# Patient Record
Sex: FEMALE | Race: WHITE | NOT HISPANIC OR LATINO | Employment: FULL TIME | URBAN - METROPOLITAN AREA
[De-identification: names, ages, dates, MRNs, and addresses within clinical notes are randomized per-mention and may not be internally consistent; named-entity substitution may affect disease eponyms.]

---

## 2017-06-22 ENCOUNTER — CONVERSION ENCOUNTER (OUTPATIENT)
Dept: GENERAL RADIOLOGY | Facility: HOSPITAL | Age: 52
End: 2017-06-22

## 2018-03-15 ENCOUNTER — OFFICE VISIT CONVERTED (OUTPATIENT)
Dept: ORTHOPEDIC SURGERY | Facility: CLINIC | Age: 53
End: 2018-03-15
Attending: ORTHOPAEDIC SURGERY

## 2018-03-15 ENCOUNTER — CONVERSION ENCOUNTER (OUTPATIENT)
Dept: ORTHOPEDIC SURGERY | Facility: CLINIC | Age: 53
End: 2018-03-15

## 2018-06-07 ENCOUNTER — OFFICE VISIT CONVERTED (OUTPATIENT)
Dept: GASTROENTEROLOGY | Facility: CLINIC | Age: 53
End: 2018-06-07
Attending: PHYSICIAN ASSISTANT

## 2018-12-06 ENCOUNTER — CONVERSION ENCOUNTER (OUTPATIENT)
Dept: CARDIOLOGY | Facility: CLINIC | Age: 53
End: 2018-12-06

## 2018-12-06 ENCOUNTER — OFFICE VISIT CONVERTED (OUTPATIENT)
Dept: CARDIOLOGY | Facility: CLINIC | Age: 53
End: 2018-12-06
Attending: INTERNAL MEDICINE

## 2018-12-13 ENCOUNTER — OFFICE VISIT CONVERTED (OUTPATIENT)
Dept: GASTROENTEROLOGY | Facility: CLINIC | Age: 53
End: 2018-12-13
Attending: INTERNAL MEDICINE

## 2019-01-02 ENCOUNTER — HOSPITAL ENCOUNTER (OUTPATIENT)
Dept: GENERAL RADIOLOGY | Facility: HOSPITAL | Age: 54
Discharge: HOME | End: 2019-01-02

## 2019-01-27 ENCOUNTER — HOSPITAL ENCOUNTER (OUTPATIENT)
Dept: URGENT CARE | Facility: CLINIC | Age: 54
Discharge: HOME | End: 2019-01-27

## 2019-01-27 LAB — MONONUCLEOSIS TEST, QUAL: NEGATIVE

## 2019-01-29 LAB — BACTERIA SPEC AEROBE CULT: NORMAL

## 2019-03-20 ENCOUNTER — HOSPITAL ENCOUNTER (OUTPATIENT)
Dept: GENERAL RADIOLOGY | Facility: HOSPITAL | Age: 54
Discharge: HOME | End: 2019-03-20
Attending: NURSE PRACTITIONER

## 2019-06-13 ENCOUNTER — HOSPITAL ENCOUNTER (OUTPATIENT)
Dept: GENERAL RADIOLOGY | Facility: HOSPITAL | Age: 54
Discharge: HOME | End: 2019-06-13
Attending: NURSE PRACTITIONER

## 2019-06-20 ENCOUNTER — HOSPITAL ENCOUNTER (OUTPATIENT)
Dept: OTHER | Facility: HOSPITAL | Age: 54
Discharge: HOME | End: 2019-06-20
Attending: NURSE PRACTITIONER

## 2019-06-20 LAB
ALBUMIN SERPL-MCNC: 4.4 G/DL (ref 3.5–5)
ALBUMIN/GLOB SERPL: 1.8 {RATIO} (ref 1.4–2.6)
ALP SERPL-CCNC: 94 U/L (ref 53–141)
ALT SERPL-CCNC: 22 U/L (ref 10–40)
AMYLASE SERPL-CCNC: 79 U/L (ref 30–110)
ANION GAP SERPL CALC-SCNC: 16 MMOL/L (ref 8–19)
AST SERPL-CCNC: 22 U/L (ref 15–50)
BASOPHILS # BLD AUTO: 0.06 10*3/UL (ref 0–0.2)
BASOPHILS NFR BLD AUTO: 0.8 % (ref 0–3)
BILIRUB SERPL-MCNC: 0.35 MG/DL (ref 0.2–1.3)
BUN SERPL-MCNC: 15 MG/DL (ref 5–25)
BUN/CREAT SERPL: 15 {RATIO} (ref 6–20)
CALCIUM SERPL-MCNC: 9.7 MG/DL (ref 8.7–10.4)
CHLORIDE SERPL-SCNC: 101 MMOL/L (ref 99–111)
CONV ABS IMM GRAN: 0.03 10*3/UL (ref 0–0.2)
CONV CO2: 31 MMOL/L (ref 22–32)
CONV IMMATURE GRAN: 0.4 % (ref 0–1.8)
CONV TOTAL PROTEIN: 6.8 G/DL (ref 6.3–8.2)
CREAT UR-MCNC: 0.98 MG/DL (ref 0.5–0.9)
DEPRECATED RDW RBC AUTO: 45.9 FL (ref 36.4–46.3)
EOSINOPHIL # BLD AUTO: 0.14 10*3/UL (ref 0–0.7)
EOSINOPHIL # BLD AUTO: 1.9 % (ref 0–7)
ERYTHROCYTE [DISTWIDTH] IN BLOOD BY AUTOMATED COUNT: 13.2 % (ref 11.7–14.4)
GFR SERPLBLD BASED ON 1.73 SQ M-ARVRAT: >60 ML/MIN/{1.73_M2}
GLOBULIN UR ELPH-MCNC: 2.4 G/DL (ref 2–3.5)
GLUCOSE SERPL-MCNC: 86 MG/DL (ref 65–99)
HBA1C MFR BLD: 14.1 G/DL (ref 12–16)
HCT VFR BLD AUTO: 44.7 % (ref 37–47)
LIPASE SERPL-CCNC: 51 U/L (ref 5–51)
LYMPHOCYTES # BLD AUTO: 1.84 10*3/UL (ref 1–5)
MCH RBC QN AUTO: 30 PG (ref 27–31)
MCHC RBC AUTO-ENTMCNC: 31.5 G/DL (ref 33–37)
MCV RBC AUTO: 95.1 FL (ref 81–99)
MONOCYTES # BLD AUTO: 0.69 10*3/UL (ref 0.2–1.2)
MONOCYTES NFR BLD AUTO: 9.4 % (ref 3–10)
NEUTROPHILS # BLD AUTO: 4.59 10*3/UL (ref 2–8)
NEUTROPHILS NFR BLD AUTO: 62.5 % (ref 30–85)
NRBC CBCN: 0 % (ref 0–0.7)
OSMOLALITY SERPL CALC.SUM OF ELEC: 296 MOSM/KG (ref 273–304)
PLATELET # BLD AUTO: 283 10*3/UL (ref 130–400)
PMV BLD AUTO: 10.4 FL (ref 9.4–12.3)
POTASSIUM SERPL-SCNC: 4.6 MMOL/L (ref 3.5–5.3)
RBC # BLD AUTO: 4.7 10*6/UL (ref 4.2–5.4)
SODIUM SERPL-SCNC: 143 MMOL/L (ref 135–147)
VARIANT LYMPHS NFR BLD MANUAL: 25 % (ref 20–45)
WBC # BLD AUTO: 7.35 10*3/UL (ref 4.8–10.8)

## 2019-06-21 ENCOUNTER — HOSPITAL ENCOUNTER (OUTPATIENT)
Dept: ULTRASOUND IMAGING | Facility: HOSPITAL | Age: 54
Discharge: HOME | End: 2019-06-21
Attending: FAMILY MEDICINE

## 2019-06-22 LAB — BACTERIA UR CULT: NORMAL

## 2019-09-26 ENCOUNTER — HOSPITAL ENCOUNTER (OUTPATIENT)
Dept: OTHER | Facility: HOSPITAL | Age: 54
Discharge: HOME | End: 2019-09-26

## 2019-09-26 LAB
ALBUMIN SERPL-MCNC: 4.5 G/DL (ref 3.5–5)
ALBUMIN/GLOB SERPL: 1.7 {RATIO} (ref 1.4–2.6)
ALP SERPL-CCNC: 101 U/L (ref 53–141)
ALT SERPL-CCNC: 19 U/L (ref 10–40)
ANION GAP SERPL CALC-SCNC: 18 MMOL/L (ref 8–19)
AST SERPL-CCNC: 17 U/L (ref 15–50)
BASOPHILS # BLD AUTO: 0.05 10*3/UL (ref 0–0.2)
BASOPHILS NFR BLD AUTO: 0.9 % (ref 0–3)
BILIRUB SERPL-MCNC: 0.59 MG/DL (ref 0.2–1.3)
BUN SERPL-MCNC: 19 MG/DL (ref 5–25)
BUN/CREAT SERPL: 22 {RATIO} (ref 6–20)
CALCIUM SERPL-MCNC: 9.6 MG/DL (ref 8.7–10.4)
CHLORIDE SERPL-SCNC: 104 MMOL/L (ref 99–111)
CHOLEST SERPL-MCNC: 257 MG/DL (ref 107–200)
CHOLEST/HDLC SERPL: 4.4 {RATIO} (ref 3–6)
CONV ABS IMM GRAN: 0.02 10*3/UL (ref 0–0.2)
CONV CO2: 24 MMOL/L (ref 22–32)
CONV IMMATURE GRAN: 0.4 % (ref 0–1.8)
CONV TOTAL PROTEIN: 7.1 G/DL (ref 6.3–8.2)
CREAT UR-MCNC: 0.86 MG/DL (ref 0.5–0.9)
DEPRECATED RDW RBC AUTO: 40.4 FL (ref 36.4–46.3)
EOSINOPHIL # BLD AUTO: 0.09 10*3/UL (ref 0–0.7)
EOSINOPHIL # BLD AUTO: 1.7 % (ref 0–7)
ERYTHROCYTE [DISTWIDTH] IN BLOOD BY AUTOMATED COUNT: 12.5 % (ref 11.7–14.4)
GFR SERPLBLD BASED ON 1.73 SQ M-ARVRAT: >60 ML/MIN/{1.73_M2}
GLOBULIN UR ELPH-MCNC: 2.6 G/DL (ref 2–3.5)
GLUCOSE SERPL-MCNC: 95 MG/DL (ref 65–99)
HCT VFR BLD AUTO: 40.1 % (ref 37–47)
HDLC SERPL-MCNC: 58 MG/DL (ref 40–60)
HGB BLD-MCNC: 13.4 G/DL (ref 12–16)
LDLC SERPL CALC-MCNC: 181 MG/DL (ref 70–100)
LYMPHOCYTES # BLD AUTO: 1.47 10*3/UL (ref 1–5)
LYMPHOCYTES NFR BLD AUTO: 27.2 % (ref 20–45)
MCH RBC QN AUTO: 29.5 PG (ref 27–31)
MCHC RBC AUTO-ENTMCNC: 33.4 G/DL (ref 33–37)
MCV RBC AUTO: 88.3 FL (ref 81–99)
MONOCYTES # BLD AUTO: 0.49 10*3/UL (ref 0.2–1.2)
MONOCYTES NFR BLD AUTO: 9.1 % (ref 3–10)
NEUTROPHILS # BLD AUTO: 3.28 10*3/UL (ref 2–8)
NEUTROPHILS NFR BLD AUTO: 60.7 % (ref 30–85)
NRBC CBCN: 0 % (ref 0–0.7)
OSMOLALITY SERPL CALC.SUM OF ELEC: 296 MOSM/KG (ref 273–304)
PLATELET # BLD AUTO: 279 10*3/UL (ref 130–400)
PMV BLD AUTO: 10 FL (ref 9.4–12.3)
POTASSIUM SERPL-SCNC: 3.7 MMOL/L (ref 3.5–5.3)
RBC # BLD AUTO: 4.54 10*6/UL (ref 4.2–5.4)
SODIUM SERPL-SCNC: 142 MMOL/L (ref 135–147)
TRIGL SERPL-MCNC: 92 MG/DL (ref 40–150)
TSH SERPL-ACNC: 2.17 M[IU]/L (ref 0.27–4.2)
VLDLC SERPL-MCNC: 18 MG/DL (ref 5–37)
WBC # BLD AUTO: 5.4 10*3/UL (ref 4.8–10.8)

## 2019-12-06 ENCOUNTER — HOSPITAL ENCOUNTER (OUTPATIENT)
Dept: OTHER | Facility: HOSPITAL | Age: 54
Discharge: HOME | End: 2019-12-06
Attending: INTERNAL MEDICINE

## 2019-12-06 LAB
ALBUMIN SERPL-MCNC: 4.2 G/DL (ref 3.5–5)
ALBUMIN/GLOB SERPL: 1.5 {RATIO} (ref 1.4–2.6)
ALP SERPL-CCNC: 96 U/L (ref 53–141)
ALT SERPL-CCNC: 16 U/L (ref 10–40)
ANION GAP SERPL CALC-SCNC: 16 MMOL/L (ref 8–19)
AST SERPL-CCNC: 15 U/L (ref 15–50)
BILIRUB SERPL-MCNC: 0.41 MG/DL (ref 0.2–1.3)
BUN SERPL-MCNC: 15 MG/DL (ref 5–25)
BUN/CREAT SERPL: 20 {RATIO} (ref 6–20)
CALCIUM SERPL-MCNC: 9.3 MG/DL (ref 8.7–10.4)
CHLORIDE SERPL-SCNC: 104 MMOL/L (ref 99–111)
CHOLEST SERPL-MCNC: 227 MG/DL (ref 107–200)
CHOLEST/HDLC SERPL: 4 {RATIO} (ref 3–6)
CONV CO2: 26 MMOL/L (ref 22–32)
CONV TOTAL PROTEIN: 7 G/DL (ref 6.3–8.2)
CREAT UR-MCNC: 0.76 MG/DL (ref 0.5–0.9)
GFR SERPLBLD BASED ON 1.73 SQ M-ARVRAT: >60 ML/MIN/{1.73_M2}
GLOBULIN UR ELPH-MCNC: 2.8 G/DL (ref 2–3.5)
GLUCOSE SERPL-MCNC: 93 MG/DL (ref 65–99)
HDLC SERPL-MCNC: 57 MG/DL (ref 40–60)
LDLC SERPL CALC-MCNC: 146 MG/DL (ref 70–100)
OSMOLALITY SERPL CALC.SUM OF ELEC: 295 MOSM/KG (ref 273–304)
POTASSIUM SERPL-SCNC: 3.8 MMOL/L (ref 3.5–5.3)
SODIUM SERPL-SCNC: 142 MMOL/L (ref 135–147)
TRIGL SERPL-MCNC: 120 MG/DL (ref 40–150)
VLDLC SERPL-MCNC: 24 MG/DL (ref 5–37)

## 2019-12-08 ENCOUNTER — HOSPITAL ENCOUNTER (OUTPATIENT)
Dept: URGENT CARE | Facility: CLINIC | Age: 54
Discharge: HOME | End: 2019-12-08
Attending: EMERGENCY MEDICINE

## 2019-12-09 ENCOUNTER — OFFICE VISIT CONVERTED (OUTPATIENT)
Dept: CARDIOLOGY | Facility: CLINIC | Age: 54
End: 2019-12-09
Attending: INTERNAL MEDICINE

## 2019-12-10 LAB — BACTERIA SPEC AEROBE CULT: NORMAL

## 2019-12-20 ENCOUNTER — HOSPITAL ENCOUNTER (OUTPATIENT)
Dept: OTHER | Facility: HOSPITAL | Age: 54
Discharge: HOME | End: 2019-12-20
Attending: NURSE PRACTITIONER

## 2019-12-20 ENCOUNTER — OFFICE VISIT CONVERTED (OUTPATIENT)
Dept: PULMONOLOGY | Facility: CLINIC | Age: 54
End: 2019-12-20
Attending: NURSE PRACTITIONER

## 2019-12-20 LAB
ALBUMIN SERPL-MCNC: 4 G/DL (ref 3.5–5)
ALBUMIN/GLOB SERPL: 1.6 {RATIO} (ref 1.4–2.6)
ALP SERPL-CCNC: 115 U/L (ref 53–141)
ALT SERPL-CCNC: 20 U/L (ref 10–40)
ANION GAP SERPL CALC-SCNC: 15 MMOL/L (ref 8–19)
AST SERPL-CCNC: 16 U/L (ref 15–50)
BASOPHILS # BLD AUTO: 0.07 10*3/UL (ref 0–0.2)
BASOPHILS NFR BLD AUTO: 0.8 % (ref 0–3)
BILIRUB SERPL-MCNC: 0.26 MG/DL (ref 0.2–1.3)
BUN SERPL-MCNC: 18 MG/DL (ref 5–25)
BUN/CREAT SERPL: 20 {RATIO} (ref 6–20)
CALCIUM SERPL-MCNC: 9.2 MG/DL (ref 8.7–10.4)
CHLORIDE SERPL-SCNC: 101 MMOL/L (ref 99–111)
CONV ABS IMM GRAN: 0.04 10*3/UL (ref 0–0.2)
CONV CO2: 29 MMOL/L (ref 22–32)
CONV IMMATURE GRAN: 0.4 % (ref 0–1.8)
CONV TOTAL PROTEIN: 6.5 G/DL (ref 6.3–8.2)
CREAT UR-MCNC: 0.89 MG/DL (ref 0.5–0.9)
DEPRECATED RDW RBC AUTO: 42.3 FL (ref 36.4–46.3)
EOSINOPHIL # BLD AUTO: 0.09 10*3/UL (ref 0–0.7)
EOSINOPHIL # BLD AUTO: 1 % (ref 0–7)
ERYTHROCYTE [DISTWIDTH] IN BLOOD BY AUTOMATED COUNT: 12.8 % (ref 11.7–14.4)
GFR SERPLBLD BASED ON 1.73 SQ M-ARVRAT: >60 ML/MIN/{1.73_M2}
GLOBULIN UR ELPH-MCNC: 2.5 G/DL (ref 2–3.5)
GLUCOSE SERPL-MCNC: 97 MG/DL (ref 65–99)
HCT VFR BLD AUTO: 41.7 % (ref 37–47)
HGB BLD-MCNC: 13.5 G/DL (ref 12–16)
LYMPHOCYTES # BLD AUTO: 2.95 10*3/UL (ref 1–5)
LYMPHOCYTES NFR BLD AUTO: 31.8 % (ref 20–45)
MCH RBC QN AUTO: 29.1 PG (ref 27–31)
MCHC RBC AUTO-ENTMCNC: 32.4 G/DL (ref 33–37)
MCV RBC AUTO: 89.9 FL (ref 81–99)
MONOCYTES # BLD AUTO: 0.87 10*3/UL (ref 0.2–1.2)
MONOCYTES NFR BLD AUTO: 9.4 % (ref 3–10)
NEUTROPHILS # BLD AUTO: 5.25 10*3/UL (ref 2–8)
NEUTROPHILS NFR BLD AUTO: 56.6 % (ref 30–85)
NRBC CBCN: 0 % (ref 0–0.7)
OSMOLALITY SERPL CALC.SUM OF ELEC: 292 MOSM/KG (ref 273–304)
PLATELET # BLD AUTO: 296 10*3/UL (ref 130–400)
PMV BLD AUTO: 9.6 FL (ref 9.4–12.3)
POTASSIUM SERPL-SCNC: 4.5 MMOL/L (ref 3.5–5.3)
RBC # BLD AUTO: 4.64 10*6/UL (ref 4.2–5.4)
SODIUM SERPL-SCNC: 140 MMOL/L (ref 135–147)
WBC # BLD AUTO: 9.27 10*3/UL (ref 4.8–10.8)

## 2019-12-28 LAB — IGE SERPL-ACNC: 7 K[IU]/ML (ref 0–24)

## 2020-01-03 ENCOUNTER — HOSPITAL ENCOUNTER (OUTPATIENT)
Dept: CARDIOLOGY | Facility: HOSPITAL | Age: 55
Discharge: HOME | End: 2020-01-03
Attending: NURSE PRACTITIONER

## 2020-01-13 ENCOUNTER — OFFICE VISIT CONVERTED (OUTPATIENT)
Dept: PULMONOLOGY | Facility: CLINIC | Age: 55
End: 2020-01-13
Attending: INTERNAL MEDICINE

## 2020-02-26 ENCOUNTER — HOSPITAL ENCOUNTER (OUTPATIENT)
Dept: OTHER | Facility: HOSPITAL | Age: 55
Discharge: HOME | End: 2020-02-26
Attending: NURSE PRACTITIONER

## 2020-02-26 LAB
T4 FREE SERPL-MCNC: 1.3 NG/DL (ref 0.9–1.8)
TSH SERPL-ACNC: 2.76 M[IU]/L (ref 0.27–4.2)

## 2020-03-04 ENCOUNTER — HOSPITAL ENCOUNTER (OUTPATIENT)
Dept: OTHER | Facility: HOSPITAL | Age: 55
Discharge: HOME | End: 2020-03-04
Attending: PODIATRIST

## 2020-03-04 LAB
ALBUMIN SERPL-MCNC: 3.9 G/DL (ref 3.5–5)
ALBUMIN/GLOB SERPL: 1.4 {RATIO} (ref 1.4–2.6)
ALP SERPL-CCNC: 94 U/L (ref 53–141)
ALT SERPL-CCNC: 23 U/L (ref 10–40)
ANION GAP SERPL CALC-SCNC: 15 MMOL/L (ref 8–19)
AST SERPL-CCNC: 16 U/L (ref 15–50)
BILIRUB SERPL-MCNC: 0.83 MG/DL (ref 0.2–1.3)
BILIRUB SERPL-MCNC: 0.86 MG/DL (ref 0.2–1.3)
BUN SERPL-MCNC: 18 MG/DL (ref 5–25)
BUN/CREAT SERPL: 22 {RATIO} (ref 6–20)
CALCIUM SERPL-MCNC: 8.9 MG/DL (ref 8.7–10.4)
CHLORIDE SERPL-SCNC: 101 MMOL/L (ref 99–111)
CHOLEST SERPL-MCNC: 174 MG/DL (ref 107–200)
CHOLEST/HDLC SERPL: 2.4 {RATIO} (ref 3–6)
CONV BILI, CONJUGATED: <0.2 MG/DL (ref 0–0.6)
CONV CO2: 26 MMOL/L (ref 22–32)
CONV TOTAL PROTEIN: 6.7 G/DL (ref 6.3–8.2)
CONV UNCONJUGATED BILIRUBIN: 0.7 MG/DL (ref 0–1.1)
CREAT UR-MCNC: 0.82 MG/DL (ref 0.5–0.9)
GFR SERPLBLD BASED ON 1.73 SQ M-ARVRAT: >60 ML/MIN/{1.73_M2}
GLOBULIN UR ELPH-MCNC: 2.8 G/DL (ref 2–3.5)
GLUCOSE SERPL-MCNC: 97 MG/DL (ref 65–99)
HDLC SERPL-MCNC: 72 MG/DL (ref 40–60)
LDLC SERPL CALC-MCNC: 90 MG/DL (ref 70–100)
OSMOLALITY SERPL CALC.SUM OF ELEC: 286 MOSM/KG (ref 273–304)
POTASSIUM SERPL-SCNC: 4.6 MMOL/L (ref 3.5–5.3)
SODIUM SERPL-SCNC: 137 MMOL/L (ref 135–147)
TRIGL SERPL-MCNC: 60 MG/DL (ref 40–150)
VLDLC SERPL-MCNC: 12 MG/DL (ref 5–37)

## 2020-03-05 ENCOUNTER — OFFICE VISIT CONVERTED (OUTPATIENT)
Dept: PULMONOLOGY | Facility: CLINIC | Age: 55
End: 2020-03-05
Attending: INTERNAL MEDICINE

## 2020-08-18 ENCOUNTER — HOSPITAL ENCOUNTER (OUTPATIENT)
Dept: OTHER | Facility: HOSPITAL | Age: 55
Discharge: HOME | End: 2020-08-18
Attending: INTERNAL MEDICINE

## 2020-08-18 LAB
ALBUMIN SERPL-MCNC: 4.3 G/DL (ref 3.5–5)
ALBUMIN/GLOB SERPL: 1.7 {RATIO} (ref 1.4–2.6)
ALP SERPL-CCNC: 100 U/L (ref 53–141)
ALT SERPL-CCNC: 22 U/L (ref 10–40)
ANION GAP SERPL CALC-SCNC: 21 MMOL/L (ref 8–19)
AST SERPL-CCNC: 21 U/L (ref 15–50)
BILIRUB SERPL-MCNC: 0.76 MG/DL (ref 0.2–1.3)
BUN SERPL-MCNC: 16 MG/DL (ref 5–25)
BUN/CREAT SERPL: 18 {RATIO} (ref 6–20)
CALCIUM SERPL-MCNC: 9.8 MG/DL (ref 8.7–10.4)
CHLORIDE SERPL-SCNC: 108 MMOL/L (ref 99–111)
CHOLEST SERPL-MCNC: 167 MG/DL (ref 107–200)
CHOLEST/HDLC SERPL: 2.7 {RATIO} (ref 3–6)
CONV CO2: 22 MMOL/L (ref 22–32)
CONV TOTAL PROTEIN: 6.8 G/DL (ref 6.3–8.2)
CREAT UR-MCNC: 0.89 MG/DL (ref 0.5–0.9)
GFR SERPLBLD BASED ON 1.73 SQ M-ARVRAT: >60 ML/MIN/{1.73_M2}
GLOBULIN UR ELPH-MCNC: 2.5 G/DL (ref 2–3.5)
GLUCOSE SERPL-MCNC: 91 MG/DL (ref 65–99)
HDLC SERPL-MCNC: 61 MG/DL (ref 40–60)
LDLC SERPL CALC-MCNC: 88 MG/DL (ref 70–100)
OSMOLALITY SERPL CALC.SUM OF ELEC: 305 MOSM/KG (ref 273–304)
POTASSIUM SERPL-SCNC: 4.1 MMOL/L (ref 3.5–5.3)
SODIUM SERPL-SCNC: 147 MMOL/L (ref 135–147)
TRIGL SERPL-MCNC: 89 MG/DL (ref 40–150)
VLDLC SERPL-MCNC: 18 MG/DL (ref 5–37)

## 2020-08-19 ENCOUNTER — OFFICE VISIT CONVERTED (OUTPATIENT)
Dept: CARDIOLOGY | Facility: CLINIC | Age: 55
End: 2020-08-19
Attending: INTERNAL MEDICINE

## 2020-09-23 ENCOUNTER — HOSPITAL ENCOUNTER (OUTPATIENT)
Dept: OTHER | Facility: HOSPITAL | Age: 55
Discharge: HOME | End: 2020-09-23

## 2020-09-23 LAB
ALBUMIN SERPL-MCNC: 4.5 G/DL (ref 3.5–5)
ALBUMIN/GLOB SERPL: 1.7 {RATIO} (ref 1.4–2.6)
ALP SERPL-CCNC: 95 U/L (ref 53–141)
ALT SERPL-CCNC: 20 U/L (ref 10–40)
ANION GAP SERPL CALC-SCNC: 14 MMOL/L (ref 8–19)
AST SERPL-CCNC: 21 U/L (ref 15–50)
BASOPHILS # BLD AUTO: 0.05 10*3/UL (ref 0–0.2)
BASOPHILS NFR BLD AUTO: 0.7 % (ref 0–3)
BILIRUB SERPL-MCNC: 0.6 MG/DL (ref 0.2–1.3)
BUN SERPL-MCNC: 15 MG/DL (ref 5–25)
BUN/CREAT SERPL: 17 {RATIO} (ref 6–20)
CALCIUM SERPL-MCNC: 9.1 MG/DL (ref 8.7–10.4)
CHLORIDE SERPL-SCNC: 102 MMOL/L (ref 99–111)
CHOLEST SERPL-MCNC: 226 MG/DL (ref 107–200)
CHOLEST/HDLC SERPL: 3.8 {RATIO} (ref 3–6)
CONV ABS IMM GRAN: 0.02 10*3/UL (ref 0–0.2)
CONV CO2: 28 MMOL/L (ref 22–32)
CONV IMMATURE GRAN: 0.3 % (ref 0–1.8)
CONV TOTAL PROTEIN: 7.1 G/DL (ref 6.3–8.2)
CREAT UR-MCNC: 0.9 MG/DL (ref 0.5–0.9)
DEPRECATED RDW RBC AUTO: 42 FL (ref 36.4–46.3)
EOSINOPHIL # BLD AUTO: 0.11 10*3/UL (ref 0–0.7)
EOSINOPHIL # BLD AUTO: 1.4 % (ref 0–7)
ERYTHROCYTE [DISTWIDTH] IN BLOOD BY AUTOMATED COUNT: 12.8 % (ref 11.7–14.4)
GFR SERPLBLD BASED ON 1.73 SQ M-ARVRAT: >60 ML/MIN/{1.73_M2}
GLOBULIN UR ELPH-MCNC: 2.6 G/DL (ref 2–3.5)
GLUCOSE SERPL-MCNC: 95 MG/DL (ref 65–99)
HCT VFR BLD AUTO: 42.6 % (ref 37–47)
HDLC SERPL-MCNC: 60 MG/DL (ref 40–60)
HGB BLD-MCNC: 14 G/DL (ref 12–16)
LDLC SERPL CALC-MCNC: 139 MG/DL (ref 70–100)
LYMPHOCYTES # BLD AUTO: 2.36 10*3/UL (ref 1–5)
LYMPHOCYTES NFR BLD AUTO: 31 % (ref 20–45)
MCH RBC QN AUTO: 29.2 PG (ref 27–31)
MCHC RBC AUTO-ENTMCNC: 32.9 G/DL (ref 33–37)
MCV RBC AUTO: 88.8 FL (ref 81–99)
MONOCYTES # BLD AUTO: 0.7 10*3/UL (ref 0.2–1.2)
MONOCYTES NFR BLD AUTO: 9.2 % (ref 3–10)
NEUTROPHILS # BLD AUTO: 4.37 10*3/UL (ref 2–8)
NEUTROPHILS NFR BLD AUTO: 57.4 % (ref 30–85)
NRBC CBCN: 0 % (ref 0–0.7)
OSMOLALITY SERPL CALC.SUM OF ELEC: 291 MOSM/KG (ref 273–304)
PLATELET # BLD AUTO: 266 10*3/UL (ref 130–400)
PMV BLD AUTO: 9.6 FL (ref 9.4–12.3)
POTASSIUM SERPL-SCNC: 4.2 MMOL/L (ref 3.5–5.3)
RBC # BLD AUTO: 4.8 10*6/UL (ref 4.2–5.4)
SODIUM SERPL-SCNC: 140 MMOL/L (ref 135–147)
TRIGL SERPL-MCNC: 137 MG/DL (ref 40–150)
TSH SERPL-ACNC: 2.68 M[IU]/L (ref 0.27–4.2)
VLDLC SERPL-MCNC: 27 MG/DL (ref 5–37)
WBC # BLD AUTO: 7.61 10*3/UL (ref 4.8–10.8)

## 2021-04-16 ENCOUNTER — HOSPITAL ENCOUNTER (OUTPATIENT)
Dept: INTERNAL MEDICINE | Facility: CLINIC | Age: 56
Discharge: HOME | End: 2021-04-16
Attending: INTERNAL MEDICINE

## 2021-04-16 ENCOUNTER — OFFICE VISIT CONVERTED (OUTPATIENT)
Dept: INTERNAL MEDICINE | Facility: CLINIC | Age: 56
End: 2021-04-16
Attending: INTERNAL MEDICINE

## 2021-04-16 LAB
25(OH)D3 SERPL-MCNC: 16.3 NG/ML (ref 30–100)
ALBUMIN SERPL-MCNC: 4.2 G/DL (ref 3.5–5)
ALBUMIN/GLOB SERPL: 1.5 {RATIO} (ref 1.4–2.6)
ALP SERPL-CCNC: 83 U/L (ref 53–141)
ALT SERPL-CCNC: 18 U/L (ref 10–40)
ANION GAP SERPL CALC-SCNC: 14 MMOL/L (ref 8–19)
AST SERPL-CCNC: 21 U/L (ref 15–50)
BASOPHILS # BLD AUTO: 0.06 10*3/UL (ref 0–0.2)
BASOPHILS NFR BLD AUTO: 1.1 % (ref 0–3)
BILIRUB SERPL-MCNC: 0.67 MG/DL (ref 0.2–1.3)
BUN SERPL-MCNC: 14 MG/DL (ref 5–25)
BUN/CREAT SERPL: 16 {RATIO} (ref 6–20)
CALCIUM SERPL-MCNC: 9.4 MG/DL (ref 8.7–10.4)
CHLORIDE SERPL-SCNC: 104 MMOL/L (ref 99–111)
CHOLEST SERPL-MCNC: 239 MG/DL (ref 107–200)
CHOLEST/HDLC SERPL: 4.2 {RATIO} (ref 3–6)
CONV ABS IMM GRAN: 0.01 10*3/UL (ref 0–0.2)
CONV CO2: 26 MMOL/L (ref 22–32)
CONV IMMATURE GRAN: 0.2 % (ref 0–1.8)
CONV TOTAL PROTEIN: 7 G/DL (ref 6.3–8.2)
CREAT UR-MCNC: 0.88 MG/DL (ref 0.5–0.9)
DEPRECATED RDW RBC AUTO: 42.3 FL (ref 36.4–46.3)
EOSINOPHIL # BLD AUTO: 0.15 10*3/UL (ref 0–0.7)
EOSINOPHIL # BLD AUTO: 2.8 % (ref 0–7)
ERYTHROCYTE [DISTWIDTH] IN BLOOD BY AUTOMATED COUNT: 13.1 % (ref 11.7–14.4)
GFR SERPLBLD BASED ON 1.73 SQ M-ARVRAT: >60 ML/MIN/{1.73_M2}
GLOBULIN UR ELPH-MCNC: 2.8 G/DL (ref 2–3.5)
GLUCOSE SERPL-MCNC: 93 MG/DL (ref 65–99)
HCT VFR BLD AUTO: 43.1 % (ref 37–47)
HDLC SERPL-MCNC: 57 MG/DL (ref 40–60)
HGB BLD-MCNC: 14 G/DL (ref 12–16)
LDLC SERPL CALC-MCNC: 156 MG/DL (ref 70–100)
LYMPHOCYTES # BLD AUTO: 1.11 10*3/UL (ref 1–5)
LYMPHOCYTES NFR BLD AUTO: 20.9 % (ref 20–45)
MCH RBC QN AUTO: 28.6 PG (ref 27–31)
MCHC RBC AUTO-ENTMCNC: 32.5 G/DL (ref 33–37)
MCV RBC AUTO: 88.1 FL (ref 81–99)
MONOCYTES # BLD AUTO: 0.56 10*3/UL (ref 0.2–1.2)
MONOCYTES NFR BLD AUTO: 10.5 % (ref 3–10)
NEUTROPHILS # BLD AUTO: 3.43 10*3/UL (ref 2–8)
NEUTROPHILS NFR BLD AUTO: 64.5 % (ref 30–85)
NRBC CBCN: 0 % (ref 0–0.7)
OSMOLALITY SERPL CALC.SUM OF ELEC: 288 MOSM/KG (ref 273–304)
PLATELET # BLD AUTO: 275 10*3/UL (ref 130–400)
PMV BLD AUTO: 10.6 FL (ref 9.4–12.3)
POTASSIUM SERPL-SCNC: 4.6 MMOL/L (ref 3.5–5.3)
RBC # BLD AUTO: 4.89 10*6/UL (ref 4.2–5.4)
SODIUM SERPL-SCNC: 139 MMOL/L (ref 135–147)
TRIGL SERPL-MCNC: 129 MG/DL (ref 40–150)
TSH SERPL-ACNC: 0.7 M[IU]/L (ref 0.27–4.2)
VLDLC SERPL-MCNC: 26 MG/DL (ref 5–37)
WBC # BLD AUTO: 5.32 10*3/UL (ref 4.8–10.8)

## 2021-05-11 NOTE — H&P
History and Physical      Patient Name: Mariama Turner   Patient ID: [id number]   Sex: Female   YOB: 1965    Primary Care Provider: James Araiza MD    Visit Date: April 16, 2021    Provider: Usman Castillo MD   Location: [facility]   Location Address: [address] [place] [address]; [address]  [place], [place]  [address]   Location Phone: [phone]          Chief Complaint  · Est Care      History Of Present Illness  Mariama Turner is a 55 year old /White female who presents for evaluation and treatment of:      Pt states she has concerns in regards to her thyroid. pt also with Night sweats, weight gain, right flank pain. pt reports right flank pain seems different than renal stones in the past. pt reports night sweats for last 1-2 months where she has to change clothes at night.  pt has been on estrogen patch since 2011. pt has tried taken a break from medication, but get hot flashes. pt has not seen OB in several years.    aortic stenosis- pt is being monitored by cardiology.   palpitations- pt is on atenolol to help control.   hypothyroid- due for recheck. pt has been taking 25mcg for about 4 years.   anxiety- pt report taking it most days, but does forget it intermittently. pt denies HI and SI.   HLD- pt reports having bad pains in her feet that she thinks may be related to statin. pt has previously tried pravastatin without much help. doing well on zetia.   pt will take ultram intermittently for hand pain and twitching. pt does not recall when she took it last  ADD- pt would take Adderall once daily when she goes to work. pt notices that she does focus better with Adderall. pt reports having some side effects with Adderall.   vit D def due for recheck  mammo- due  pap- 1.5 years ago, h/o cervical adenocarcinoma s/p loop in 1993. pt reports neg paps since then  colon Ca- last done 3 years ago and due now.   +FamHx of CAD         Allergy  "List    Allergies Reconciled  Vitals  Date Time BP Position Site L\R Cuff Size HR RR TEMP (F) WT  HT  BMI kg/m2 BSA m2 O2 Sat FR L/min FiO2 HC       08/19/2020 03:17 /80 Sitting    86 - R   174lbs 0oz 5'  4\" 29.87 1.89       04/16/2021 01:26 /79 Sitting    76 - R  97.9 179lbs 0oz 5'  4\" 30.72 1.91 99 %  21%          Physical Examination  · Constitutional  o Appearance  o : no acute distress, well-nourished  · Head and Face  o Head  o :   § Inspection  § : atraumatic, normocephalic  · Eyes  o Eyes  o : extraocular movements intact, no scleral icterus, no conjunctival injection  · Ears, Nose, Mouth and Throat  o Ears  o :   § External Ears  § : normal  o Nose  o :   § Intranasal Exam  § : nares patent  o Oral Cavity  o :   § Oral Mucosa  § : moist mucous membranes  · Neck  o Thyroid  o : gland size normal, nontender, no nodules or masses present on palpation, symmetric  · Respiratory  o Respiratory Effort  o : breathing comfortably, symmetric chest rise  o Auscultation of Lungs  o : clear to asculatation bilaterally, no wheezes, rales, or rhonchii  · Cardiovascular  o Heart  o :   § Auscultation of Heart  § : regular rate and rhythm, no murmurs, rubs, or gallops  o Peripheral Vascular System  o :   § Extremities  § : no edema  · Lymphatic  o Neck  o : no lymphadenopathy present  o Supraclavicular Nodes  o : no supraclavicular nodes  · Neurologic  o Mental Status Examination  o :   § Orientation  § : grossly oriented to person, place and time  o Gait and Station  o :   § Gait Screening  § : normal gait  · Psychiatric  o General  o : normal mood and affect              Assessment  · Screening for colon cancer     V76.51/Z12.11  · Visit for screening mammogram     V76.12/Z12.31  · Hyperlipidemia     272.4/E78.5  cont livalo and zetia. check lipids. f/u with cards  · Hypothyroidism     244.9/E03.9  check TSH. may DC Synthroid if normal given small dose.  · Vitamin D deficiency     268.9/E55.9  check " level  · Right flank pain     789.09/R10.9  check urine dip to further eval.   · Night sweats     780.8/R61  unknown etiology. may be from hormone use? will check labs.   · Aortic stenosis     424.1/I35.0  cont f/u with cardiology. reportedly reassuring echo on last eval.   · ADD (attention deficit disorder)     314.00/F98.8  will switch to Strattera. off Adderall. f/u in 1 month for repeat eval.   · Anxiety     300.00/F41.9  off Lexapro with concerns for side effects. start Strattera.   · Palpitations     785.1/R00.2  cont BB per cards. doing well.     Problems Reconciled  Plan  · Orders  o COLONOSCOPY REFERRAL (COLON) - V76.51/Z12.11 - 04/16/2021  o Screening Mammography; Bilateral 2D (, 90535) - V76.12/Z12.31 - 04/16/2021  o Physical, Primary Care Panel (CBC, CMP, Lipid, TSH) [facility] (60999, 01244, 51101, 37810) - 244.9/E03.9, 272.4/E78.5 - 04/16/2021  o Vitamin D (25-Hydroxy) Level (99021) - 268.9/E55.9 - 04/16/2021  o ACO-39: Current medications updated and reviewed (, 1159F) - - 04/16/2021  · Medications  o Strattera 60 mg oral capsule   SIG: take 1 capsule (60 mg) by oral route once daily in the morning for 30 days   DISP: (30) Capsule with 1 refills  Prescribed on 04/16/2021     o Adderall 20 mg oral tablet   SIG: take 1 tablet (20 mg) by oral route 2 times per day before breakfast and at noon   DISP: (0) tablet with 0 refills  Discontinued on 04/16/2021     o Medications have been Reconciled  o Transition of Care or Provider Policy  · Instructions  o Patient was educated/instructed on their diagnosis, treatment and medications prior to discharge from the clinic today.  o 40 Minutes spent with patient including chart review and Education/Counseling/Care Coordination.  · Disposition  o f/u in 1 month  o labs done in clinic            Electronically Signed by: Usman Castillo MD -Author on April 19, 2021 07:28:59 AM

## 2021-05-13 NOTE — PROGRESS NOTES
Progress Note      Patient Name: Mariama Turner   Patient ID: [id number]   Sex: Female   YOB: 1965    Primary Care Provider: James Araiza MD   Referring Provider: Elizabeth Mishra MD    Visit Date: August 19, 2020    Provider: Elizabeth Mishra MD   Location: [facility]   Location Address: [address] [place] [address] A   LETTY Carvajal  [id number]   Location Phone: [phone]          Chief Complaint  · Palpitations   · Myalgias       History Of Present Illness  REFERRING PROVIDER: James Araiza MD   Mariama Turner is a 54-year-old female with palpitations and hyperlipidemia, who is doing well from a cardiac standpoint. Her episodes of palpitations have become quite infrequent and occur once or twice a month, and they are self-limiting. She still has quite a bit of myalgias every night. She thinks they are related to the Crestor. She denies dizziness or syncope.   PAST MEDICAL HISTORY: Gestational diabetes (resolved); Hypothyroidism; Hyperlipidemia; Hypertension; Mitral valve regurgitation; Symptomatic palpitations.   FAMILY HISTORY: Positive for diabetes, hypertension and heart disease.   PSYCHOSOCIAL HISTORY: She rarely drinks alcohol and never used tobacco.   CURRENT MEDICATIONS: include Atenolol 25 mg at bedtime; Crestor 5 mg at bedtime; Zetia 10 mg at bedtime; Potassium Chloride 10 mEq daily; Lasix 40 mg daily; Lexapro 20 mg daily; Levothyroxine 50 mcg daily; Albuterol inhaler p.r.n. The dosage and frequency of the medications were reviewed with the patient.       Review of Systems  · Cardiovascular  o Admits  o : palpitations (fast, fluttering, or skipping beats), swelling (feet, ankles, hands), chest pain or angina pectoris   o Denies  o : shortness of breath while walking or lying flat  · Respiratory  o Admits  o : asthma or wheezing  o Denies  o : chronic or frequent cough      Vitals  Date Time BP Position Site L\R Cuff Size HR RR TEMP (F) WT  HT  BMI kg/m2 BSA m2  "O2 Sat        08/19/2020 03:17 /80 Sitting    86 - R   174lbs 0oz 5'  4\" 29.87 1.89     08/19/2020 03:17 /78 Sitting    88 - R                 Physical Examination  · Constitutional  o Appearance  o : Awake, alert, in no acute distress.  · Respiratory  o Respiratory  o : Good respiratory effort. Clear to percussion and auscultation.  · Cardiovascular  o Heart  o : PMI not well felt. S1, S2 regular. No S3. No S4. 1-2/6 systolic murmur at the apex. Negative diastolic murmur.   o Peripheral Vascular System  o :   § Extremities  § : Trace pedal edema, left more than right. Good femoral and pedal pulses.   · Gastrointestinal  o Abdominal Examination  o : Soft. No tenderness or masses felt. No hepatosplenomegaly. Abdominal aorta is not palpable.     Her home blood pressure readings are much better.    Chemistry panel is normal.  HDL 61, LDL 88, TRG 89.               Assessment     1.  Palpitations, stable, improved.  2.  Hypertension controlled based on home blood pressure readings.  3.  Hyperlipidemia at goal, but probably intolerant to Crestor.         Plan  · Medications  o Medications have been Reconciled  o Transition of Care or Provider Policy     I will have her discontinue the Crestor, and if after 2 weeks her symptoms are resolved, then we will consider it to be a Crestor myalgia and will consider either trying Livalo or Nexletol.    Elizabeth Mishra M.D., [facility]  pmm/dmd           This note was transcribed by Pastora Logan.  dmd/pmm  The above service was transcribed by Pastora Logan, and I attest to the accuracy of the note.  PMM                 Electronically Signed by: Pastora Logan-, -Author on August 21, 2020 09:23:36 AM  Electronically Co-signed by: Elizabeth Mishra MD -Reviewer on August 22, 2020 03:53:28 PM  "

## 2021-05-14 VITALS
SYSTOLIC BLOOD PRESSURE: 132 MMHG | HEART RATE: 76 BPM | HEIGHT: 64 IN | OXYGEN SATURATION: 99 % | DIASTOLIC BLOOD PRESSURE: 79 MMHG | TEMPERATURE: 97.9 F | BODY MASS INDEX: 30.56 KG/M2 | WEIGHT: 179 LBS

## 2021-05-15 VITALS
HEART RATE: 86 BPM | SYSTOLIC BLOOD PRESSURE: 150 MMHG | DIASTOLIC BLOOD PRESSURE: 80 MMHG | BODY MASS INDEX: 29.71 KG/M2 | HEIGHT: 64 IN | WEIGHT: 174 LBS

## 2021-05-15 VITALS
DIASTOLIC BLOOD PRESSURE: 70 MMHG | WEIGHT: 172 LBS | SYSTOLIC BLOOD PRESSURE: 154 MMHG | BODY MASS INDEX: 29.37 KG/M2 | HEIGHT: 64 IN | HEART RATE: 78 BPM

## 2021-05-16 VITALS
RESPIRATION RATE: 12 BRPM | HEART RATE: 95 BPM | WEIGHT: 167.5 LBS | HEIGHT: 63 IN | DIASTOLIC BLOOD PRESSURE: 75 MMHG | SYSTOLIC BLOOD PRESSURE: 139 MMHG | BODY MASS INDEX: 29.68 KG/M2 | OXYGEN SATURATION: 97 %

## 2021-05-16 VITALS — HEIGHT: 63 IN | WEIGHT: 164 LBS | HEART RATE: 73 BPM | OXYGEN SATURATION: 98 % | BODY MASS INDEX: 29.06 KG/M2

## 2021-05-16 VITALS
WEIGHT: 171 LBS | HEIGHT: 64 IN | HEART RATE: 66 BPM | SYSTOLIC BLOOD PRESSURE: 136 MMHG | DIASTOLIC BLOOD PRESSURE: 82 MMHG | BODY MASS INDEX: 29.19 KG/M2

## 2021-05-16 VITALS
HEART RATE: 74 BPM | OXYGEN SATURATION: 97 % | DIASTOLIC BLOOD PRESSURE: 78 MMHG | SYSTOLIC BLOOD PRESSURE: 126 MMHG | WEIGHT: 173 LBS

## 2021-05-27 ENCOUNTER — TRANSCRIBE ORDERS (OUTPATIENT)
Dept: INTERNAL MEDICINE | Facility: CLINIC | Age: 56
End: 2021-05-27

## 2021-05-27 DIAGNOSIS — Z12.31 ENCOUNTER FOR SCREENING MAMMOGRAM FOR BREAST CANCER: Primary | ICD-10-CM

## 2021-05-28 VITALS
HEART RATE: 84 BPM | WEIGHT: 170 LBS | DIASTOLIC BLOOD PRESSURE: 85 MMHG | HEIGHT: 63 IN | RESPIRATION RATE: 14 BRPM | HEART RATE: 100 BPM | BODY MASS INDEX: 30.12 KG/M2 | SYSTOLIC BLOOD PRESSURE: 147 MMHG | TEMPERATURE: 98.1 F | RESPIRATION RATE: 14 BRPM | TEMPERATURE: 98 F | HEIGHT: 63 IN | OXYGEN SATURATION: 96 % | BODY MASS INDEX: 30.65 KG/M2 | DIASTOLIC BLOOD PRESSURE: 60 MMHG | WEIGHT: 173 LBS | OXYGEN SATURATION: 97 % | SYSTOLIC BLOOD PRESSURE: 137 MMHG

## 2021-05-28 VITALS
HEART RATE: 51 BPM | BODY MASS INDEX: 30.39 KG/M2 | TEMPERATURE: 98.1 F | HEIGHT: 63 IN | WEIGHT: 171.5 LBS | DIASTOLIC BLOOD PRESSURE: 68 MMHG | RESPIRATION RATE: 16 BRPM | OXYGEN SATURATION: 98 % | SYSTOLIC BLOOD PRESSURE: 129 MMHG

## 2021-05-28 NOTE — PROGRESS NOTES
Patient: HCRISTIAN STONE     Acct: [id number]     Report: #[id number]  UNIT #: [id number]     : 1965    Encounter Date:2020  PRIMARY CARE: BRAXTON FOSTER  ***Signed***  --------------------------------------------------------------------------------------------------------------------  Chief Complaint      Encounter Date      Mar 5, 2020            Primary Care Provider      BRAXTON FOSTER            Referring Provider      SOBEIDA PANG            Patient Complaint      Patient is complaining of      Patient here today for f/u, Asthma            VITALS      Height 63 in / 160.02 cm      Weight 173 lbs  / 78.274924 kg      BSA 1.82 m2      BMI 30.6 kg/m2      Temperature 98 F / 36.67 C - Oral      Pulse 100      Respirations 14      Blood Pressure 147/85 Sitting, Left Arm      Pulse Oximetry 97%, room air      Initial Exhaled Nitrous Oxide      Date:  Dec 20, 2019      Exhaled Nitrous Oxide Results:  8            HPI      The patient is a 54 year old female here for follow up today.             The patient is doing well, her cough is completely resolved. She reports     compliance with the use of her bronchodilator therapies and she thinks these     medicines are helping. She feel overall feels much better than her last office     visit.            ROS      Constitutional:  Denies: Fatigue, Fever, Weight gain, Weight loss, Chills,     Insomnia, Other      Respiratory/Breathing:  Complains of: Shortness of air; Denies: Wheezing, Cough,    Hemoptysis, Pleuritic pain, Other      Endocrine:  Denies: Polydipsia, Polyuria, Heat/cold intolerance, Abnorml     menstrual pattern, Diabetes, Other      Eyes:  Denies: Blurred vision, Vision Changes, Other      Ears, nose, mouth, throat:  Denies: Mouth lesions, Thrush, Throat pain,     Hoarseness, Allergies/Hay Fever, Post Nasal Drip, Headaches, Recent Head Injury,    Nose Bleeding, Neck Stiffness, Thyroid Mass, Hearing Loss, Ear Fullness, Dry     Mouth,  Nasal or Sinus Pain, Dry Lips, Nasal discharge, Nasal congestion, Other      Cardiovascular:  Denies: Palpitations, Syncope, Claudication, Chest Pain, Wake     up Gasping for air, Leg Swelling, Irregular Heart Rate, Cyanosis, Dyspnea on     Exertion, Other      Gastrointestinal:  Denies: Nausea, Constipation, Diarrhea, Abdominal pain,     Vomiting, Difficulty Swallowing, Reflux/Heartburn, Dysphagia, Jaundice,     Bloating, Melena, Bloody stools, Other      Genitourinary:  Denies: Urinary frequency, Incontinence, Hematuria, Urgency,     Nocturia, Dysuria, Testicular problems, Other      Musculoskeletal:  Denies: Joint Pain, Joint Stiffness, Joint Swelling, Myalgias,    Other      Hematologic/lymphatic:  DENIES: Lymphadenopathy, Bruising, Bleeding tendencies,     Other      Neurological:  Denies: Headache, Numbness, Weakness, Seizures, Other      Psychiatric:  Denies: Anxiety, Appropriate Effect, Depression, Other      Sleep:  No: Excessive daytime sleep, Morning Headache?, Snoring, Insomnia?, Stop    breathing at sleep?, Other      Integumentary:  Denies: Rash, Dry skin, Skin Warm to Touch, Other      Immunologic/Allergic:  Denies: Latex allergy, Seasonal allergies, Asthma,     Urticaria, Eczema, Other      Immunization status:  No: Up to date            FAMILY/SOCIAL/MEDICAL HX      Surgical History:  Yes: Bowel Surgery (COLONOSCOPY 1989, 2000,2017), Cholecyste    ctomy (LAP CHOL 2005), Head Surgery (RHINOPLASTY 1979), Nose Surgery, Oral     Surgery (TONSILLECTOMY 1997,EGD)      Stroke - Family Hx:  Father, Uncle      Heart - Family Hx:  Father, Sister, Grandparent, Uncle      Diabetes - Family Hx:  Father      Cancer/Type - Family Hx:  Mother, Aunt      Is Father Still Living?:  No      Is Mother Still Living?:  No       Family History:  Yes      Smoking status:  Never smoker      Anticoagulation Therapy:  No      Antibiotic Prophylaxis:  Yes      Medical History:  Yes: Arthritis, Asthma (INHALER PRN), Blood  Disease (HX     ANEMIA), Chemotherapy/Cancer (CERVICAL CA IN 1995, REMOVED IN 1995),     Hemorrhoids/Rectal Prob (REFLUX, HX ULCERS, HIATAL HERNIA, IBS), High Blood     Pressure (GESTATIONAL INDUCED.), Shortness Of Breath (ASTHMA, HX BRONCHITIS,HX     PNEUMONIA IN 1993), Miscellaneous Medical/oth (FIBROMYALGIA); No: Congestive     Heart Failu, Deafness or Ringing Ears (BILATERAL DECREASED HEARING), Diabetes     (GESTATIONAL DIABETES 1997, HX HYPOGLYCEMIA), Seizures, Heart Attack, Sinus     Trouble      Psychiatric History      none            PREVENTION      Hx Influenza Vaccination:  Yes      Date Influenza Vaccine Given:  Oct 1, 2019      Influenza Vaccine Declined:  No      2 or More Falls Past Year?:  No      Fall Past Year with Injury?:  No      Hx Pneumococcal Vaccination:  No      Encouraged to follow-up with:  PCP regarding preventative exams.      Chart initiated by      Tona Valadez CMA            ALLERGIES/MEDICATIONS      Allergies:        Coded Allergies:             CEFTRIAXONE (Verified  Allergy, Severe, ITCHING, SWELLING, RED RASH,     3/5/20)           IODINE (Verified  Allergy, Severe, ANAPHYLACTIC REACTION, 3/5/20)      Medications    Last Reconciled on 3/5/20 13:09 by CHETNA LÓPEZ MD      Tiotropium Bromide (Spiriva Respimat 1.25 mcg/puff) 4 Gm Mist.inhal               Prov: Chetna López         1/13/20       Esomeprazole Mag (nexIUM) 40 Mg Capsule      40 MG PO QDAY@07, #30 CAP 11 Refills         Prov: Chetna López         1/13/20       Montelukast Sodium (Singulair*) 10 Mg Tab      10 MG PO HS, #30 TAB 11 Refills         Prov: Chetna López         1/13/20       Tiotropium Bromide (Spiriva Respimat 1.25 mcg/puff) 4 Gm Mist.inhal      2 PUFFS INH QDAY, #1 INH 6 Refills         Prov: Chetna López         1/13/20       Ezetimide (Zetia) 10 Mg Tablet      10 MG PO HS, #30 TAB 0 Refills         Reported         1/13/20       Budesonide/Formoterol Fumarate (Symbicort 160/4.5 Mcg) 10.2  Gm Inh      2 PUFF INH RTBID, #1 INH 5 Refills         Prov: LESTER DAVILA [facility]         12/20/19       NEB-Albuterol Sulf (Albuterol) 2.5 Mg/3 Ml Vial.neb      2.5 MG INH Q4H PRN for SHORTNESS OF BREATH, #120 NEB 0 Refills         Reported         12/20/19       Escitalopram Oxalate (Escitalopram Oxalate*) 5 Mg Tablet      10 MG PO QDAY, TAB         Reported         7/20/18       Potassium Chloride (K-Dur*) 10 Meq Tab.er.prt      10 MEQ PO TID, #90 TAB 0 Refills         Reported         7/20/18       Rosuvastatin Calcium (Crestor*) 5 Mg Tab      5 MG PO HS, #30 TAB 0 Refills         Reported         4/29/18       Levothyroxine (Synthroid) 50 Mcg Tablet      0.05 MG PO QDAY@07, #30 TAB 0 Refills         Reported         3/6/17       Furosemide (Furosemide) 40 Mg Tablet      40 MG PO QDAY, #30 TAB 0 Refills         Reported         7/15/16       Estradiol (Vivelle-Dot 0.075 MG/24 HR) 0.075 Mg Patch.tdbw      1 PATCH TRANSDERM ONCE WEEKLY, PATCH.BWK 0 Refills         Reported         7/15/16       traMADol (Ultram) 50 Mg Tablet      50 MG PO BID PRN for JOINT PAIN, TAB 0 Refills         Reported         6/2/09       Albuterol (*Albuterol Inhaler) 17 Gm Inh      2 PUFF IH Q4HR PRN for SHORTNESS OF BREATH, #1 INH 0 Refills         Reported         6/2/09       Atenolol (ATENOLOL) 25 Mg Tablet      25 MG PO HS, TAB 0 Refills         Reported         6/2/09      Current Medications      Current Medications Reviewed 3/5/20            EXAM      Vital Signs Reviewed      Gen: WDWN, Alert, NAD.        HEENT:  PERRL, EOMI.  OP, nares clear, no sinus tenderness.      Neck:  Supple, no JVD, no thyromegaly.      Lymph: No axillary, cervical, supraclavicular lymphadenopathy noted bilaterally.      Chest:  Good aeration, clear to auscultation bilaterally, tympanic to percussion    bilaterally, no work of breathing noted.      CV:  RRR, no MGR, pulses 2+, equal.      Abd:  Soft, NT, ND, + BS, no HSM.      EXT:  No clubbing, no  cyanosis, no edema, no joint tenderness.       Neuro:  A  Skin: No rashes or lesions.      Vtials      Vitals:             Height 63 in / 160.02 cm           Weight 173 lbs  / 78.060195 kg           BSA 1.82 m2           BMI 30.6 kg/m2           Temperature 98 F / 36.67 C - Oral           Pulse 100           Respirations 14           Blood Pressure 147/85 Sitting, Left Arm           Pulse Oximetry 97%, room air            REVIEW      Results Reviewed      [facility] Results Reviewed?:  Yes Prev Lab Results, Yes Prev Radiology Results, Yes     Previous [facility] Records            Assessment      ASSESSMENT:       1.  Asthma.        2. Cough variant asthma.      3. History of gastroesophageal reflux disease.      4. Allergic rhinitis.      5. History of esophageal stricture, status post dilatation.            PLAN:      1. Continue Symbicort 160.      2. Continue Spiriva given persistent symptoms.      3. Continue  Singulair 10 mg po qhs.      4. Continue  patient on Protonix.      5.I have personally reviewed laboratory data, imaging as well as previous     medical records.            Patient Education      Education resources provided:  Yes      Patient Education Provided:  Acute Asthma            Electronically signed by Felipe López  03/24/2020 16:24       Disclaimer: Converted document may not contain table formatting or lab diagrams. Please see [facility] for the authenticated document.

## 2021-05-28 NOTE — PROGRESS NOTES
Patient: CHRISTIAN STONE     Acct: [id number]     Report: #[id number]  UNIT #: [id number]     : 1965    Encounter Date:2019  PRIMARY CARE: BRAXTON FOSTER  ***Signed***  --------------------------------------------------------------------------------------------------------------------  Chief Complaint      Encounter Date      Dec 20, 2019            Primary Care Provider      BRAXTON FOSTER            Referring Provider      SOBEIDA PANG            Patient Complaint      Patient is complaining of      NEW PT  HERE FOR ASTHMA AND COPD            VITALS      Height 5 ft 3 in / 160.02 cm      Weight 171 lbs 8 oz / 77.036750 kg      BSA 1.81 m2      BMI 30.4 kg/m2      Temperature 98.1 F / 36.72 C - Oral      Pulse 51      Respirations 16      Blood Pressure 129/68 Sitting, Left Arm      Pulse Oximetry 98%, room air      Initial Exhaled Nitrous Oxide      Date:  Dec 20, 2019      Exhaled Nitrous Oxide Results:  8            HPI      The patient is a 54 year old  female who presents today with increasing    shortness of air, coughing, wheezing, nasal congestion, and post nasal drip. The    patient states that symptoms started over one year ago and her PCP, Dr. Albert    had given her an albuterol inhaler to use as needed and albuterol nebs.  The     patient states that her cough is productive and is dark brown to yellow and     white at times.  The patient states that she was seen at the [facility] over one     week ago and was prescribed Augmentin, however it was not working, so they     switched her to Levaquin and gave her a Medrol dose pack.  The patient states     she is feeling somewhat better, however she is still short of air and is only     able to walk about 500-1000 feet before having to stop and rest.  The patient     states she used to be able to climb up five flights of stairs and now is only     able to climb up two flights of stairs before needing to stop and rest.   The      patient states that she does have diffuse joint pain at times.The patient states    that she was tested for Rheumatoid Arthritis and lLupus, however the test came     back negative and she was told that she had Fibromyalgia.  The patient states     that she did have a pet bird back in 1993, but the only animal pet that she has     now is a dog.  The patient states the dog does not sleep with her.  The patient     states that she also used to get allergy shots twice a week, however she is no     longer doing that.  The patient states she has never smoked cigarettes, however     she was exposed to secondhand smoke exposure up until the age of 37.  The     patient states that she used to see an  allergist and had allergy testing and     did have some allergies to mold. The patient states that she may have possible     mold exposure, however she denies any chemical exposure.  The patient states she    did have a maternal uncle that had histoplasmosis and may have had another uncle    that had silicosis.  The patient states that symptoms are worse with exertion,     moderate in severity and improved with rest.  The patient states since starting     her albuterol nebulizers her symptoms have improved.  The patient denies fevers,    chills, night sweats, hemoptysis, chest pain, chest tightness, reflux, swollen     glands in the head and neck, malignancies, nausea, vomiting, diarrhea, chest     pain, chest tightness. The patient states she was diagnosed with asthma at the     age of 38.  The patient states she has a history of aortic stenosis, is under     the care of Dr. Mishra, seen by him two weeks ago and had an EKG.  Patient states    she is scheduled to have a stress test completed.  Patient denies any history of    blood clots, bleeding disorders. No recent travel. Patient states that she does     use an Estrogen patch.            I have personally reviewed the review of systems, past family, social, surgical      and medical histories and I agree with those as entered in the chart.      Copies To:   Felipe López      Constitutional:  Complains of: Fatigue; Denies: Fever, Weight gain, Weight loss,    Chills, Insomnia, Other      Respiratory/Breathing:  Complains of: Shortness of air, Wheezing, Cough; Denies:    Hemoptysis, Pleuritic pain, Other      Endocrine:  Denies: Polydipsia, Polyuria, Heat/cold intolerance, Abnorml     menstrual pattern, Diabetes, Other      Eyes:  Denies: Blurred vision, Vision Changes, Other      Ears, nose, mouth, throat:  Complains of: Nasal discharge; Denies: Mouth     lesions, Thrush, Throat pain, Hoarseness, Allergies/Hay Fever, Post Nasal Drip,     Headaches, Recent Head Injury, Nose Bleeding, Neck Stiffness, Thyroid Mass,     Hearing Loss, Ear Fullness, Dry Mouth, Nasal or Sinus Pain, Dry Lips, Nasal     congestion, Other      Cardiovascular:  Denies: Palpitations, Syncope, Claudication, Chest Pain, Wake     up Gasping for air, Leg Swelling, Irregular Heart Rate, Cyanosis, Dyspnea on     Exertion, Other      Gastrointestinal:  Denies: Nausea, Constipation, Diarrhea, Abdominal pain,     Vomiting, Difficulty Swallowing, Reflux/Heartburn, Dysphagia, Jaundice,     Bloating, Melena, Bloody stools, Other      Genitourinary:  Denies: Urinary frequency, Incontinence, Hematuria, Urgency,     Nocturia, Dysuria, Testicular problems, Other      Musculoskeletal:  Denies: Joint Pain, Joint Stiffness, Joint Swelling, Myalgias,    Other      Hematologic/lymphatic:  DENIES: Lymphadenopathy, Bruising, Bleeding tendencies,     Other      Neurological:  Complains of: Headache; Denies: Numbness, Weakness, Seizures,     Other      Psychiatric:  Denies: Anxiety, Appropriate Effect, Depression, Other      Sleep:  No: Excessive daytime sleep, Morning Headache?, Snoring, Insomnia?, Stop    breathing at sleep?, Other      Integumentary:  Denies: Rash, Dry skin, Skin Warm to Touch, Other       Immunologic/Allergic:  Denies: Latex allergy, Seasonal allergies, Asthma,     Urticaria, Eczema, Other      Immunization status:  No: Up to date            FAMILY/SOCIAL/MEDICAL HX      Surgical History:  Yes: Bowel Surgery (COLONOSCOPY 1989, 2000,2017),     Cholecystectomy (LAP CHOL 2005), Head Surgery (RHINOPLASTY 1979), Nose Surgery,     Oral Surgery (TONSILLECTOMY 1997,EGD)      Stroke - Family Hx:  Father, Uncle      Heart - Family Hx:  Father, Sister, Grandparent, Uncle      Diabetes - Family Hx:  Father      Cancer/Type - Family Hx:  Mother, Aunt      Is Father Still Living?:  No      Is Mother Still Living?:  No       Family History:  Yes      Smoking status:  Never smoker      Anticoagulation Therapy:  No      Antibiotic Prophylaxis:  Yes      Medical History:  Yes: Arthritis, Asthma (INHALER PRN), Blood Disease (HX     ANEMIA), Chemotherapy/Cancer (CERVICAL CA IN 1995, REMOVED IN 1995),     Hemorrhoids/Rectal Prob (REFLUX, HX ULCERS, HIATAL HERNIA, IBS), High Blood     Pressure (GESTATIONAL INDUCED.), Shortness Of Breath (ASTHMA, HX BRONCHITIS,HX     PNEUMONIA IN 1993), Miscellaneous Medical/oth (FIBROMYALGIA); No: Congestive     Heart Failu, Deafness or Ringing Ears (BILATERAL DECREASED HEARING), Diabetes     (GESTATIONAL DIABETES 1997, HX HYPOGLYCEMIA), Seizures, Heart Attack      Psychiatric History      NONE            PREVENTION      Hx Influenza Vaccination:  Yes      Date Influenza Vaccine Given:  Oct 1, 2019      Influenza Vaccine Declined:  No      2 or More Falls Past Year?:  No      Fall Past Year with Injury?:  No      Hx Pneumococcal Vaccination:  No      Encouraged to follow-up with:  PCP regarding preventative exams.      Chart initiated by      ABBEY INMAN MA            ALLERGIES/MEDICATIONS      Allergies:        Coded Allergies:             CEFTRIAXONE (Verified  Allergy, Severe, ITCHING, SWELLING, RED RASH,     12/20/19)           IODINE (Verified  Allergy, Severe, ANAPHYLACTIC  REACTION, 12/20/19)      Medications    Last Reconciled on 12/20/19 09:43 by LESTER DAVILA,       Budesonide/Formoterol Fumarate (Symbicort 160/4.5 Mcg) 10.2 Gm Inh      2 PUFF INH RTBID, #1 INH 5 Refills         Prov: LESTER DAVILA [facility]         12/20/19       NEB-Albuterol Sulf (Albuterol) 2.5 Mg/3 Ml Vial.neb      2.5 MG INH Q4H PRN for SHORTNESS OF BREATH, #120 NEB 0 Refills         Reported         12/20/19       Escitalopram Oxalate (Escitalopram Oxalate*) 5 Mg Tablet      10 MG PO QDAY, TAB         Reported         7/20/18       Potassium Chloride (K-Dur*) 10 Meq Tab.er.prt      10 MEQ PO TID, #90 TAB 0 Refills         Reported         7/20/18       Rosuvastatin Calcium (Crestor*) 5 Mg Tab      5 MG PO HS, #30 TAB 0 Refills         Reported         4/29/18       Levothyroxine (Synthroid) 50 Mcg Tablet      0.05 MG PO QDAY@07, #30 TAB 0 Refills         Reported         3/6/17       Furosemide (Furosemide) 40 Mg Tablet      40 MG PO QDAY, #30 TAB 0 Refills         Reported         7/15/16       Estradiol (Vivelle-Dot 0.075 MG/24 HR) 0.075 Mg Patch.tdbw      1 PATCH TRANSDERM ONCE WEEKLY, PATCH.BWK 0 Refills         Reported         7/15/16       traMADol (Ultram) 50 Mg Tablet      50 MG PO BID PRN for JOINT PAIN, TAB 0 Refills         Reported         6/2/09       Albuterol (*Albuterol Inhaler) 17 Gm Inh      2 PUFF IH Q4HR PRN for SHORTNESS OF BREATH, #1 INH 0 Refills         Reported         6/2/09       Atenolol (ATENOLOL) 25 Mg Tablet      25 MG PO HS, TAB 0 Refills         Reported         6/2/09      Current Medications      Current Medications Reviewed 12/20/19            EXAM      GEN-well built, well nourished, in no acute distress.        Eyes-PERRL,  conjunctiva are normal in appearance extraocular muscles are     intact, no scleral icterus      Nasal-both nares are patent turbinates appear normal no polyps seen no nasal     discharge or ulcerations      Ears-tympanic membranes are normal no  erythema no bulging, normal to inspection      Lymphatic-no swollen or enlarged cervical nodes, or axillary node, or femoral     nodes, or supraclavicular nodes      Mouth normal dentition, no erythema no ulcerations oropharynx appears normal no     exudate no evidence of postnasal drip      Neck-there are no palpable supraclavicular or cervical adenopathy, thyroid is     normal in appearance no apparent nodules, there is no inspiratory or expiratory     stridor      Respiratory-chest is normal in appearance.  Lungs are clear to auscultation     bilaterally.  No wheezes, rales or rhonchi appreciated, normal work of breathing    noted.  The patient is able to speak full sentences without difficulty.        Cardiovascular-the heart rate is normal and regular S1 and S2 present with no     murmur or extra heart sounds, there is no JVD or pedal edema present      GI-the abdomen is normal in appearance, bowel sounds present and normal in all     quadrants no hepatosplenomegaly or masses felt      Extremities-no clubbing is present, pulses present in all extremities, capillary    refill time is normal      Musculoskeletal-Normal strength in upper and lower extremities, inspection shows    no evidence of muscle atrophy      Skin-skin is normal in appearance it is warm and dry, no rashes present, no     evidence of cyanosis, palpation reveals no masses      Neurological-the patient is alert and oriented to time place and person, moves     all 4 extremities, normal gait, normal affect and mood, CN2-12 intact      Psych-normal judgment and insight is good, normal mood and affect, alert and     oriented to person, place, time and date      Vtials      Vitals:             Height 5 ft 3 in / 160.02 cm           Weight 171 lbs 8 oz / 77.261670 kg           BSA 1.81 m2           BMI 30.4 kg/m2           Temperature 98.1 F / 36.72 C - Oral           Pulse 51           Respirations 16           Blood Pressure 129/68 Sitting, Left Arm            Pulse Oximetry 98%, room air            REVIEW      Results Reviewed      PCCS Results Reviewed?:  Yes Prev Lab Results, Yes Prev Radiology Results, Yes     Previous [facility] Records            Assessment      Dyspnea - R06.00            Productive cough - R05            Notes      New Medications      * NEB-Albuterol Sulf (Albuterol) 2.5 MG/3 ML VIAL.NEB: 2.5 MG INH Q4H PRN SHORTN      ESS OF BREATH #120         Instructions: DIAGNOSIS CODE REQUIRED PRIOR TO PRESCRIBING.      * Budesonide/Formoterol Fumarate (Symbicort 160/4.5 Mcg) 10.2 GM INH: 2 PUFF INH      RTBID #1         Dx: Dyspnea - R06.00      * Budesonide/Formoterol Fumarate (Symbicort 160/4.5 Mcg) 10.2 GM INH          Sample - Qty 2      Discontinued Medications      * Amoxicillin 875 MG TABLET: 875 MG PO BID #20      * BENZOCAINE/MENTH/CETYLPYRD CL (CEPACOL SORE THROAT LOZENGE) 1 HERRERA LOZENGE: 1       HERRERA PO Q4H PRN SORE THROAT #1         Instructions: 1 HERRERA      * ACETAMINOPHEN ES (Tylenol Extra Strength) 500 MG TABLET: 500 MG PO Q6H PRN       PAIN OR FEVER #30      * Benzonatate (Tessalon Perles) 100 MG CAP: 100 MG PO TID #30      * methylPREDNISolone Dosepak (Medrol Dosepak) 4 MG TAB.DS.PK: 4 MG PO ASDIR #1         Instructions: Take dosepack as directed. Take all of first day's tablets the        first day.      New Diagnostics      * PFT Comp PrePost DLCO BodBx sx, Week         Dx: Dyspnea - R06.00      * 6 Min Walk w O2 Titration Test, Routine         Dx: Dyspnea - R06.00      * Chest W/O Cont CT, SCHEDULED PROCEDURE         Dx: Dyspnea - R06.00      * Immunoglobulin  E (I, Week         Dx: Dyspnea - R06.00      * Sputum Culture W/Gram Stain, Routine         Dx: Dyspnea - R06.00      * Comp Metabolic Panel, 1 DAY         Dx: Dyspnea - R06.00      * CBC, Routine         Dx: Dyspnea - R06.00      ASSESSMENT:       1.  Dyspnea.,      2. Cough.      3. Wheezing.      4.  History of asthma.              PLAN:        1. FENO was performed in the  office today with a level of 8 signifying no     eosinophilic airway inflammation.      2.  The patient with cough and dyspnea for the past year.  We will order a CBC,     IgE level, chest CT without contrast, pulmonary function test, six minute walk     test.      3. We will start patient on Symbicort two puffs twice a day.  The patient is     advised to rinse mouth out after each use. Inhaler demonstration performed in     the office today. Samples of Symbicort given to the patient in the office today.     Patient to continue Ventolin inhaler and albuterol nebulizers as needed.        4.  We will request records from patient's PCP, Dr. Albert.  Unfortunately, no    records were sent over to the office and were not available with the patient in     the office today.       5. The patient states she has already received the flu shot. We will hold off on    pneumonia vaccines for now and discuss with the patient  at next office visit.        6.  The patient is to follow up with Dr. López in one month, sooner if needed.            Patient Education      Patient Education Provided:  Acute Bronchitis      Time Spent:  > 50% /Coord Care            Electronically signed by LESTER DAVILA [facility]  12/23/2019 14:19       Disclaimer: Converted document may not contain table formatting or lab diagrams. Please see [facility] for the authenticated document.

## 2021-05-28 NOTE — PROGRESS NOTES
Patient: CHRISTIAN STONE     Acct: [id number]     Report: #[id number]  UNIT #: [id number]     : 1965    Encounter Date:2020  PRIMARY CARE: BRAXTON FOSTER  ***Signed***  --------------------------------------------------------------------------------------------------------------------  Chief Complaint      Encounter Date      2020            Primary Care Provider      BRAXTON FOSETR            Referring Provider      SOBEIDA PANG            Patient Complaint      Patient is complaining of      Pt here for f/u, Dyspnea            VITALS      Height 5 ft 3 in / 160.02 cm      Weight 170 lbs 0 oz / 77.847999 kg      BSA 1.80 m2      BMI 30.1 kg/m2      Temperature 98.1 F / 36.72 C - Oral      Pulse 84      Respirations 14      Blood Pressure 137/60 Sitting, Left Arm      Pulse Oximetry 96%, Room air      Initial Exhaled Nitrous Oxide      Date:  Dec 20, 2019      Exhaled Nitrous Oxide Results:  8            HPI      The patient is a 54 year old female with a history of asthma here for follow up.     Patient had pulmonary function studies since last office visit and CT scan of     the chest was unremarkable.  She feels that her breathing has improved and her     cough is much less since starting Symbicort.  She still has dyspnea, worse with     exertion, better with rest.  She does have some chest tightness with no overt     chest pain and lower extremity edema. She is still with cough, nasal congestion     and drainage.  She still has acid reflux, but not currently taking any     medications. She describes her symptoms as mild in severity now, there were     moderate to severe in nature prior.            ROS      Constitutional:  Denies: Fatigue, Fever, Weight gain, Weight loss, Chills,     Insomnia, Other      Respiratory/Breathing:  Complains of: Shortness of air, Cough; Denies: Wheezing,    Hemoptysis, Pleuritic pain, Other      Endocrine:  Denies: Polydipsia, Polyuria, Heat/cold  intolerance, Abnorml me    nstrual pattern, Diabetes, Other      Eyes:  Denies: Blurred vision, Vision Changes, Other      Ears, nose, mouth, throat:  Denies: Mouth lesions, Thrush, Throat pain,     Hoarseness, Allergies/Hay Fever, Post Nasal Drip, Headaches, Recent Head Injury,    Nose Bleeding, Neck Stiffness, Thyroid Mass, Hearing Loss, Ear Fullness, Dry     Mouth, Nasal or Sinus Pain, Dry Lips, Nasal discharge, Nasal congestion, Other      Cardiovascular:  Denies: Palpitations, Syncope, Claudication, Chest Pain, Wake     up Gasping for air, Leg Swelling, Irregular Heart Rate, Cyanosis, Dyspnea on     Exertion, Other      Gastrointestinal:  Denies: Nausea, Constipation, Diarrhea, Abdominal pain,     Vomiting, Difficulty Swallowing, Reflux/Heartburn, Dysphagia, Jaundice,     Bloating, Melena, Bloody stools, Other      Genitourinary:  Denies: Urinary frequency, Incontinence, Hematuria, Urgency,     Nocturia, Dysuria, Testicular problems, Other      Musculoskeletal:  Denies: Joint Pain, Joint Stiffness, Joint Swelling, Myalgias,    Other      Hematologic/lymphatic:  DENIES: Lymphadenopathy, Bruising, Bleeding tendencies,     Other      Neurological:  Denies: Headache, Numbness, Weakness, Seizures, Other      Psychiatric:  Denies: Anxiety, Appropriate Effect, Depression, Other      Sleep:  No: Excessive daytime sleep, Morning Headache?, Snoring, Insomnia?, Stop    breathing at sleep?, Other      Integumentary:  Denies: Rash, Dry skin, Skin Warm to Touch, Other      Immunologic/Allergic:  Denies: Latex allergy, Seasonal allergies, Asthma,     Urticaria, Eczema, Other      Immunization status:  No: Up to date            FAMILY/SOCIAL/MEDICAL HX      Surgical History:  Yes: Bowel Surgery (COLONOSCOPY 1989, 2000,2017),     Cholecystectomy (LAP CHOL 2005), Head Surgery (RHINOPLASTY 1979), Nose Surgery,     Oral Surgery (TONSILLECTOMY 1997,EGD)      Stroke - Family Hx:  Father, Uncle      Heart - Family Hx:  Father,  Sister, Grandparent, Uncle      Diabetes - Family Hx:  Father      Cancer/Type - Family Hx:  Mother, Aunt      Is Father Still Living?:  No      Is Mother Still Living?:  No       Family History:  Yes      Smoking status:  Never smoker      Anticoagulation Therapy:  No      Antibiotic Prophylaxis:  Yes      Medical History:  Yes: Arthritis, Asthma (INHALER PRN), Blood Disease (HX     ANEMIA), Chemotherapy/Cancer (CERVICAL CA IN 1995, REMOVED IN 1995),     Hemorrhoids/Rectal Prob (REFLUX, HX ULCERS, HIATAL HERNIA, IBS), High Blood     Pressure (GESTATIONAL INDUCED.), Shortness Of Breath (ASTHMA, HX BRONCHITIS,HX     PNEUMONIA IN 1993), Miscellaneous Medical/oth (FIBROMYALGIA); No: Congestive     Heart Failu, Deafness or Ringing Ears (BILATERAL DECREASED HEARING), Diabetes     (GESTATIONAL DIABETES 1997, HX HYPOGLYCEMIA), Seizures, Heart Attack, Sinus     Trouble      Psychiatric History      None            PREVENTION      Hx Influenza Vaccination:  Yes      Date Influenza Vaccine Given:  Oct 1, 2019      Influenza Vaccine Declined:  No      2 or More Falls Past Year?:  No      Fall Past Year with Injury?:  No      Hx Pneumococcal Vaccination:  No      Encouraged to follow-up with:  PCP regarding preventative exams.      Chart initiated by      Agnieszka Li MA            ALLERGIES/MEDICATIONS      Allergies:        Coded Allergies:             CEFTRIAXONE (Verified  Allergy, Severe, ITCHING, SWELLING, RED RASH,     1/13/20)           IODINE (Verified  Allergy, Severe, ANAPHYLACTIC REACTION, 1/13/20)      Medications    Last Reconciled on 1/13/20 16:16 by FELIPE LOPEZ MD      Esomeprazole Mag (nexIUM) 40 Mg Capsule      40 MG PO QDAY@07, #30 CAP 11 Refills         Prov: Felipe Lopez         1/13/20       Montelukast Sodium (Singulair*) 10 Mg Tab      10 MG PO HS, #30 TAB 11 Refills         Prov: Felipe Lopez         1/13/20       Tiotropium Bromide (Spiriva Respimat 1.25 mcg/puff) 4 Gm Mist.inhal      2  PUFFS INH QDAY, #1 INH 6 Refills         Prov: Felipe López         1/13/20       Ezetimide (Zetia) 10 Mg Tablet      10 MG PO HS, #30 TAB 0 Refills         Reported         1/13/20       Budesonide/Formoterol Fumarate (Symbicort 160/4.5 Mcg) 10.2 Gm Inh      2 PUFF INH RTBID, #1 INH 5 Refills         [facility]         12/20/19       NEB-Albuterol Sulf (Albuterol) 2.5 Mg/3 Ml Vial.neb      2.5 MG INH Q4H PRN for SHORTNESS OF BREATH, #120 NEB 0 Refills         Reported         12/20/19       Escitalopram Oxalate (Escitalopram Oxalate*) 5 Mg Tablet      10 MG PO QDAY, TAB         Reported         7/20/18       Potassium Chloride (K-Dur*) 10 Meq Tab.er.prt      10 MEQ PO TID, #90 TAB 0 Refills         Reported         7/20/18       Rosuvastatin Calcium (Crestor*) 5 Mg Tab      5 MG PO HS, #30 TAB 0 Refills         Reported         4/29/18       Levothyroxine (Synthroid) 50 Mcg Tablet      0.05 MG PO QDAY@07, #30 TAB 0 Refills         Reported         3/6/17       Furosemide (Furosemide) 40 Mg Tablet      40 MG PO QDAY, #30 TAB 0 Refills         Reported         7/15/16       Estradiol (Vivelle-Dot 0.075 MG/24 HR) 0.075 Mg Patch.tdbw      1 PATCH TRANSDERM ONCE WEEKLY, PATCH.BWK 0 Refills         Reported         7/15/16       traMADol (Ultram) 50 Mg Tablet      50 MG PO BID PRN for JOINT PAIN, TAB 0 Refills         Reported         6/2/09       Albuterol (*Albuterol Inhaler) 17 Gm Inh      2 PUFF IH Q4HR PRN for SHORTNESS OF BREATH, #1 INH 0 Refills         Reported         6/2/09       Atenolol (ATENOLOL) 25 Mg Tablet      25 MG PO HS, TAB 0 Refills         Reported         6/2/09      Current Medications      Current Medications Reviewed 1/13/20            EXAM      Vital Signs Reviewed.      General:  WDWN, Alert, NAD.      HEENT: PERRL, EOMI.  OP, nares clear, no sinus tenderness.      Neck: Supple, no JVD, no thyromegaly.      Lymph: No axillary, cervical, supraclavicular lymphadenopathy noted  bilaterally.      Chest: Good aeration, clear to auscultation bilaterally, tympanic to percussion     bilaterally, no work of breathing noted.      CV: RRR, no MGR, pulses 2+, equal.        Abd: Soft, NT, ND, +BS, no HSM.      EXT: No clubbing, no cyanosis, no edema, no joint tenderness.        Neuro:  A  Skin: No rashes or lesions.      Vtials      Vitals:             Height 5 ft 3 in / 160.02 cm           Weight 170 lbs 0 oz / 77.865079 kg           BSA 1.80 m2           BMI 30.1 kg/m2           Temperature 98.1 F / 36.72 C - Oral           Pulse 84           Respirations 14           Blood Pressure 137/60 Sitting, Left Arm           Pulse Oximetry 96%, Room air            REVIEW      Results Reviewed      PCCS Results Reviewed?:  Yes Prev Lab Results, Yes Prev Radiology Results, Yes     Previous [facility] Records            Assessment      Notes      New Medications      * Ezetimide (Zetia) 10 MG TABLET: 10 MG PO HS #30      * TIOTROPIUM BROMIDE (Spiriva Respimat 1.25 mcg/puff) 4 GM MIST.INHAL: 2 PUFFS       INH QDAY #1      * Montelukast Sodium (Singulair*) 10 MG TAB: 10 MG PO HS #30      * Esomeprazole Mag (nexIUM) 40 MG CAPSULE: 40 MG PO QDAY@07 #30         Instructions: Take on an empty stomach, one hour before or two hours after        meal.      * TIOTROPIUM BROMIDE (Spiriva Respimat 1.25 mcg/puff) 4 GM MIST.INHAL          Sample - Qty 2         Instructions: 2 puffs QD         Dx: Dyspnea, unspecified - R06.00      Discontinued Medications      * Budesonide/Formoterol Fumarate (Symbicort 160/4.5 Mcg) 10.2 GM INH          Sample - Qty 2      ASSESSMENT:       1.  Asthma.        2. Cough variant asthma.      3. History of gastroesophageal reflux disease.      4. Allergic rhinitis.      5. History of esophageal stricture, status post dilatation.            PLAN:      1. Continue Symbicort 160.      2. Add Spiriva given persistent symptoms.      3. Add Singulair 10 mg po qhs.      4. Start patient on Protonix.       5. We will see patient back and assess response to treatment.      6. I have personally reviewed laboratory data, imaging as well as previous     medical records.            Patient Education      Education resources provided:  Yes      Patient Education Provided:  Acute Asthma            Electronically signed by Felipe López  01/23/2020 11:56       Disclaimer: Converted document may not contain table formatting or lab diagrams. Please see [employer] for the authenticated document.

## 2021-06-15 ENCOUNTER — APPOINTMENT (OUTPATIENT)
Dept: MAMMOGRAPHY | Facility: HOSPITAL | Age: 56
End: 2021-06-15

## 2021-06-25 ENCOUNTER — HOSPITAL ENCOUNTER (OUTPATIENT)
Dept: MAMMOGRAPHY | Facility: HOSPITAL | Age: 56
Discharge: HOME | End: 2021-06-25
Admitting: INTERNAL MEDICINE

## 2021-06-25 DIAGNOSIS — Z12.31 ENCOUNTER FOR SCREENING MAMMOGRAM FOR BREAST CANCER: ICD-10-CM

## 2021-06-25 PROCEDURE — 77063 BREAST TOMOSYNTHESIS BI: CPT

## 2021-06-25 PROCEDURE — 77067 SCR MAMMO BI INCL CAD: CPT

## 2021-06-28 ENCOUNTER — OFFICE VISIT (OUTPATIENT)
Dept: INTERNAL MEDICINE | Facility: CLINIC | Age: 56
End: 2021-06-28

## 2021-06-28 VITALS
WEIGHT: 169.4 LBS | OXYGEN SATURATION: 97 % | TEMPERATURE: 97.7 F | BODY MASS INDEX: 30.02 KG/M2 | HEIGHT: 63 IN | HEART RATE: 79 BPM | SYSTOLIC BLOOD PRESSURE: 131 MMHG | DIASTOLIC BLOOD PRESSURE: 84 MMHG

## 2021-06-28 DIAGNOSIS — I10 ESSENTIAL HYPERTENSION: Primary | ICD-10-CM

## 2021-06-28 DIAGNOSIS — M25.562 LEFT KNEE PAIN, UNSPECIFIED CHRONICITY: ICD-10-CM

## 2021-06-28 DIAGNOSIS — E78.49 OTHER HYPERLIPIDEMIA: ICD-10-CM

## 2021-06-28 DIAGNOSIS — F41.9 ANXIETY: ICD-10-CM

## 2021-06-28 DIAGNOSIS — B02.9 HERPES ZOSTER WITHOUT COMPLICATION: ICD-10-CM

## 2021-06-28 PROBLEM — Z85.41 HX OF CERVICAL CANCER: Status: ACTIVE | Noted: 2021-06-28

## 2021-06-28 PROBLEM — E11.9 TYPE 2 DIABETES MELLITUS, WITHOUT LONG-TERM CURRENT USE OF INSULIN (HCC): Status: ACTIVE | Noted: 2021-06-28

## 2021-06-28 PROBLEM — E11.9 TYPE 2 DIABETES MELLITUS, WITHOUT LONG-TERM CURRENT USE OF INSULIN: Status: RESOLVED | Noted: 2021-06-28 | Resolved: 2021-06-28

## 2021-06-28 PROBLEM — N20.0 KIDNEY STONES: Status: ACTIVE | Noted: 2021-06-28

## 2021-06-28 PROBLEM — J45.909 ASTHMA: Status: ACTIVE | Noted: 2021-06-28

## 2021-06-28 PROBLEM — M19.90 ARTHRITIS: Status: ACTIVE | Noted: 2021-06-28

## 2021-06-28 PROBLEM — J30.2 SEASONAL ALLERGIC RHINITIS: Status: ACTIVE | Noted: 2021-06-28

## 2021-06-28 PROBLEM — K25.9 GASTRIC ULCER: Status: ACTIVE | Noted: 2021-06-28

## 2021-06-28 PROBLEM — D64.9 ANEMIA: Status: ACTIVE | Noted: 2021-06-28

## 2021-06-28 PROCEDURE — 99214 OFFICE O/P EST MOD 30 MIN: CPT | Performed by: INTERNAL MEDICINE

## 2021-06-28 RX ORDER — VALACYCLOVIR HYDROCHLORIDE 1 G/1
500 TABLET, FILM COATED ORAL 2 TIMES DAILY
COMMUNITY
End: 2021-06-28 | Stop reason: SDUPTHER

## 2021-06-28 RX ORDER — ATOMOXETINE 60 MG/1
60 CAPSULE ORAL DAILY
Qty: 90 CAPSULE | Refills: 3 | Status: SHIPPED | OUTPATIENT
Start: 2021-06-28 | End: 2022-04-19 | Stop reason: ALTCHOICE

## 2021-06-28 RX ORDER — ALBUTEROL SULFATE 90 UG/1
2 AEROSOL, METERED RESPIRATORY (INHALATION) EVERY 4 HOURS PRN
COMMUNITY
End: 2021-11-22 | Stop reason: SDUPTHER

## 2021-06-28 RX ORDER — ATOMOXETINE 60 MG/1
CAPSULE ORAL
COMMUNITY
Start: 2021-04-22 | End: 2021-06-28 | Stop reason: SDUPTHER

## 2021-06-28 RX ORDER — SPIRONOLACTONE 25 MG/1
25 TABLET ORAL DAILY
Qty: 90 TABLET | Refills: 3 | Status: SHIPPED | OUTPATIENT
Start: 2021-06-28 | End: 2022-05-06

## 2021-06-28 RX ORDER — SPIRONOLACTONE 25 MG/1
25 TABLET ORAL DAILY
COMMUNITY
End: 2021-06-28 | Stop reason: SDUPTHER

## 2021-06-28 RX ORDER — VALACYCLOVIR HYDROCHLORIDE 1 G/1
500 TABLET, FILM COATED ORAL 2 TIMES DAILY PRN
Qty: 30 TABLET | Refills: 0 | Status: SHIPPED | OUTPATIENT
Start: 2021-06-28 | End: 2021-07-29

## 2021-06-28 NOTE — PROGRESS NOTES
"Chief Complaint  Follow-up (medication change ) and Knee Pain (left )    Subjective          Mariama Turner presents to [facility]  History of Present Illness  ADD/anxiety - pt on strattera and overall doing well. 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   HLD- on statin and zetia and doing well.   Aortic stenosis- pt is on lasix and aldactone. Cont to f/u with cards without issue.   H/o cold sores - pt gets help with valtrex.   Palpitations- doing well on atenolol.   Pt reports left knee pain that she thought occurred during her sleep. Pt reports frequent popping of her left knee. Pt also feels her knee give out intermittently. Pt does report having intermittent sharp pain. Pt reports improved today. Pt has not tried anything for pain control.     Objective   Vital Signs:   /84 (BP Location: Right arm, Patient Position: Sitting, Cuff Size: Adult)   Pulse 79   Temp 97.7 °F (36.5 °C) (Temporal)   Ht 160 cm (63\")   Wt 76.8 kg (169 lb 6.4 oz)   SpO2 97%   BMI 30.01 kg/m²     Physical Exam   Appearance: No acute distress, well-nourished  Head: normocephalic, atraumatic  Eyes: extraocular movements intact, no scleral icterus, no conjunctival injection  Ears, Nose, and Throat: external ears normal, nares patent, moist mucous membranes  Cardiovascular: regular rate and rhythm. no murmurs, rales, or rhonchi. no edema  Respiratory: breathing comfortably, symmetric chest rise, clear to auscultation bilaterally. No wheezes, rales, or rhonchi.  Neuro: alert and oriented to time, place, and person. Normal gait  Psych: normal mood and affect     Result Review :   The following data was reviewed by: Usman Castillo Jr, MD on 06/28/2021:  Common labs    Common Labsle 8/18/20 8/18/20 9/23/20 4/16/21    1420 1420     Glucose 91  95 93   BUN 16  15 14   Creatinine 0.89  0.90 0.88   Sodium 147  140 139   Potassium 4.1  4.2 4.6 "   Chloride 108  102 104   Calcium 9.8  9.1 9.4   Albumin 4.3  4.5 4.2   Total Bilirubin 0.76  0.60 0.67   Alkaline Phosphatase 100  95 83   AST (SGOT) 21  21 21   ALT (SGPT) 22  20 18   WBC   7.61 5.32   Hemoglobin   14.0 14.0   Hematocrit   42.6 43.1   Platelets   266 275   Total Cholesterol  167 226 (A) 239 (A)   Triglycerides  89 137 129   HDL Cholesterol  61 (A) 60 57   LDL Cholesterol   88 139 (A) 156 (A)   (A) Abnormal value       Comments are available for some flowsheets but are not being displayed.           Assessment and Plan    Diagnoses and all orders for this visit:    1. Essential hypertension (Primary)  Comments:  well controlled on regimen.   Orders:  -     spironolactone (ALDACTONE) 25 MG tablet; Take 1 tablet by mouth Daily.  Dispense: 90 tablet; Refill: 3    2. Other hyperlipidemia  Comments:  check labs in 3 months    3. Left knee pain, unspecified chronicity  -     Diclofenac Sodium (Voltaren) 1 % gel gel; Apply 4 g topically to the appropriate area as directed 4 (Four) Times a Day As Needed (pain).  Dispense: 350 g; Refill: 1    4. Herpes zoster without complication  -     valACYclovir (VALTREX) 1000 MG tablet; Take 0.5 tablets by mouth 2 (Two) Times a Day As Needed (cold sores) for up to 1 day.  Dispense: 30 tablet; Refill: 0    5. Anxiety  -     atomoxetine (Strattera) 60 MG capsule; Take 1 capsule by mouth Daily.  Dispense: 90 capsule; Refill: 3        Follow Up   Return in about 3 months (around 9/28/2021).  Patient was given instructions and counseling regarding her condition or for health maintenance advice. Please see specific information pulled into the AVS if appropriate.

## 2021-06-29 ENCOUNTER — TELEPHONE (OUTPATIENT)
Dept: INTERNAL MEDICINE | Facility: CLINIC | Age: 56
End: 2021-06-29

## 2021-08-23 ENCOUNTER — OFFICE VISIT (OUTPATIENT)
Dept: CARDIOLOGY | Facility: CLINIC | Age: 56
End: 2021-08-23

## 2021-08-23 VITALS
HEIGHT: 63 IN | WEIGHT: 172 LBS | BODY MASS INDEX: 30.48 KG/M2 | DIASTOLIC BLOOD PRESSURE: 77 MMHG | HEART RATE: 67 BPM | SYSTOLIC BLOOD PRESSURE: 120 MMHG

## 2021-08-23 DIAGNOSIS — E78.49 OTHER HYPERLIPIDEMIA: ICD-10-CM

## 2021-08-23 DIAGNOSIS — R00.2 PALPITATIONS: Primary | ICD-10-CM

## 2021-08-23 DIAGNOSIS — I10 ESSENTIAL HYPERTENSION: ICD-10-CM

## 2021-08-23 PROCEDURE — 99214 OFFICE O/P EST MOD 30 MIN: CPT | Performed by: NURSE PRACTITIONER

## 2021-08-23 RX ORDER — ESTRADIOL 0.04 MG/D
FILM, EXTENDED RELEASE TRANSDERMAL
COMMUNITY
End: 2021-10-22 | Stop reason: SDUPTHER

## 2021-08-23 NOTE — PROGRESS NOTES
Chief Complaint  Follow-up, Hypertension, and Hyperlipidemia    Subjective            Mariama Turner presents to [facility]  She is a 55-year-old white female comes in complains of palpitation scribes her heart pounding.  It is different than the palpitations in the past where she felt skipped and irregular beats.  She is now on a different med for ADHD.  Is also off the thyroid medications.  Has occasional dizzy spells which is long-term and normal for her and unchanged.  Swelling is mild. Denies chest pain, shortness of breath, syncope, PND, and orthopnea.  Has not had a Covid vaccine.              Past History:    Past Medical History:   Diagnosis Date   • Anemia    • Arthritis    • Asthma    • Bowel disease    • Cervical cancer (CMS/HCC)    • Diabetes (CMS/HCC)    • Gastric ulcer    • Heart disease    • Hyperlipemia    • Hypertension    • Kidney stones    • Limb swelling    • Osteoarthritis 2018    Of left thumb CMC joint   • Reflux esophagitis    • Seasonal allergies    • Thyroid disorder         Family History: family history includes Cancer in her mother; Diabetes in her father; Heart disease in her mother; Stroke in her father.     Social History: reports that she has never smoked. She has never used smokeless tobacco. She reports current alcohol use. She reports that she does not use drugs.    Allergies: Ceftriaxone, Contrast dye, and Iodine      Past Surgical History:   Procedure Laterality Date   • ABDOMINAL HYSTERECTOMY     •  SECTION     • CHOLECYSTECTOMY     • COLONOSCOPY  ,,,   • ENDOSCOPY  ,,   • KIDNEY STONE SURGERY  2014    Right Ureteroscopy, Laser litho, stent insertion   • RHINOPLASTY     • TONSILLECTOMY          Prior to Admission medications    Medication Sig Start Date End Date Taking? Authorizing Provider   albuterol sulfate  (90 Base) MCG/ACT inhaler Inhale 2 puffs Every 4 (Four) Hours As Needed for  "Wheezing.   Yes Provider, MD Ugo   atenolol (TENORMIN) 25 MG tablet Take 1 tablet by mouth every night at bedtime. 8/19/20  Yes    atomoxetine (Strattera) 60 MG capsule Take 1 capsule by mouth Daily. 6/28/21  Yes Usman Castillo Jr., MD   Diclofenac Sodium (Voltaren) 1 % gel gel Apply 4 g topically to the appropriate area as directed 4 (Four) Times a Day As Needed (pain). 6/28/21  Yes Usman Castillo Jr., MD   ezetimibe (ZETIA) 10 MG tablet Take 1 tablet by mouth every night at bedtime. 6/14/21  Yes    furosemide (LASIX) 40 MG tablet Take 1 tablet by mouth Daily. 6/14/21  Yes    pitavastatin calcium (LIVALO) 1 MG tablet tablet Take 1 tablet by mouth Daily. 6/14/21  Yes Malou Fitzgerald June, APRN   potassium chloride (K-DUR,KLOR-CON) 10 MEQ CR tablet Take 1 tablet by mouth Daily. 6/14/21  Yes    spironolactone (ALDACTONE) 25 MG tablet Take 1 tablet by mouth Daily. 6/28/21  Yes Usman Castillo Jr., MD   estradiol (VIVELLE-DOT) 0.0375 MG/24HR patch Vivelle-Dot 0.0375 mg/24 hr transdermal patch semiweekly apply 1 patch by transdermal route twice weekly   Active    Provider, MD Ugo        Review of Systems   Constitutional: Positive for fatigue.   Cardiovascular: Positive for palpitations (\"pounding\") and leg swelling (Mostly L).   Neurological: Positive for light-headedness.   All other systems reviewed and are negative.       Objective     Physical Exam  Constitutional:       Appearance: Normal appearance. She is obese.   Eyes:      Pupils: Pupils are equal, round, and reactive to light.   Neck:      Vascular: No carotid bruit.   Cardiovascular:      Rate and Rhythm: Normal rate and regular rhythm.      Chest Wall: PMI is displaced.      Pulses: Normal pulses.      Heart sounds: Murmur (At the apex) heard.   Systolic murmur is present with a grade of 1/6.   No S3 or S4 sounds.    Musculoskeletal:      Right lower leg: No edema.      Left lower leg: No edema.   Neurological:      " "Mental Status: She is alert.       /77   Pulse 67   Ht 160 cm (63\")   Wt 78 kg (172 lb)   BMI 30.47 kg/m²       Vitals:    08/23/21 1319   BP: 120/77   Pulse: 67       Result Review :         The following data was reviewed by: MIGNON Najera on 08/23/2021:      No results found for: PROBNP, BNP  CMP    CMP 9/23/20 4/16/21   Glucose 95 93   BUN 15 14   Creatinine 0.90 0.88   Sodium 140 139   Potassium 4.2 4.6   Chloride 102 104   Calcium 9.1 9.4   Albumin 4.5 4.2   Total Bilirubin 0.60 0.67   Alkaline Phosphatase 95 83   AST (SGOT) 21 21   ALT (SGPT) 20 18           CBC w/diff    CBC w/Diff 9/23/20 4/16/21   WBC 7.61 5.32   RBC 4.80 4.89   Hemoglobin 14.0 14.0   Hematocrit 42.6 43.1   MCV 88.8 88.1   MCH 29.2 28.6   MCHC 32.9 (A) 32.5 (A)   RDW 12.8 13.1   Platelets 266 275   Neutrophil Rel % 57.4 64.5   Lymphocyte Rel % 31.0 20.9   Monocyte Rel % 9.2 10.5 (A)   Eosinophil Rel % 1.4 2.8   Basophil Rel % 0.7 1.1   (A) Abnormal value             Lipid Panel    Lipid Panel 9/23/20 4/16/21   Total Cholesterol 226 (A) 239 (A)   Triglycerides 137 129   HDL Cholesterol 60 57   VLDL Cholesterol 27 26   LDL Cholesterol  139 (A) 156 (A)   (A) Abnormal value       Comments are available for some flowsheets but are not being displayed.            Lab Results   Component Value Date    TSH 0.701 04/16/2021    TSH 2.680 09/23/2020    TSH 2.760 02/26/2020      Lab Results   Component Value Date    FREET4 1.3 02/26/2020      No results found for: DDIMERQUANT  No results found for: MG   No results found for: DIGOXIN                          Assessment and Plan        Diagnoses and all orders for this visit:    1. Palpitations (Primary)  Assessment & Plan:  Symptomatic palpitations.  Most likely aggravated by her Strattera.  Discussed possibly increasing the atenolol dose but she would like to stay on the current medications at this time.      2. Essential hypertension  Assessment & Plan:  Controlled.  Continue " spironolactone and furosemide and atenolol.      3. Other hyperlipidemia  Assessment & Plan:  Worse in April.  She admits she was probably not taking her Livalo at that time.  She did have repeat labs next month with her birthday labs.  Will suggest Nexletol and Zetia combination with Livalo if it does not improve.    4.  Discussed the risk versus the benefits of the Covid vaccine.  Strongly encouraged her to the Covid vaccineand she agrees to consider it.          Follow Up     Return in about 1 year (around 8/23/2022) for with Dr. Mishra, EKG on next visit.    Patient was given instructions and counseling regarding her condition or for health maintenance advice. Please see specific information pulled into the AVS if appropriate.       MIGNON Guzman  08/23/21 13:21 EDT

## 2021-08-23 NOTE — ASSESSMENT & PLAN NOTE
Worse in April.  She admits she was probably not taking her Livalo at that time.  She did have repeat labs next month with her birthday labs.  Will suggest Nexletol and Zetia combination with Livalo if it does not improve.

## 2021-08-23 NOTE — ASSESSMENT & PLAN NOTE
Symptomatic palpitations.  Most likely aggravated by her Strattera.  Discussed possibly increasing the atenolol dose but she would like to stay on the current medications at this time.

## 2021-08-31 ENCOUNTER — OFFICE VISIT (OUTPATIENT)
Dept: INTERNAL MEDICINE | Facility: CLINIC | Age: 56
End: 2021-08-31

## 2021-08-31 VITALS
BODY MASS INDEX: 30.58 KG/M2 | HEIGHT: 63 IN | HEART RATE: 71 BPM | WEIGHT: 172.6 LBS | OXYGEN SATURATION: 98 % | TEMPERATURE: 97.2 F | SYSTOLIC BLOOD PRESSURE: 126 MMHG | DIASTOLIC BLOOD PRESSURE: 77 MMHG

## 2021-08-31 DIAGNOSIS — M25.551 RIGHT HIP PAIN: Primary | ICD-10-CM

## 2021-08-31 PROCEDURE — 99213 OFFICE O/P EST LOW 20 MIN: CPT | Performed by: INTERNAL MEDICINE

## 2021-09-01 PROCEDURE — 20610 DRAIN/INJ JOINT/BURSA W/O US: CPT | Performed by: INTERNAL MEDICINE

## 2021-09-01 RX ADMIN — TRIAMCINOLONE ACETONIDE 80 MG: 40 INJECTION, SUSPENSION INTRA-ARTICULAR; INTRAMUSCULAR at 14:00

## 2021-09-02 RX ORDER — TRIAMCINOLONE ACETONIDE 40 MG/ML
80 INJECTION, SUSPENSION INTRA-ARTICULAR; INTRAMUSCULAR
Status: COMPLETED | OUTPATIENT
Start: 2021-09-01 | End: 2021-09-01

## 2021-09-03 ENCOUNTER — TELEPHONE (OUTPATIENT)
Dept: INTERNAL MEDICINE | Facility: CLINIC | Age: 56
End: 2021-09-03

## 2021-09-03 DIAGNOSIS — M25.551 HIP PAIN, ACUTE, RIGHT: Primary | ICD-10-CM

## 2021-09-03 NOTE — TELEPHONE ENCOUNTER
Caller: Mariama Turner    Relationship: Self    Best call back number: [phone]  What orders are you requesting (i.e. lab or imaging) MRI ORDER FOR RIGHT HIP     In what timeframe would the patient need to come in: ASAP

## 2021-09-07 ENCOUNTER — PATIENT ROUNDING (BHMG ONLY) (OUTPATIENT)
Dept: INTERNAL MEDICINE | Facility: CLINIC | Age: 56
End: 2021-09-07

## 2021-09-07 NOTE — PROGRESS NOTES
September 2,2021    Hello, may I speak with Mariama Turner?    My name is Yissel     I am  with [facility]  [address]  [place] [address] [phone].    Before we get started may I verify your date of birth? 1965    I am calling to officially welcome you to our practice and ask about your recent visit. Is this a good time to talk? LMOM    Tell me about your visit with us. What things went well?  LMOM       We're always looking for ways to make our patients' experiences even better. Do you have recommendations on ways we may improve?  LMOM      Overall were you satisfied with your first visit to our practice? LMOM     I appreciate you taking the time to speak with me today. Is there anything else I can do for you? no      Thank you, and have a great day.

## 2021-09-08 NOTE — TELEPHONE ENCOUNTER
Will need hip x-ray prior to MRI and will order. May complete at any [facility] imaging center.

## 2021-09-13 ENCOUNTER — TRANSCRIBE ORDERS (OUTPATIENT)
Dept: LAB | Facility: HOSPITAL | Age: 56
End: 2021-09-13

## 2021-09-13 ENCOUNTER — LAB (OUTPATIENT)
Dept: LAB | Facility: HOSPITAL | Age: 56
End: 2021-09-13

## 2021-09-13 DIAGNOSIS — Z00.00 ROUTINE GENERAL MEDICAL EXAMINATION AT A HEALTH CARE FACILITY: ICD-10-CM

## 2021-09-13 DIAGNOSIS — Z00.00 ROUTINE GENERAL MEDICAL EXAMINATION AT A HEALTH CARE FACILITY: Primary | ICD-10-CM

## 2021-09-13 PROCEDURE — C9803 HOPD COVID-19 SPEC COLLECT: HCPCS

## 2021-09-13 PROCEDURE — U0004 COV-19 TEST NON-CDC HGH THRU: HCPCS

## 2021-09-13 RX ORDER — FUROSEMIDE 40 MG/1
40 TABLET ORAL DAILY
Qty: 90 TABLET | Refills: 3 | Status: SHIPPED | OUTPATIENT
Start: 2021-09-13 | End: 2022-05-06

## 2021-09-13 NOTE — TELEPHONE ENCOUNTER
Attempted to contact patient.    GURU OK TO ADVISE:  Will need hip x-ray prior to MRI and will order. May complete at any [facility] imaging center. This is per Dr. Castillo's order.

## 2021-09-14 LAB — SARS-COV-2 RNA NOSE QL NAA+PROBE: NOT DETECTED

## 2021-09-15 RX ORDER — ATENOLOL 25 MG/1
25 TABLET ORAL
Qty: 90 TABLET | Refills: 3 | Status: SHIPPED | OUTPATIENT
Start: 2021-09-15 | End: 2022-09-21 | Stop reason: SDUPTHER

## 2021-09-16 RX ORDER — ATENOLOL 25 MG/1
25 TABLET ORAL
Qty: 30 TABLET | Refills: 3 | OUTPATIENT
Start: 2021-09-16

## 2021-09-21 NOTE — TELEPHONE ENCOUNTER
PT VERIFIED      CONTACTED PT PER PROVIDER'S INSTRUCTIONS     Usman Castillo Jr., MD 13 days ago   DA     Will need hip x-ray prior to MRI and will order. May complete at any [facility] imaging center.         XR Hip With or Without Pelvis 2 - 3 View Right (2021 07:16)    PT CAN GO TO OUTPATIENT LAB TO COMPLETE    [facility] [place]  PHONE [phone]    OPT 2 DIAGNOSTICS/IMAGING/[place] (CT/MRI/XRAY ORDERS)    DIAGNOSTICS/IMAGING/[facility] MENU  [address] [address]    HOURS 730AM - 5PM    OPT 1 APPT SCHEDULING

## 2021-09-22 ENCOUNTER — HOSPITAL ENCOUNTER (OUTPATIENT)
Dept: GENERAL RADIOLOGY | Facility: HOSPITAL | Age: 56
Discharge: HOME | End: 2021-09-22

## 2021-09-22 DIAGNOSIS — M25.551 HIP PAIN, ACUTE, RIGHT: ICD-10-CM

## 2021-09-22 PROCEDURE — 73502 X-RAY EXAM HIP UNI 2-3 VIEWS: CPT

## 2021-09-23 ENCOUNTER — OFFICE VISIT (OUTPATIENT)
Dept: INTERNAL MEDICINE | Facility: CLINIC | Age: 56
End: 2021-09-23

## 2021-09-23 VITALS
WEIGHT: 170.6 LBS | HEART RATE: 87 BPM | HEIGHT: 63 IN | SYSTOLIC BLOOD PRESSURE: 129 MMHG | TEMPERATURE: 97.5 F | DIASTOLIC BLOOD PRESSURE: 85 MMHG | OXYGEN SATURATION: 99 % | BODY MASS INDEX: 30.23 KG/M2

## 2021-09-23 DIAGNOSIS — M25.559 HIP PAIN: ICD-10-CM

## 2021-09-23 DIAGNOSIS — G43.911 INTRACTABLE MIGRAINE WITH STATUS MIGRAINOSUS, UNSPECIFIED MIGRAINE TYPE: Primary | ICD-10-CM

## 2021-09-23 DIAGNOSIS — Z20.822 CLOSE EXPOSURE TO COVID-19 VIRUS: ICD-10-CM

## 2021-09-23 LAB
EXPIRATION DATE: NORMAL
FLUAV AG UPPER RESP QL IA.RAPID: NOT DETECTED
FLUBV AG UPPER RESP QL IA.RAPID: NOT DETECTED
INTERNAL CONTROL: NORMAL
Lab: NORMAL
SARS-COV-2 AG UPPER RESP QL IA.RAPID: NOT DETECTED

## 2021-09-23 PROCEDURE — U0004 COV-19 TEST NON-CDC HGH THRU: HCPCS | Performed by: INTERNAL MEDICINE

## 2021-09-23 PROCEDURE — 87428 SARSCOV & INF VIR A&B AG IA: CPT | Performed by: INTERNAL MEDICINE

## 2021-09-23 PROCEDURE — 96372 THER/PROPH/DIAG INJ SC/IM: CPT | Performed by: INTERNAL MEDICINE

## 2021-09-23 PROCEDURE — 99213 OFFICE O/P EST LOW 20 MIN: CPT | Performed by: INTERNAL MEDICINE

## 2021-09-23 RX ORDER — PROMETHAZINE HYDROCHLORIDE 25 MG/ML
12.5 INJECTION, SOLUTION INTRAMUSCULAR; INTRAVENOUS ONCE
Status: DISCONTINUED | OUTPATIENT
Start: 2021-09-23 | End: 2021-09-23

## 2021-09-23 RX ORDER — KETOROLAC TROMETHAMINE 30 MG/ML
60 INJECTION, SOLUTION INTRAMUSCULAR; INTRAVENOUS ONCE
Status: COMPLETED | OUTPATIENT
Start: 2021-09-23 | End: 2021-09-23

## 2021-09-23 RX ORDER — PROMETHAZINE HYDROCHLORIDE 25 MG/ML
25 INJECTION, SOLUTION INTRAMUSCULAR; INTRAVENOUS ONCE
Status: COMPLETED | OUTPATIENT
Start: 2021-09-23 | End: 2021-09-23

## 2021-09-23 RX ADMIN — KETOROLAC TROMETHAMINE 60 MG: 30 INJECTION, SOLUTION INTRAMUSCULAR; INTRAVENOUS at 17:37

## 2021-09-23 RX ADMIN — PROMETHAZINE HYDROCHLORIDE 25 MG: 25 INJECTION, SOLUTION INTRAMUSCULAR; INTRAVENOUS at 17:39

## 2021-09-23 NOTE — PROGRESS NOTES
"Chief Complaint  Hip Pain (still having hip pain ), Migraine (wont go away- last tuesday night ), and Tinnitus (high pitch noise that doesnt stop)    Subjective          Mariama Turner presents to [facility]  History of Present Illness  HA- pt has been taking ibufroen and decongestant, but without help. Pt cannot identify triggers. Pt reports entire heads hurts. Pt reports getting covid vaccine 2-3 weeks ago.   Pt reports recent covid exposure.  Persistent hip pain. Pt with x-ray yesterday, but read pending. Orthopedic appt in approx 1 week.     Objective   Vital Signs:   /85 (BP Location: Left arm, Patient Position: Sitting, Cuff Size: Adult)   Pulse 87   Temp 97.5 °F (36.4 °C) (Temporal)   Ht 158.8 cm (62.5\")   Wt 77.4 kg (170 lb 9.6 oz)   SpO2 99%   BMI 30.71 kg/m²     Physical Exam   Appearance: No acute distress, well-nourished  Head: normocephalic, atraumatic  Eyes: extraocular movements intact, no scleral icterus, no conjunctival injection  Ears, Nose, and Throat: external ears normal, nares patent, moist mucous membranes  Cardiovascular: regular rate and rhythm. no murmurs, rales, or rhonchi. no edema  Respiratory: breathing comfortably, symmetric chest rise, clear to auscultation bilaterally. No wheezes, rales, or rhonchi.  Neuro: alert and oriented to time, place, and person. Normal gait  Psych: normal mood and affect     Result Review :   The following data was reviewed by: Usman Castillo Jr, MD on 09/23/2021:  Common labs    Common Labsle 4/16/21   Glucose 93   BUN 14   Creatinine 0.88   Sodium 139   Potassium 4.6   Chloride 104   Calcium 9.4   Albumin 4.2   Total Bilirubin 0.67   Alkaline Phosphatase 83   AST (SGOT) 21   ALT (SGPT) 18   WBC 5.32   Hemoglobin 14.0   Hematocrit 43.1   Platelets 275   Total Cholesterol 239 (A)   Triglycerides 129   HDL Cholesterol 57   LDL Cholesterol  156 (A)   (A) Abnormal value       Comments are " available for some flowsheets but are not being displayed.                Assessment and Plan    Diagnoses and all orders for this visit:    1. Intractable migraine with status migrainosus, unspecified migraine type (Primary)  -     promethazine (PHENERGAN) injection 12.5 mg  -     ketorolac (TORADOL) injection 60 mg    2. Close exposure to COVID-19 virus  Comments:  neg rapid  Orders:  -     POCT SARS-CoV-2 Antigen LUBA + Flu  -     COVID-19 PCR, [facility], NP SWAB IN [facility] VIRAL TRANSPORT MEDIA/ORAL SWISH 24-30 HR TAT - Swab, Nasopharynx; Future  -     COVID-19 PCR, [facility], NP SWAB IN [facility] VIRAL TRANSPORT MEDIA/ORAL SWISH 24-30 HR TAT - Swab, Nasopharynx    3. Hip pain  Comments:  x-ray pending. orthopedic appt upcoming.       Follow Up   Return if symptoms worsen or fail to improve.  Patient was given instructions and counseling regarding her condition or for health maintenance advice. Please see specific information pulled into the AVS if appropriate.

## 2021-09-24 LAB — SARS-COV-2 RNA NOSE QL NAA+PROBE: NOT DETECTED

## 2021-09-27 ENCOUNTER — OFFICE VISIT (OUTPATIENT)
Dept: ORTHOPEDIC SURGERY | Facility: CLINIC | Age: 56
End: 2021-09-27

## 2021-09-27 VITALS — BODY MASS INDEX: 30.58 KG/M2 | WEIGHT: 172.6 LBS | HEIGHT: 63 IN | HEART RATE: 74 BPM | OXYGEN SATURATION: 96 %

## 2021-09-27 DIAGNOSIS — M76.891 ADDUCTOR TENDINITIS OF RIGHT HIP: ICD-10-CM

## 2021-09-27 DIAGNOSIS — M76.891 HIP FLEXOR TENDINITIS, RIGHT: Primary | ICD-10-CM

## 2021-09-27 DIAGNOSIS — M70.61 TROCHANTERIC BURSITIS, RIGHT HIP: ICD-10-CM

## 2021-09-27 PROCEDURE — 99203 OFFICE O/P NEW LOW 30 MIN: CPT | Performed by: ORTHOPAEDIC SURGERY

## 2021-09-27 RX ORDER — DICLOFENAC SODIUM 75 MG/1
75 TABLET, DELAYED RELEASE ORAL 2 TIMES DAILY
Qty: 60 TABLET | Refills: 2 | Status: SHIPPED | OUTPATIENT
Start: 2021-09-27 | End: 2022-05-06

## 2021-09-27 NOTE — PROGRESS NOTES
"Chief Complaint  Pain of the Right Hip     Subjective      Mariama Turner presents to [facility] for an evaluation of right hip. A month ago, patient went to put her leg into her pajamas when she felt a sharp, ripping sensation throughout her hip. She states pain was severe and her hip has been sore for several days afterwards. She states pain has improved some. She has pain at night or with twisting and turning. She states pain in in the groin that radiates laterally and posteriorly.     Allergies   Allergen Reactions   • Ceftriaxone Itching   • Contrast Dye Unknown - High Severity   • Iodine Unknown - High Severity        Social History     Socioeconomic History   • Marital status:      Spouse name: Not on file   • Number of children: Not on file   • Years of education: Not on file   • Highest education level: Not on file   Tobacco Use   • Smoking status: Never Smoker   • Smokeless tobacco: Never Used   Vaping Use   • Vaping Use: Never used   Substance and Sexual Activity   • Alcohol use: Yes     Comment: Some day / rarely once a year   • Drug use: Never        Review of Systems     Objective   Vital Signs:   Pulse 74   Ht 160 cm (63\")   Wt 78.3 kg (172 lb 9.6 oz)   SpO2 96%   BMI 30.57 kg/m²       Physical Exam  Constitutional:       Appearance: Normal appearance. Patient is well-developed and normal weight.   HENT:      Head: Normocephalic.      Right Ear: Hearing and external ear normal.      Left Ear: Hearing and external ear normal.      Nose: Nose normal.   Eyes:      Conjunctiva/sclera: Conjunctivae normal.   Cardiovascular:      Rate and Rhythm: Normal rate.   Pulmonary:      Effort: Pulmonary effort is normal.      Breath sounds: No wheezing or rales.   Abdominal:      Palpations: Abdomen is soft.      Tenderness: There is no abdominal tenderness.   Musculoskeletal:      Cervical back: Normal range of motion.   Skin:     Findings: No rash.   Neurological:      " Mental Status: Patient  is alert and oriented to person, place, and time.   Psychiatric:         Mood and Affect: Mood and affect normal.         Judgment: Judgment normal.       Ortho Exam      RIGHT HIP: Tender greater trochanter. Moderate tenderness of the hip and pelvic muscles. Good tone of hip flexors, hip extensors, hip adductor, hip abductors. Full leg raise. Good tone of hip flexors, hip extensors, hip adductor, hip abductors. Good strength to hamstrings, quadriceps, dorsiflexors and plantar flexors. Calf supple, non-tender, no signs of DVT. Dorsal Pedal Pulse 2+, posterior tibialis pulse 2+. Sensation grossly intact. Neurovascular intact.        Procedures        Imaging Results (Most Recent)     None           Result Review :       XR Hip With or Without Pelvis 2 - 3 View Right    Result Date: 9/24/2021  Narrative: PROCEDURE: XR HIP W OR WO PELVIS 2-3 VIEW RIGHT  COMPARISON: None  INDICATIONS: right hip pain  FINDINGS:  Mild to moderate right hip arthrosis.  There is no fracture or dislocation.  No aggressive appearing bone change.  Sacroiliac joints are symmetric.  CONCLUSION: Mild to moderate right hip arthrosis      MONICA MALCOLM MD       Electronically Signed and Approved By: MONICA MALCOLM MD on 9/24/2021 at 9:16                  Assessment and Plan     DX: Right hip flexor tendinitis   Trochanteric bursitis,right   Adductor tendinitis, right     Patients pain is improving at this time. Patient given a prescription for PT if she wishes to attend in the future. She will continue giving her hip time to improve. She was also prescribed an anti-inflammatory in office today.     Call or return if worsening symptoms.    Follow Up     PRN.       Patient was given instructions and counseling regarding her condition or for health maintenance advice. Please see specific information pulled into the AVS if appropriate.     Scribed for Eleazar Matos MD by Nelli Ware.  09/27/21   14:19 EDT    I have personally  performed the services described in this document as scribed by the above individual and it is both accurate and complete. Eleazar Matos MD 09/29/21

## 2021-10-13 ENCOUNTER — OFFICE VISIT (OUTPATIENT)
Dept: INTERNAL MEDICINE | Facility: CLINIC | Age: 56
End: 2021-10-13

## 2021-10-13 ENCOUNTER — TELEPHONE (OUTPATIENT)
Dept: INTERNAL MEDICINE | Facility: CLINIC | Age: 56
End: 2021-10-13

## 2021-10-13 VITALS
DIASTOLIC BLOOD PRESSURE: 81 MMHG | SYSTOLIC BLOOD PRESSURE: 131 MMHG | BODY MASS INDEX: 29.3 KG/M2 | TEMPERATURE: 97.7 F | OXYGEN SATURATION: 95 % | WEIGHT: 165.4 LBS | HEART RATE: 101 BPM | HEIGHT: 63 IN

## 2021-10-13 DIAGNOSIS — Z11.52 ENCOUNTER FOR SCREENING FOR COVID-19: ICD-10-CM

## 2021-10-13 DIAGNOSIS — H66.002 NON-RECURRENT ACUTE SUPPURATIVE OTITIS MEDIA OF LEFT EAR WITHOUT SPONTANEOUS RUPTURE OF TYMPANIC MEMBRANE: ICD-10-CM

## 2021-10-13 DIAGNOSIS — H93.13 TINNITUS OF BOTH EARS: ICD-10-CM

## 2021-10-13 DIAGNOSIS — R51.9 ACUTE NONINTRACTABLE HEADACHE, UNSPECIFIED HEADACHE TYPE: ICD-10-CM

## 2021-10-13 DIAGNOSIS — R11.2 NAUSEA AND VOMITING, INTRACTABILITY OF VOMITING NOT SPECIFIED, UNSPECIFIED VOMITING TYPE: Primary | ICD-10-CM

## 2021-10-13 DIAGNOSIS — B34.9 ACUTE VIRAL SYNDROME: ICD-10-CM

## 2021-10-13 PROCEDURE — U0004 COV-19 TEST NON-CDC HGH THRU: HCPCS | Performed by: NURSE PRACTITIONER

## 2021-10-13 PROCEDURE — 96372 THER/PROPH/DIAG INJ SC/IM: CPT | Performed by: NURSE PRACTITIONER

## 2021-10-13 PROCEDURE — 99213 OFFICE O/P EST LOW 20 MIN: CPT | Performed by: NURSE PRACTITIONER

## 2021-10-13 RX ORDER — AMOXICILLIN 875 MG/1
875 TABLET, COATED ORAL 2 TIMES DAILY
Qty: 20 TABLET | Refills: 0 | Status: SHIPPED | OUTPATIENT
Start: 2021-10-13 | End: 2021-10-22

## 2021-10-13 RX ORDER — KETOROLAC TROMETHAMINE 30 MG/ML
60 INJECTION, SOLUTION INTRAMUSCULAR; INTRAVENOUS ONCE
Status: COMPLETED | OUTPATIENT
Start: 2021-10-13 | End: 2021-10-13

## 2021-10-13 RX ORDER — ONDANSETRON 4 MG/1
4 TABLET, ORALLY DISINTEGRATING ORAL EVERY 8 HOURS PRN
Qty: 15 TABLET | Refills: 0 | Status: SHIPPED | OUTPATIENT
Start: 2021-10-13 | End: 2022-03-25

## 2021-10-13 RX ADMIN — KETOROLAC TROMETHAMINE 60 MG: 30 INJECTION, SOLUTION INTRAMUSCULAR; INTRAVENOUS at 13:54

## 2021-10-13 NOTE — TELEPHONE ENCOUNTER
Hub staff attempted to follow warm transfer process and was unsuccessful     Caller: Mariama Turner    Relationship to patient: Self    Best call back number: [phone]    Patient is needing: PATIENT IS HAVING A SEVERE HEADACHE, NAUSEA, DIARRHEA, POSSIBLE FEVER, VOMITING, EARS RINGING, ACHES. NEEDS A SAME DAY APPOINTMENT. HUB STAFF UNABLE TO FIND AVAILABILITY

## 2021-10-13 NOTE — PROGRESS NOTES
"Chief Complaint  Nausea, Vomiting, Diarrhea, Generalized Body Aches, Chest Pain (feels like there is a band all around my chest and back ), Migraine (had since i got the vaccine ), and Tinnitus (has ear ringing )    Subjective          Mariama Turner presents to [facility]  History of Present Illness     56-year-old female patient presents to the clinic complaining of headache, ringing in the ears, nausea, vomiting, diarrhea, body aches.  Patient states that the initial episode of the symptoms began in September after receiving her first Covid vaccine.  Patient states that they have been resolved.      Patient states that the symptoms returned yesterday and she has had multiple episodes of vomiting.  Patient denies chest pain, shortness of air, lower extremity edema, abdominal pain.  She has not received her second dose of the Covid vaccine.          Objective   Vital Signs:   /81 (BP Location: Left arm, Patient Position: Sitting, Cuff Size: Adult)   Pulse 101   Temp 97.7 °F (36.5 °C) (Temporal)   Ht 160 cm (63\")   Wt 75 kg (165 lb 6.4 oz)   SpO2 95%   BMI 29.30 kg/m²     Physical Exam  Vitals and nursing note reviewed.   Constitutional:       General: She is not in acute distress.     Appearance: Normal appearance. She is not ill-appearing.   HENT:      Head: Normocephalic.      Right Ear: Hearing, tympanic membrane, ear canal and external ear normal.      Left Ear: Hearing, ear canal and external ear normal. Tenderness present. Tympanic membrane is erythematous.      Nose: Nose normal.   Eyes:      Extraocular Movements: Extraocular movements intact.      Conjunctiva/sclera: Conjunctivae normal.   Cardiovascular:      Rate and Rhythm: Normal rate.   Pulmonary:      Effort: Pulmonary effort is normal.      Breath sounds: Normal breath sounds.   Abdominal:      General: Bowel sounds are normal. There is no distension.      Palpations: Abdomen is soft.    "   Tenderness: There is no abdominal tenderness. There is no right CVA tenderness, left CVA tenderness, guarding or rebound.   Skin:     General: Skin is warm and dry.      Capillary Refill: Capillary refill takes less than 2 seconds.   Neurological:      General: No focal deficit present.      Mental Status: She is alert and oriented to person, place, and time. Mental status is at baseline.   Psychiatric:         Mood and Affect: Mood normal.         Behavior: Behavior normal.         Thought Content: Thought content normal.         Judgment: Judgment normal.          Result Review :   The following data was reviewed by: MIGNON Gaitan on 10/13/2021:  Common labs    Common Labsle 4/16/21   Glucose 93   BUN 14   Creatinine 0.88   Sodium 139   Potassium 4.6   Chloride 104   Calcium 9.4   Albumin 4.2   Total Bilirubin 0.67   Alkaline Phosphatase 83   AST (SGOT) 21   ALT (SGPT) 18   WBC 5.32   Hemoglobin 14.0   Hematocrit 43.1   Platelets 275   Total Cholesterol 239 (A)   Triglycerides 129   HDL Cholesterol 57   LDL Cholesterol  156 (A)   (A) Abnormal value       Comments are available for some flowsheets but are not being displayed.             Procedures      Assessment and Plan    Diagnoses and all orders for this visit:    1. Nausea and vomiting, intractability of vomiting not specified, unspecified vomiting type (Primary)  Comments:  Zofran ODT prescribed in the clinic.   Orders:  -     COVID-19 PCR, [facility], NP SWAB IN [facility] VIRAL TRANSPORT MEDIA/ORAL SWISH 24-30 HR TAT - Swab, Nasopharynx    2. Non-recurrent acute suppurative otitis media of left ear without spontaneous rupture of tympanic membrane  Comments:  Amoxicillin given; not likely to be causing all of patient's symptoms.     3. Acute nonintractable headache, unspecified headache type  Comments:  Toradol IM given in the clinic.   Orders:  -     ketorolac (TORADOL) injection 60 mg    4. Tinnitus of both ears  Comments:  intermittent; resolved  at present. No medications on list that would appear to cause this    5. Acute viral syndrome  Comments:  Call or RTC if symptoms persist.     6. Encounter for screening for COVID-19    Other orders  -     amoxicillin (AMOXIL) 875 MG tablet; Take 1 tablet by mouth 2 (Two) Times a Day for 10 days.  Dispense: 20 tablet; Refill: 0  -     ondansetron ODT (ZOFRAN-ODT) 4 MG disintegrating tablet; Place 1 tablet under the tongue Every 8 (Eight) Hours As Needed for Nausea or Vomiting.  Dispense: 15 tablet; Refill: 0        Follow Up   Return if symptoms worsen or fail to improve.  Patient was given instructions and counseling regarding her condition or for health maintenance advice. Please see specific information pulled into the AVS if appropriate.

## 2021-10-13 NOTE — TELEPHONE ENCOUNTER
PT VERIFIED      CONTACTED PT PER PROVIDER'S INSTRUCTIONS   PT TO COME IN TODAY FOR SAME DAY APPT

## 2021-10-14 PROBLEM — H93.13 TINNITUS OF BOTH EARS: Status: ACTIVE | Noted: 2021-10-14

## 2021-10-14 PROBLEM — H66.002 NON-RECURRENT ACUTE SUPPURATIVE OTITIS MEDIA OF LEFT EAR WITHOUT SPONTANEOUS RUPTURE OF TYMPANIC MEMBRANE: Status: ACTIVE | Noted: 2021-10-14

## 2021-10-14 PROBLEM — B34.9 ACUTE VIRAL SYNDROME: Status: ACTIVE | Noted: 2021-10-14

## 2021-10-14 PROBLEM — R51.9 ACUTE NONINTRACTABLE HEADACHE: Status: ACTIVE | Noted: 2021-10-14

## 2021-10-14 PROBLEM — R11.2 NAUSEA AND VOMITING: Status: ACTIVE | Noted: 2021-10-14

## 2021-10-16 LAB — SARS-COV-2 RNA NOSE QL NAA+PROBE: NOT DETECTED

## 2021-10-17 NOTE — PROGRESS NOTES
Rapid COVID negative. NOT VACCINATED:     Self quarantine to protect yourself and others.  Stay home.  Do not go to work, school, or other public places.  Self quarantine for    10 full days if you have no symptoms.  7 days if you have no symptoms and test negative on or after day 5 of quarantine.    Stay away from people you live with, if possible.  Consider wearing a mask at home if you live with persons who are at high risk.    Consider vaccination when able.    If symptoms develop, you need to be retested, and remain in quarantine.     Symptoms include, but are not limited to:     Fever, chills, cough, shortness of breath, fatigue, body aches, headache, new loss of taste or smell, sore throat, congestion, runny nose, nausea, vomiting, diarrhea.     Seek emergency medical care if you experience chest pain, blue or gray lips/fingernails, or difficulty staying awake, or fevers that you cannot control with Tylenol or ibuprofen.    VACCINATED:     Get tested 3 to 5 days after the day you were exposed to COVID-19    Wear a mask in indoor public settings for 14 days or until you receive a negative test result.    If you do not have symptoms of COVID-19, you do not need to quarantine, and less you are living in the same home unable to separate with someone who is COVID-19 positive.  In that case, you will need to quarantine.    Monitor for symptoms for 14 days following exposure.    Consider wearing a mask at home if you live with persons who are high risk.    Isolate yourself if you develop symptoms:    Symptoms include, but are not limited to:     Fever, chills, cough, shortness of breath, fatigue, body aches, headache, new loss of taste or smell, sore throat, congestion, runny nose, nausea, vomiting, diarrhea.     Seek emergency medical care if you experience chest pain, blue or gray lips/fingernails, or difficulty staying awake, or fevers that you cannot control with Tylenol or ibuprofen.

## 2021-10-18 ENCOUNTER — TELEPHONE (OUTPATIENT)
Dept: INTERNAL MEDICINE | Facility: CLINIC | Age: 56
End: 2021-10-18

## 2021-10-18 NOTE — TELEPHONE ENCOUNTER
----- Message from MIGNON Gaitan sent at 10/17/2021  3:07 PM EDT -----  Rapid COVID negative. NOT VACCINATED:     Self quarantine to protect yourself and others.  Stay home.  Do not go to work, school, or other public places.  Self quarantine for    10 full days if you have no symptoms.  7 days if you have no symptoms and test negative on or after day 5 of quarantine.    Stay away from people you live with, if possible.  Consider wearing a mask at home if you live with persons who are at high risk.    Consider vaccination when able.    If symptoms develop, you need to be retested, and remain in quarantine.     Symptoms include, but are not limited to:     Fever, chills, cough, shortness of breath, fatigue, body aches, headache, new loss of taste or smell, sore throat, congestion, runny nose, nausea, vomiting, diarrhea.     Seek emergency medical care if you experience chest pain, blue or gray lips/fingernails, or difficulty staying awake, or fevers that you cannot control with Tylenol or ibuprofen.    VACCINATED:     Get tested 3 to 5 days after the day you were exposed to COVID-19    Wear a mask in indoor public settings for 14 days or until you receive a negative test result.    If you do not have symptoms of COVID-19, you do not need to quarantine, and less you are living in the same home unable to separate with someone who is COVID-19 positive.  In that case, you will need to quarantine.    Monitor for symptoms for 14 days following exposure.    Consider wearing a mask at home if you live with persons who are high risk.    Isolate yourself if you develop symptoms:    Symptoms include, but are not limited to:     Fever, chills, cough, shortness of breath, fatigue, body aches, headache, new loss of taste or smell, sore throat, congestion, runny nose, nausea, vomiting, diarrhea.     Seek emergency medical care if you experience chest pain, blue or gray lips/fingernails, or difficulty staying awake, or  fevers that you cannot control with Tylenol or ibuprofen.

## 2021-10-18 NOTE — TELEPHONE ENCOUNTER
ATTEMPTED TO CONTACT PT PER PROVIDER'S INSTRUCTIONS     NO ANSWER     LEFT VOICEMAIL WITH INSTRUCTIONS TO RETURN CALL TO OFFICE AT [phone]    OK FOR HUB TO ADVISE/READ   Ame Xie APRN P [facility] [place] [facility]  Rapid COVID negative. NOT VACCINATED:     Self quarantine to protect yourself and others.   Stay home.  Do not go to work, school, or other public places.  Self quarantine for     10 full days if you have no symptoms.   7 days if you have no symptoms and test negative on or after day 5 of quarantine.     Stay away from people you live with, if possible.  Consider wearing a mask at home if you live with persons who are at high risk.     Consider vaccination when able.     If symptoms develop, you need to be retested, and remain in quarantine.     Symptoms include, but are not limited to:     Fever, chills, cough, shortness of breath, fatigue, body aches, headache, new loss of taste or smell, sore throat, congestion, runny nose, nausea, vomiting, diarrhea.     Seek emergency medical care if you experience chest pain, blue or gray lips/fingernails, or difficulty staying awake, or fevers that you cannot control with Tylenol or ibuprofen.     VACCINATED:     Get tested 3 to 5 days after the day you were exposed to COVID-19     Wear a mask in indoor public settings for 14 days or until you receive a negative test result.     If you do not have symptoms of COVID-19, you do not need to quarantine, and less you are living in the same home unable to separate with someone who is COVID-19 positive.  In that case, you will need to quarantine.     Monitor for symptoms for 14 days following exposure.     Consider wearing a mask at home if you live with persons who are high risk.     Isolate yourself if you develop symptoms:     Symptoms include, but are not limited to:     Fever, chills, cough, shortness of breath, fatigue, body aches, headache, new loss of taste or smell, sore throat,  congestion, runny nose, nausea, vomiting, diarrhea.     Seek emergency medical care if you experience chest pain, blue or gray lips/fingernails, or difficulty staying awake, or fevers that you cannot control with Tylenol or ibuprofen.

## 2021-10-22 ENCOUNTER — OFFICE VISIT (OUTPATIENT)
Dept: INTERNAL MEDICINE | Facility: CLINIC | Age: 56
End: 2021-10-22

## 2021-10-22 VITALS
WEIGHT: 168.6 LBS | OXYGEN SATURATION: 98 % | DIASTOLIC BLOOD PRESSURE: 90 MMHG | TEMPERATURE: 97.4 F | SYSTOLIC BLOOD PRESSURE: 147 MMHG | BODY MASS INDEX: 29.88 KG/M2 | HEIGHT: 63 IN | HEART RATE: 82 BPM

## 2021-10-22 DIAGNOSIS — E55.9 VITAMIN D DEFICIENCY: ICD-10-CM

## 2021-10-22 DIAGNOSIS — R51.9 NONINTRACTABLE HEADACHE, UNSPECIFIED CHRONICITY PATTERN, UNSPECIFIED HEADACHE TYPE: Primary | ICD-10-CM

## 2021-10-22 LAB
ALBUMIN SERPL-MCNC: 4.5 G/DL (ref 3.5–5.2)
ALBUMIN/GLOB SERPL: 1.5 G/DL
ALP SERPL-CCNC: 90 U/L (ref 39–117)
ALT SERPL W P-5'-P-CCNC: 29 U/L (ref 1–33)
ANION GAP SERPL CALCULATED.3IONS-SCNC: 13.9 MMOL/L (ref 5–15)
AST SERPL-CCNC: 21 U/L (ref 1–32)
BASOPHILS # BLD AUTO: 0.06 10*3/MM3 (ref 0–0.2)
BASOPHILS NFR BLD AUTO: 0.9 % (ref 0–1.5)
BILIRUB SERPL-MCNC: 0.5 MG/DL (ref 0–1.2)
BUN SERPL-MCNC: 13 MG/DL (ref 6–20)
BUN/CREAT SERPL: 17.6 (ref 7–25)
CALCIUM SPEC-SCNC: 9.4 MG/DL (ref 8.6–10.5)
CHLORIDE SERPL-SCNC: 100 MMOL/L (ref 98–107)
CHOLEST SERPL-MCNC: 190 MG/DL (ref 0–200)
CO2 SERPL-SCNC: 25.1 MMOL/L (ref 22–29)
CREAT SERPL-MCNC: 0.74 MG/DL (ref 0.57–1)
DEPRECATED RDW RBC AUTO: 41.4 FL (ref 37–54)
EOSINOPHIL # BLD AUTO: 0.13 10*3/MM3 (ref 0–0.4)
EOSINOPHIL NFR BLD AUTO: 1.9 % (ref 0.3–6.2)
ERYTHROCYTE [DISTWIDTH] IN BLOOD BY AUTOMATED COUNT: 12.9 % (ref 12.3–15.4)
GFR SERPL CREATININE-BSD FRML MDRD: 81 ML/MIN/1.73
GLOBULIN UR ELPH-MCNC: 3 GM/DL
GLUCOSE SERPL-MCNC: 89 MG/DL (ref 65–99)
HCT VFR BLD AUTO: 44.3 % (ref 34–46.6)
HDLC SERPL-MCNC: 54 MG/DL (ref 40–60)
HGB BLD-MCNC: 14.8 G/DL (ref 12–15.9)
IMM GRANULOCYTES # BLD AUTO: 0.1 10*3/MM3 (ref 0–0.05)
IMM GRANULOCYTES NFR BLD AUTO: 1.4 % (ref 0–0.5)
LDLC SERPL CALC-MCNC: 118 MG/DL (ref 0–100)
LDLC/HDLC SERPL: 2.14 {RATIO}
LYMPHOCYTES # BLD AUTO: 1.34 10*3/MM3 (ref 0.7–3.1)
LYMPHOCYTES NFR BLD AUTO: 19.3 % (ref 19.6–45.3)
MCH RBC QN AUTO: 29.2 PG (ref 26.6–33)
MCHC RBC AUTO-ENTMCNC: 33.4 G/DL (ref 31.5–35.7)
MCV RBC AUTO: 87.5 FL (ref 79–97)
MONOCYTES # BLD AUTO: 0.6 10*3/MM3 (ref 0.1–0.9)
MONOCYTES NFR BLD AUTO: 8.6 % (ref 5–12)
NEUTROPHILS NFR BLD AUTO: 4.73 10*3/MM3 (ref 1.7–7)
NEUTROPHILS NFR BLD AUTO: 67.9 % (ref 42.7–76)
NRBC BLD AUTO-RTO: 0 /100 WBC (ref 0–0.2)
PLATELET # BLD AUTO: 338 10*3/MM3 (ref 140–450)
PMV BLD AUTO: 9.7 FL (ref 6–12)
POTASSIUM SERPL-SCNC: 4.2 MMOL/L (ref 3.5–5.2)
PROT SERPL-MCNC: 7.5 G/DL (ref 6–8.5)
RBC # BLD AUTO: 5.06 10*6/MM3 (ref 3.77–5.28)
SODIUM SERPL-SCNC: 139 MMOL/L (ref 136–145)
TRIGL SERPL-MCNC: 102 MG/DL (ref 0–150)
TSH SERPL DL<=0.05 MIU/L-ACNC: 3.03 UIU/ML (ref 0.27–4.2)
VLDLC SERPL-MCNC: 18 MG/DL (ref 5–40)
WBC # BLD AUTO: 6.96 10*3/MM3 (ref 3.4–10.8)

## 2021-10-22 PROCEDURE — 80053 COMPREHEN METABOLIC PANEL: CPT | Performed by: INTERNAL MEDICINE

## 2021-10-22 PROCEDURE — 99213 OFFICE O/P EST LOW 20 MIN: CPT | Performed by: INTERNAL MEDICINE

## 2021-10-22 PROCEDURE — 80061 LIPID PANEL: CPT | Performed by: INTERNAL MEDICINE

## 2021-10-22 PROCEDURE — 85025 COMPLETE CBC W/AUTO DIFF WBC: CPT | Performed by: INTERNAL MEDICINE

## 2021-10-22 PROCEDURE — 84443 ASSAY THYROID STIM HORMONE: CPT | Performed by: INTERNAL MEDICINE

## 2021-10-22 PROCEDURE — 82652 VIT D 1 25-DIHYDROXY: CPT | Performed by: INTERNAL MEDICINE

## 2021-10-22 RX ORDER — RIMEGEPANT SULFATE 75 MG/75MG
75 TABLET, ORALLY DISINTEGRATING ORAL DAILY PRN
Qty: 8 TABLET | Refills: 0 | Status: SHIPPED | OUTPATIENT
Start: 2021-10-22 | End: 2022-03-25

## 2021-10-22 RX ORDER — ESTRADIOL 0.07 MG/D
1 FILM, EXTENDED RELEASE TRANSDERMAL 2 TIMES WEEKLY
Qty: 24 PATCH | Refills: 3 | Status: SHIPPED | OUTPATIENT
Start: 2021-10-25 | End: 2022-12-27 | Stop reason: SDUPTHER

## 2021-10-22 RX ORDER — ESTRADIOL 0.07 MG/D
1 FILM, EXTENDED RELEASE TRANSDERMAL 2 TIMES WEEKLY
COMMUNITY
End: 2021-10-22 | Stop reason: SDUPTHER

## 2021-10-22 NOTE — PROGRESS NOTES
"Chief Complaint  Headache and Dizziness    Subjective          Mariama Turner presents to [facility]  History of Present Illness  pt reports having HAs and tinnitus since having first dose of covid vaccine. Pt has been given ABx. Pt reports HA is different than previous HAs she has had. Pt has tried tramadol and NSAIDs and caffiene without much relief. Pt has not tried imitrex or maxalt previously.     Objective   Vital Signs:   /90 (BP Location: Left arm, Patient Position: Sitting, Cuff Size: Adult)   Pulse 82   Temp 97.4 °F (36.3 °C) (Temporal)   Ht 160 cm (63\")   Wt 76.5 kg (168 lb 9.6 oz)   SpO2 98%   BMI 29.87 kg/m²     BP Readings from Last 3 Encounters:   10/22/21 147/90   10/13/21 131/81   09/23/21 129/85       Physical Exam   Appearance: No acute distress, well-nourished  Head: normocephalic, atraumatic  Eyes: extraocular movements intact, no scleral icterus, no conjunctival injection  Ears, Nose, and Throat: external ears normal, nares patent, moist mucous membranes  Cardiovascular: regular rate and rhythm. no murmurs, rales, or rhonchi. no edema  Respiratory: breathing comfortably, symmetric chest rise, clear to auscultation bilaterally. No wheezes, rales, or rhonchi.  Neuro: alert and oriented to time, place, and person. Normal gait  Psych: normal mood and affect     Result Review :   The following data was reviewed by: Usman Castillo Jr, MD on 10/22/2021:  Common labs    Common Labsle 4/16/21 10/22/21 10/22/21 10/22/21     1638 1638 1638   Glucose    89   Glucose 93      BUN 14   13   Creatinine 0.88   0.74   eGFR Non African Am    81   Sodium 139   139   Potassium 4.6   4.2   Chloride 104   100   Calcium 9.4   9.4   Albumin 4.2   4.50   Total Bilirubin 0.67   0.5   Alkaline Phosphatase 83   90   AST (SGOT) 21   21   ALT (SGPT) 18   29   WBC 5.32 6.96     Hemoglobin 14.0 14.8     Hematocrit 43.1 44.3     Platelets 275 338     Total " Cholesterol   190    Total Cholesterol 239 (A)      Triglycerides 129  102    HDL Cholesterol 57  54    LDL Cholesterol  156 (A)  118 (A)    (A) Abnormal value       Comments are available for some flowsheets but are not being displayed.              Assessment and Plan    Diagnoses and all orders for this visit:    1. Nonintractable headache, unspecified chronicity pattern, unspecified headache type (Primary)  -     MRI Brain With & Without Contrast; Future  -     Lipid Panel  -     Comprehensive Metabolic Panel  -     CBC & Differential  -     TSH  -     Rimegepant Sulfate (Nurtec) 75 MG tablet dispersible tablet; Take 1 tablet by mouth Daily As Needed (migraine).  Dispense: 8 tablet; Refill: 0    2. Vitamin D deficiency  -     Vitamin D 1,25 Dihydroxy    Other orders  -     estradiol (MINIVELLE, VIVELLE-DOT) 0.075 MG/24HR patch; Place 1 patch on the skin as directed by provider 2 (Two) Times a Week.  Dispense: 24 patch; Refill: 3      Follow Up   Return if symptoms worsen or fail to improve.  Patient was given instructions and counseling regarding her condition or for health maintenance advice. Please see specific information pulled into the AVS if appropriate.

## 2021-10-25 LAB — 1,25(OH)2D SERPL-MCNC: 84.6 PG/ML (ref 19.9–79.3)

## 2021-10-26 NOTE — TELEPHONE ENCOUNTER
PT VERIFIED     CONTACTED PT PER PROVIDER'S INSTRUCTIONS    PT INFORMED COVID-19 PCR, [facility], NP SWAB IN [facility] VIRAL TRANSPORT MEDIA/ORAL SWISH 24-30 HR TAT - Swab, Nasopharynx (10/13/2021 13:55)  SARS-CoV-2 JIMI   Not Detected Not Detected

## 2021-11-05 RX ORDER — PITAVASTATIN CALCIUM 1.04 MG/1
1 TABLET, FILM COATED ORAL DAILY
Qty: 30 TABLET | Refills: 3 | Status: CANCELLED | OUTPATIENT
Start: 2021-11-05

## 2021-11-05 RX ORDER — EZETIMIBE 10 MG/1
10 TABLET ORAL
Qty: 30 TABLET | Refills: 3 | OUTPATIENT
Start: 2021-11-05

## 2021-11-05 RX ORDER — POTASSIUM CHLORIDE 750 MG/1
TABLET, EXTENDED RELEASE ORAL
Qty: 30 TABLET | Refills: 3 | OUTPATIENT
Start: 2021-11-05

## 2021-11-08 NOTE — TELEPHONE ENCOUNTER
Rx Refill Note  Requested Prescriptions     Pending Prescriptions Disp Refills   • pitavastatin calcium (Livalo) 1 MG tablet tablet 30 tablet 3     Sig: Take 1 tablet by mouth Daily.      Last office visit with prescribing clinician: 8/23/2021      Next office visit with prescribing clinician: Visit date not found            Yelena Bass RN  11/08/21, 17:48 EST      Matches OV from 8/23/21

## 2021-11-10 ENCOUNTER — HOSPITAL ENCOUNTER (OUTPATIENT)
Dept: MRI IMAGING | Facility: HOSPITAL | Age: 56
Discharge: HOME | End: 2021-11-10
Admitting: INTERNAL MEDICINE

## 2021-11-10 DIAGNOSIS — R51.9 NONINTRACTABLE HEADACHE, UNSPECIFIED CHRONICITY PATTERN, UNSPECIFIED HEADACHE TYPE: ICD-10-CM

## 2021-11-10 PROCEDURE — A9577 INJ MULTIHANCE: HCPCS | Performed by: INTERNAL MEDICINE

## 2021-11-10 PROCEDURE — 70553 MRI BRAIN STEM W/O & W/DYE: CPT

## 2021-11-10 PROCEDURE — 0 GADOBENATE DIMEGLUMINE 529 MG/ML SOLUTION: Performed by: INTERNAL MEDICINE

## 2021-11-10 RX ADMIN — GADOBENATE DIMEGLUMINE 15 ML: 529 INJECTION, SOLUTION INTRAVENOUS at 08:07

## 2021-11-11 ENCOUNTER — PATIENT MESSAGE (OUTPATIENT)
Dept: INTERNAL MEDICINE | Facility: CLINIC | Age: 56
End: 2021-11-11

## 2021-11-11 DIAGNOSIS — R51.9 NONINTRACTABLE HEADACHE, UNSPECIFIED CHRONICITY PATTERN, UNSPECIFIED HEADACHE TYPE: Primary | ICD-10-CM

## 2021-11-11 NOTE — TELEPHONE ENCOUNTER
From: Mariama Turner  To: Usman Castillo Jr, MD  Sent: 11/11/2021 1:48 PM EST  Subject: Provider Referral    Dr. Castillo,     Good afternoon, I hope you are doing well. I wanted to touch base to see if you would give me a neurologist referral. This past weekend I had some issues with walking that I've not experienced before, also I'm still having issues with headaches.    Respectfully,  Mariama Turner

## 2021-11-22 ENCOUNTER — TELEPHONE (OUTPATIENT)
Dept: INTERNAL MEDICINE | Facility: CLINIC | Age: 56
End: 2021-11-22

## 2021-11-22 NOTE — TELEPHONE ENCOUNTER
Caller: Mariama Turner    Relationship: Self    Best call back number: [phone]    Requested Prescriptions:   Requested Prescriptions     Pending Prescriptions Disp Refills   • albuterol sulfate  (90 Base) MCG/ACT inhaler       Sig: Inhale 2 puffs Every 4 (Four) Hours As Needed for Wheezing.        Pharmacy where request should be sent:    [facility] [phone]    Additional details provided by patient: PATIENT IS TOTALLY OUT OF THIS MEDICATION     Does the patient have less than a 3 day supply:  [x] Yes  [] No    Marian VALENTINE Rep   11/22/21 11:13 EST

## 2021-11-23 RX ORDER — ALBUTEROL SULFATE 90 UG/1
2 AEROSOL, METERED RESPIRATORY (INHALATION) EVERY 4 HOURS PRN
Qty: 18 G | Refills: 3 | Status: SHIPPED | OUTPATIENT
Start: 2021-11-23 | End: 2023-03-15 | Stop reason: SDUPTHER

## 2021-12-17 ENCOUNTER — OFFICE VISIT (OUTPATIENT)
Dept: INTERNAL MEDICINE | Facility: CLINIC | Age: 56
End: 2021-12-17

## 2021-12-17 VITALS
TEMPERATURE: 97.6 F | HEART RATE: 83 BPM | WEIGHT: 168.8 LBS | OXYGEN SATURATION: 96 % | HEIGHT: 63 IN | BODY MASS INDEX: 29.91 KG/M2 | DIASTOLIC BLOOD PRESSURE: 82 MMHG | SYSTOLIC BLOOD PRESSURE: 122 MMHG

## 2021-12-17 DIAGNOSIS — J01.10 ACUTE NON-RECURRENT FRONTAL SINUSITIS: Primary | ICD-10-CM

## 2021-12-17 PROCEDURE — 99213 OFFICE O/P EST LOW 20 MIN: CPT | Performed by: INTERNAL MEDICINE

## 2021-12-17 RX ORDER — AMOXICILLIN AND CLAVULANATE POTASSIUM 875; 125 MG/1; MG/1
1 TABLET, FILM COATED ORAL 2 TIMES DAILY
Qty: 20 TABLET | Refills: 0 | Status: SHIPPED | OUTPATIENT
Start: 2021-12-17 | End: 2021-12-27

## 2021-12-17 RX ORDER — BROMPHENIRAMINE MALEATE, PSEUDOEPHEDRINE HYDROCHLORIDE, AND DEXTROMETHORPHAN HYDROBROMIDE 2; 30; 10 MG/5ML; MG/5ML; MG/5ML
5 SYRUP ORAL 4 TIMES DAILY PRN
Qty: 118 ML | Refills: 0 | Status: SHIPPED | OUTPATIENT
Start: 2021-12-17 | End: 2022-03-24

## 2021-12-17 NOTE — PROGRESS NOTES
"Chief Complaint  Nasal Congestion and Earache (left ear, feels really full)    Subjective     {Problem List  Visit Diagnosis   Encounters  Notes  Medications  Labs  Result Review Imaging  Media :23}     Mariama Turner presents to [facility]  History of Present Illness  Patient reports having congestion, sinus pain for several days. Patient has used mucinex to help. Patient denies fevers, shortness of breath, chest pain. sick contacts. Patient reports previously taking allergy shots twice weekly but has delayed injections.     Current Outpatient Medications   Medication Instructions   • albuterol sulfate  (90 Base) MCG/ACT inhaler 2 puffs, Inhalation, Every 4 Hours PRN   • amoxicillin-clavulanate (Augmentin) 875-125 MG per tablet 1 tablet, Oral, 2 Times Daily   • atenolol (TENORMIN) 25 mg, Oral, Every Night at Bedtime   • atomoxetine (STRATTERA) 60 mg, Oral, Daily   • brompheniramine-pseudoephedrine-DM 30-2-10 MG/5ML syrup 5 mL, Oral, 4 Times Daily PRN   • diclofenac (VOLTAREN) 75 mg, Oral, 2 Times Daily   • Diclofenac Sodium (VOLTAREN) 4 g, Topical, 4 Times Daily PRN   • estradiol (MINIVELLE, VIVELLE-DOT) 0.075 MG/24HR patch 1 patch, Transdermal, 2 Times Weekly   • furosemide (LASIX) 40 mg, Oral, Daily   • Livalo 1 mg, Oral, Daily   • Nurtec 75 mg, Oral, Daily PRN   • ondansetron ODT (ZOFRAN-ODT) 4 mg, Sublingual, Every 8 Hours PRN   • spironolactone (ALDACTONE) 25 mg, Oral, Daily       The following portions of the patient's history were reviewed and updated as appropriate: allergies, current medications, past family history, past medical history, past social history, past surgical history, and problem list.    Objective   Vital Signs:   /82 (BP Location: Left arm, Patient Position: Sitting, Cuff Size: Adult)   Pulse 83   Temp 97.6 °F (36.4 °C) (Temporal)   Ht 160 cm (63\")   Wt 76.6 kg (168 lb 12.8 oz)   SpO2 96%   BMI 29.90 kg/m²     Wt " Readings from Last 3 Encounters:   12/17/21 76.6 kg (168 lb 12.8 oz)   10/22/21 76.5 kg (168 lb 9.6 oz)   10/13/21 75 kg (165 lb 6.4 oz)     BP Readings from Last 3 Encounters:   12/17/21 122/82   10/22/21 147/90   10/13/21 131/81     Physical Exam   Appearance: No acute distress, well-nourished  Head: normocephalic, atraumatic  Eyes: extraocular movements intact, no scleral icterus, no conjunctival injection  Ears, Nose, and Throat: external ears normal, nares patent, moist mucous membranes  Cardiovascular: regular rate and rhythm. no murmurs, rales, or rhonchi. no edema  Respiratory: breathing comfortably, symmetric chest rise, clear to auscultation bilaterally. No wheezes, rales, or rhonchi.  Neuro: alert and oriented to time, place, and person. Normal gait  Psych: normal mood and affect     Result Review :{Labs  Result Review  Imaging  Med Tab  Media  Procedures :23}   The following data was reviewed by: Usman Castillo Jr, MD on 12/17/2021:  Common labs    Common Labsle 4/16/21 10/22/21 10/22/21 10/22/21     1638 1638 1638   Glucose 93   89   BUN 14   13   Creatinine 0.88   0.74   eGFR Non African Am    81   Sodium 139   139   Potassium 4.6   4.2   Chloride 104   100   Calcium 9.4   9.4   Albumin 4.2   4.50   Total Bilirubin 0.67   0.5   Alkaline Phosphatase 83   90   AST (SGOT) 21   21   ALT (SGPT) 18   29   WBC 5.32 6.96     Hemoglobin 14.0 14.8     Hematocrit 43.1 44.3     Platelets 275 338     Total Cholesterol   190    Total Cholesterol 239 (A)      Triglycerides 129  102    HDL Cholesterol 57  54    LDL Cholesterol  156 (A)  118 (A)    (A) Abnormal value       Comments are available for some flowsheets but are not being displayed.                  Assessment and Plan    Diagnoses and all orders for this visit:    1. Acute non-recurrent frontal sinusitis (Primary)  -     amoxicillin-clavulanate (Augmentin) 875-125 MG per tablet; Take 1 tablet by mouth 2 (Two) Times a Day for 10 days.   Dispense: 20 tablet; Refill: 0  -     brompheniramine-pseudoephedrine-DM 30-2-10 MG/5ML syrup; Take 5 mL by mouth 4 (Four) Times a Day As Needed for Congestion or Cough.  Dispense: 118 mL; Refill: 0      There are no discontinued medications.     Follow Up   No follow-ups on file.  Patient was given instructions and counseling regarding her condition or for health maintenance advice. Please see specific information pulled into the AVS if appropriate.

## 2021-12-22 DIAGNOSIS — E78.49 OTHER HYPERLIPIDEMIA: Primary | ICD-10-CM

## 2021-12-22 DIAGNOSIS — I10 PRIMARY HYPERTENSION: ICD-10-CM

## 2021-12-22 RX ORDER — POTASSIUM CHLORIDE 750 MG/1
10 TABLET, FILM COATED, EXTENDED RELEASE ORAL DAILY
Qty: 90 TABLET | Refills: 3 | Status: SHIPPED | OUTPATIENT
Start: 2021-12-22 | End: 2022-05-06

## 2021-12-22 RX ORDER — EZETIMIBE 10 MG/1
10 TABLET ORAL DAILY
Qty: 90 TABLET | Refills: 3 | Status: SHIPPED | OUTPATIENT
Start: 2021-12-22 | End: 2022-08-24 | Stop reason: SDUPTHER

## 2022-02-10 ENCOUNTER — TELEPHONE (OUTPATIENT)
Dept: INTERNAL MEDICINE | Facility: CLINIC | Age: 57
End: 2022-02-10

## 2022-02-10 NOTE — TELEPHONE ENCOUNTER
Caller: [employer]    Relationship to patient: Other    Best call back number: [phone]    Patient is needing: [employer] FOR PATIENT IS CALLING TO INFORM DOCTOR ANSWER PATIENT'S    Rimegepant Sulfate (Nurtec) 75 MG tablet dispersible tablet      WILL NEED AUTHORIZATION SENT TO:  [facility]  [phone]

## 2022-02-21 ENCOUNTER — LAB (OUTPATIENT)
Dept: LAB | Facility: HOSPITAL | Age: 57
End: 2022-02-21

## 2022-02-21 ENCOUNTER — OFFICE VISIT (OUTPATIENT)
Dept: NEUROLOGY | Facility: CLINIC | Age: 57
End: 2022-02-21

## 2022-02-21 VITALS
WEIGHT: 170 LBS | DIASTOLIC BLOOD PRESSURE: 88 MMHG | HEIGHT: 63 IN | SYSTOLIC BLOOD PRESSURE: 138 MMHG | OXYGEN SATURATION: 94 % | BODY MASS INDEX: 30.12 KG/M2 | HEART RATE: 71 BPM

## 2022-02-21 DIAGNOSIS — R41.89 COGNITIVE IMPAIRMENT: Primary | ICD-10-CM

## 2022-02-21 DIAGNOSIS — M79.7 FIBROMYALGIA: ICD-10-CM

## 2022-02-21 DIAGNOSIS — H93.13 TINNITUS OF BOTH EARS: ICD-10-CM

## 2022-02-21 DIAGNOSIS — R42 DIZZINESS: ICD-10-CM

## 2022-02-21 DIAGNOSIS — M54.81 BILATERAL OCCIPITAL NEURALGIA: ICD-10-CM

## 2022-02-21 LAB — VIT B12 BLD-MCNC: 420 PG/ML (ref 211–946)

## 2022-02-21 PROCEDURE — 36415 COLL VENOUS BLD VENIPUNCTURE: CPT | Performed by: PSYCHIATRY & NEUROLOGY

## 2022-02-21 PROCEDURE — 82607 VITAMIN B-12: CPT | Performed by: PSYCHIATRY & NEUROLOGY

## 2022-02-21 PROCEDURE — 99204 OFFICE O/P NEW MOD 45 MIN: CPT | Performed by: PSYCHIATRY & NEUROLOGY

## 2022-02-21 NOTE — PROGRESS NOTES
"Chief Complaint   Patient presents with   • Headache   • Gait Problem       Patient ID: Mariama Turner is a 56 y.o. female.    HPI: I have had the pleasure of speaking with your patient today.  As you may know she is a 56-year-old female being seen at the request of and referred to us by Dr. Castillo for a history of headache, cognitive issues as well as fibromyalgia.  Patient says that she was diagnosed with fibromyalgia back about 17 years ago.  She states that around that time she was experiencing shoulder elbow legs and back pain.  She did have a rheumatological work-up that was negative apparently.  She states that she was treated with Mobic and tramadol which did help at the time.  She also has a history of chronic neck and lower back pain.  She says that she has a \"herniated disc\" in the cervical spine as well as lumbosacral spine.  She has not had surgical intervention apparently.  She says that she has had a lot of headaches however.  She started having migraine headaches at age 18.  Patient says that these headaches were in the back of her head primarily radiating to the top and the sides of her head.  She says that previously she would have sensitivity to light as well as sound as well as nausea however no vomiting.  It is usually a throbbing type headache or an achy type sensation.  At some point she was prescribed atenolol but was quite helpful for her.  She also has a lot of issues with focus and concentration.  She says that for the most part she has a lot of difficulty with word finding at times.  She says that she is left the stove on at home.  She is also left her front door open.  She is worried about her memory and is afraid that she may have dementia.  She does mention that her aunt had dementia.  She has been known to ask the same question multiple times based on family account.  She has also describes some dizziness.  She describes it as a movement-like sensation that occurs when she is " moving or changes position.  It does not last very long and is not associated with any focal weakness or numbness of her arms or legs.  Along with those symptoms she has had some ringing in your ears.  Is typically a high-pitched ringing that occurs on a daily basis.  She did recently have an MRI of her brain In November 2021.  I did review that with her today.  It was within normal limits for age.    The following portions of the patient's history were reviewed and updated as appropriate: allergies, current medications, past family history, past medical history, past social history, past surgical history and problem list.    Review of Systems   Constitutional: Positive for fatigue. Negative for activity change and appetite change.   HENT: Positive for hearing loss, tinnitus and voice change.    Eyes: Positive for pain. Negative for photophobia and visual disturbance.   Respiratory: Negative for cough, chest tightness and shortness of breath.    Cardiovascular: Negative for chest pain, palpitations and leg swelling.   Gastrointestinal: Positive for constipation. Negative for abdominal pain and nausea.   Endocrine: Positive for heat intolerance. Negative for cold intolerance and polydipsia.   Musculoskeletal: Positive for gait problem. Negative for neck pain and neck stiffness.   Skin: Negative for color change, pallor and rash.   Allergic/Immunologic: Positive for environmental allergies. Negative for food allergies and immunocompromised state.   Neurological: Positive for dizziness, tremors, speech difficulty, weakness, numbness and headaches. Negative for seizures, syncope, facial asymmetry and light-headedness.   Hematological: Negative for adenopathy. Does not bruise/bleed easily.   Psychiatric/Behavioral: Positive for decreased concentration. Negative for agitation, behavioral problems, confusion, dysphoric mood, hallucinations, self-injury, sleep disturbance and suicidal ideas. The patient is not  nervous/anxious and is not hyperactive.       I have reviewed the review of systems above performed by my medical assistant.      Vitals:    22 1226   BP: 138/88   Pulse: 71   SpO2: 94%       Neurologic Exam     Mental Status   Oriented to person, place, and time.   Registration: recalls 3 of 3 objects. Follows 3 step commands.   Attention: normal. Concentration: normal.   Speech: speech is normal   Level of consciousness: alert  Knowledge: consistent with education (No deficits found.).   Normal comprehension.     Cranial Nerves     CN II   Visual fields full to confrontation.     CN III, IV, VI   Pupils are equal, round, and reactive to light.  Extraocular motions are normal.   CN III: no CN III palsy  CN VI: no CN VI palsy  Nystagmus: none   Diplopia: none    CN V   Facial sensation intact.     CN VII   Facial expression full, symmetric.     CN VIII   CN VIII normal.     CN IX, X   CN IX normal.   CN X normal.     CN XI   CN XI normal.     CN XII   CN XII normal.     Motor Exam   Muscle bulk: normal  Right arm tone: normal  Left arm tone: normal  Right leg tone: normal  Left leg tone: normal    Strength   Right neck flexion: 5/5  Left neck flexion: 5/5  Right neck extension: 5/5  Left neck extension: 5/5  Right deltoid: 5/5  Left deltoid: 5/5  Right biceps: 5/5  Left biceps: 5/5  Right triceps: 5/5  Left triceps: 5/5  Right wrist flexion: 5/5  Left wrist flexion: 5/5  Right wrist extension: 5/5  Left wrist extension: 5/5  Right interossei: 5/5  Left interossei: 5/5  Right abdominals: 5/5  Left abdominals: 5/5  Right iliopsoas: 5/5  Left iliopsoas: 5/5  Right quadriceps: 5/5  Left quadriceps: 5/5  Right hamstrin/5  Left hamstrin/5  Right glutei: 5/5  Left glutei: 5/5  Right anterior tibial: 5/5  Left anterior tibial: 5/5  Right posterior tibial: 5/5  Left posterior tibial: 5/5  Right peroneal: 5/5  Left peroneal: 5/5  Right gastroc: 5/5  Left gastroc: 5/5    Sensory Exam   Light touch normal.    Vibration normal.   Proprioception normal.   Pinprick normal.     Gait, Coordination, and Reflexes     Gait  Gait: normal    Coordination   Romberg: negative    Tremor   Resting tremor: absent  Intention tremor: absent    Reflexes   Right brachioradialis: 2+  Left brachioradialis: 2+  Right biceps: 2+  Left biceps: 2+  Right triceps: 2+  Left triceps: 2+  Right patellar: 2+  Left patellar: 2+  Right achilles: 2+  Left achilles: 2+  Right : 2+  Left : 2+Station is normal.       Physical Exam  Vitals reviewed.   Constitutional:       General: She is not in acute distress.     Appearance: She is well-developed.   HENT:      Head: Normocephalic and atraumatic.   Eyes:      Extraocular Movements: EOM normal.      Pupils: Pupils are equal, round, and reactive to light.   Cardiovascular:      Rate and Rhythm: Normal rate and regular rhythm.      Heart sounds: Normal heart sounds.   Pulmonary:      Effort: Pulmonary effort is normal. No respiratory distress.      Breath sounds: Normal breath sounds.   Abdominal:      General: Bowel sounds are normal. There is no distension.      Palpations: Abdomen is soft.      Tenderness: There is no abdominal tenderness.   Musculoskeletal:         General: No deformity.      Cervical back: Normal range of motion.   Skin:     General: Skin is warm.      Findings: No rash.   Neurological:      Mental Status: She is oriented to person, place, and time.      Coordination: Romberg Test normal.      Gait: Gait is intact.      Deep Tendon Reflexes:      Reflex Scores:       Tricep reflexes are 2+ on the right side and 2+ on the left side.       Bicep reflexes are 2+ on the right side and 2+ on the left side.       Brachioradialis reflexes are 2+ on the right side and 2+ on the left side.       Patellar reflexes are 2+ on the right side and 2+ on the left side.       Achilles reflexes are 2+ on the right side and 2+ on the left side.  Psychiatric:         Speech: Speech normal.          Judgment: Judgment normal.         Procedures    Assessment/Plan: I would like to go ahead and schedule her for neuropsych testing.  We will also refer her to the [facility] for vestibular assessment.  Return to check a vitamin B12 level today.  We'll also schedule her for an occipital nerve block, left.  Return to clinic after testing.       Diagnoses and all orders for this visit:    1. Cognitive impairment (Primary)  -     Vitamin B12  -     Ambulatory Referral to Neuropsychology    2. Dizziness  -     Basic Vestibular Evaluation; Future    3. Bilateral occipital neuralgia    4. Fibromyalgia    5. Tinnitus of both ears  -     Basic Vestibular Evaluation; Future           Jordan Rawls II, MD

## 2022-02-28 ENCOUNTER — PATIENT ROUNDING (BHMG ONLY) (OUTPATIENT)
Dept: NEUROLOGY | Facility: CLINIC | Age: 57
End: 2022-02-28

## 2022-03-03 ENCOUNTER — TELEPHONE (OUTPATIENT)
Dept: CARDIOLOGY | Facility: CLINIC | Age: 57
End: 2022-03-03

## 2022-03-03 NOTE — TELEPHONE ENCOUNTER
"Received VM from patient.    Returned call. Patient stated that she has had a \"twinge under her breast\" that is both exertional and non-exertional. Denies SOB.    Advised patient that this sounds pretty atypical but that I would discuss with Dr. Mishra.     Advised patient that she had a normal stress test 1/2020.  "

## 2022-03-22 ENCOUNTER — PATIENT MESSAGE (OUTPATIENT)
Dept: INTERNAL MEDICINE | Facility: CLINIC | Age: 57
End: 2022-03-22

## 2022-03-23 RX ORDER — VALACYCLOVIR HYDROCHLORIDE 1 G/1
1000 TABLET, FILM COATED ORAL 2 TIMES DAILY
Qty: 20 TABLET | Refills: 0 | Status: SHIPPED | OUTPATIENT
Start: 2022-03-23 | End: 2022-04-03

## 2022-03-23 NOTE — TELEPHONE ENCOUNTER
From: Mariama Turner  To: Usman Castillo Jr, MD  Sent: 3/22/2022 8:52 AM EDT  Subject: Script    Good morning Dr. Castillo, I hope all is going well with you. I am having issues with fever blisters again. Can you please give me a prescription for Valtrex with refills?

## 2022-03-24 ENCOUNTER — TELEPHONE (OUTPATIENT)
Dept: INTERNAL MEDICINE | Facility: CLINIC | Age: 57
End: 2022-03-24

## 2022-03-24 NOTE — TELEPHONE ENCOUNTER
Spoke with patient and her . I put her on for tomorrow at 12:00 with Ame. I explained we are just overbooked today and that is the earliest available. They may go to urgent care or the ER depending on if she can wait.

## 2022-03-24 NOTE — TELEPHONE ENCOUNTER
Patient has a hx of herniated disc back in 1995. Patient states she steam cleaned carpets Sunday. She mopped on Monday and moved a recliner. She states she went to put laundry in the dryer earlier and when bending over she almost went to the floor. Patient states she has been dx with spinal stenosis as well. Patient states it is progressing worse and she is in a lot of pain. Patient states she can't sit, stand, aor lay. She says she thinks she can feel the vertebra slipping a little bit. She gets a sharp pain with the slightest bit of movement.

## 2022-03-24 NOTE — TELEPHONE ENCOUNTER
Caller: JAGDISH STONE    Relationship: Emergency Contact    Best call back number: [phone]    What is the best time to reach you: ANYTIME     Who are you requesting to speak with (clinical staff, provider,  specific staff member): NURSE       What was the call regarding: PATIENT HAS DONE SOMETHING TO HER BACK AND IS UNABLE TO GET COMFORTABLE, SHE IS IN A LOT OF PAIN, NOT SURE IF SHE NEEDS TO COME IN OR IF SHE COULD GET SOMETHING CALLED IN FOR IT.  PLEASE ADVISE PATIENT.     Do you require a callback: YES

## 2022-03-25 ENCOUNTER — OFFICE VISIT (OUTPATIENT)
Dept: INTERNAL MEDICINE | Facility: CLINIC | Age: 57
End: 2022-03-25

## 2022-03-25 VITALS
HEIGHT: 63 IN | BODY MASS INDEX: 30.12 KG/M2 | TEMPERATURE: 98.6 F | WEIGHT: 170 LBS | OXYGEN SATURATION: 95 % | DIASTOLIC BLOOD PRESSURE: 76 MMHG | HEART RATE: 63 BPM | SYSTOLIC BLOOD PRESSURE: 115 MMHG

## 2022-03-25 DIAGNOSIS — F98.8 ATTENTION DEFICIT DISORDER (ADD) WITHOUT HYPERACTIVITY: Chronic | ICD-10-CM

## 2022-03-25 DIAGNOSIS — S39.012D STRAIN OF LUMBAR REGION, SUBSEQUENT ENCOUNTER: Primary | ICD-10-CM

## 2022-03-25 DIAGNOSIS — F41.9 ANXIETY: Chronic | ICD-10-CM

## 2022-03-25 PROCEDURE — 99214 OFFICE O/P EST MOD 30 MIN: CPT | Performed by: NURSE PRACTITIONER

## 2022-03-25 PROCEDURE — 96372 THER/PROPH/DIAG INJ SC/IM: CPT | Performed by: NURSE PRACTITIONER

## 2022-03-25 RX ORDER — KETOROLAC TROMETHAMINE 30 MG/ML
60 INJECTION, SOLUTION INTRAMUSCULAR; INTRAVENOUS ONCE
Status: COMPLETED | OUTPATIENT
Start: 2022-03-25 | End: 2022-03-25

## 2022-03-25 RX ORDER — METHYLPREDNISOLONE ACETATE 80 MG/ML
80 INJECTION, SUSPENSION INTRA-ARTICULAR; INTRALESIONAL; INTRAMUSCULAR; SOFT TISSUE ONCE
Status: COMPLETED | OUTPATIENT
Start: 2022-03-25 | End: 2022-03-25

## 2022-03-25 RX ADMIN — METHYLPREDNISOLONE ACETATE 80 MG: 80 INJECTION, SUSPENSION INTRA-ARTICULAR; INTRALESIONAL; INTRAMUSCULAR; SOFT TISSUE at 10:17

## 2022-03-25 RX ADMIN — KETOROLAC TROMETHAMINE 60 MG: 30 INJECTION, SOLUTION INTRAMUSCULAR; INTRAVENOUS at 10:17

## 2022-03-25 NOTE — PROGRESS NOTES
Chief Complaint  Back Pain (Seen at Urgent Care yesterday)    Subjective          Mariama Turner presents to [facility]  Back Pain  This is a new problem. The current episode started yesterday. The problem occurs constantly. The pain is present in the lumbar spine. The pain does not radiate. The symptoms are aggravated by bending, sitting, standing, stress, twisting and position. Pertinent negatives include no abdominal pain, bladder incontinence, bowel incontinence, chest pain, dysuria, fever, headaches, leg pain, numbness, paresis, paresthesias, pelvic pain, perianal numbness, tingling, weakness or weight loss. Treatments tried: seen at  - [facility] xray - taking oxycodone and naproxen with moderate relief.   Had done some cleaning ealrier in the week woke up yesterday and had more severe pain bent over to put something in the dryer and had sharp pain in lower back     Patient also states that she was seeing Dr. Albert and was on Adderrrall and lexapro. Pt states that she was having dry mouth and started seeing Dr. Castillo and they took her off of Lexapro and switched to Straterra. Patient states that she does not feel like the inattention is helped with the Straterra.         Current Outpatient Medications   Medication Instructions   • albuterol sulfate  (90 Base) MCG/ACT inhaler 2 puffs, Inhalation, Every 4 Hours PRN   • atenolol (TENORMIN) 25 mg, Oral, Every Night at Bedtime   • atomoxetine (STRATTERA) 60 mg, Oral, Daily   • diclofenac (VOLTAREN) 75 mg, Oral, 2 Times Daily   • Diclofenac Sodium (VOLTAREN) 4 g, Topical, 4 Times Daily PRN   • estradiol (MINIVELLE, VIVELLE-DOT) 0.075 MG/24HR patch 1 patch, Transdermal, 2 Times Weekly   • ezetimibe (ZETIA) 10 mg, Oral, Daily   • furosemide (LASIX) 40 mg, Oral, Daily   • HYDROcodone-acetaminophen (NORCO) 5-325 MG per tablet 1 tablet 4 times daily as needed for pain.   • Livalo 1 mg, Oral, Daily   • potassium  "chloride 10 MEQ CR tablet 10 mEq, Oral, Daily   • spironolactone (ALDACTONE) 25 mg, Oral, Daily   • valACYclovir (VALTREX) 1,000 mg, Oral, 2 Times Daily       The following portions of the patient's history were reviewed and updated as appropriate: allergies, current medications, past family history, past medical history, past social history, past surgical history, and problem list.    Objective   Vital Signs:   /76 (BP Location: Left arm, Patient Position: Sitting, Cuff Size: Adult)   Pulse 63   Temp 98.6 °F (37 °C) (Temporal)   Ht 160 cm (63\")   Wt 77.1 kg (170 lb)   SpO2 95%   BMI 30.11 kg/m²     Wt Readings from Last 3 Encounters:   03/25/22 77.1 kg (170 lb)   03/24/22 72.6 kg (160 lb)   02/21/22 77.1 kg (170 lb)     BP Readings from Last 3 Encounters:   03/25/22 115/76   03/24/22 106/74   02/21/22 138/88     Physical Exam  Vitals and nursing note reviewed.   Constitutional:       General: She is not in acute distress.     Appearance: Normal appearance. She is not ill-appearing.   Eyes:      Extraocular Movements: Extraocular movements intact.      Conjunctiva/sclera: Conjunctivae normal.   Cardiovascular:      Rate and Rhythm: Normal rate.   Pulmonary:      Effort: Pulmonary effort is normal.      Breath sounds: Normal breath sounds.   Musculoskeletal:         General: Tenderness (lumbar ) present.      Lumbar back: Tenderness present. No bony tenderness. Decreased range of motion (related to pain). Positive right straight leg raise test and positive left straight leg raise test.   Skin:     General: Skin is warm and dry.      Capillary Refill: Capillary refill takes less than 2 seconds.   Neurological:      General: No focal deficit present.      Mental Status: She is alert and oriented to person, place, and time. Mental status is at baseline.   Psychiatric:         Mood and Affect: Mood normal.         Behavior: Behavior normal.         Thought Content: Thought content normal.         Judgment: " Judgment normal.              Result Review :   The following data was reviewed by: MIGNON Gaitan on 03/25/2022:  Common labs    Common Labsle 4/16/21 10/22/21 10/22/21 10/22/21     1638 1638 1638   Glucose 93   89   BUN 14   13   Creatinine 0.88   0.74   eGFR Non African Am    81   Sodium 139   139   Potassium 4.6   4.2   Chloride 104   100   Calcium 9.4   9.4   Albumin 4.2   4.50   Total Bilirubin 0.67   0.5   Alkaline Phosphatase 83   90   AST (SGOT) 21   21   ALT (SGPT) 18   29   WBC 5.32 6.96     Hemoglobin 14.0 14.8     Hematocrit 43.1 44.3     Platelets 275 338     Total Cholesterol   190    Total Cholesterol 239 (A)      Triglycerides 129  102    HDL Cholesterol 57  54    LDL Cholesterol  156 (A)  118 (A)    (A) Abnormal value       Comments are available for some flowsheets but are not being displayed.                  Lab Results   Component Value Date    SARSANTIGEN Not Detected 09/23/2021    COVID19 Not Detected 10/13/2021    FLUAAG Not Detected 09/23/2021    FLUBAG Not Detected 09/23/2021       Procedures        Assessment and Plan    Diagnoses and all orders for this visit:    1. Strain of lumbar region, subsequent encounter (Primary)  -     Ambulatory Referral to Physical Therapy  -     ketorolac (TORADOL) injection 60 mg  -     methylPREDNISolone acetate (DEPO-medrol) injection 80 mg    2. Anxiety  -     [facility] - Swab,    3. Attention deficit disorder (ADD) without hyperactivity    - discussed genesight before switching medications.     Medications Discontinued During This Encounter   Medication Reason   • ondansetron ODT (ZOFRAN-ODT) 4 MG disintegrating tablet    • Rimegepant Sulfate (Nurtec) 75 MG tablet dispersible tablet    • naproxen sodium (ANAPROX) 550 MG tablet Historical Med - Therapy completed          Follow Up   Return if symptoms worsen or fail to improve.  Patient was given instructions and counseling regarding her condition or for health maintenance advice. Please see  specific information pulled into the AVS if appropriate.       Ame Xie, MIGNON  03/25/22  11:33 EDT

## 2022-03-29 ENCOUNTER — TELEPHONE (OUTPATIENT)
Dept: INTERNAL MEDICINE | Facility: CLINIC | Age: 57
End: 2022-03-29

## 2022-03-29 ENCOUNTER — OFFICE VISIT (OUTPATIENT)
Dept: INTERNAL MEDICINE | Facility: CLINIC | Age: 57
End: 2022-03-29

## 2022-03-29 VITALS
OXYGEN SATURATION: 95 % | BODY MASS INDEX: 29.02 KG/M2 | HEART RATE: 63 BPM | DIASTOLIC BLOOD PRESSURE: 83 MMHG | HEIGHT: 64 IN | TEMPERATURE: 97.5 F | WEIGHT: 170 LBS | SYSTOLIC BLOOD PRESSURE: 117 MMHG

## 2022-03-29 DIAGNOSIS — J02.9 PHARYNGITIS, UNSPECIFIED ETIOLOGY: ICD-10-CM

## 2022-03-29 DIAGNOSIS — J06.9 VIRAL URI WITH COUGH: Primary | ICD-10-CM

## 2022-03-29 LAB
EXPIRATION DATE: NORMAL
FLUAV AG NPH QL: NEGATIVE
FLUBV AG NPH QL: NEGATIVE
INTERNAL CONTROL: NORMAL
Lab: NORMAL
S PYO AG THROAT QL: NEGATIVE
SARS-COV-2 AG UPPER RESP QL IA.RAPID: NOT DETECTED
SARS-COV-2 RNA PNL SPEC NAA+PROBE: NOT DETECTED

## 2022-03-29 PROCEDURE — 87081 CULTURE SCREEN ONLY: CPT | Performed by: NURSE PRACTITIONER

## 2022-03-29 PROCEDURE — 99214 OFFICE O/P EST MOD 30 MIN: CPT | Performed by: NURSE PRACTITIONER

## 2022-03-29 PROCEDURE — 87880 STREP A ASSAY W/OPTIC: CPT | Performed by: NURSE PRACTITIONER

## 2022-03-29 PROCEDURE — 87426 SARSCOV CORONAVIRUS AG IA: CPT | Performed by: NURSE PRACTITIONER

## 2022-03-29 PROCEDURE — 87804 INFLUENZA ASSAY W/OPTIC: CPT | Performed by: NURSE PRACTITIONER

## 2022-03-29 PROCEDURE — U0004 COV-19 TEST NON-CDC HGH THRU: HCPCS | Performed by: NURSE PRACTITIONER

## 2022-03-29 RX ORDER — FLUTICASONE PROPIONATE 50 MCG
2 SPRAY, SUSPENSION (ML) NASAL DAILY
Qty: 16 G | Refills: 0 | Status: SHIPPED | OUTPATIENT
Start: 2022-03-29 | End: 2022-03-29

## 2022-03-29 RX ORDER — BROMPHENIRAMINE MALEATE, PSEUDOEPHEDRINE HYDROCHLORIDE, AND DEXTROMETHORPHAN HYDROBROMIDE 2; 30; 10 MG/5ML; MG/5ML; MG/5ML
10 SYRUP ORAL 4 TIMES DAILY PRN
Qty: 400 ML | Refills: 0 | Status: SHIPPED | OUTPATIENT
Start: 2022-03-29 | End: 2022-03-29

## 2022-03-29 RX ORDER — METHYLPREDNISOLONE 4 MG/1
TABLET ORAL
Qty: 21 EACH | Refills: 0 | Status: SHIPPED | OUTPATIENT
Start: 2022-03-29 | End: 2022-03-29

## 2022-03-29 RX ORDER — FLUTICASONE PROPIONATE 50 MCG
2 SPRAY, SUSPENSION (ML) NASAL DAILY
Qty: 16 G | Refills: 0 | Status: SHIPPED | OUTPATIENT
Start: 2022-03-29 | End: 2023-03-15 | Stop reason: SDUPTHER

## 2022-03-29 RX ORDER — BROMPHENIRAMINE MALEATE, PSEUDOEPHEDRINE HYDROCHLORIDE, AND DEXTROMETHORPHAN HYDROBROMIDE 2; 30; 10 MG/5ML; MG/5ML; MG/5ML
10 SYRUP ORAL 4 TIMES DAILY PRN
Qty: 400 ML | Refills: 0 | Status: SHIPPED | OUTPATIENT
Start: 2022-03-29 | End: 2022-04-08

## 2022-03-29 RX ORDER — METHYLPREDNISOLONE 4 MG/1
TABLET ORAL
Qty: 21 EACH | Refills: 0 | Status: SHIPPED | OUTPATIENT
Start: 2022-03-29 | End: 2022-05-06

## 2022-03-29 NOTE — TELEPHONE ENCOUNTER
----- Message from MIGNON Gaitan sent at 3/29/2022 12:01 PM EDT -----  Flu, strep, covid negative.

## 2022-03-29 NOTE — PROGRESS NOTES
Chief Complaint  Cough, Nasal Congestion, and Sore Throat    Subjective          Mariama Turner presents to [facility]  URI   This is a new problem. Episode onset: 3 days  The problem has been unchanged. There has been no fever. Associated symptoms include coughing, ear pain, headaches, a plugged ear sensation, rhinorrhea and a sore throat. Pertinent negatives include no abdominal pain, chest pain, congestion, diarrhea, dysuria, joint pain, joint swelling, nausea, neck pain, rash, sinus pain, sneezing, swollen glands, vomiting or wheezing.       Current Outpatient Medications   Medication Instructions   • albuterol sulfate  (90 Base) MCG/ACT inhaler 2 puffs, Inhalation, Every 4 Hours PRN   • atenolol (TENORMIN) 25 mg, Oral, Every Night at Bedtime   • atomoxetine (STRATTERA) 60 mg, Oral, Daily   • brompheniramine-pseudoephedrine-DM (Bromfed DM) 30-2-10 MG/5ML syrup 10 mL, Oral, 4 Times Daily PRN   • diclofenac (VOLTAREN) 75 mg, Oral, 2 Times Daily   • Diclofenac Sodium (VOLTAREN) 4 g, Topical, 4 Times Daily PRN   • estradiol (MINIVELLE, VIVELLE-DOT) 0.075 MG/24HR patch 1 patch, Transdermal, 2 Times Weekly   • ezetimibe (ZETIA) 10 mg, Oral, Daily   • fluticasone (Flonase) 50 MCG/ACT nasal spray 2 sprays, Nasal, Daily   • furosemide (LASIX) 40 mg, Oral, Daily   • HYDROcodone-acetaminophen (NORCO) 5-325 MG per tablet 1 tablet 4 times daily as needed for pain.   • Livalo 1 mg, Oral, Daily   • methylPREDNISolone (Medrol) 4 MG dose pack Take as directed on package instructions.   • potassium chloride 10 MEQ CR tablet 10 mEq, Oral, Daily   • spironolactone (ALDACTONE) 25 mg, Oral, Daily   • valACYclovir (VALTREX) 1,000 mg, Oral, 2 Times Daily       The following portions of the patient's history were reviewed and updated as appropriate: allergies, current medications, past family history, past medical history, past social history, past surgical history, and problem  "list.    Objective   Vital Signs:   /83   Pulse 63   Temp 97.5 °F (36.4 °C) (Temporal)   Ht 161.3 cm (63.5\")   Wt 77.1 kg (170 lb)   SpO2 95%   BMI 29.64 kg/m²     Wt Readings from Last 3 Encounters:   03/29/22 77.1 kg (170 lb)   03/25/22 77.1 kg (170 lb)   03/24/22 72.6 kg (160 lb)     BP Readings from Last 3 Encounters:   03/29/22 117/83   03/25/22 115/76   03/24/22 106/74     Physical Exam  Vitals and nursing note reviewed.   Constitutional:       General: She is not in acute distress.     Appearance: Normal appearance. She is not ill-appearing.   HENT:      Right Ear: Ear canal and external ear normal.      Left Ear: Ear canal and external ear normal.      Ears:      Comments: Fluid noted to bilateral TM     Nose: Congestion and rhinorrhea present.      Mouth/Throat:      Mouth: Mucous membranes are moist.      Pharynx: Posterior oropharyngeal erythema present.   Eyes:      Extraocular Movements: Extraocular movements intact.      Conjunctiva/sclera: Conjunctivae normal.   Cardiovascular:      Rate and Rhythm: Normal rate.   Pulmonary:      Effort: Pulmonary effort is normal.      Breath sounds: Normal breath sounds.   Skin:     General: Skin is warm and dry.      Capillary Refill: Capillary refill takes less than 2 seconds.   Neurological:      General: No focal deficit present.      Mental Status: She is alert and oriented to person, place, and time. Mental status is at baseline.   Psychiatric:         Mood and Affect: Mood normal.         Behavior: Behavior normal.         Thought Content: Thought content normal.         Judgment: Judgment normal.            Result Review :   The following data was reviewed by: MIGNON Gaitan on 03/29/2022:  Common labs    Common Labsle 4/16/21 10/22/21 10/22/21 10/22/21     1638 1638 1638   Glucose 93   89   BUN 14   13   Creatinine 0.88   0.74   eGFR Non African Am    81   Sodium 139   139   Potassium 4.6   4.2   Chloride 104   100   Calcium 9.4   9.4 "   Albumin 4.2   4.50   Total Bilirubin 0.67   0.5   Alkaline Phosphatase 83   90   AST (SGOT) 21   21   ALT (SGPT) 18   29   WBC 5.32 6.96     Hemoglobin 14.0 14.8     Hematocrit 43.1 44.3     Platelets 275 338     Total Cholesterol   190    Total Cholesterol 239 (A)      Triglycerides 129  102    HDL Cholesterol 57  54    LDL Cholesterol  156 (A)  118 (A)    (A) Abnormal value       Comments are available for some flowsheets but are not being displayed.                  Lab Results   Component Value Date    SARSANTIGEN Not Detected 03/29/2022    COVID19 Not Detected 10/13/2021    RAPFLUA Negative 03/29/2022    RAPFLUB Negative 03/29/2022    FLUAAG Not Detected 09/23/2021    FLUBAG Not Detected 09/23/2021    RAPSCRN Negative 03/29/2022       Procedures        Assessment and Plan    Diagnoses and all orders for this visit:    1. Viral URI with cough (Primary)  -     POCT SARS-CoV-2 Antigen LUBA  -     POCT Influenza A/B  -     COVID-19,APTIMA PANTHER(JIMI),[facility]/ LESLIE, NP/OP SWAB IN UTM/VTM/SALINE TRANSPORT MEDIA,24 HR TAT - Swab, Nasal Cavity  -     Discontinue: brompheniramine-pseudoephedrine-DM (Bromfed DM) 30-2-10 MG/5ML syrup; Take 10 mL by mouth 4 (Four) Times a Day As Needed for Congestion, Cough or Allergies for up to 10 days.  Dispense: 400 mL; Refill: 0  -     Discontinue: fluticasone (Flonase) 50 MCG/ACT nasal spray; 2 sprays into the nostril(s) as directed by provider Daily.  Dispense: 16 g; Refill: 0  -     Discontinue: methylPREDNISolone (Medrol) 4 MG dose pack; Take as directed on package instructions.  Dispense: 21 each; Refill: 0  -     methylPREDNISolone (Medrol) 4 MG dose pack; Take as directed on package instructions.  Dispense: 21 each; Refill: 0  -     fluticasone (Flonase) 50 MCG/ACT nasal spray; 2 sprays into the nostril(s) as directed by provider Daily.  Dispense: 16 g; Refill: 0  -     brompheniramine-pseudoephedrine-DM (Bromfed DM) 30-2-10 MG/5ML syrup; Take 10 mL by mouth 4 (Four) Times a  Day As Needed for Congestion, Cough or Allergies for up to 10 days.  Dispense: 400 mL; Refill: 0    2. Pharyngitis, unspecified etiology  -     POCT rapid strep A  -     COVID-19,APTIMA PANTHER(JIMI),[facility]/ LESLIE, NP/OP SWAB IN UTM/VTM/SALINE TRANSPORT MEDIA,24 HR TAT - Swab, Nasal Cavity  -     Beta Strep Culture, Throat - , Throat; Future          Medications Discontinued During This Encounter   Medication Reason   • brompheniramine-pseudoephedrine-DM (Bromfed DM) 30-2-10 MG/5ML syrup    • fluticasone (Flonase) 50 MCG/ACT nasal spray    • methylPREDNISolone (Medrol) 4 MG dose pack           Follow Up   Return if symptoms worsen or fail to improve.  Patient was given instructions and counseling regarding her condition or for health maintenance advice. Please see specific information pulled into the AVS if appropriate.       Ame Xie, MIGNON  03/29/22  12:37 EDT

## 2022-03-30 ENCOUNTER — TELEPHONE (OUTPATIENT)
Dept: INTERNAL MEDICINE | Facility: CLINIC | Age: 57
End: 2022-03-30

## 2022-03-31 LAB — BACTERIA SPEC AEROBE CULT: NORMAL

## 2022-04-04 ENCOUNTER — TELEPHONE (OUTPATIENT)
Dept: INTERNAL MEDICINE | Facility: CLINIC | Age: 57
End: 2022-04-04

## 2022-04-04 NOTE — TELEPHONE ENCOUNTER
Called patient in regards to Portfolium swab. When swabbed, patient requested office to hold onto specimen before sending it off until patient was sure insurance would cover it. Swab is only good for a few more days in packaging before needing to be sent.     Hub: please ask patient if she is wanting to continue with this.

## 2022-04-07 DIAGNOSIS — B37.31 VAGINAL YEAST INFECTION: Primary | ICD-10-CM

## 2022-04-07 RX ORDER — FLUCONAZOLE 150 MG/1
150 TABLET ORAL ONCE
Qty: 2 TABLET | Refills: 0 | Status: SHIPPED | OUTPATIENT
Start: 2022-04-07 | End: 2022-04-10

## 2022-04-20 ENCOUNTER — TREATMENT (OUTPATIENT)
Dept: PHYSICAL THERAPY | Facility: CLINIC | Age: 57
End: 2022-04-20

## 2022-04-20 DIAGNOSIS — R29.898 WEAKNESS OF BOTH HIPS: ICD-10-CM

## 2022-04-20 DIAGNOSIS — S39.012D STRAIN OF LUMBAR REGION, SUBSEQUENT ENCOUNTER: ICD-10-CM

## 2022-04-20 DIAGNOSIS — M54.50 MIDLINE LOW BACK PAIN, UNSPECIFIED CHRONICITY, UNSPECIFIED WHETHER SCIATICA PRESENT: Primary | ICD-10-CM

## 2022-04-20 PROCEDURE — 97161 PT EVAL LOW COMPLEX 20 MIN: CPT | Performed by: PHYSICAL THERAPIST

## 2022-04-20 PROCEDURE — 97110 THERAPEUTIC EXERCISES: CPT | Performed by: PHYSICAL THERAPIST

## 2022-04-20 NOTE — PROGRESS NOTES
"Physical Therapy Initial Evaluation and Plan of Care        Patient: Mariama Turner   : 1965  Diagnosis/ICD-10 Code:  Midline low back pain, unspecified chronicity, unspecified whether sciatica present [M54.50]  Referring practitioner: SCARLET Gaitan*  Date of Initial Visit: 2022  Today's Date: 2022  Patient seen for 1 sessions           Subjective Questionnaire: Oswestry:       Subjective Evaluation    History of Present Illness  Mechanism of injury: Pt reports that she has been having lower back pain intermittently for several years after she herniated a lumbar disc while she was trying to lift something heavy.  Pt reports that she vaccuumed and mopped the same day about 4 weeks ago and \"flared the back pain up again\".  Pt reports she feels instability and soreness in her lower back.  Pt reports that she sleeps with a pillow between her legs which decreases some pressure while sleeping. Pt reports that prolonged bending and twisting as well as prolonged sitting increases her pain.  Pt reports that she sits for long periods for her job.  Pt reports that during bad flare ups that she has intermittent numbness to her bilateral knees and experiences bilateral LE weakness.  Pt reports that normally pain is mainly localized in her lower back.  Pt reports that she is not taking anything for pain currently.           Pain  Current pain ratin  At best pain ratin  At worst pain rating: 10  Quality: dull ache, discomfort and sharp  Aggravating factors: repetitive movement and prolonged positioning       Lumbar x-ray results:    FINDINGS:          There is no acute fracture or dislocation.  There is disc space narrowing at L5-S1 indicating   degenerative disc disease.  There is multilevel endplate sclerosis and endplate spurring consistent   with spondylosis.  There is levoscoliotic curvature of the lumbar spine.  There are facet arthritic   changes at L5-S1.  The soft tissues are " unremarkable.     IMPRESSION:               No acute fracture or dislocation.      Objective          Static Posture     Head  Forward.    Shoulders  Rounded.    Scapulae  Left protracted and right protracted.    Thoracic Spine  Hyperkyphosis.    Lumbar Spine   Decreased lordosis.     Palpation   Left   No palpable tenderness to the erector spinae and lumbar paraspinals.     Right   No palpable tenderness to the erector spinae and lumbar paraspinals.     Active Range of Motion     Additional Active Range of Motion Details  Lumbar AROM:  Flexion: 25% limited  Extension: 50% limited (pain)  Lateral flexion: 50% limited bilaterally  Rotation: 50% limited bialterally    Strength/Myotome Testing     Left Hip   Planes of Motion   Flexion: 4-  Extension: 4-  Abduction: 4-    Right Hip   Planes of Motion   Flexion: 4-  Extension: 4-  Abduction: 4-    Left Knee   Flexion: 4+  Extension: 4+    Right Knee   Flexion: 4+  Extension: 4+    Tests       Thoracic   Negative slump.     Lumbar     Left   Positive quadrant.   Negative passive SLR.     Right   Positive quadrant.   Negative passive SLR.     Left Hip   Negative FEROZ and piriformis.   90/90 SLR: Negative.   SLR: Negative.     Right Hip   Negative FEROZ and piriformis.   90/90 SLR: Negative.  SLR: Negative.     Lumbar Flexibility Comments:   Tightness noted in bilateral piriformis    Ambulation     Observational Gait     Additional Observational Gait Details  Pt demonstrates decreased hip extension in stance phase bilaterally with slight forward flexed posture during gait.           Assessment & Plan     Assessment  Impairments: abnormal gait, abnormal muscle firing, abnormal or restricted ROM, activity intolerance, impaired balance, impaired physical strength, lacks appropriate home exercise program, pain with function, safety issue and weight-bearing intolerance  Functional Limitations: lifting, sleeping, walking, pulling, pushing, uncomfortable because of pain, moving  in bed, sitting, standing, stooping and unable to perform repetitive tasks  Assessment details: Patient presents with signs/symptoms consistent with lower back pain with intermittent bilateral LE radiculopathy including decreased lumbar ROM, bilateral hip and core weakness and reports of pain limiting function during ADLs as shown on the NELLY. Patient would benefit from skilled PT services in order to address remaining deficits limiting function at this time.       Prognosis: good    Goals  Plan Goals: 1. The patient complains of low back pain.  LTG 1: 12 weeks:  The patient will report a pain rating of 2/10 or better in order to improve  tolerance to activities of daily living and improve sleep quality.  STATUS:  New  STG 1a: 6 weeks:  The patient will report a pain rating of 3/10 or better.  STATUS:  New  TREATMENT:  Therapeutic exercises, manual therapy, aquatic therapy, home exercise   instruction, and modalities as needed for pain to include:  electrical stimulation, moist heat, ice,   ultrasound, and diathermy.      2. The patient demonstrates weakness of the bilateral hips.  LTG 2: 12 weeks:  The patient will demonstrate 4+ /5 strength for bilateral hip flexion, abduction,  and extension in order to improve hip stability.  STATUS:  New  STG 2a: 6 weeks:  The patient will demonstrate 4 /5 strength for bilateral hip flexion, abduction,  and extension.  STATUS:  New  TREATMENT: Therapeutic exercises, manual therapy, aquatic therapy, home exercise instruction,  and modalities as needed for pain to include:  electrical stimulation, moist heat, ice, ultrasound, and   diathermy.      3. The patient has gait dysfunction.  LTG 3: 12 weeks:  The patient will ambulate without assistive device, independently, for   community distances with minimal limp in order to improve mobility and allow   patient to perform activities such as grocery shopping with greater ease.  STATUS:  New  TREATMENT: Gait training, aquatic therapy,  therapeutic exercise, and home exercise instruction.    4. Mobility: Walking/Moving Around Functional Limitation    LTG 1: 12 weeks:  The patient will demonstrate 1-19 % limitation by achieving a score of 8/45 on the NELLY.  STATUS:  New  TREATMENT:  Manual therapy, therapeutic exercise, home exercise instruction, and modalities as needed to include: moist heat, electrical stimulation, and ultrasound.      Plan  Therapy options: will be seen for skilled therapy services  Planned modality interventions: cryotherapy, electrical stimulation/Russian stimulation and TENS  Planned therapy interventions: balance/weight-bearing training, ADL retraining, soft tissue mobilization, strengthening, stretching, therapeutic activities, joint mobilization, home exercise program, gait training, functional ROM exercises, flexibility, body mechanics training, postural training, neuromuscular re-education, manual therapy, spinal/joint mobilization and abdominal trunk stabilization  Frequency: 2x week  Duration in weeks: 12  Treatment plan discussed with: patient        Timed:         Manual Therapy:    0     mins  71767;     Therapeutic Exercise:    8     mins  07861;     Neuromuscular Srinivas:    0    mins  21212;    Therapeutic Activity:     0     mins  51103;     Gait Trainin     mins  69089;     Ultrasound:     0     mins  11224;    Ionto                               0    mins   93180  Self-care  __0__ mins 17287    Un-Timed:  Electrical Stimulation:    0     mins  23078 ( );  Traction     0     mins 61076  Low Eval     30     Mins  03306  Mod Eval     0     Mins  37586  High Eval                       0     Mins  38320  Hot pack     0     Mins    Cold pack                       0     Mins      Timed Treatment:   8   mins   Total Treatment:     38   mins    PT SIGNATURE: Jaky Pritchett PT      Electronically signed 2022  [place] License: [id number]    Initial Certification    Certification Period: 2022 thru  7/18/2022  NPI: [id number]  I certify that the therapy services are furnished while this patient is under my care.  The services outlined above are required by this patient, and will be reviewed every 90 days.     PHYSICIAN: Ame Xie APRN      DATE:     Please sign and return via fax to [phone].. Thank you, [facility].

## 2022-04-25 ENCOUNTER — TREATMENT (OUTPATIENT)
Dept: PHYSICAL THERAPY | Facility: CLINIC | Age: 57
End: 2022-04-25

## 2022-04-25 DIAGNOSIS — R29.898 WEAKNESS OF BOTH HIPS: ICD-10-CM

## 2022-04-25 DIAGNOSIS — S39.012D STRAIN OF LUMBAR REGION, SUBSEQUENT ENCOUNTER: ICD-10-CM

## 2022-04-25 DIAGNOSIS — M54.50 MIDLINE LOW BACK PAIN, UNSPECIFIED CHRONICITY, UNSPECIFIED WHETHER SCIATICA PRESENT: Primary | ICD-10-CM

## 2022-04-25 PROCEDURE — 97112 NEUROMUSCULAR REEDUCATION: CPT | Performed by: PHYSICAL THERAPIST

## 2022-04-25 PROCEDURE — 97110 THERAPEUTIC EXERCISES: CPT | Performed by: PHYSICAL THERAPIST

## 2022-04-25 NOTE — PROGRESS NOTES
"Physical Therapy Daily Progress Note    VISIT#: 2    Subjective   Mariama Turner reports 0/10 pain. Pt reports she has been compliant with HEP and has been walking more since initial evaluation.      Objective     See Exercise, Manual, and Modality Logs for complete treatment.     Assessment/Plan     Progressed patient's strengthening exercises and patient tolerated well. Pt required tactile cues with performing pelvic tilt in standing but able to achieve after cues. Pt did report slight \"twinging\" sensation with standing marching exercise but no increase in pain. Will continue to progress patient as able.        Progress per Plan of Care and Progress strengthening /stabilization /functional activity            Timed:                 Manual Therapy:    0     mins  05936;     Therapeutic Exercise:    8     mins  37345;     Neuromuscular Srinivas:    23    mins  03462;    Therapeutic Activity:     0     mins  09339;     Gait Trainin     mins  17096;     Ultrasound:     0     mins  03076;    Ionto                               0    mins   53017  Self pay                         0     mins [id number]    Un-Timed:  Electrical Stimulation:    0     mins  06143 (MC )  Canalith Repos    0     mins 88010  Dry Needling     0     mins self-pay  Traction     0     mins 21579    Timed Treatment:   31   mins   Total Treatment:     31   mins    Lilliana Hicks PT  Physical Therapist    PT SIGNATURE: Electronically signed by Lilliana Hicks PT  [place] LICENSE: [id number]  " Hospital Medicine Progress Note, Adult, Complex               Author: Lalo Vernon Date & Time created: 1/25/2017  4:20 PM     Interval History:  Admitted for Quadriparesis.    Quadriparesis - minimal improvement  Pain - not controlled  HTN - BP controlled    Review of Systems:  Review of Systems   Constitutional: Positive for malaise/fatigue. Negative for fever and chills.   HENT: Negative for sore throat.    Eyes: Negative for blurred vision.   Respiratory: Negative for cough, shortness of breath and wheezing.    Cardiovascular: Negative for chest pain.   Gastrointestinal: Negative for heartburn, nausea, vomiting, abdominal pain and diarrhea.   Musculoskeletal: Positive for joint pain.   Neurological: Positive for sensory change, focal weakness and weakness. Negative for dizziness, speech change, seizures and headaches.       Physical Exam:  Physical Exam   Constitutional: She is oriented to person, place, and time. She appears well-developed.   Morbidly obese   HENT:   Head: Normocephalic and atraumatic.   Eyes: Conjunctivae are normal. Pupils are equal, round, and reactive to light.   Neck: No tracheal deviation present. No thyromegaly present.   Cardiovascular: Normal rate and regular rhythm.    Pulmonary/Chest: Effort normal and breath sounds normal.   Abdominal: Soft. Bowel sounds are normal. She exhibits no distension. There is no tenderness.   Musculoskeletal: She exhibits edema.   Lymphadenopathy:     She has no cervical adenopathy.   Neurological: She is alert and oriented to person, place, and time. No cranial nerve deficit.   MMT RUE 3/5, RLE 0-1/5, LUE 4+/5, LLE 0-1/5   Skin: Skin is warm and dry.   Nursing note and vitals reviewed.      Labs:        Invalid input(s): [id number]      Recent Labs      01/25/17   0814   SODIUM  139   POTASSIUM  4.0   CHLORIDE  105   CO2  26   BUN  24*   CREATININE  0.65   CALCIUM  9.0     Recent Labs      01/25/17   0814   GLUCOSE  137*     No results for  input(s): RBC, HEMOGLOBIN, HEMATOCRIT, PLATELETCT, PROTHROMBTM, APTT, INR, IRON, FERRITIN, TOTIRONBC in the last 72 hours.            Hemodynamics:  Temp (24hrs), Av.4 °C (97.6 °F), Min:36.1 °C (96.9 °F), Max:36.9 °C (98.4 °F)  Temperature: 36.9 °C (98.4 °F)  Pulse  Av.4  Min: 70  Max: 108   Blood Pressure: 135/81 mmHg     Respiratory:    Respiration: 17, Pulse Oximetry: 95 %     Work Of Breathing / Effort: Mild  RUL Breath Sounds: Diminished, RML Breath Sounds: Diminished, RLL Breath Sounds: Diminished, MARY Breath Sounds: Diminished, LLL Breath Sounds: Diminished  Fluids:    Intake/Output Summary (Last 24 hours) at 17 1620  Last data filed at 17 0600   Gross per 24 hour   Intake    100 ml   Output   1200 ml   Net  -1100 ml        GI/Nutrition:  Orders Placed This Encounter   Procedures   • Diet Order     Standing Status: Standing      Number of Occurrences: 1      Standing Expiration Date:      Order Specific Question:  Diet:     Answer:  Low Fiber(GI Soft) [2]     Order Specific Question:  Texture/Fiber modifications:     Answer:  Dysphagia 1(Pureed)specify fluid consistency(question 6) [1]     Medical Decision Making, by Problem:  Active Hospital Problems    Diagnosis   • *Quadriparesis (CMS-[facility]) [G82.50]   IV Solumedrol, Neurology following, arrangements for muscle biopsy by Surgery   • HTN (hypertension) [I10]     No current meds   • Chronic pain syndrome [G89.4]     Fentanyl patch, increased Oxycodone, continue Neurontin   • Dysphagia [R13.10]      Dysphagia 1, needs swallow eval   • Chronic inflammatory arthritis [M19.90]     previously on Plaquenil   • Suprapubic catheter (CMS-[facility]) [Z93.59]          • Schizophrenia (CMS-[facility]) [F20.9]   Geodon, Prozac   • TONYA on CPAP [G47.33]        May use home cpap   • Hypothyroidism [E03.9]       Synthroid       Medications reviewed, Labs reviewed, Radiology images reviewed and EKG reviewed  Andrade catheter: Neurogenic Bladder      DVT Prophylaxis:  Enoxaparin (Lovenox)    Ulcer prophylaxis: Yes    Assessed for rehab: Patient was assess for and/or received rehabilitation services during this hospitalization

## 2022-04-28 ENCOUNTER — HOSPITAL ENCOUNTER (EMERGENCY)
Facility: HOSPITAL | Age: 57
Discharge: HOME | End: 2022-04-28
Attending: EMERGENCY MEDICINE | Admitting: EMERGENCY MEDICINE

## 2022-04-28 ENCOUNTER — APPOINTMENT (OUTPATIENT)
Dept: CT IMAGING | Facility: HOSPITAL | Age: 57
End: 2022-04-28

## 2022-04-28 ENCOUNTER — TELEPHONE (OUTPATIENT)
Dept: INTERNAL MEDICINE | Facility: CLINIC | Age: 57
End: 2022-04-28

## 2022-04-28 ENCOUNTER — APPOINTMENT (OUTPATIENT)
Dept: GENERAL RADIOLOGY | Facility: HOSPITAL | Age: 57
End: 2022-04-28

## 2022-04-28 VITALS
HEART RATE: 59 BPM | RESPIRATION RATE: 18 BRPM | HEIGHT: 62 IN | DIASTOLIC BLOOD PRESSURE: 51 MMHG | TEMPERATURE: 97.9 F | OXYGEN SATURATION: 100 % | WEIGHT: 166.89 LBS | SYSTOLIC BLOOD PRESSURE: 122 MMHG | BODY MASS INDEX: 30.71 KG/M2

## 2022-04-28 DIAGNOSIS — G89.29 CHRONIC NONINTRACTABLE HEADACHE, UNSPECIFIED HEADACHE TYPE: ICD-10-CM

## 2022-04-28 DIAGNOSIS — R11.0 NAUSEA: ICD-10-CM

## 2022-04-28 DIAGNOSIS — R55 VASOVAGAL SYNCOPE: Primary | ICD-10-CM

## 2022-04-28 DIAGNOSIS — R51.9 CHRONIC NONINTRACTABLE HEADACHE, UNSPECIFIED HEADACHE TYPE: ICD-10-CM

## 2022-04-28 LAB
ALBUMIN SERPL-MCNC: 4.6 G/DL (ref 3.5–5.2)
ALBUMIN/GLOB SERPL: 1.8 G/DL
ALP SERPL-CCNC: 89 U/L (ref 39–117)
ALT SERPL W P-5'-P-CCNC: 20 U/L (ref 1–33)
ANION GAP SERPL CALCULATED.3IONS-SCNC: 11.2 MMOL/L (ref 5–15)
AST SERPL-CCNC: 20 U/L (ref 1–32)
BASOPHILS # BLD AUTO: 0.04 10*3/MM3 (ref 0–0.2)
BASOPHILS NFR BLD AUTO: 0.5 % (ref 0–1.5)
BILIRUB SERPL-MCNC: 0.7 MG/DL (ref 0–1.2)
BUN SERPL-MCNC: 16 MG/DL (ref 6–20)
BUN/CREAT SERPL: 18 (ref 7–25)
CALCIUM SPEC-SCNC: 9.5 MG/DL (ref 8.6–10.5)
CHLORIDE SERPL-SCNC: 104 MMOL/L (ref 98–107)
CO2 SERPL-SCNC: 26.8 MMOL/L (ref 22–29)
CREAT SERPL-MCNC: 0.89 MG/DL (ref 0.57–1)
DEPRECATED RDW RBC AUTO: 43 FL (ref 37–54)
EGFRCR SERPLBLD CKD-EPI 2021: 75.3 ML/MIN/1.73
EOSINOPHIL # BLD AUTO: 0.03 10*3/MM3 (ref 0–0.4)
EOSINOPHIL NFR BLD AUTO: 0.4 % (ref 0.3–6.2)
ERYTHROCYTE [DISTWIDTH] IN BLOOD BY AUTOMATED COUNT: 13.2 % (ref 12.3–15.4)
FLUAV AG NPH QL: NEGATIVE
FLUBV AG NPH QL IA: NEGATIVE
GLOBULIN UR ELPH-MCNC: 2.5 GM/DL
GLUCOSE SERPL-MCNC: 115 MG/DL (ref 65–99)
HCT VFR BLD AUTO: 42 % (ref 34–46.6)
HGB BLD-MCNC: 14 G/DL (ref 12–15.9)
HOLD SPECIMEN: NORMAL
HOLD SPECIMEN: NORMAL
IMM GRANULOCYTES # BLD AUTO: 0.02 10*3/MM3 (ref 0–0.05)
IMM GRANULOCYTES NFR BLD AUTO: 0.2 % (ref 0–0.5)
LYMPHOCYTES # BLD AUTO: 0.83 10*3/MM3 (ref 0.7–3.1)
LYMPHOCYTES NFR BLD AUTO: 10 % (ref 19.6–45.3)
MAGNESIUM SERPL-MCNC: 2 MG/DL (ref 1.6–2.6)
MCH RBC QN AUTO: 29.8 PG (ref 26.6–33)
MCHC RBC AUTO-ENTMCNC: 33.3 G/DL (ref 31.5–35.7)
MCV RBC AUTO: 89.4 FL (ref 79–97)
MONOCYTES # BLD AUTO: 0.54 10*3/MM3 (ref 0.1–0.9)
MONOCYTES NFR BLD AUTO: 6.5 % (ref 5–12)
NEUTROPHILS NFR BLD AUTO: 6.82 10*3/MM3 (ref 1.7–7)
NEUTROPHILS NFR BLD AUTO: 82.4 % (ref 42.7–76)
NRBC BLD AUTO-RTO: 0 /100 WBC (ref 0–0.2)
PLATELET # BLD AUTO: 272 10*3/MM3 (ref 140–450)
PMV BLD AUTO: 9.8 FL (ref 6–12)
POTASSIUM SERPL-SCNC: 3.9 MMOL/L (ref 3.5–5.2)
PROT SERPL-MCNC: 7.1 G/DL (ref 6–8.5)
RBC # BLD AUTO: 4.7 10*6/MM3 (ref 3.77–5.28)
SARS-COV-2 RNA PNL SPEC NAA+PROBE: NOT DETECTED
SODIUM SERPL-SCNC: 142 MMOL/L (ref 136–145)
TROPONIN T SERPL-MCNC: <0.01 NG/ML (ref 0–0.03)
TROPONIN T SERPL-MCNC: <0.01 NG/ML (ref 0–0.03)
WBC NRBC COR # BLD: 8.28 10*3/MM3 (ref 3.4–10.8)
WHOLE BLOOD HOLD SPECIMEN: NORMAL
WHOLE BLOOD HOLD SPECIMEN: NORMAL

## 2022-04-28 PROCEDURE — C9803 HOPD COVID-19 SPEC COLLECT: HCPCS | Performed by: EMERGENCY MEDICINE

## 2022-04-28 PROCEDURE — 85025 COMPLETE CBC W/AUTO DIFF WBC: CPT | Performed by: NURSE PRACTITIONER

## 2022-04-28 PROCEDURE — U0004 COV-19 TEST NON-CDC HGH THRU: HCPCS | Performed by: EMERGENCY MEDICINE

## 2022-04-28 PROCEDURE — 36415 COLL VENOUS BLD VENIPUNCTURE: CPT | Performed by: NURSE PRACTITIONER

## 2022-04-28 PROCEDURE — 93010 ELECTROCARDIOGRAM REPORT: CPT | Performed by: INTERNAL MEDICINE

## 2022-04-28 PROCEDURE — 87804 INFLUENZA ASSAY W/OPTIC: CPT | Performed by: EMERGENCY MEDICINE

## 2022-04-28 PROCEDURE — 93005 ELECTROCARDIOGRAM TRACING: CPT | Performed by: NURSE PRACTITIONER

## 2022-04-28 PROCEDURE — 71045 X-RAY EXAM CHEST 1 VIEW: CPT

## 2022-04-28 PROCEDURE — 84484 ASSAY OF TROPONIN QUANT: CPT | Performed by: NURSE PRACTITIONER

## 2022-04-28 PROCEDURE — 93005 ELECTROCARDIOGRAM TRACING: CPT

## 2022-04-28 PROCEDURE — 80053 COMPREHEN METABOLIC PANEL: CPT | Performed by: NURSE PRACTITIONER

## 2022-04-28 PROCEDURE — 93010 ELECTROCARDIOGRAM REPORT: CPT | Performed by: SPECIALIST

## 2022-04-28 PROCEDURE — 70450 CT HEAD/BRAIN W/O DYE: CPT

## 2022-04-28 PROCEDURE — 93005 ELECTROCARDIOGRAM TRACING: CPT | Performed by: EMERGENCY MEDICINE

## 2022-04-28 PROCEDURE — 99284 EMERGENCY DEPT VISIT MOD MDM: CPT

## 2022-04-28 PROCEDURE — 83735 ASSAY OF MAGNESIUM: CPT

## 2022-04-28 PROCEDURE — 72100 X-RAY EXAM L-S SPINE 2/3 VWS: CPT

## 2022-04-28 RX ORDER — SODIUM CHLORIDE 0.9 % (FLUSH) 0.9 %
10 SYRINGE (ML) INJECTION AS NEEDED
Status: DISCONTINUED | OUTPATIENT
Start: 2022-04-28 | End: 2022-04-28 | Stop reason: HOSPADM

## 2022-04-28 RX ADMIN — SODIUM CHLORIDE 500 ML: 9 INJECTION, SOLUTION INTRAVENOUS at 18:35

## 2022-04-28 RX ADMIN — SODIUM CHLORIDE 500 ML: 9 INJECTION, SOLUTION INTRAVENOUS at 19:45

## 2022-04-28 NOTE — TELEPHONE ENCOUNTER
PT(PATIENT) VERIFIED     PT(PATIENT) STATES SHE HAS PASSED OUT TWICE THIS MORNING    PT(PATIENT) STATES SHE HAS HAD A BAD HEADACHE SINCE SHE WOKE UP TODAY    PT(PATIENT) STATES SHE LAID BACK DOWN WITH THE INTENTION TO CALL OUT OFFICE TO MAKE AN APPT    PT(PATIENT) STATES SHE PASSED OUT ON HER WAY TO THE BATHROOM, SHE WOKE UP IN THE HALLWAY ON THE FLOOR    PT(PATIENT) STATES WHEN SHE GOT TO THE BATHROOM, SHE PASSED OUT AGAIN AND WOKE UP ON THE BATHROOM FLOOR WITH A MORE INTENSE HEADACHE    PT(PATIENT) STATES SHE LOST CONTROL OF HER BLADDER WHILE SHE WAS UNCONSCIOUS    PT(PATIENT) STATES SHE HAD BLURRED VISION WHEN SHE PASSED OUT AND WHEN SHE CAME TOO    PT(PATIENT) ADVISED TO CALL 911 OR GO TO THE ER FOR IMMEDIATELY FOR URGENT MEDICAL TREATMENT    PT(PATIENT) STATES SHE DOESN'T WANT TO GO TO THE ER OR CALL 911    PT(PATIENT) AGAIN ADVISED TO GO TO THE ER    PT(PATIENT) STATES SHE DOES NOT WISH TO GO TO THE ER AND WAIT FOR HOURS TO BE SEEN    PT(PATIENT) STATES DR MEJIA IS AWARE THAT SHE HAS BEEN HAVING HEADACHES    PT(PATIENT) REQUESTED FOR DR MEJIA TO BE SENT A MESSAGE AS SHE DOES NOT WANT TO GO TO THE ER    PT(PATIENT) IS REQUESTING A SAME DAY APPT    PROVIDER PLEASE ADVISE

## 2022-04-28 NOTE — TELEPHONE ENCOUNTER
ATTEMPTED TO CONTACT PT PER PROVIDER'S INSTRUCTIONS     NO ANSWER     LEFT VOICEMAIL WITH INSTRUCTIONS TO RETURN CALL TO OFFICE AT [phone]    OK FOR HUB TO ADVISE/READ   Just now (1:00 PM)     Usman Castillo Jr., MD 1 minute ago (1:00 PM)        With these constellation of symptoms, patient should be immediately evaluated by the ER. If she goes by ambulance, they typically see patients immediately.

## 2022-04-28 NOTE — TELEPHONE ENCOUNTER
With these constellation of symptoms, patient should be immediately evaluated by the ER. If she goes by ambulance, they typically see patients immediately.

## 2022-04-28 NOTE — TELEPHONE ENCOUNTER
Spoke with patient. Verified .      Patient is aware that provider advised patient to go to the ER. Patient stated she doesn't know what she is going to do but she is not going by ambulance. States she feels better and is bruised up. I continued to explain that the Provider wants her to be seen in the ER. Patient is of understanding.

## 2022-04-29 LAB — QT INTERVAL: 450 MS

## 2022-05-01 LAB — QT INTERVAL: 430 MS

## 2022-05-06 ENCOUNTER — OFFICE VISIT (OUTPATIENT)
Dept: INTERNAL MEDICINE | Facility: CLINIC | Age: 57
End: 2022-05-06

## 2022-05-06 VITALS
WEIGHT: 169.6 LBS | HEART RATE: 59 BPM | SYSTOLIC BLOOD PRESSURE: 129 MMHG | HEIGHT: 63 IN | BODY MASS INDEX: 30.05 KG/M2 | OXYGEN SATURATION: 95 % | DIASTOLIC BLOOD PRESSURE: 74 MMHG

## 2022-05-06 DIAGNOSIS — S09.90XD TRAUMATIC INJURY OF HEAD, SUBSEQUENT ENCOUNTER: ICD-10-CM

## 2022-05-06 DIAGNOSIS — F41.9 ANXIETY: ICD-10-CM

## 2022-05-06 DIAGNOSIS — F98.8 ATTENTION DEFICIT DISORDER (ADD) WITHOUT HYPERACTIVITY: ICD-10-CM

## 2022-05-06 DIAGNOSIS — R56.9 SEIZURE-LIKE ACTIVITY: ICD-10-CM

## 2022-05-06 DIAGNOSIS — R42 DIZZINESS: ICD-10-CM

## 2022-05-06 DIAGNOSIS — I10 PRIMARY HYPERTENSION: Primary | ICD-10-CM

## 2022-05-06 DIAGNOSIS — R00.2 PALPITATIONS: ICD-10-CM

## 2022-05-06 PROCEDURE — 99214 OFFICE O/P EST MOD 30 MIN: CPT | Performed by: INTERNAL MEDICINE

## 2022-05-06 RX ORDER — MECLIZINE HYDROCHLORIDE 25 MG/1
25 TABLET ORAL 3 TIMES DAILY PRN
Qty: 30 TABLET | Refills: 1 | Status: SHIPPED | OUTPATIENT
Start: 2022-05-06 | End: 2022-08-05

## 2022-05-06 RX ORDER — ESCITALOPRAM OXALATE 5 MG/1
5 TABLET ORAL DAILY
Qty: 90 TABLET | Refills: 1 | Status: SHIPPED | OUTPATIENT
Start: 2022-05-06 | End: 2022-05-19 | Stop reason: ALTCHOICE

## 2022-05-06 NOTE — PROGRESS NOTES
Chief Complaint  Med Refill, ER FOLLOW UP, Headache, Blurred Vision, and Balance Issues    Subjective          Mariama Turner presents to [facility]  History of Present Illness  pt REPORTS HAVING EPISODE OF SYNCOPE PRIOR TO ER VISIT. SHE DOES NOT RECALL PASSING OUT. pt REPORTS HAVING BRIEF POST-ICTAL STATE WHERE SHE NEEDED TO COLLECT HER THOUGHTS. Patient reports having 2nd episode as well. Patient reports having knot/bruise on side of her head from when she fell. Patient thinks she had LOC for approx 20 minutes. Patient continue to be on atenolol as well as lasix/aldactone. Patient reports she had stool incontinence during this episode as well.   Patient would like to switch back to lexapro/adderall from straterra. Patient does not think strattera was helping as much. Patient stopped straterra and started lexapro and concerned some symptoms may be related to restarting lexapro. Patient reports continued headaches and dizziness. This has made it difficult for her to work and to drive.     Current Outpatient Medications   Medication Instructions   • albuterol sulfate  (90 Base) MCG/ACT inhaler 2 puffs, Inhalation, Every 4 Hours PRN   • atenolol (TENORMIN) 25 mg, Oral, Every Night at Bedtime   • escitalopram (LEXAPRO) 5 mg, Oral, Daily   • estradiol (MINIVELLE, VIVELLE-DOT) 0.075 MG/24HR patch 1 patch, Transdermal, 2 Times Weekly   • ezetimibe (ZETIA) 10 mg, Oral, Daily   • fluticasone (Flonase) 50 MCG/ACT nasal spray 2 sprays, Nasal, Daily   • Livalo 1 mg, Oral, Daily   • meclizine (ANTIVERT) 25 mg, Oral, 3 Times Daily PRN       The following portions of the patient's history were reviewed and updated as appropriate: allergies, current medications, past family history, past medical history, past social history, past surgical history, and problem list.    Objective   Vital Signs:   /74 (BP Location: Left arm, Patient Position: Sitting, Cuff Size: Adult)   " Pulse 59   Ht 160 cm (63\")   Wt 76.9 kg (169 lb 9.6 oz)   SpO2 95%   BMI 30.04 kg/m²     Wt Readings from Last 3 Encounters:   05/06/22 76.9 kg (169 lb 9.6 oz)   04/28/22 75.7 kg (166 lb 14.2 oz)   03/29/22 77.1 kg (170 lb)     BP Readings from Last 3 Encounters:   05/06/22 129/74   04/28/22 122/51   03/29/22 117/83     Physical Exam   Appearance: No acute distress, well-nourished  Head: normocephalic, atraumatic  Eyes: extraocular movements intact, no scleral icterus, no conjunctival injection  Ears, Nose, and Throat: external ears normal, nares patent, moist mucous membranes  Cardiovascular: regular rate and rhythm. no murmurs, rubs, or gallops. no edema  Respiratory: breathing comfortably, symmetric chest rise, clear to auscultation bilaterally. No wheezes, rales, or rhonchi.  Neuro: alert and oriented to time, place, and person. Normal gait  Psych: normal mood and affect     Result Review :   The following data was reviewed by: Usman Castillo Jr, MD on 05/06/2022:  Common labs    Common Labsle 10/22/21 10/22/21 10/22/21 4/28/22 4/28/22    1638 1638 1638 1515 1515   Glucose   89  115 (A)   BUN   13  16   Creatinine   0.74  0.89   eGFR Non African Am   81     Sodium   139  142   Potassium   4.2  3.9   Chloride   100  104   Calcium   9.4  9.5   Albumin   4.50  4.60   Total Bilirubin   0.5  0.7   Alkaline Phosphatase   90  89   AST (SGOT)   21  20   ALT (SGPT)   29  20   WBC 6.96   8.28    Hemoglobin 14.8   14.0    Hematocrit 44.3   42.0    Platelets 338   272    Total Cholesterol  190      Triglycerides  102      HDL Cholesterol  54      LDL Cholesterol   118 (A)      (A) Abnormal value              Lab Results   Component Value Date    SARSANTIGEN Not Detected 03/29/2022    COVID19 Not Detected 04/28/2022    RAPFLUA Negative 03/29/2022    RAPFLUB Negative 03/29/2022    FLUAAG Not Detected 09/23/2021    FLU Negative 04/28/2022    FLU Negative 04/28/2022    FLUBAG Not Detected 09/23/2021    BERRY " Negative 03/29/2022           Assessment and Plan    Diagnoses and all orders for this visit:    1. Primary hypertension (Primary)  Comments:  doing well. concern for hypotensive episodes. stop diuretics. monitor closely.     2. Palpitations  Comments:  cont atenolol. monitor BP and HR.     3. Attention deficit disorder (ADD) without hyperactivity  Comments:  discussed possible use of trintellix or adderall in future. will rule out seizure disorder and recover from concussion first.     4. Anxiety  Comments:  DC straterra due to lack of efficacy. consider restart lexapro or if side effect due to lexapro, may consider trintellix for cognitive benefit.   Orders:  -     escitalopram (Lexapro) 5 MG tablet; Take 1 tablet by mouth Daily.  Dispense: 90 tablet; Refill: 1    5. Traumatic injury of head, subsequent encounter  Comments:  copncern for post-concussive syndrome. educated on gradual return to work. pt given note to be excused form work until next appt.   Orders:  -     EEG; Future  -     MRI Brain Without Contrast; Future    6. Dizziness  -     meclizine (ANTIVERT) 25 MG tablet; Take 1 tablet by mouth 3 (Three) Times a Day As Needed for Dizziness or Nausea.  Dispense: 30 tablet; Refill: 1  -     EEG; Future  -     MRI Brain Without Contrast; Future    7. Seizure-like activity (HCC)  Comments:  ER labs and imaging reviewed. obtain EEG and brain MRI to further eval  Orders:  -     EEG; Future  -     MRI Brain Without Contrast; Future          Medications Discontinued During This Encounter   Medication Reason   • diclofenac (VOLTAREN) 75 MG EC tablet *Therapy completed   • Diclofenac Sodium (Voltaren) 1 % gel gel *Therapy completed   • methylPREDNISolone (Medrol) 4 MG dose pack *Therapy completed   • HYDROcodone-acetaminophen (NORCO) 5-325 MG per tablet *Therapy completed   • spironolactone (ALDACTONE) 25 MG tablet *Therapy completed   • potassium chloride 10 MEQ CR tablet *Therapy completed   • furosemide (LASIX) 40  MG tablet *Therapy completed   • escitalopram (Lexapro) 10 MG tablet Reorder        Follow Up {Instructions Charge Capture  Follow-up Communications :23}  Return in about 2 weeks (around 5/20/2022).  Patient was given instructions and counseling regarding her condition or for health maintenance advice. Please see specific information pulled into the AVS if appropriate.       Usman Casitllo Jr, MD  05/06/22  13:56 EDT

## 2022-05-10 ENCOUNTER — HOSPITAL ENCOUNTER (OUTPATIENT)
Dept: MRI IMAGING | Facility: HOSPITAL | Age: 57
Discharge: HOME | End: 2022-05-10
Admitting: INTERNAL MEDICINE

## 2022-05-10 DIAGNOSIS — R42 DIZZINESS: ICD-10-CM

## 2022-05-10 DIAGNOSIS — S09.90XD TRAUMATIC INJURY OF HEAD, SUBSEQUENT ENCOUNTER: ICD-10-CM

## 2022-05-10 DIAGNOSIS — R56.9 SEIZURE-LIKE ACTIVITY: ICD-10-CM

## 2022-05-10 PROCEDURE — 70551 MRI BRAIN STEM W/O DYE: CPT

## 2022-05-17 ENCOUNTER — HOSPITAL ENCOUNTER (OUTPATIENT)
Dept: NEUROLOGY | Facility: HOSPITAL | Age: 57
Discharge: HOME | End: 2022-05-17
Admitting: INTERNAL MEDICINE

## 2022-05-17 DIAGNOSIS — R56.9 SEIZURE-LIKE ACTIVITY: ICD-10-CM

## 2022-05-17 DIAGNOSIS — S09.90XD TRAUMATIC INJURY OF HEAD, SUBSEQUENT ENCOUNTER: ICD-10-CM

## 2022-05-17 DIAGNOSIS — R42 DIZZINESS: ICD-10-CM

## 2022-05-17 PROCEDURE — 95816 EEG AWAKE AND DROWSY: CPT

## 2022-05-18 DIAGNOSIS — R94.01 ABNORMAL EEG: ICD-10-CM

## 2022-05-18 DIAGNOSIS — R56.9 SEIZURE-LIKE ACTIVITY: Primary | ICD-10-CM

## 2022-05-19 ENCOUNTER — OFFICE VISIT (OUTPATIENT)
Dept: INTERNAL MEDICINE | Facility: CLINIC | Age: 57
End: 2022-05-19

## 2022-05-19 VITALS
BODY MASS INDEX: 30.58 KG/M2 | TEMPERATURE: 98.2 F | WEIGHT: 172.6 LBS | OXYGEN SATURATION: 98 % | SYSTOLIC BLOOD PRESSURE: 134 MMHG | HEIGHT: 63 IN | DIASTOLIC BLOOD PRESSURE: 78 MMHG | HEART RATE: 74 BPM

## 2022-05-19 DIAGNOSIS — R94.01 ABNORMAL EEG: ICD-10-CM

## 2022-05-19 DIAGNOSIS — I10 PRIMARY HYPERTENSION: Primary | ICD-10-CM

## 2022-05-19 DIAGNOSIS — R42 DIZZINESS: ICD-10-CM

## 2022-05-19 DIAGNOSIS — F41.9 ANXIETY: ICD-10-CM

## 2022-05-19 PROCEDURE — 99214 OFFICE O/P EST MOD 30 MIN: CPT | Performed by: INTERNAL MEDICINE

## 2022-05-19 RX ORDER — SCOLOPAMINE TRANSDERMAL SYSTEM 1 MG/1
1 PATCH, EXTENDED RELEASE TRANSDERMAL
Qty: 10 PATCH | Refills: 1 | Status: SHIPPED | OUTPATIENT
Start: 2022-05-19 | End: 2023-03-15 | Stop reason: SDUPTHER

## 2022-05-19 RX ORDER — BUSPIRONE HYDROCHLORIDE 5 MG/1
5 TABLET ORAL 3 TIMES DAILY PRN
Qty: 60 TABLET | Refills: 0 | Status: SHIPPED | OUTPATIENT
Start: 2022-05-19 | End: 2022-07-08

## 2022-05-19 NOTE — PROGRESS NOTES
"Chief Complaint  Hypertension (Follow up ) and Fall (3 weeks ago /Hit head )    Subjective          Mariama Turner presents to [facility]  History of Present Illness  hypertension- patient is off diuretic. Patient will utilize intermittently for swelling in her hands.   Traumatic head injury- Patient reports continue feeling off balance. Patient reports continue pain on her left side of her head. Patient does not report help with meclizine.  Patient can walk short distances without problem, but cannot walk longer distances. Patient has been away from computers, TV.   Anxiety- patient reports trying to take lower dose of lexapro. Patient feels hesitant to start these medications again. Patient previously on cymbalta but had swelling.     Current Outpatient Medications   Medication Instructions   • albuterol sulfate  (90 Base) MCG/ACT inhaler 2 puffs, Inhalation, Every 4 Hours PRN   • atenolol (TENORMIN) 25 mg, Oral, Every Night at Bedtime   • busPIRone (BUSPAR) 5 mg, Oral, 3 Times Daily PRN   • estradiol (MINIVELLE, VIVELLE-DOT) 0.075 MG/24HR patch 1 patch, Transdermal, 2 Times Weekly   • ezetimibe (ZETIA) 10 mg, Oral, Daily   • fluticasone (Flonase) 50 MCG/ACT nasal spray 2 sprays, Nasal, Daily   • Livalo 1 mg, Oral, Daily   • meclizine (ANTIVERT) 25 mg, Oral, 3 Times Daily PRN   • Scopolamine (Transderm-Scop, 1.5 MG,) 1 MG/3DAYS patch 1 patch, Transdermal, Every 72 Hours       The following portions of the patient's history were reviewed and updated as appropriate: allergies, current medications, past family history, past medical history, past social history, past surgical history, and problem list.    Objective   Vital Signs:   /78 (BP Location: Left arm, Patient Position: Sitting)   Pulse 74   Temp 98.2 °F (36.8 °C) (Temporal)   Ht 160 cm (63\")   Wt 78.3 kg (172 lb 9.6 oz)   SpO2 98%   BMI 30.57 kg/m²     Wt Readings from Last 3 Encounters: "   05/19/22 78.3 kg (172 lb 9.6 oz)   05/06/22 76.9 kg (169 lb 9.6 oz)   04/28/22 75.7 kg (166 lb 14.2 oz)     BP Readings from Last 3 Encounters:   05/19/22 134/78   05/06/22 129/74   04/28/22 122/51     Physical Exam   Appearance: No acute distress, well-nourished  Head: normocephalic, atraumatic  Eyes: extraocular movements intact, no scleral icterus, no conjunctival injection  Ears, Nose, and Throat: external ears normal, nares patent, moist mucous membranes  Cardiovascular: regular rate. no edema  Respiratory: breathing comfortably, symmetric chest rise  Neuro: alert and oriented to time, place, and person. Normal gait  Psych: normal mood and affect     Result Review :   The following data was reviewed by: Usman Castillo Jr, MD on 05/19/2022:  Common labs    Common Labsle 10/22/21 10/22/21 10/22/21 4/28/22 4/28/22    1638 1638 1638 1515 1515   Glucose   89  115 (A)   BUN   13  16   Creatinine   0.74  0.89   eGFR Non African Am   81     Sodium   139  142   Potassium   4.2  3.9   Chloride   100  104   Calcium   9.4  9.5   Albumin   4.50  4.60   Total Bilirubin   0.5  0.7   Alkaline Phosphatase   90  89   AST (SGOT)   21  20   ALT (SGPT)   29  20   WBC 6.96   8.28    Hemoglobin 14.8   14.0    Hematocrit 44.3   42.0    Platelets 338   272    Total Cholesterol  190      Triglycerides  102      HDL Cholesterol  54      LDL Cholesterol   118 (A)      (A) Abnormal value              Lab Results   Component Value Date    SARSANTIGEN Not Detected 03/29/2022    COVID19 Not Detected 04/28/2022    RAPFLUA Negative 03/29/2022    RAPFLUB Negative 03/29/2022    FLUAAG Not Detected 09/23/2021    FLU Negative 04/28/2022    FLU Negative 04/28/2022    FLUBAG Not Detected 09/23/2021    RAPSCRN Negative 03/29/2022          Assessment and Plan    Diagnoses and all orders for this visit:    1. Primary hypertension (Primary)  Comments:  well controlled off diuretics.     2. Abnormal EEG  Comments:  concern for epilpetogenic  focus. f/u with neuro.     3. Dizziness  Comments:  avoiding diuretics. no help with meclizine. will Rx scopalamine.   Orders:  -     Scopolamine (Transderm-Scop, 1.5 MG,) 1 MG/3DAYS patch; Place 1 patch on the skin as directed by provider Every 72 (Seventy-Two) Hours.  Dispense: 10 patch; Refill: 1    4. Anxiety  Comments:  off lexparto with concerns for side effects. will Rx buspar to use as needed.   Orders:  -     busPIRone (BUSPAR) 5 MG tablet; Take 1 tablet by mouth 3 (Three) Times a Day As Needed (anxiety).  Dispense: 60 tablet; Refill: 0          Medications Discontinued During This Encounter   Medication Reason   • escitalopram (Lexapro) 5 MG tablet Alternate therapy        Follow Up   Return in about 4 weeks (around 6/16/2022) for Recheck.  Patient was given instructions and counseling regarding her condition or for health maintenance advice. Please see specific information pulled into the AVS if appropriate.       Usman Castillo Jr, MD  05/19/22  15:42 EDT

## 2022-05-20 ENCOUNTER — TELEPHONE (OUTPATIENT)
Dept: NEUROLOGY | Facility: CLINIC | Age: 57
End: 2022-05-20

## 2022-05-20 NOTE — TELEPHONE ENCOUNTER
Caller: CHRISTIAN Bailey call back number: [phone]      What was the call regarding: PT WANTED DR SWIFT TO KNOW SHE FELL AND HIT HEAD TWICE  SHE WENT TO E.  04.28.22 .HAD A   C.T. EEG ,  AND MRI   ORDERED BY PCP DR MEJIA  THE  EEG SHOWED ABNORMALITIES AND WOULD LIKE YOU TO GO OVER EVERYTHING AND GET HER IN SOONER TO SEE YOU.      Do you require a callback: YES

## 2022-05-26 ENCOUNTER — TELEPHONE (OUTPATIENT)
Dept: PHYSICAL THERAPY | Facility: CLINIC | Age: 57
End: 2022-05-26

## 2022-05-26 NOTE — TELEPHONE ENCOUNTER
I called and left a message regarding today being the patients third no show for PT. I asked her to please call and let us know if she need to take care of other medical concerns prior to returning to PT. I provided her with our number to call back to cancel if she will be unable to make a future visit.

## 2022-06-01 ENCOUNTER — TELEPHONE (OUTPATIENT)
Dept: INTERNAL MEDICINE | Facility: CLINIC | Age: 57
End: 2022-06-01

## 2022-06-10 ENCOUNTER — OFFICE VISIT (OUTPATIENT)
Dept: INTERNAL MEDICINE | Facility: CLINIC | Age: 57
End: 2022-06-10

## 2022-06-10 VITALS
HEIGHT: 63 IN | BODY MASS INDEX: 30.26 KG/M2 | OXYGEN SATURATION: 96 % | WEIGHT: 170.8 LBS | DIASTOLIC BLOOD PRESSURE: 78 MMHG | TEMPERATURE: 98.3 F | HEART RATE: 69 BPM | SYSTOLIC BLOOD PRESSURE: 122 MMHG

## 2022-06-10 DIAGNOSIS — R56.9 SEIZURE-LIKE ACTIVITY: ICD-10-CM

## 2022-06-10 DIAGNOSIS — S09.90XD TRAUMATIC INJURY OF HEAD, SUBSEQUENT ENCOUNTER: Primary | ICD-10-CM

## 2022-06-10 DIAGNOSIS — G44.321 INTRACTABLE CHRONIC POST-TRAUMATIC HEADACHE: ICD-10-CM

## 2022-06-10 PROCEDURE — 99213 OFFICE O/P EST LOW 20 MIN: CPT | Performed by: INTERNAL MEDICINE

## 2022-06-10 RX ORDER — MAGNESIUM OXIDE 400 MG/1
400 TABLET ORAL DAILY
Qty: 90 TABLET | Refills: 3 | Status: SHIPPED | OUTPATIENT
Start: 2022-06-10 | End: 2022-08-05

## 2022-06-10 NOTE — PROGRESS NOTES
"Chief Complaint  Follow-up and Hypertension    Subjective          Mariama Turner presents to [facility]  History of Present Illness  Patient has follow-up with neurology next week due to abnormal EEG.   Patient also will have 2nd opinion in 7/2022.   Patient reports ongoing Has overlying her temple that occur intermittently. Patient reports Has come one suddenly. Patient thinks may be related to rapid movement. Patient reports improvement for a few days at a time.    Patient reports ongoing left ear pain. Patient does report help with scopalamine patch. Patient reports have difficulty focusing. Patient continue to be off work. Patient reports help with ibuprofen and will lay down.   Anxiety- patient has not taking any buspar. Patient reports anxiety is currently controlled.     Current Outpatient Medications   Medication Instructions   • albuterol sulfate  (90 Base) MCG/ACT inhaler 2 puffs, Inhalation, Every 4 Hours PRN   • atenolol (TENORMIN) 25 mg, Oral, Every Night at Bedtime   • busPIRone (BUSPAR) 5 mg, Oral, 3 Times Daily PRN   • estradiol (MINIVELLE, VIVELLE-DOT) 0.075 MG/24HR patch 1 patch, Transdermal, 2 Times Weekly   • ezetimibe (ZETIA) 10 mg, Oral, Daily   • fluticasone (Flonase) 50 MCG/ACT nasal spray 2 sprays, Nasal, Daily   • Livalo 1 mg, Oral, Daily   • magnesium oxide (MAG-OX) 400 mg, Oral, Daily   • meclizine (ANTIVERT) 25 mg, Oral, 3 Times Daily PRN   • Scopolamine (Transderm-Scop, 1.5 MG,) 1 MG/3DAYS patch 1 patch, Transdermal, Every 72 Hours   • Vitamin B-2 (RIBOFLAVIN) 400 mg, Oral, Daily       The following portions of the patient's history were reviewed and updated as appropriate: allergies, current medications, past family history, past medical history, past social history, past surgical history, and problem list.    Objective   Vital Signs:   /78   Pulse 69   Temp 98.3 °F (36.8 °C) (Temporal)   Ht 160 cm (63\")   Wt 77.5 kg " (170 lb 12.8 oz)   SpO2 96%   BMI 30.26 kg/m²     Wt Readings from Last 3 Encounters:   06/10/22 77.5 kg (170 lb 12.8 oz)   05/19/22 78.3 kg (172 lb 9.6 oz)   05/06/22 76.9 kg (169 lb 9.6 oz)     BP Readings from Last 3 Encounters:   06/10/22 122/78   05/19/22 134/78   05/06/22 129/74     Physical Exam   Appearance: No acute distress, well-nourished  Head: normocephalic, atraumatic  Eyes: extraocular movements intact, no scleral icterus, no conjunctival injection  Ears, Nose, and Throat: external ears normal, nares patent, moist mucous membranes  Cardiovascular: regular rate and rhythm. no murmurs, rubs, or gallops. no edema  Respiratory: breathing comfortably, symmetric chest rise, clear to auscultation bilaterally. No wheezes, rales, or rhonchi.  Neuro: alert and oriented to time, place, and person. Normal gait  Psych: normal mood and affect     Result Review :   The following data was reviewed by: Usman aCstillo Jr, MD on 06/10/2022:  Common labs    Common Labsle 10/22/21 10/22/21 10/22/21 4/28/22 4/28/22    1638 1638 1638 1515 1515   Glucose   89  115 (A)   BUN   13  16   Creatinine   0.74  0.89   eGFR Non African Am   81     Sodium   139  142   Potassium   4.2  3.9   Chloride   100  104   Calcium   9.4  9.5   Albumin   4.50  4.60   Total Bilirubin   0.5  0.7   Alkaline Phosphatase   90  89   AST (SGOT)   21  20   ALT (SGPT)   29  20   WBC 6.96   8.28    Hemoglobin 14.8   14.0    Hematocrit 44.3   42.0    Platelets 338   272    Total Cholesterol  190      Triglycerides  102      HDL Cholesterol  54      LDL Cholesterol   118 (A)      (A) Abnormal value              Lab Results   Component Value Date    SARSANTIGEN Not Detected 03/29/2022    COVID19 Not Detected 04/28/2022    RAPFLUA Negative 03/29/2022    RAPFLUB Negative 03/29/2022    FLUAAG Not Detected 09/23/2021    FLU Negative 04/28/2022    FLU Negative 04/28/2022    FLUBAG Not Detected 09/23/2021    RAPSCRN Negative 03/29/2022         Assessment  and Plan    Diagnoses and all orders for this visit:    1. Traumatic injury of head, subsequent encounter (Primary)  Comments:  head imaging reviewed with radiology without abnormality.     2. Seizure-like activity (HCC)  Comments:  abnormal EEG. pt has f/u with neuro pending.     3. Intractable chronic post-traumatic headache  Comments:  previously nurtec did not help.   Orders:  -     magnesium oxide (MAG-OX) 400 MG tablet; Take 1 tablet by mouth Daily.  Dispense: 90 tablet; Refill: 3  -     Vitamin B-2 (RIBOFLAVIN) 100 MG tablet tablet; Take 4 tablets by mouth Daily.  Dispense: 360 tablet; Refill: 0        There are no discontinued medications.     Follow Up   Return in about 6 weeks (around 7/22/2022).  Patient was given instructions and counseling regarding her condition or for health maintenance advice. Please see specific information pulled into the AVS if appropriate.       Usman Castillo Jr, MD  06/10/22  13:04 EDT

## 2022-06-15 DIAGNOSIS — R42 DIZZINESS: ICD-10-CM

## 2022-06-15 DIAGNOSIS — F07.81 POST CONCUSSIVE SYNDROME: Primary | ICD-10-CM

## 2022-06-24 ENCOUNTER — OFFICE VISIT (OUTPATIENT)
Dept: INTERNAL MEDICINE | Facility: CLINIC | Age: 57
End: 2022-06-24

## 2022-06-24 VITALS
BODY MASS INDEX: 30.3 KG/M2 | SYSTOLIC BLOOD PRESSURE: 131 MMHG | WEIGHT: 171 LBS | HEIGHT: 63 IN | HEART RATE: 58 BPM | TEMPERATURE: 97.9 F | DIASTOLIC BLOOD PRESSURE: 78 MMHG | OXYGEN SATURATION: 95 %

## 2022-06-24 DIAGNOSIS — R51.9 INTRACTABLE EPISODIC HEADACHE, UNSPECIFIED HEADACHE TYPE: ICD-10-CM

## 2022-06-24 DIAGNOSIS — R42 VERTIGO: Primary | ICD-10-CM

## 2022-06-24 DIAGNOSIS — F07.81 POST CONCUSSIVE SYNDROME: ICD-10-CM

## 2022-06-24 PROCEDURE — 99215 OFFICE O/P EST HI 40 MIN: CPT | Performed by: NURSE PRACTITIONER

## 2022-06-27 ENCOUNTER — TELEPHONE (OUTPATIENT)
Dept: INTERNAL MEDICINE | Facility: CLINIC | Age: 57
End: 2022-06-27

## 2022-06-27 DIAGNOSIS — R56.9 SEIZURE-LIKE ACTIVITY: ICD-10-CM

## 2022-06-27 DIAGNOSIS — F07.81 POST CONCUSSIVE SYNDROME: ICD-10-CM

## 2022-06-27 DIAGNOSIS — R42 VERTIGO: Primary | ICD-10-CM

## 2022-06-27 DIAGNOSIS — M25.559 HIP PAIN: ICD-10-CM

## 2022-06-27 DIAGNOSIS — S39.012D STRAIN OF LUMBAR REGION, SUBSEQUENT ENCOUNTER: ICD-10-CM

## 2022-06-27 DIAGNOSIS — M25.551 RIGHT HIP PAIN: ICD-10-CM

## 2022-06-27 DIAGNOSIS — S09.90XD TRAUMATIC INJURY OF HEAD, SUBSEQUENT ENCOUNTER: ICD-10-CM

## 2022-06-27 DIAGNOSIS — M25.562 LEFT KNEE PAIN, UNSPECIFIED CHRONICITY: ICD-10-CM

## 2022-07-08 ENCOUNTER — OFFICE VISIT (OUTPATIENT)
Dept: INTERNAL MEDICINE | Facility: CLINIC | Age: 57
End: 2022-07-08

## 2022-07-08 ENCOUNTER — PATIENT ROUNDING (BHMG ONLY) (OUTPATIENT)
Dept: INTERNAL MEDICINE | Facility: CLINIC | Age: 57
End: 2022-07-08

## 2022-07-08 VITALS
HEIGHT: 63 IN | WEIGHT: 172.8 LBS | HEART RATE: 59 BPM | DIASTOLIC BLOOD PRESSURE: 76 MMHG | OXYGEN SATURATION: 97 % | TEMPERATURE: 97.3 F | BODY MASS INDEX: 30.62 KG/M2 | SYSTOLIC BLOOD PRESSURE: 138 MMHG

## 2022-07-08 DIAGNOSIS — H66.002 NON-RECURRENT ACUTE SUPPURATIVE OTITIS MEDIA OF LEFT EAR WITHOUT SPONTANEOUS RUPTURE OF TYMPANIC MEMBRANE: ICD-10-CM

## 2022-07-08 DIAGNOSIS — G44.309 HEADACHES DUE TO OLD HEAD INJURY: ICD-10-CM

## 2022-07-08 DIAGNOSIS — S09.90XS HEADACHES DUE TO OLD HEAD INJURY: ICD-10-CM

## 2022-07-08 DIAGNOSIS — F07.81 POST CONCUSSIVE SYNDROME: ICD-10-CM

## 2022-07-08 DIAGNOSIS — H83.2X3 VESTIBULAR DISEQUILIBRIUM, BILATERAL: Primary | ICD-10-CM

## 2022-07-08 PROCEDURE — 99215 OFFICE O/P EST HI 40 MIN: CPT | Performed by: NURSE PRACTITIONER

## 2022-07-08 RX ORDER — AMOXICILLIN 875 MG/1
875 TABLET, COATED ORAL 2 TIMES DAILY
Qty: 20 TABLET | Refills: 0 | Status: SHIPPED | OUTPATIENT
Start: 2022-07-08 | End: 2022-07-18

## 2022-07-08 NOTE — PROGRESS NOTES
Chief Complaint  Hypertension (4 week follow-up) and Headache (4 week follow-up)    Subjective          Mariama Turner presents to [facility]  Earache   There is pain in the left ear. This is a new problem. The current episode started 1 to 4 weeks ago. The problem occurs constantly. The problem has been unchanged. There has been no fever. Associated symptoms include headaches. Pertinent negatives include no abdominal pain, coughing, diarrhea, ear discharge, hearing loss, neck pain, rash, rhinorrhea, sore throat or vomiting. She has tried acetaminophen and NSAIDs for the symptoms. The treatment provided mild relief.       Headache  Headache pattern:  Headache sometimes there, sometimes not at all  Initial event:  Head trauma or concussion  Other event details:  Fell   Date of injury (exact):  4/28/2022  Recent changes:  Headaches come more often than they used to  Providers seen:  Neurologist and primary care provider  Previous testing:  CT, MRI and EEG  Time of day symptoms are worse:  No specific time of day  Do headaches wake patient from sleep?: No    Days of the week symptoms are worse:  No specific day of the week  Season symptoms are worse:  No particular season  Headaches last more than three days?: Yes    Changes in thinking and mood:  None  Changes in vision:  Unable to describe  Bilateral symptoms:  None  Unilateral symptoms:  None  Changes in sensation:  Sensitivity to light, lightheadedness and balance problems    Is seeing PT pros for vestibular therapy and this is going well.   Also has optometry appt.   Vestibular ocular reflex   BPPV     Current Outpatient Medications   Medication Instructions   • albuterol sulfate  (90 Base) MCG/ACT inhaler 2 puffs, Inhalation, Every 4 Hours PRN   • atenolol (TENORMIN) 25 mg, Oral, Every Night at Bedtime   • estradiol (MINIVELLE, VIVELLE-DOT) 0.075 MG/24HR patch 1 patch, Transdermal, 2 Times Weekly   •  "ezetimibe (ZETIA) 10 mg, Oral, Daily   • fluticasone (Flonase) 50 MCG/ACT nasal spray 2 sprays, Nasal, Daily   • furosemide (LASIX) 40 mg, Oral, Daily   • Livalo 1 mg, Oral, Daily   • magnesium oxide (MAG-OX) 400 mg, Oral, Daily   • meclizine (ANTIVERT) 25 mg, Oral, 3 Times Daily PRN   • potassium chloride 10 MEQ CR tablet 10 mEq, Oral, 2 Times Daily   • Scopolamine (Transderm-Scop, 1.5 MG,) 1 MG/3DAYS patch 1 patch, Transdermal, Every 72 Hours   • Vitamin B-2 (RIBOFLAVIN) 400 mg, Oral, Daily       The following portions of the patient's history were reviewed and updated as appropriate: allergies, current medications, past family history, past medical history, past social history, past surgical history, and problem list.    Objective   Vital Signs:   /76   Pulse 59   Temp 97.3 °F (36.3 °C)   Ht 160 cm (63\")   Wt 78.4 kg (172 lb 12.8 oz)   SpO2 97%   BMI 30.61 kg/m²     Wt Readings from Last 3 Encounters:   07/25/22 77.1 kg (170 lb)   07/08/22 78.4 kg (172 lb 12.8 oz)   06/24/22 77.6 kg (171 lb)     BP Readings from Last 3 Encounters:   07/25/22 138/84   07/08/22 138/76   06/24/22 131/78     Physical Exam   Appearance: No acute distress, well-nourished  Head: normocephalic, atraumatic  Eyes: extraocular movements intact, no scleral icterus, no conjunctival injection  Ears, Nose, and Throat: external ears normal, nares patent, moist mucous membranes ; erythema noted to left TM  Cardiovascular: regular rate and rhythm. no murmurs, rubs, or gallops. no edema  Respiratory: breathing comfortably, symmetric chest rise, clear to auscultation bilaterally. No wheezes, rales, or rhonchi.  Neuro: alert and oriented to time, place, and person. Normal gait  Psych: normal mood and affect     Result Review :   The following data was reviewed by: MIGNON Gaitan on 07/08/2022:  Common labs    Common Labsle 10/22/21 10/22/21 10/22/21 4/28/22 4/28/22    1638 1638 1638 1515 1515   Glucose   89  115 (A)   BUN   13  " 16   Creatinine   0.74  0.89   eGFR Non African Am   81     Sodium   139  142   Potassium   4.2  3.9   Chloride   100  104   Calcium   9.4  9.5   Albumin   4.50  4.60   Total Bilirubin   0.5  0.7   Alkaline Phosphatase   90  89   AST (SGOT)   21  20   ALT (SGPT)   29  20   WBC 6.96   8.28    Hemoglobin 14.8   14.0    Hematocrit 44.3   42.0    Platelets 338   272    Total Cholesterol  190      Triglycerides  102      HDL Cholesterol  54      LDL Cholesterol   118 (A)      (A) Abnormal value                   Lab Results   Component Value Date    SARSANTIGEN Not Detected 03/29/2022    COVID19 Not Detected 04/28/2022    RAPFLUA Negative 03/29/2022    RAPFLUB Negative 03/29/2022    FLUAAG Not Detected 09/23/2021    FLU Negative 04/28/2022    FLU Negative 04/28/2022    FLUBAG Not Detected 09/23/2021    RAPSCRN Negative 03/29/2022       Procedures        Assessment and Plan    Diagnoses and all orders for this visit:    1. Vestibular disequilibrium, bilateral (Primary)  -     Ambulatory Referral to ENT (Otolaryngology)    2. Non-recurrent acute suppurative otitis media of left ear without spontaneous rupture of tympanic membrane  Overview:  Amoxicillin given; not likely to be causing all of patient's symptoms.     Orders:  -     amoxicillin (AMOXIL) 875 MG tablet; Take 1 tablet by mouth 2 (Two) Times a Day for 10 days.  Dispense: 20 tablet; Refill: 0    3. Post concussive syndrome    4. Headaches due to old head injury    - spoke to patient's optometrist on the phone He is referring her to specialist in [place]   - Pt also needs updated COVID exemption previously filled out by Dr. Castillo.     Medications Discontinued During This Encounter   Medication Reason   • busPIRone (BUSPAR) 5 MG tablet *Therapy completed        I spent 40 minutes caring for Mariama on this date of service. This time includes time spent by me in the following activities:preparing for the visit, reviewing tests, obtaining and/or reviewing a  separately obtained history, performing a medically appropriate examination and/or evaluation , counseling and educating the patient/family/caregiver, ordering medications, tests, or procedures, referring and communicating with other health care professionals , documenting information in the medical record, independently interpreting results and communicating that information with the patient/family/caregiver and care coordination  Follow Up   Return in 4 weeks (on 8/5/2022) for headaches .  Patient was given instructions and counseling regarding her condition or for health maintenance advice. Please see specific information pulled into the AVS if appropriate.       Ame Xie, MIGNON  07/25/22  12:41 EDT

## 2022-07-20 ENCOUNTER — TELEPHONE (OUTPATIENT)
Dept: CARDIOLOGY | Facility: CLINIC | Age: 57
End: 2022-07-20

## 2022-07-20 ENCOUNTER — TELEPHONE (OUTPATIENT)
Dept: INTERNAL MEDICINE | Facility: CLINIC | Age: 57
End: 2022-07-20

## 2022-07-20 RX ORDER — FUROSEMIDE 40 MG/1
40 TABLET ORAL DAILY
Qty: 90 TABLET | Refills: 3 | Status: SHIPPED | OUTPATIENT
Start: 2022-07-20 | End: 2023-03-15 | Stop reason: SDUPTHER

## 2022-07-20 RX ORDER — POTASSIUM CHLORIDE 750 MG/1
10 TABLET, FILM COATED, EXTENDED RELEASE ORAL 2 TIMES DAILY
Qty: 180 TABLET | Refills: 3 | Status: SHIPPED | OUTPATIENT
Start: 2022-07-20 | End: 2023-03-15 | Stop reason: SDUPTHER

## 2022-07-20 NOTE — TELEPHONE ENCOUNTER
Pt called and stated that her Lasix and potassium was took off of her med list.  Pt stated that she needed refills on these.  Is it ok to add these and refill them?  Please advise.

## 2022-07-20 NOTE — TELEPHONE ENCOUNTER
Called patient to notify that COVID Vaccination Exemption form and letter is ready for patient to . Patient states she will be in office tomorrow 7/21/22 to get forms. Placed up front.

## 2022-07-22 ENCOUNTER — TELEPHONE (OUTPATIENT)
Dept: INTERNAL MEDICINE | Facility: CLINIC | Age: 57
End: 2022-07-22

## 2022-07-22 NOTE — TELEPHONE ENCOUNTER
Caller: Mariama Turner    Relationship to patient: Self    Best call back number: [phone]    Patient is needing: PATIENT CALLED STATING SHE IS NEEDING A WORK NOTE TO BE EXCUSED FROM WORK TILL 08/05/2022 AND WOULD LIKE TO COME PICK IT UP IN THE OFFICE TODAY. THE PATIENT WOULD LIKE A CALL BACK TO LET HER KNOW THIS IS READY FOR .

## 2022-07-25 ENCOUNTER — OFFICE VISIT (OUTPATIENT)
Dept: NEUROLOGY | Facility: CLINIC | Age: 57
End: 2022-07-25

## 2022-07-25 VITALS
OXYGEN SATURATION: 99 % | HEART RATE: 69 BPM | BODY MASS INDEX: 30.12 KG/M2 | WEIGHT: 170 LBS | DIASTOLIC BLOOD PRESSURE: 84 MMHG | HEIGHT: 63 IN | SYSTOLIC BLOOD PRESSURE: 138 MMHG

## 2022-07-25 DIAGNOSIS — F07.81 POSTCONCUSSIVE SYNDROME: ICD-10-CM

## 2022-07-25 DIAGNOSIS — R41.89 COGNITIVE IMPAIRMENT: ICD-10-CM

## 2022-07-25 DIAGNOSIS — R42 DIZZINESS: ICD-10-CM

## 2022-07-25 DIAGNOSIS — S09.90XD TRAUMATIC INJURY OF HEAD, SUBSEQUENT ENCOUNTER: Primary | ICD-10-CM

## 2022-07-25 PROCEDURE — 99214 OFFICE O/P EST MOD 30 MIN: CPT | Performed by: PSYCHIATRY & NEUROLOGY

## 2022-07-25 NOTE — PROGRESS NOTES
Chief Complaint   Patient presents with   • cognitive impairment        Patient ID: Mariama Turner is a 56 y.o. female.    HPI: I had the pleasure of seeing your patient today.  As you may know she is a 56-year-old female with a history of dizziness.  The patient apparently fell twice in 1 day in April.  According to the patient she hit her head on both occasions.  With the second head injury she apparently lost consciousness for an unknown amount of time.  She says that since then she has had lots of headaches and vision issues.  She did have an MRI of the brain which did show some chronic microvascular ischemic white matter changes.  No evidence for intracranial hemorrhage.  She does continue to have issues with focus and concentration as well as short-term memory.  She also still has some issues with dizziness.  She denies any focal weakness or numbness of her arms or legs.  She has had some double vision and is currently in vision therapy with an eye specialist.    The following portions of the patient's history were reviewed and updated as appropriate: allergies, current medications, past family history, past medical history, past social history, past surgical history and problem list.    Review of Systems   Musculoskeletal: Positive for gait problem.   Neurological: Positive for dizziness, syncope (hit head April 2022), speech difficulty (forgetting words, but getting better), light-headedness and headaches. Negative for tremors, seizures, weakness and numbness.   Hematological: Bruises/bleeds easily.   Psychiatric/Behavioral: Positive for confusion and decreased concentration. Negative for agitation, behavioral problems, hallucinations, self-injury, sleep disturbance and suicidal ideas. The patient is not nervous/anxious.       I have reviewed the review of systems above performed by my medical assistant.            Vitals:    07/25/22 1013   BP: 138/84   Pulse: 69   SpO2: 99%       Neurologic Exam      Mental Status   Oriented to person, place, and time.   Concentration: normal.   Level of consciousness: alert  Knowledge: consistent with education (No deficits found.).     Cranial Nerves     CN II   Visual fields full to confrontation.     CN III, IV, VI   Pupils are equal, round, and reactive to light.  Extraocular motions are normal.   CN III: no CN III palsy  CN VI: no CN VI palsy    CN V   Facial sensation intact.     CN VII   Facial expression full, symmetric.     CN VIII   CN VIII normal.     CN IX, X   CN IX normal.   CN X normal.     CN XI   CN XI normal.     CN XII   CN XII normal.     Motor Exam     Strength   Right neck flexion: 5/5  Left neck flexion: 5/5  Right neck extension: 5/5  Left neck extension: 5/5  Right deltoid: 5/5  Left deltoid: 5/5  Right biceps: 5/5  Left biceps: 5/5  Right triceps: 5/5  Left triceps: 5/5  Right wrist flexion: 5/5  Left wrist flexion: 5/5  Right wrist extension: 5/5  Left wrist extension: 5/5  Right interossei: 5/5  Left interossei: 5/5  Right abdominals: 5/5  Left abdominals: 5/5  Right iliopsoas: 5/5  Left iliopsoas: 5/5  Right quadriceps: 5/5  Left quadriceps: 5/5  Right hamstrin/5  Left hamstrin/5  Right glutei: 5/5  Left glutei: 5/5  Right anterior tibial: 5/5  Left anterior tibial: 5/5  Right posterior tibial: 5/5  Left posterior tibial: 5/5  Right peroneal: 5/5  Left peroneal: 5/5  Right gastroc: 5/5  Left gastroc: 5/5    Sensory Exam   Light touch normal.   Vibration normal.     Gait, Coordination, and Reflexes     Gait  Gait: normal    Reflexes   Right brachioradialis: 2+  Left brachioradialis: 2+  Right biceps: 2+  Left biceps: 2+  Right triceps: 2+  Left triceps: 2+  Right patellar: 2+  Left patellar: 2+  Right achilles: 2+  Left achilles: 2+  Right : 2+  Left : 2+Station is normal.       Physical Exam  Vitals reviewed.   Constitutional:       Appearance: She is well-developed.   HENT:      Head: Normocephalic and atraumatic.   Eyes:       Extraocular Movements: EOM normal.      Pupils: Pupils are equal, round, and reactive to light.   Cardiovascular:      Rate and Rhythm: Normal rate and regular rhythm.   Pulmonary:      Breath sounds: Normal breath sounds.   Musculoskeletal:         General: Normal range of motion.   Skin:     General: Skin is warm.   Neurological:      Mental Status: She is oriented to person, place, and time.      Gait: Gait is intact.      Deep Tendon Reflexes:      Reflex Scores:       Tricep reflexes are 2+ on the right side and 2+ on the left side.       Bicep reflexes are 2+ on the right side and 2+ on the left side.       Brachioradialis reflexes are 2+ on the right side and 2+ on the left side.       Patellar reflexes are 2+ on the right side and 2+ on the left side.       Achilles reflexes are 2+ on the right side and 2+ on the left side.        Procedures    Assessment/Plan: Her eye movements appear conjugate today however it sounds as though she is experiencing subtle issues with double vision.  This could be 4th nerve palsy versus 6th nerve palsy.  Either way I feel that we should continue with the referral for vestibular assessment and therapy.  We also will continue with the neuropsych testing.  She likely had a grade 2 concussion versus grade 3.  We will see her back after neuropsych testing has been completed.  A total of 40 minutes was spent face-to-face with the patient today.  Of that greater than 50% of this time was spent discussing signs and symptoms of postconcussive syndrome, cognitive impairment, dizziness, patient education, plan of care and prognosis.     Diagnoses and all orders for this visit:    1. Traumatic injury of head, subsequent encounter (Primary)    2. Postconcussive syndrome    3. Dizziness    4. Cognitive impairment           Jordan Rawls II, MD

## 2022-07-26 ENCOUNTER — TELEPHONE (OUTPATIENT)
Dept: INTERNAL MEDICINE | Facility: CLINIC | Age: 57
End: 2022-07-26

## 2022-08-05 ENCOUNTER — OFFICE VISIT (OUTPATIENT)
Dept: INTERNAL MEDICINE | Facility: CLINIC | Age: 57
End: 2022-08-05

## 2022-08-05 VITALS
SYSTOLIC BLOOD PRESSURE: 129 MMHG | WEIGHT: 173.6 LBS | DIASTOLIC BLOOD PRESSURE: 74 MMHG | HEIGHT: 63 IN | HEART RATE: 50 BPM | OXYGEN SATURATION: 96 % | BODY MASS INDEX: 30.76 KG/M2 | TEMPERATURE: 97.4 F

## 2022-08-05 DIAGNOSIS — F07.81 POST CONCUSSIVE SYNDROME: ICD-10-CM

## 2022-08-05 DIAGNOSIS — H83.2X3 VESTIBULAR DISEQUILIBRIUM, BILATERAL: ICD-10-CM

## 2022-08-05 DIAGNOSIS — S09.90XS HEADACHES DUE TO OLD HEAD INJURY: Primary | ICD-10-CM

## 2022-08-05 DIAGNOSIS — G44.309 HEADACHES DUE TO OLD HEAD INJURY: Primary | ICD-10-CM

## 2022-08-05 PROCEDURE — 99215 OFFICE O/P EST HI 40 MIN: CPT | Performed by: NURSE PRACTITIONER

## 2022-08-05 NOTE — PROGRESS NOTES
Chief Complaint  Traumatic injury of head (Follow up )    Subjective          Mariama Turner presents to [facility]  History of Present Illness    56-year-old female presents to the clinic today for follow-up regarding her TBI/headaches.  Patient is currently doing therapy 4 times a week.  2 with an ophthalmologist specializing in her condition and 2 with PT pros in [facility] for vestibular therapy.  The name of the provider in [place] is Dr. Wyatt Sanchez.  Patient provided significant detail of what they are trying to do with her eyes.  Patient states she is doing exercises with lites and tracking.  Patient states that overall when she feels that she is looking straight ahead she is looking down into the right.    Patient does states that she has tried to do some games on the computer but this really bothers her eyes.  To which she cannot work as she is an .  Patient also reports that headaches are improving but she still has them occasionally.  Patient states she does have samples of Nurtec at home that she has not tried.  We will also provide samples of Ubrevly.     Current Outpatient Medications   Medication Instructions   • albuterol sulfate  (90 Base) MCG/ACT inhaler 2 puffs, Inhalation, Every 4 Hours PRN   • atenolol (TENORMIN) 25 mg, Oral, Every Night at Bedtime   • estradiol (MINIVELLE, VIVELLE-DOT) 0.075 MG/24HR patch 1 patch, Transdermal, 2 Times Weekly   • ezetimibe (ZETIA) 10 mg, Oral, Daily   • fluticasone (Flonase) 50 MCG/ACT nasal spray 2 sprays, Nasal, Daily   • furosemide (LASIX) 40 mg, Oral, Daily   • Livalo 1 mg, Oral, Daily   • potassium chloride 10 MEQ CR tablet 10 mEq, Oral, 2 Times Daily   • Scopolamine (Transderm-Scop, 1.5 MG,) 1 MG/3DAYS patch 1 patch, Transdermal, Every 72 Hours       The following portions of the patient's history were reviewed and updated as appropriate: allergies, current medications, past family history,  "past medical history, past social history, past surgical history, and problem list.    Objective   Vital Signs:   /74   Pulse 50   Temp 97.4 °F (36.3 °C) (Temporal)   Ht 160 cm (63\")   Wt 78.7 kg (173 lb 9.6 oz)   SpO2 96%   BMI 30.75 kg/m²     Wt Readings from Last 3 Encounters:   08/05/22 78.7 kg (173 lb 9.6 oz)   07/25/22 77.1 kg (170 lb)   07/08/22 78.4 kg (172 lb 12.8 oz)     BP Readings from Last 3 Encounters:   08/05/22 129/74   07/25/22 138/84   07/08/22 138/76     Physical Exam   Appearance: No acute distress, well-nourished  Head: normocephalic, atraumatic  Eyes: extraocular movements intact, no scleral icterus, no conjunctival injection  Ears, Nose, and Throat: external ears normal, nares patent, moist mucous membranes  Cardiovascular: regular rate and rhythm. no murmurs, rubs, or gallops. no edema  Respiratory: breathing comfortably, symmetric chest rise, clear to auscultation bilaterally. No wheezes, rales, or rhonchi.  Neuro: alert and oriented to time, place, and person. Normal gait  Psych: normal mood and affect     Result Review :   The following data was reviewed by: MIGNON Gaitan on 08/05/2022:  Common labs    Common Labsle 10/22/21 10/22/21 10/22/21 4/28/22 4/28/22    1638 1638 1638 1515 1515   Glucose   89  115 (A)   BUN   13  16   Creatinine   0.74  0.89   eGFR Non African Am   81     Sodium   139  142   Potassium   4.2  3.9   Chloride   100  104   Calcium   9.4  9.5   Albumin   4.50  4.60   Total Bilirubin   0.5  0.7   Alkaline Phosphatase   90  89   AST (SGOT)   21  20   ALT (SGPT)   29  20   WBC 6.96   8.28    Hemoglobin 14.8   14.0    Hematocrit 44.3   42.0    Platelets 338   272    Total Cholesterol  190      Triglycerides  102      HDL Cholesterol  54      LDL Cholesterol   118 (A)      (A) Abnormal value                   Lab Results   Component Value Date    SARSANTIGEN Not Detected 03/29/2022    COVID19 Not Detected 04/28/2022    RAPFLUA Negative 03/29/2022    " RAPFLUB Negative 03/29/2022    FLUAAG Not Detected 09/23/2021    FLU Negative 04/28/2022    FLU Negative 04/28/2022    FLUBAG Not Detected 09/23/2021    RAPSCRN Negative 03/29/2022       Procedures        Assessment and Plan    Diagnoses and all orders for this visit:    1. Headaches due to old head injury (Primary)    2. Vestibular disequilibrium, bilateral    3. Post concussive syndrome      - provided patient with 2 boxes of Ubrevly for headache control.   - extended work note x1 month until pt's long term disability kicks in   - continue therapy sessions     Medications Discontinued During This Encounter   Medication Reason   • meclizine (ANTIVERT) 25 MG tablet Historical Med - Therapy completed   • magnesium oxide (MAG-OX) 400 MG tablet Historical Med - Therapy completed   • Vitamin B-2 (RIBOFLAVIN) 100 MG tablet tablet *Therapy completed        I spent 50 minutes caring for Mariama on this date of service. This time includes time spent by me in the following activities:preparing for the visit, reviewing tests, obtaining and/or reviewing a separately obtained history, performing a medically appropriate examination and/or evaluation , counseling and educating the patient/family/caregiver, ordering medications, tests, or procedures, referring and communicating with other health care professionals , documenting information in the medical record and independently interpreting results and communicating that information with the patient/family/caregiver  Follow Up   Return in about 3 months (around 11/5/2022).  Patient was given instructions and counseling regarding her condition or for health maintenance advice. Please see specific information pulled into the AVS if appropriate.       Ame Xie, IMGNON  08/09/22  07:37 EDT

## 2022-08-09 ENCOUNTER — TELEPHONE (OUTPATIENT)
Dept: INTERNAL MEDICINE | Facility: CLINIC | Age: 57
End: 2022-08-09

## 2022-08-09 NOTE — TELEPHONE ENCOUNTER
Caller: Mariama Turner    Relationship: Self    Best call back number: [phone]    What form or medical record are you requesting: LONG TERM DISABILITY PAPERS    Who is requesting this form or medical record from you: [employer]    Timeframe paperwork needed: AS SOON AS POSSIBLE    Additional notes:   PATIENT IS REQUESTING A CALL TO DISCUSS THE STATUS OF THIS. SHE STATED THAT IT WASN'T FMLA, IT'S LONG TERM DISABILITY.

## 2022-08-09 NOTE — TELEPHONE ENCOUNTER
Sent patient message in regards to paperwork. Patient has information that needs to be filled out on her part so we can complete and send back.

## 2022-08-19 ENCOUNTER — TELEPHONE (OUTPATIENT)
Dept: INTERNAL MEDICINE | Facility: CLINIC | Age: 57
End: 2022-08-19

## 2022-08-24 ENCOUNTER — OFFICE VISIT (OUTPATIENT)
Dept: CARDIOLOGY | Facility: CLINIC | Age: 57
End: 2022-08-24

## 2022-08-24 VITALS
SYSTOLIC BLOOD PRESSURE: 132 MMHG | OXYGEN SATURATION: 97 % | WEIGHT: 176 LBS | HEIGHT: 63 IN | DIASTOLIC BLOOD PRESSURE: 64 MMHG | BODY MASS INDEX: 31.18 KG/M2 | HEART RATE: 53 BPM

## 2022-08-24 DIAGNOSIS — E78.49 OTHER HYPERLIPIDEMIA: ICD-10-CM

## 2022-08-24 DIAGNOSIS — I10 PRIMARY HYPERTENSION: ICD-10-CM

## 2022-08-24 DIAGNOSIS — R00.2 PALPITATIONS: Primary | ICD-10-CM

## 2022-08-24 PROCEDURE — 99214 OFFICE O/P EST MOD 30 MIN: CPT | Performed by: INTERNAL MEDICINE

## 2022-08-24 RX ORDER — EZETIMIBE 10 MG/1
10 TABLET ORAL DAILY
Qty: 90 TABLET | Refills: 3 | Status: SHIPPED | OUTPATIENT
Start: 2022-08-24 | End: 2023-03-15 | Stop reason: SDUPTHER

## 2022-08-24 NOTE — PROGRESS NOTES
Office Visit    Chief Complaint  Hypertension and Hyperlipidemia    Subjective            Mariama Turner presents to [facility]  Mariama is a 56 years old female with prior history of palpitations hypertension pedal edema was doing reasonably well from a cardiac standpoint.  She denies any palpitations lately although she has not been very active and not exercising.  Her pedal edema has resolved.  She accidentally left out her ezetimibe for a few months but is back on it now.      Past Medical History:   Diagnosis Date   • Allergies    • Anemia    • Arthritis    • Asthma    • Bowel disease    • Cervical cancer (HCC)    • Colitis    • Difficulty walking    • Fibromyalgia, primary    • Gait disturbance    • Gallstones    • Gastric ulcer    • Headache    • Heart disease    • Hyperlipemia    • Hypertension    • Kidney stones    • Limb swelling    • Osteoarthritis 2018    Of left thumb CMC joint   • Reflux esophagitis    • Seasonal allergies        Allergies   Allergen Reactions   • Contrast Dye Unknown - High Severity   • Iodine Unknown - High Severity   • Ceftriaxone Itching        Past Surgical History:   Procedure Laterality Date   • ABDOMINAL HYSTERECTOMY     •  SECTION     • CHOLECYSTECTOMY     • COLONOSCOPY  ,,,   • ENDOSCOPY  ,,   • KIDNEY STONE SURGERY  2014    Right Ureteroscopy, Laser litho, stent insertion   • RHINOPLASTY     • TONSILLECTOMY          Social History     Tobacco Use   • Smoking status: Never Smoker   • Smokeless tobacco: Never Used   Vaping Use   • Vaping Use: Never used   Substance Use Topics   • Alcohol use: Yes     Comment: Some day / rarely once a year   • Drug use: Never       Family History   Problem Relation Age of Onset   • Heart disease Mother    • Cancer Mother    • Stroke Father    • Diabetes Father    • Hypertension Father    • Arthritis Maternal Grandmother         Prior to Admission medications   "  Medication Sig Start Date End Date Taking? Authorizing Provider   albuterol sulfate  (90 Base) MCG/ACT inhaler Inhale 2 puffs Every 4 (Four) Hours As Needed for Wheezing or Shortness of Air. 11/23/21  Yes Usman Castillo Jr., MD   atenolol (TENORMIN) 25 MG tablet Take 1 tablet by mouth every night at bedtime. 9/15/21  Yes Malou Fitzgerald APRN   estradiol (MINIVELLE, VIVELLE-DOT) 0.075 MG/24HR patch Place 1 patch on the skin as directed by provider 2 (Two) Times a Week. 10/25/21  Yes Usman Castillo Jr., MD   ezetimibe (ZETIA) 10 MG tablet Take 1 tablet by mouth Daily. 12/22/21  Yes Malou Fitzgerald APRN   fluticasone (Flonase) 50 MCG/ACT nasal spray 2 sprays into the nostril(s) as directed by provider Daily. 3/29/22  Yes Ame Xie APRN   furosemide (LASIX) 40 MG tablet Take 1 tablet by mouth Daily. 7/20/22  Yes Malou Fitzgerald APRN   pitavastatin calcium (Livalo) 1 MG tablet tablet Take 1 tablet by mouth Daily. 11/11/21  Yes Malou Fitzgerald APRN   potassium chloride 10 MEQ CR tablet Take 1 tablet by mouth 2 (Two) Times a Day. 7/20/22  Yes Malou Fitzgerald APRN   Scopolamine (Transderm-Scop, 1.5 MG,) 1 MG/3DAYS patch Place 1 patch on the skin as directed by provider Every 72 (Seventy-Two) Hours. 5/19/22  Yes Usman Castillo Jr., MD        Review of Systems   Constitutional: Negative for fatigue.   Respiratory: Negative for cough and shortness of breath.    Cardiovascular: Positive for palpitations. Negative for chest pain and leg swelling.   Neurological: Positive for dizziness.        Objective     /64   Pulse 53   Ht 160 cm (63\")   Wt 79.8 kg (176 lb)   SpO2 97%   BMI 31.18 kg/m²       Physical Exam  Constitutional:       General: She is awake.      Appearance: Normal appearance.   Neck:      Thyroid: No thyromegaly.      Vascular: No carotid bruit or JVD.   Cardiovascular:      Rate and Rhythm: Normal rate and regular rhythm.      Chest Wall: PMI " is not displaced.      Pulses: Normal pulses.      Heart sounds: S1 normal and S2 normal. Murmur heard.    Systolic murmur is present.    No friction rub. No gallop. No S3 or S4 sounds.   Pulmonary:      Effort: Pulmonary effort is normal.      Breath sounds: Normal breath sounds and air entry. No wheezing, rhonchi or rales.   Abdominal:      General: Bowel sounds are normal.      Palpations: Abdomen is soft. There is no mass.      Tenderness: There is no abdominal tenderness.   Musculoskeletal:      Cervical back: Neck supple.      Right lower leg: No edema.      Left lower leg: No edema.   Neurological:      Mental Status: She is alert and oriented to person, place, and time.   Psychiatric:         Mood and Affect: Mood normal.         Behavior: Behavior is cooperative.           Result Review :                      No results found for: PROBNP, BNP  CMP    CMP 10/22/21 4/28/22   Glucose 89 115 (A)   BUN 13 16   Creatinine 0.74 0.89   eGFR Non African Am 81    Sodium 139 142   Potassium 4.2 3.9   Chloride 100 104   Calcium 9.4 9.5   Albumin 4.50 4.60   Total Bilirubin 0.5 0.7   Alkaline Phosphatase 90 89   AST (SGOT) 21 20   ALT (SGPT) 29 20   (A) Abnormal value            CBC w/diff    CBC w/Diff 10/22/21 4/28/22   WBC 6.96 8.28   RBC 5.06 4.70   Hemoglobin 14.8 14.0   Hematocrit 44.3 42.0   MCV 87.5 89.4   MCH 29.2 29.8   MCHC 33.4 33.3   RDW 12.9 13.2   Platelets 338 272   Neutrophil Rel % 67.9 82.4 (A)   Immature Granulocyte Rel % 1.4 (A) 0.2   Lymphocyte Rel % 19.3 (A) 10.0 (A)   Monocyte Rel % 8.6 6.5   Eosinophil Rel % 1.9 0.4   Basophil Rel % 0.9 0.5   (A) Abnormal value             Lipid Panel    Lipid Panel 10/22/21   Total Cholesterol 190   Triglycerides 102   HDL Cholesterol 54   VLDL Cholesterol 18   LDL Cholesterol  118 (A)   LDL/HDL Ratio 2.14   (A) Abnormal value             Lab Results   Component Value Date    TSH 3.030 10/22/2021    TSH 0.701 04/16/2021    TSH 2.680 09/23/2020      Lab Results    Component Value Date    FREET4 1.3 02/26/2020      No results found for: DDIMERQUANT  Magnesium   Date Value Ref Range Status   04/28/2022 2.0 1.6 - 2.6 mg/dL Final      No results found for: DIGOXIN               Assessment and Plan        Diagnoses and all orders for this visit:    1. Palpitations (Primary)  Assessment & Plan:  Her palpitations are almost completely resolved.  She will continue atenolol 25 mg at bedtime    Orders:  -     Comprehensive Metabolic Panel; Future  -     CBC (No Diff); Future  -     Lipid Panel; Future  -     Magnesium; Future  -     TSH; Future  -     Adult Transthoracic Echo Complete W/ Cont if Necessary Per Protocol; Future    2. Primary hypertension  Assessment & Plan:  Her hypertension is well controlled on atenolol once a day and furosemide 40 mg.    Orders:  -     Comprehensive Metabolic Panel; Future  -     CBC (No Diff); Future  -     Lipid Panel; Future  -     Magnesium; Future  -     TSH; Future  -     Adult Transthoracic Echo Complete W/ Cont if Necessary Per Protocol; Future    3. Other hyperlipidemia  Assessment & Plan:  Her LDL is not quite at goal, however, she has not got any lipids checked since last year.  And in between she had missed out on her ezetimibe for the previous few months.  We will recheck her lipids and then decide if we need to adjust the dose of Livalo    Orders:  -     ezetimibe (ZETIA) 10 MG tablet; Take 1 tablet by mouth Daily.  Dispense: 90 tablet; Refill: 3  -     Comprehensive Metabolic Panel; Future  -     CBC (No Diff); Future  -     Lipid Panel; Future  -     Magnesium; Future  -     TSH; Future  -     Adult Transthoracic Echo Complete W/ Cont if Necessary Per Protocol; Future    Other orders  -     pitavastatin calcium (Livalo) 1 MG tablet tablet; Take 1 tablet by mouth Daily.  Dispense: 90 tablet; Refill: 3          Follow Up     Return in about 9 months (around 5/24/2023) for fu with Valentino.    Patient was given instructions and counseling  regarding her condition or for health maintenance advice. Please see specific information pulled into the AVS if appropriate.     Elizabeth Mishra MD  08/24/22 14:17 EDT

## 2022-08-24 NOTE — ASSESSMENT & PLAN NOTE
Her LDL is not quite at goal, however, she has not got any lipids checked since last year.  And in between she had missed out on her ezetimibe for the previous few months.  We will recheck her lipids and then decide if we need to adjust the dose of Livalo

## 2022-08-26 ENCOUNTER — LAB (OUTPATIENT)
Dept: LAB | Facility: HOSPITAL | Age: 57
End: 2022-08-26

## 2022-08-26 DIAGNOSIS — R00.2 PALPITATIONS: ICD-10-CM

## 2022-08-26 DIAGNOSIS — E78.49 OTHER HYPERLIPIDEMIA: ICD-10-CM

## 2022-08-26 DIAGNOSIS — I10 PRIMARY HYPERTENSION: ICD-10-CM

## 2022-08-26 LAB
ALBUMIN SERPL-MCNC: 4.1 G/DL (ref 3.5–5.2)
ALBUMIN/GLOB SERPL: 2 G/DL
ALP SERPL-CCNC: 78 U/L (ref 39–117)
ALT SERPL W P-5'-P-CCNC: 18 U/L (ref 1–33)
ANION GAP SERPL CALCULATED.3IONS-SCNC: 7 MMOL/L (ref 5–15)
AST SERPL-CCNC: 20 U/L (ref 1–32)
BILIRUB SERPL-MCNC: 0.5 MG/DL (ref 0–1.2)
BUN SERPL-MCNC: 11 MG/DL (ref 6–20)
BUN/CREAT SERPL: 12.9 (ref 7–25)
CALCIUM SPEC-SCNC: 9.2 MG/DL (ref 8.6–10.5)
CHLORIDE SERPL-SCNC: 108 MMOL/L (ref 98–107)
CHOLEST SERPL-MCNC: 164 MG/DL (ref 0–200)
CO2 SERPL-SCNC: 27 MMOL/L (ref 22–29)
CREAT SERPL-MCNC: 0.85 MG/DL (ref 0.57–1)
DEPRECATED RDW RBC AUTO: 41.9 FL (ref 37–54)
EGFRCR SERPLBLD CKD-EPI 2021: 79.5 ML/MIN/1.73
ERYTHROCYTE [DISTWIDTH] IN BLOOD BY AUTOMATED COUNT: 12.9 % (ref 12.3–15.4)
GLOBULIN UR ELPH-MCNC: 2.1 GM/DL
GLUCOSE SERPL-MCNC: 89 MG/DL (ref 65–99)
HCT VFR BLD AUTO: 39.8 % (ref 34–46.6)
HDLC SERPL-MCNC: 51 MG/DL (ref 40–60)
HGB BLD-MCNC: 12.9 G/DL (ref 12–15.9)
LDLC SERPL CALC-MCNC: 87 MG/DL (ref 0–100)
LDLC/HDLC SERPL: 1.64 {RATIO}
MAGNESIUM SERPL-MCNC: 2.2 MG/DL (ref 1.6–2.6)
MCH RBC QN AUTO: 29.3 PG (ref 26.6–33)
MCHC RBC AUTO-ENTMCNC: 32.4 G/DL (ref 31.5–35.7)
MCV RBC AUTO: 90.2 FL (ref 79–97)
PLATELET # BLD AUTO: 229 10*3/MM3 (ref 140–450)
PMV BLD AUTO: 10.6 FL (ref 6–12)
POTASSIUM SERPL-SCNC: 4.2 MMOL/L (ref 3.5–5.2)
PROT SERPL-MCNC: 6.2 G/DL (ref 6–8.5)
RBC # BLD AUTO: 4.41 10*6/MM3 (ref 3.77–5.28)
SODIUM SERPL-SCNC: 142 MMOL/L (ref 136–145)
TRIGL SERPL-MCNC: 148 MG/DL (ref 0–150)
TSH SERPL DL<=0.05 MIU/L-ACNC: 4.63 UIU/ML (ref 0.27–4.2)
VLDLC SERPL-MCNC: 26 MG/DL (ref 5–40)
WBC NRBC COR # BLD: 6.02 10*3/MM3 (ref 3.4–10.8)

## 2022-08-26 PROCEDURE — 80061 LIPID PANEL: CPT

## 2022-08-26 PROCEDURE — 83735 ASSAY OF MAGNESIUM: CPT

## 2022-08-26 PROCEDURE — 80050 GENERAL HEALTH PANEL: CPT

## 2022-08-26 PROCEDURE — 36415 COLL VENOUS BLD VENIPUNCTURE: CPT

## 2022-09-02 ENCOUNTER — TELEPHONE (OUTPATIENT)
Dept: INTERNAL MEDICINE | Facility: CLINIC | Age: 57
End: 2022-09-02

## 2022-09-02 NOTE — TELEPHONE ENCOUNTER
Caller: Mariama Turner    Relationship: Self    Best call back number: [phone]      What is the best time to reach you: ANY     Who are you requesting to speak with CLINICAL     What was the call regarding: PATIENT WOULD LIKE A CALL BACK REGARDING LONG TERM DISABILITY FORM    PLEASE ADVISE       PATIENT IS ALSO REQUESTING AN UPDATED WORK EXCUSE      PATIENT IS REQUESTING ANOTHER MONTH OFF FROM WORK 9-2-22 TO 10-2-22      Do you require a callback: YES

## 2022-09-07 ENCOUNTER — TELEPHONE (OUTPATIENT)
Dept: INTERNAL MEDICINE | Facility: CLINIC | Age: 57
End: 2022-09-07

## 2022-09-07 NOTE — TELEPHONE ENCOUNTER
Pt called in about her long term disability paperwork and I spoke with Kelly who sd that we needed the last 4 months of her records from her eye dr and her physical therapy records. Pt sd that she had an appt on Friday and she will work on gathering these then.

## 2022-09-07 NOTE — TELEPHONE ENCOUNTER
Patient was notified today that we need her opthalmology and P.T notes for the last 4 months to send with paperwork as requested.

## 2022-09-13 ENCOUNTER — TELEPHONE (OUTPATIENT)
Dept: INTERNAL MEDICINE | Facility: CLINIC | Age: 57
End: 2022-09-13

## 2022-09-13 NOTE — TELEPHONE ENCOUNTER
SPOKE WITH PATIENT. VERIFIED .     THERAPY TOLD HER TODAY THAT THEY NEVER RECEIVED ANY REQUEST FOR THE RECORDS.   PATIENT WAS TOLD BY OUR OFFICE THAT WE WERE GOING TO REQUEST THE RECORDS FOR HER.     I EXPLAINED TO HER THAT WE HAD TOLD HER SEVERAL TIMES THAT SHE NEEDED TO GET THE RECORDS AND PROVIDE THEM TO US, PER SHAHEED.     I AGAIN TOLD THE PATIENT SHE NEEDED TO GET THE RECORDS FROM OPTHALMOLOGY AND PHYSICAL THERAPY, PER SHAHEED.     I PROVIDED HER WITH [employer]'S SECURE EMAIL AND OUR OFFICE'S FAX NUMBER TO SEND THE RECORDS TO.     PATIENT WAS TOLD IF SHE WANTS TO FAX THE PAPERWORK HERSELF SHE CAN  OUR PORTION AND FAX IT ALL WHEN SHE GETS THE RECORDS, BUT WE CAN NOT FAX IT UNTIL WE GET ALL THE RECORDS.     PATIENT AND HER  WERE OF UNDERSTANDING THAT THEY NEEDED TO GET THE RECORDS AND THEN GET THEM TO US. SO WE CAN FAX THE PAPERWORK INTO [employer].

## 2022-09-13 NOTE — TELEPHONE ENCOUNTER
PATIENT'S  CALLED  IN, THERE IS NO VERBAL RELEASE ON FILE FOR OUR OFFICE.     HE WAS ASKING ME ABOUT HER DISABILITY PAPERWORK, HOWEVER I CAN NOT LEGALLY RELEASE ANY INFORMATION TO HIM BECAUSE OF THERE BEING NO RELEASE ON FILE FOR OUR OFFICE.     I INFORMED HIM CHRISTIAN HERSELF WOULD NEED TO CALL IN.       HUB OK TO READ/ADVISE IF PATIENT CALLS BACK:     PER JOHN HUMMEL'S MA PATIENT NEEDS TO PROVIDE US WITH 4 MONTHS OF OPTHALMOLOGY RECORDS AND 4 MONTHS OF PHYSICAL THERAPY  RECORDS. WE NEED THIS INFORMATION IN ORDER TO FAX THE PAPERWORK TO [facility]. OUR PORTION OF THE PAPERWORK IS COMPLETED, BUT IN ORDER FOR IT TO BE SENT IN THE OPTHALMOLOGY AND PHYSICAL THERAPY RECORDS ARE REQUIRED.  IF PATIENT WANTS TO FAX AND SEND IN THE PAPERWORK HERSELF THEN SHE IS WELCOME TO  OUR PORTION BUT SHE IS GOING TO STILL HAVE TO HAVE THE OPTHALMOLOGY AND PHYSICAL THERAPY RECORDS IN ORDER FOR IT TO GET APPROVED.

## 2022-09-19 ENCOUNTER — OFFICE VISIT (OUTPATIENT)
Dept: OTOLARYNGOLOGY | Facility: CLINIC | Age: 57
End: 2022-09-19

## 2022-09-19 VITALS — BODY MASS INDEX: 30.48 KG/M2 | HEIGHT: 63 IN | WEIGHT: 172 LBS | TEMPERATURE: 96.8 F

## 2022-09-19 DIAGNOSIS — R26.89 IMBALANCE: Primary | ICD-10-CM

## 2022-09-19 DIAGNOSIS — H92.02 OTALGIA OF LEFT EAR: ICD-10-CM

## 2022-09-19 PROCEDURE — 99203 OFFICE O/P NEW LOW 30 MIN: CPT | Performed by: OTOLARYNGOLOGY

## 2022-09-19 NOTE — PROGRESS NOTES
Patient Name: Mariama Turner   Visit Date: 09/19/2022   Patient ID: [id number]  Provider: Satya Castaneda MD    Sex: female  [place] [facility]   YOB: 1965  Location Address: [address] [place] [address], [address] [place] [address],?[id number]    Primary Care Provider Ame Xie APRN  Location Phone: [phone]    Referring Provider: MUSHTAQ Gaitan        Chief Complaint  Balance Issues    History of Present Illness  Mariama Turner is a 57 y.o. female who presents to [facility] today as a consult from MUSHTAQ Gaitan for evaluation of her ears.  She tells me that her troubles began after falling twice on 4/8/2022.  Both times she woke up on her left side presumably striking her head.  Since that time she has had significant issues with imbalance for which she has undergone vestibular rehabilitation therapy.  This has been helpful but she is still experiencing difficulty with her balance when standing or ambulating.  She does mention occasional left-sided otalgia which only tends to occur at night and tinnitus which began after receiving the COVID-vaccine.  She mentions a history of hereditary hearing loss.  She denies any noise exposure.  She has not had any significant issues with vertigo although she does experience rare episodes lasting less than a minute in duration.  They have not been associated with any triggers.  She experiences occasional diminished sensation in her left foot.  She also mentions intermittent blurry vision for which she has seen an ophthalmologist.  She also sees a neurologist and has been diagnosed with postconcussive syndrome. MRI of her brain without contrast on 5/10/2022 revealed clear middle ears and mastoids.  Stable chronic small vessel ischemic changes were noted.    Past Medical History:   Diagnosis Date   • Allergies    • Anemia    • Arthritis    • Asthma    • Bowel disease    •  Cervical cancer (HCC)    • Colitis    • Difficulty walking    • Fibromyalgia, primary    • Gait disturbance    • Gallstones    • Gastric ulcer    • Headache    • Heart disease    • Hyperlipemia    • Hypertension    • Kidney stones    • Limb swelling    • Osteoarthritis 2018    Of left thumb CMC joint   • Reflux esophagitis    • Seasonal allergies        Past Surgical History:   Procedure Laterality Date   • ABDOMINAL HYSTERECTOMY     •  SECTION     • CHOLECYSTECTOMY     • COLONOSCOPY  ,,,2018   • ENDOSCOPY  ,,   • KIDNEY STONE SURGERY  2014    Right Ureteroscopy, Laser litho, stent insertion   • RHINOPLASTY     • TONSILLECTOMY           Current Outpatient Medications:   •  albuterol sulfate  (90 Base) MCG/ACT inhaler, Inhale 2 puffs Every 4 (Four) Hours As Needed for Wheezing or Shortness of Air., Disp: 18 g, Rfl: 3  •  atenolol (TENORMIN) 25 MG tablet, Take 1 tablet by mouth every night at bedtime., Disp: 90 tablet, Rfl: 3  •  estradiol (MINIVELLE, VIVELLE-DOT) 0.075 MG/24HR patch, Place 1 patch on the skin as directed by provider 2 (Two) Times a Week., Disp: 24 patch, Rfl: 3  •  ezetimibe (ZETIA) 10 MG tablet, Take 1 tablet by mouth Daily., Disp: 90 tablet, Rfl: 3  •  fluticasone (Flonase) 50 MCG/ACT nasal spray, 2 sprays into the nostril(s) as directed by provider Daily., Disp: 16 g, Rfl: 0  •  furosemide (LASIX) 40 MG tablet, Take 1 tablet by mouth Daily., Disp: 90 tablet, Rfl: 3  •  pitavastatin calcium (Livalo) 1 MG tablet tablet, Take 1 tablet by mouth Daily., Disp: 90 tablet, Rfl: 3  •  potassium chloride 10 MEQ CR tablet, Take 1 tablet by mouth 2 (Two) Times a Day., Disp: 180 tablet, Rfl: 3  •  Scopolamine (Transderm-Scop, 1.5 MG,) 1 MG/3DAYS patch, Place 1 patch on the skin as directed by provider Every 72 (Seventy-Two) Hours., Disp: 10 patch, Rfl: 1     Allergies   Allergen Reactions   • Contrast Dye Unknown - High Severity   • Iodine Unknown - High  "Severity   • Ceftriaxone Itching       Social History     Tobacco Use   • Smoking status: Never Smoker   • Smokeless tobacco: Never Used   Vaping Use   • Vaping Use: Never used   Substance Use Topics   • Alcohol use: Yes     Comment: Some day / rarely once a year   • Drug use: Never        Objective     Vital Signs:   Temp 96.8 °F (36 °C) (Temporal)   Ht 160 cm (63\")   Wt 78 kg (172 lb)   BMI 30.47 kg/m²       Physical Exam    General: Well developed, well nourished patient of stated age in no acute distress. Voice is strong and clear.   Head: Normocephalic and atraumatic.  Face: No lesions.  Bilateral parotid and submandibular glands are unremarkable.  Stensen's and Warthin's ducts are productive of clear saliva bilaterally.  House-Brackmann I/VI     bilaterally.   muscles and temporomandibular joint nontender to palpation.  No TMJ crepitus.  Eyes: PERRLA, sclerae anicteric, no conjunctival injection. Extraocular movements are intact and full. No nystagmus.   Ears: Auricles are normal in appearance. Bilateral external auditory canals are unremarkable. Bilateral tympanic membranes are clear and without effusion. Hearing normal to conversational voice.   Nose: External nose is normal in appearance. Bilateral nares are patent with normal appearing mucosa. Septum deviated to the left. Turbinates are unremarkable. No lesions.   Oral Cavity: Lips are normal in appearance. Oral mucosa is unremarkable. Gingiva is unremarkable.  Worn dentition for age. Tongue is unremarkable with good movement. Hard palate is unremarkable.   Oropharynx: Soft palate is unremarkable with full movement. Uvula is unremarkable. Bilateral tonsils are surgically absent. Posterior oropharynx is unremarkable.    Larynx and hypopharynx: Deferred secondary to gag reflex.  Neck: Supple.  No mass.  Nontender to palpation.  Trachea midline. Thyroid normal size and without nodules to palpation.   Lymphatic: No lymphadenopathy upon " palpation.  Respiratory: Clear to auscultation bilaterally, nonlabored respirations    Cardiovascular: RRR, no murmurs, rubs, or gallops,   Psychiatric: Appropriate affect, cooperative   Neurologic: Oriented x 3, strength symmetric in all extremities, Cranial Nerves II-XII are grossly intact to confrontation.  Bilateral finger-to-nose and heel to shin is hesitant but otherwise unremarkable.  [place] Bar-Hallpike testing is negative.  Romberg testing reveals sway which worsens upon eye closure.  Fukuda step testing is negative.   Skin: Warm and dry. No rashes.    Procedures           Result Review :{Labs  Result Review  Imaging  Med Tab  Media  Procedures :23}               Assessment and Plan    Diagnoses and all orders for this visit:    1. Imbalance (Primary)    2. Otalgia of left ear    Impressions and findings were discussed at great length.  We discussed the differential for her symptoms including multifactorial imbalance versus more of a postconcussive syndrome amongst other possibilities.  Options for further evaluation and management were discussed and she feels as though she is improving with time and vestibular rehabilitation would recommend continuing with vestibular rehabilitation therapy.  If she has a point where she plateaus in therapy we may pursue further evaluation with an audiogram, tympanogram, and video nystagmography testing.  She was given ample time to ask questions, all of which were answered to her satisfaction.        Follow Up   No follow-ups on file.  Patient was given instructions and counseling regarding her condition or for health maintenance advice. Please see specific information pulled into the AVS if appropriate.

## 2022-09-21 DIAGNOSIS — E03.9 HYPOTHYROIDISM, UNSPECIFIED TYPE: Primary | ICD-10-CM

## 2022-09-21 RX ORDER — ATENOLOL 25 MG/1
25 TABLET ORAL
Qty: 90 TABLET | Refills: 3 | Status: SHIPPED | OUTPATIENT
Start: 2022-09-21 | End: 2023-03-15 | Stop reason: SDUPTHER

## 2022-09-21 RX ORDER — LEVOTHYROXINE SODIUM 0.05 MG/1
50 TABLET ORAL DAILY
Qty: 90 TABLET | Refills: 1 | Status: SHIPPED | OUTPATIENT
Start: 2022-09-21 | End: 2022-12-16

## 2022-10-05 ENCOUNTER — TELEPHONE (OUTPATIENT)
Dept: CARDIOLOGY | Facility: CLINIC | Age: 57
End: 2022-10-05

## 2022-10-05 NOTE — TELEPHONE ENCOUNTER
----- Message from Tona Mac MA sent at 10/5/2022 12:19 PM EDT -----    ----- Message -----  From: Elizabeth Mishra MD  Sent: 9/30/2022   3:57 PM EDT  To: Ina Jerome-Preston    Call her the results

## 2022-11-01 ENCOUNTER — DOCUMENTATION (OUTPATIENT)
Dept: PHYSICAL THERAPY | Facility: CLINIC | Age: 57
End: 2022-11-01

## 2022-11-02 PROCEDURE — 87086 URINE CULTURE/COLONY COUNT: CPT | Performed by: PHYSICIAN ASSISTANT

## 2022-11-03 DIAGNOSIS — R30.0 DYSURIA: Primary | ICD-10-CM

## 2022-11-03 RX ORDER — FLUCONAZOLE 150 MG/1
150 TABLET ORAL ONCE
Qty: 1 TABLET | Refills: 0 | Status: SHIPPED | OUTPATIENT
Start: 2022-11-03 | End: 2022-11-03

## 2022-11-03 RX ORDER — NITROFURANTOIN 25; 75 MG/1; MG/1
100 CAPSULE ORAL 2 TIMES DAILY
Qty: 14 CAPSULE | Refills: 0 | Status: SHIPPED | OUTPATIENT
Start: 2022-11-03 | End: 2022-11-14

## 2022-11-14 ENCOUNTER — OFFICE VISIT (OUTPATIENT)
Dept: INTERNAL MEDICINE | Facility: CLINIC | Age: 57
End: 2022-11-14

## 2022-11-14 VITALS
TEMPERATURE: 98 F | OXYGEN SATURATION: 95 % | WEIGHT: 172.4 LBS | DIASTOLIC BLOOD PRESSURE: 86 MMHG | HEART RATE: 67 BPM | SYSTOLIC BLOOD PRESSURE: 138 MMHG | BODY MASS INDEX: 30.55 KG/M2 | HEIGHT: 63 IN | RESPIRATION RATE: 14 BRPM

## 2022-11-14 DIAGNOSIS — R51.9 NONINTRACTABLE HEADACHE, UNSPECIFIED CHRONICITY PATTERN, UNSPECIFIED HEADACHE TYPE: ICD-10-CM

## 2022-11-14 DIAGNOSIS — J01.90 ACUTE NON-RECURRENT SINUSITIS, UNSPECIFIED LOCATION: ICD-10-CM

## 2022-11-14 DIAGNOSIS — Z23 ENCOUNTER FOR IMMUNIZATION: ICD-10-CM

## 2022-11-14 DIAGNOSIS — J34.89 SINUS PRESSURE: ICD-10-CM

## 2022-11-14 DIAGNOSIS — R09.81 NASAL CONGESTION: Primary | ICD-10-CM

## 2022-11-14 DIAGNOSIS — J02.9 SORE THROAT: ICD-10-CM

## 2022-11-14 LAB
EXPIRATION DATE: NORMAL
FLUAV AG NPH QL: NEGATIVE
FLUBV AG NPH QL: NEGATIVE
INTERNAL CONTROL: NORMAL
Lab: NORMAL
S PYO AG THROAT QL: NEGATIVE
SARS-COV-2 AG UPPER RESP QL IA.RAPID: NOT DETECTED

## 2022-11-14 PROCEDURE — 99213 OFFICE O/P EST LOW 20 MIN: CPT | Performed by: STUDENT IN AN ORGANIZED HEALTH CARE EDUCATION/TRAINING PROGRAM

## 2022-11-14 PROCEDURE — 87081 CULTURE SCREEN ONLY: CPT | Performed by: STUDENT IN AN ORGANIZED HEALTH CARE EDUCATION/TRAINING PROGRAM

## 2022-11-14 PROCEDURE — 87426 SARSCOV CORONAVIRUS AG IA: CPT | Performed by: STUDENT IN AN ORGANIZED HEALTH CARE EDUCATION/TRAINING PROGRAM

## 2022-11-14 PROCEDURE — U0004 COV-19 TEST NON-CDC HGH THRU: HCPCS | Performed by: STUDENT IN AN ORGANIZED HEALTH CARE EDUCATION/TRAINING PROGRAM

## 2022-11-14 PROCEDURE — 87804 INFLUENZA ASSAY W/OPTIC: CPT | Performed by: STUDENT IN AN ORGANIZED HEALTH CARE EDUCATION/TRAINING PROGRAM

## 2022-11-14 PROCEDURE — 90686 IIV4 VACC NO PRSV 0.5 ML IM: CPT | Performed by: STUDENT IN AN ORGANIZED HEALTH CARE EDUCATION/TRAINING PROGRAM

## 2022-11-14 PROCEDURE — 87880 STREP A ASSAY W/OPTIC: CPT | Performed by: STUDENT IN AN ORGANIZED HEALTH CARE EDUCATION/TRAINING PROGRAM

## 2022-11-14 PROCEDURE — 90471 IMMUNIZATION ADMIN: CPT | Performed by: STUDENT IN AN ORGANIZED HEALTH CARE EDUCATION/TRAINING PROGRAM

## 2022-11-14 RX ORDER — AMOXICILLIN AND CLAVULANATE POTASSIUM 875; 125 MG/1; MG/1
1 TABLET, FILM COATED ORAL 2 TIMES DAILY
Qty: 10 TABLET | Refills: 0 | Status: SHIPPED | OUTPATIENT
Start: 2022-11-14 | End: 2022-11-19

## 2022-11-14 RX ORDER — BROMPHENIRAMINE MALEATE, PSEUDOEPHEDRINE HYDROCHLORIDE, AND DEXTROMETHORPHAN HYDROBROMIDE 2; 30; 10 MG/5ML; MG/5ML; MG/5ML
10 SYRUP ORAL 4 TIMES DAILY PRN
Qty: 473 ML | Refills: 0 | Status: SHIPPED | OUTPATIENT
Start: 2022-11-14 | End: 2023-01-28

## 2022-11-14 NOTE — PROGRESS NOTES
"Chief Complaint  Headache, Nasal Congestion (Sinus pressure), and Sore Throat    Subjective          Mariama Turner presents to [facility]  History of Present Illness    Here with .    Here for a sick visit.  Here with complaints of sinus pressure, \"raw throat\", cough, congestion., headache  Tolerating PO.  No vomiting or diarrhea.    Started Wednesday of last week.  Using bromfed leftover from previous illness.    Also desires a flu shot.    Current Outpatient Medications   Medication Instructions   • albuterol sulfate  (90 Base) MCG/ACT inhaler 2 puffs, Inhalation, Every 4 Hours PRN   • amoxicillin-clavulanate (Augmentin) 875-125 MG per tablet 1 tablet, Oral, 2 Times Daily   • atenolol (TENORMIN) 25 mg, Oral, Every Night at Bedtime   • brompheniramine-pseudoephedrine-DM 30-2-10 MG/5ML syrup 10 mL, Oral, 4 Times Daily PRN   • estradiol (MINIVELLE, VIVELLE-DOT) 0.075 MG/24HR patch 1 patch, Transdermal, 2 Times Weekly   • ezetimibe (ZETIA) 10 mg, Oral, Daily   • fluticasone (Flonase) 50 MCG/ACT nasal spray 2 sprays, Nasal, Daily   • furosemide (LASIX) 40 mg, Oral, Daily   • levothyroxine (SYNTHROID) 50 mcg, Oral, Daily   • Livalo 1 mg, Oral, Daily   • phenol (CHLORASEPTIC) 1.4 % liquid liquid 1 spray, Mouth/Throat, Every 2 Hours PRN   • potassium chloride 10 MEQ CR tablet 10 mEq, Oral, 2 Times Daily   • Scopolamine (Transderm-Scop, 1.5 MG,) 1 MG/3DAYS patch 1 patch, Transdermal, Every 72 Hours       The following portions of the patient's history were reviewed and updated as appropriate: allergies, current medications, past family history, past medical history, past social history, past surgical history, and problem list.    Objective   Vital Signs:   /86   Pulse 67   Temp 98 °F (36.7 °C) (Temporal)   Resp 14   Ht 160 cm (63\")   Wt 78.2 kg (172 lb 6.4 oz)   SpO2 95%   BMI 30.54 kg/m²     Wt Readings from Last 3 Encounters:   11/14/22 78.2 kg " (172 lb 6.4 oz)   11/02/22 78 kg (172 lb)   09/27/22 78 kg (172 lb)     BP Readings from Last 3 Encounters:   11/14/22 138/86   11/02/22 110/63   09/27/22 132/64     Physical Exam  Vitals and nursing note reviewed.   Constitutional:       Appearance: Normal appearance. She is well-developed.   HENT:      Head: Normocephalic and atraumatic.      Comments: Frontal and maxillary sinuses TTP bilaterally (right worse than left).     Right Ear: Tympanic membrane, ear canal and external ear normal.      Left Ear: Tympanic membrane, ear canal and external ear normal.      Mouth/Throat:      Mouth: Mucous membranes are moist.      Pharynx: Oropharynx is clear. No oropharyngeal exudate.   Eyes:      Conjunctiva/sclera: Conjunctivae normal.   Cardiovascular:      Rate and Rhythm: Normal rate and regular rhythm.      Pulses: Normal pulses.      Heart sounds: Normal heart sounds. No murmur heard.    No friction rub. No gallop.   Pulmonary:      Effort: Pulmonary effort is normal.      Breath sounds: Normal breath sounds. No wheezing or rhonchi.   Abdominal:      General: Abdomen is flat. There is no distension.      Palpations: Abdomen is soft. There is no mass.      Tenderness: There is no abdominal tenderness.   Musculoskeletal:      Right lower leg: No edema.      Left lower leg: No edema.   Skin:     General: Skin is warm and dry.   Neurological:      General: No focal deficit present.      Mental Status: She is alert. Mental status is at baseline.   Psychiatric:         Mood and Affect: Mood and affect normal.         Behavior: Behavior normal.         Thought Content: Thought content normal.         Judgment: Judgment normal.       Result Review :   The following data was reviewed by: Rory Mike MD on 11/14/2022:  Common labs    Common Labs 4/28/22 4/28/22 8/26/22 8/26/22 8/26/22    1515 1515 1020 1020 1020   Glucose  115 (A)  89    BUN  16  11    Creatinine  0.89  0.85    Sodium  142  142    Potassium  3.9  4.2     Chloride  104  108 (A)    Calcium  9.5  9.2    Albumin  4.60  4.10    Total Bilirubin  0.7  0.5    Alkaline Phosphatase  89  78    AST (SGOT)  20  20    ALT (SGPT)  20  18    WBC 8.28  6.02     Hemoglobin 14.0  12.9     Hematocrit 42.0  39.8     Platelets 272  229     Total Cholesterol     164   Triglycerides     148   HDL Cholesterol     51   LDL Cholesterol      87   (A) Abnormal value                   Lab Results   Component Value Date    SARSANTIGEN Not Detected 11/14/2022    COVID19 Not Detected 04/28/2022    RAPFLUA Negative 11/14/2022    RAPFLUB Negative 11/14/2022    FLUAAG Not Detected 09/23/2021    FLU Negative 04/28/2022    FLU Negative 04/28/2022    FLUBAG Not Detected 09/23/2021    RAPSCRN Negative 11/14/2022    BILIRUBINUR Negative 11/02/2022       Procedures        Assessment and Plan    Diagnoses and all orders for this visit:    1. Nasal congestion (Primary)  -     POCT Influenza A/B  -     POCT SARS-CoV-2 Antigen LUBA  -     POC Rapid Strep A  -     Beta Strep Culture, Throat - Swab, Throat  -     brompheniramine-pseudoephedrine-DM 30-2-10 MG/5ML syrup; Take 10 mL by mouth 4 (Four) Times a Day As Needed for Congestion, Cough or Allergies.  Dispense: 473 mL; Refill: 0  -     COVID-19,APTIMA PANTHER(JIMI),[facility] TRUDY/[facility] LESLIE, NP/OP SWAB IN UTM/VTM/SALINE TRANSPORT MEDIA,24 HR TAT - Swab, Nasopharynx    2. Nonintractable headache, unspecified chronicity pattern, unspecified headache type  -     POCT Influenza A/B  -     POCT SARS-CoV-2 Antigen LUBA  -     POC Rapid Strep A  -     Beta Strep Culture, Throat - Swab, Throat    3. Sinus pressure    4. Sore throat  -     phenol (CHLORASEPTIC) 1.4 % liquid liquid; Apply 1 spray to the mouth or throat Every 2 (Two) Hours As Needed (sore throat).  Dispense: 177 mL; Refill: 0  -     COVID-19,APTIMA PANTHER(JIMI),[facility] TRUDY/[facility] LESLIE, NP/OP SWAB IN UTM/VTM/SALINE TRANSPORT MEDIA,24 HR TAT - Swab, Nasopharynx    5. Encounter for immunization  -     FluLaval/Fluzone >6 mos  ([id number])    6. Acute non-recurrent sinusitis, unspecified location  -     amoxicillin-clavulanate (Augmentin) 875-125 MG per tablet; Take 1 tablet by mouth 2 (Two) Times a Day for 5 days.  Dispense: 10 tablet; Refill: 0      Immunization:  -Discussed risks/benefits to vaccination, reviewed components of the vaccine, discussed VIS, discussed informed consent, informed consent obtained. Patient/Parent was allowed to accept or refuse vaccine. Questions answered to satisfactory state of patient/parent. We reviewed typical age appropriate and seasonally appropriate vaccinations. Reviewed immunization history and updated state vaccination form as needed. Patient/Parent was counseled on the above vaccines.      Medications Discontinued During This Encounter   Medication Reason   • nitrofurantoin, macrocrystal-monohydrate, (Macrobid) 100 MG capsule           Follow Up   Return if symptoms worsen or fail to improve.  Patient was given instructions and counseling regarding her condition or for health maintenance advice. Please see specific information pulled into the AVS if appropriate.       Rory Mike MD  11/14/22  14:10 EST

## 2022-11-15 LAB — SARS-COV-2 RNA PNL SPEC NAA+PROBE: NOT DETECTED

## 2022-11-16 LAB — BACTERIA SPEC AEROBE CULT: NORMAL

## 2022-11-21 ENCOUNTER — OFFICE VISIT (OUTPATIENT)
Dept: NEUROLOGY | Facility: CLINIC | Age: 57
End: 2022-11-21

## 2022-11-21 VITALS
DIASTOLIC BLOOD PRESSURE: 84 MMHG | OXYGEN SATURATION: 97 % | WEIGHT: 174 LBS | BODY MASS INDEX: 30.83 KG/M2 | HEART RATE: 45 BPM | HEIGHT: 63 IN | SYSTOLIC BLOOD PRESSURE: 126 MMHG

## 2022-11-21 DIAGNOSIS — F07.81 POSTCONCUSSIVE SYNDROME: ICD-10-CM

## 2022-11-21 DIAGNOSIS — S09.90XD TRAUMATIC INJURY OF HEAD, SUBSEQUENT ENCOUNTER: Primary | ICD-10-CM

## 2022-11-21 DIAGNOSIS — R41.89 COGNITIVE IMPAIRMENT: ICD-10-CM

## 2022-11-21 DIAGNOSIS — R42 DIZZINESS: ICD-10-CM

## 2022-11-21 PROBLEM — G44.329 CHRONIC POST-TRAUMATIC HEADACHE, NOT INTRACTABLE: Status: ACTIVE | Noted: 2022-10-08

## 2022-11-21 PROBLEM — G43.009 MIGRAINE WITHOUT AURA AND WITHOUT STATUS MIGRAINOSUS, NOT INTRACTABLE: Status: ACTIVE | Noted: 2022-10-08

## 2022-11-21 PROCEDURE — 99214 OFFICE O/P EST MOD 30 MIN: CPT | Performed by: PSYCHIATRY & NEUROLOGY

## 2022-11-21 RX ORDER — ZONISAMIDE 100 MG/1
200 CAPSULE ORAL DAILY
COMMUNITY
Start: 2022-10-08 | End: 2023-01-28

## 2022-11-21 RX ORDER — POTASSIUM CHLORIDE 750 MG/1
10 TABLET, EXTENDED RELEASE ORAL
COMMUNITY
End: 2022-12-15 | Stop reason: SDUPTHER

## 2022-11-21 RX ORDER — FLUCONAZOLE 150 MG/1
TABLET ORAL
COMMUNITY
Start: 2022-11-04 | End: 2022-12-15

## 2022-11-21 NOTE — PROGRESS NOTES
Chief Complaint   Patient presents with   • Dizziness   • Diplopia   • cognitive impairment        Patient ID: Mariama Turner is a 57 y.o. female.    HPI: I had the pleasure of seeing your patient.  She is a 57-year-old female here for the management of postconcussive syndrome, dizziness, cognitive Marco double vision.  She has been better.  Her double vision has improved significantly.  She is receiving vision therapy which she feels has helped.  We did have her scheduled for neuropsych testing.  We do not yet have the report of the results however the patient did have follow-up consultation with them showing no evidence for dementia.  She does still have significant forgetfulness.  She does mention that she still has dizziness on a daily basis.  She typically experiences it when walking or with changes in head position.  No new onset focal weakness or numbness of her arms or legs.  No loss of vision.    The following portions of the patient's history were reviewed and updated as appropriate: allergies, current medications, past family history, past medical history, past social history, past surgical history and problem list.    Review of Systems   Eyes: Positive for visual disturbance.   Neurological: Positive for dizziness, tremors, speech difficulty (word finding), light-headedness and headaches. Negative for seizures, syncope (april 2022), weakness and numbness.   Hematological: Bruises/bleeds easily.   Psychiatric/Behavioral: Positive for confusion and decreased concentration. Negative for agitation, behavioral problems, hallucinations, self-injury, sleep disturbance and suicidal ideas. The patient is not nervous/anxious.       I have reviewed the review of systems above performed by my medical assistant.      Vitals:    11/21/22 0939   BP: 126/84   Pulse: (!) 45   SpO2: 97%       Neurologic Exam     Mental Status   Oriented to person, place, and time.   Concentration: normal.   Level of consciousness:  alert  Knowledge: consistent with education (No deficits found.).     Cranial Nerves     CN II   Visual fields full to confrontation.     CN III, IV, VI   Pupils are equal, round, and reactive to light.  Extraocular motions are normal.   CN III: no CN III palsy  CN VI: no CN VI palsy    CN V   Facial sensation intact.     CN VII   Facial expression full, symmetric.     CN VIII   CN VIII normal.     CN IX, X   CN IX normal.   CN X normal.     CN XI   CN XI normal.     CN XII   CN XII normal.     Motor Exam     Strength   Right neck flexion: 5/5  Left neck flexion: 5/5  Right neck extension: 5/5  Left neck extension: 5/5  Right deltoid: 5/5  Left deltoid: 5/5  Right biceps: 5/5  Left biceps: 5/5  Right triceps: 5/5  Left triceps: 5/5  Right wrist flexion: 5/5  Left wrist flexion: 5/5  Right wrist extension: 5/5  Left wrist extension: 5/5  Right interossei: 5/5  Left interossei: 5/5  Right abdominals: 5/5  Left abdominals: 5/5  Right iliopsoas: 5/5  Left iliopsoas: 5/5  Right quadriceps: 5/5  Left quadriceps: 5/5  Right hamstrin/5  Left hamstrin/5  Right glutei: 5/5  Left glutei: 5/5  Right anterior tibial: 5/5  Left anterior tibial: 5/5  Right posterior tibial: 5/5  Left posterior tibial: 5/5  Right peroneal: 5/5  Left peroneal: 5/5  Right gastroc: 5/5  Left gastroc: 5/5    Sensory Exam   Light touch normal.   Vibration normal.     Gait, Coordination, and Reflexes     Gait  Gait: (She does appear to the left with walking.  Somewhat unsteady gait)    Reflexes   Right brachioradialis: 2+  Left brachioradialis: 2+  Right biceps: 2+  Left biceps: 2+  Right triceps: 2+  Left triceps: 2+  Right patellar: 2+  Left patellar: 2+  Right achilles: 2+  Left achilles: 2+  Right : 2+  Left : 2+Station is normal.       Physical Exam  Vitals reviewed.   Constitutional:       Appearance: She is well-developed.   HENT:      Head: Normocephalic and atraumatic.   Eyes:      Extraocular Movements: EOM normal.      Pupils:  Pupils are equal, round, and reactive to light.   Cardiovascular:      Rate and Rhythm: Normal rate and regular rhythm.   Pulmonary:      Breath sounds: Normal breath sounds.   Musculoskeletal:         General: Normal range of motion.   Skin:     General: Skin is warm.   Neurological:      Mental Status: She is oriented to person, place, and time.      Deep Tendon Reflexes:      Reflex Scores:       Tricep reflexes are 2+ on the right side and 2+ on the left side.       Bicep reflexes are 2+ on the right side and 2+ on the left side.       Brachioradialis reflexes are 2+ on the right side and 2+ on the left side.       Patellar reflexes are 2+ on the right side and 2+ on the left side.       Achilles reflexes are 2+ on the right side and 2+ on the left side.        Procedures    Assessment/Plan: She does continue to have dizziness.  I would like for her to see the folks at the [facility].  We will make another referral for that.  Also refer her for cognitive therapy.  We will see her back in 4 months or sooner if needed.  A total of 30 minutes was spent face-to-face with today.  Of that greater than 50% of this time was spent discussing signs and symptoms of dizziness, postconcussive syndrome, patient education, plan of care and prognosis.       Diagnoses and all orders for this visit:    1. Traumatic injury of head, subsequent encounter (Primary)  -     Basic Vestibular Evaluation; Future  -     Ambulatory Referral to Speech Therapy    2. Postconcussive syndrome  -     Ambulatory Referral to Speech Therapy    3. Dizziness  -     Basic Vestibular Evaluation; Future    4. Cognitive impairment  -     Ambulatory Referral to Speech Therapy           Jordan Rawls II, MD

## 2022-12-12 ENCOUNTER — TELEPHONE (OUTPATIENT)
Dept: NEUROLOGY | Facility: CLINIC | Age: 57
End: 2022-12-12

## 2022-12-12 DIAGNOSIS — H83.2X9 VESTIBULAR DYSFUNCTION, UNSPECIFIED LATERALITY: ICD-10-CM

## 2022-12-12 DIAGNOSIS — R42 DIZZINESS: Primary | ICD-10-CM

## 2022-12-12 NOTE — TELEPHONE ENCOUNTER
Caller: Mariama Turner    Relationship: Self    Best call back number: [phone]    What is the best time to reach you: ANY    Who are you requesting to speak with (clinical staff, provider,  specific staff member): JAMISON  What was the call regarding: PATIENT WOULD LIKE RECOMMENDATION FOR NEW FACILITY TO REPLACE [facility] FOR REFERRAL FOR BASIC VESTIBULAR EVAL.    PLEASE CALL HER TO ADVISE AS SHE IS TRYING TO BE SEEN BEFORE HER 1YR POST CONCUSSION DATE IN April 2023 & [facility] CAN NOT GET HER IN UNTIL MARCH 2023 FOR THERAPY,

## 2022-12-15 ENCOUNTER — OFFICE VISIT (OUTPATIENT)
Dept: INTERNAL MEDICINE | Facility: CLINIC | Age: 57
End: 2022-12-15

## 2022-12-15 VITALS
TEMPERATURE: 97.8 F | HEIGHT: 63 IN | WEIGHT: 177.4 LBS | OXYGEN SATURATION: 96 % | DIASTOLIC BLOOD PRESSURE: 80 MMHG | SYSTOLIC BLOOD PRESSURE: 128 MMHG | HEART RATE: 71 BPM | BODY MASS INDEX: 31.43 KG/M2

## 2022-12-15 DIAGNOSIS — F41.9 ANXIETY: ICD-10-CM

## 2022-12-15 DIAGNOSIS — F07.81 POST CONCUSSIVE SYNDROME: ICD-10-CM

## 2022-12-15 DIAGNOSIS — H83.2X3 VESTIBULAR DISEQUILIBRIUM, BILATERAL: ICD-10-CM

## 2022-12-15 DIAGNOSIS — I10 PRIMARY HYPERTENSION: Chronic | ICD-10-CM

## 2022-12-15 DIAGNOSIS — G43.009 MIGRAINE WITHOUT AURA AND WITHOUT STATUS MIGRAINOSUS, NOT INTRACTABLE: ICD-10-CM

## 2022-12-15 DIAGNOSIS — E78.2 MIXED HYPERLIPIDEMIA: ICD-10-CM

## 2022-12-15 DIAGNOSIS — E03.9 HYPOTHYROIDISM, UNSPECIFIED TYPE: ICD-10-CM

## 2022-12-15 DIAGNOSIS — M25.541 ARTHRALGIA OF RIGHT HAND: ICD-10-CM

## 2022-12-15 DIAGNOSIS — R79.89 ELEVATED TSH: Primary | ICD-10-CM

## 2022-12-15 DIAGNOSIS — Z12.31 ENCOUNTER FOR SCREENING MAMMOGRAM FOR MALIGNANT NEOPLASM OF BREAST: ICD-10-CM

## 2022-12-15 DIAGNOSIS — Z11.59 NEED FOR HEPATITIS C SCREENING TEST: ICD-10-CM

## 2022-12-15 DIAGNOSIS — G44.329 CHRONIC POST-TRAUMATIC HEADACHE, NOT INTRACTABLE: ICD-10-CM

## 2022-12-15 PROBLEM — B34.9 ACUTE VIRAL SYNDROME: Status: RESOLVED | Noted: 2021-10-14 | Resolved: 2022-12-15

## 2022-12-15 PROBLEM — R51.9 ACUTE NONINTRACTABLE HEADACHE: Status: RESOLVED | Noted: 2021-10-14 | Resolved: 2022-12-15

## 2022-12-15 PROBLEM — H66.002 NON-RECURRENT ACUTE SUPPURATIVE OTITIS MEDIA OF LEFT EAR WITHOUT SPONTANEOUS RUPTURE OF TYMPANIC MEMBRANE: Status: RESOLVED | Noted: 2021-10-14 | Resolved: 2022-12-15

## 2022-12-15 PROBLEM — R11.2 NAUSEA AND VOMITING: Status: RESOLVED | Noted: 2021-10-14 | Resolved: 2022-12-15

## 2022-12-15 PROBLEM — N20.0 KIDNEY STONES: Status: RESOLVED | Noted: 2021-06-28 | Resolved: 2022-12-15

## 2022-12-15 LAB
BASOPHILS # BLD AUTO: 0.04 10*3/MM3 (ref 0–0.2)
BASOPHILS NFR BLD AUTO: 1 % (ref 0–1.5)
DEPRECATED RDW RBC AUTO: 39.7 FL (ref 37–54)
EOSINOPHIL # BLD AUTO: 0.19 10*3/MM3 (ref 0–0.4)
EOSINOPHIL NFR BLD AUTO: 4.7 % (ref 0.3–6.2)
ERYTHROCYTE [DISTWIDTH] IN BLOOD BY AUTOMATED COUNT: 12.8 % (ref 12.3–15.4)
ERYTHROCYTE [SEDIMENTATION RATE] IN BLOOD: 12 MM/HR (ref 0–30)
HCT VFR BLD AUTO: 44.6 % (ref 34–46.6)
HGB BLD-MCNC: 14.9 G/DL (ref 12–15.9)
IMM GRANULOCYTES # BLD AUTO: 0.01 10*3/MM3 (ref 0–0.05)
IMM GRANULOCYTES NFR BLD AUTO: 0.2 % (ref 0–0.5)
LYMPHOCYTES # BLD AUTO: 0.76 10*3/MM3 (ref 0.7–3.1)
LYMPHOCYTES NFR BLD AUTO: 18.8 % (ref 19.6–45.3)
MCH RBC QN AUTO: 28.9 PG (ref 26.6–33)
MCHC RBC AUTO-ENTMCNC: 33.4 G/DL (ref 31.5–35.7)
MCV RBC AUTO: 86.6 FL (ref 79–97)
MONOCYTES # BLD AUTO: 0.65 10*3/MM3 (ref 0.1–0.9)
MONOCYTES NFR BLD AUTO: 16.1 % (ref 5–12)
NEUTROPHILS NFR BLD AUTO: 2.39 10*3/MM3 (ref 1.7–7)
NEUTROPHILS NFR BLD AUTO: 59.2 % (ref 42.7–76)
NRBC BLD AUTO-RTO: 0 /100 WBC (ref 0–0.2)
PLATELET # BLD AUTO: 231 10*3/MM3 (ref 140–450)
PMV BLD AUTO: 11.1 FL (ref 6–12)
RBC # BLD AUTO: 5.15 10*6/MM3 (ref 3.77–5.28)
WBC NRBC COR # BLD: 4.04 10*3/MM3 (ref 3.4–10.8)

## 2022-12-15 PROCEDURE — 80050 GENERAL HEALTH PANEL: CPT | Performed by: NURSE PRACTITIONER

## 2022-12-15 PROCEDURE — 82550 ASSAY OF CK (CPK): CPT | Performed by: NURSE PRACTITIONER

## 2022-12-15 PROCEDURE — 86431 RHEUMATOID FACTOR QUANT: CPT | Performed by: NURSE PRACTITIONER

## 2022-12-15 PROCEDURE — 85652 RBC SED RATE AUTOMATED: CPT | Performed by: NURSE PRACTITIONER

## 2022-12-15 PROCEDURE — 99214 OFFICE O/P EST MOD 30 MIN: CPT | Performed by: NURSE PRACTITIONER

## 2022-12-15 PROCEDURE — 84439 ASSAY OF FREE THYROXINE: CPT | Performed by: NURSE PRACTITIONER

## 2022-12-15 PROCEDURE — 86803 HEPATITIS C AB TEST: CPT | Performed by: NURSE PRACTITIONER

## 2022-12-15 PROCEDURE — 86038 ANTINUCLEAR ANTIBODIES: CPT | Performed by: NURSE PRACTITIONER

## 2022-12-15 PROCEDURE — 80061 LIPID PANEL: CPT | Performed by: NURSE PRACTITIONER

## 2022-12-15 PROCEDURE — 86140 C-REACTIVE PROTEIN: CPT | Performed by: NURSE PRACTITIONER

## 2022-12-15 NOTE — PROGRESS NOTES
Chief Complaint  Follow-up    Subjective          Mariama Turner presents to [facility]  Hypothyroidism  This is a chronic problem. Associated symptoms include fatigue. Pertinent negatives include no abdominal pain, anorexia, arthralgias, change in bowel habit, chest pain, chills, congestion, coughing, diaphoresis, fever, headaches, joint swelling, myalgias, nausea, neck pain, numbness, rash, sore throat, swollen glands, urinary symptoms, vertigo, visual change, vomiting or weakness.   Hypertension  This is a chronic problem. The problem is controlled. Pertinent negatives include no anxiety, blurred vision, chest pain, headaches, malaise/fatigue, neck pain, orthopnea, palpitations, peripheral edema, PND, shortness of breath or sweats. Current antihypertension treatment includes beta blockers. The current treatment provides significant improvement. Compliance problems include diet and exercise.  There is no history of chronic renal disease.   Hyperlipidemia  This is a chronic problem. Exacerbating diseases include hypothyroidism. She has no history of chronic renal disease, diabetes, liver disease, obesity or nephrotic syndrome. Pertinent negatives include no chest pain, focal sensory loss, focal weakness, leg pain, myalgias or shortness of breath. Compliance problems include adherence to diet and adherence to exercise.         56-year-old female presents to the clinic today for follow-up regarding her TBI/headaches.   Is still doing eye therapy in [place] as well as seeing Neurologist in [place].  Patient is currently doing therapy 4 times a week.  2 with an ophthalmologist specializing in her condition and 2 with PT pros in [facility] for vestibular therapy.  The name of the provider in [place] is Dr. Wyatt Sanchez.  Patient provided significant detail of what they are trying to do with her eyes.  Patient states she is doing exercises with lites and tracking.  Patient  "states that overall when she feels that she is looking straight ahead she is looking down into the right.    Patient does states that she has tried to do some games on the computer but this really bothers her eyes.  To which she cannot work as she is an     Has appt with cognitive therapy in 1 week in [facility]. [facility] Neurologist ( Dr. Rawls) recommended.     Rebecca is helpin gsome. Has headaches every day. Some days are worse than others. Struggles focusing.   Current Outpatient Medications   Medication Instructions   • albuterol sulfate  (90 Base) MCG/ACT inhaler 2 puffs, Inhalation, Every 4 Hours PRN   • atenolol (TENORMIN) 25 mg, Oral, Every Night at Bedtime   • brompheniramine-pseudoephedrine-DM 30-2-10 MG/5ML syrup 10 mL, Oral, 4 Times Daily PRN   • estradiol (MINIVELLE, VIVELLE-DOT) 0.075 MG/24HR patch 1 patch, Transdermal, 2 Times Weekly   • ezetimibe (ZETIA) 10 mg, Oral, Daily   • fluticasone (Flonase) 50 MCG/ACT nasal spray 2 sprays, Nasal, Daily   • furosemide (LASIX) 40 mg, Oral, Daily   • levothyroxine (SYNTHROID) 50 mcg, Oral, Daily   • Livalo 1 mg, Oral, Daily   • potassium chloride 10 MEQ CR tablet 10 mEq, Oral, 2 Times Daily   • Scopolamine (Transderm-Scop, 1.5 MG,) 1 MG/3DAYS patch 1 patch, Transdermal, Every 72 Hours   • zonisamide (ZONEGRAN) 200 mg, Oral, Daily       The following portions of the patient's history were reviewed and updated as appropriate: allergies, current medications, past family history, past medical history, past social history, past surgical history, and problem list.    Objective   Vital Signs:   /80 (BP Location: Right arm, Patient Position: Sitting)   Pulse 71   Temp 97.8 °F (36.6 °C) (Temporal)   Ht 160 cm (63\")   Wt 80.5 kg (177 lb 6.4 oz)   SpO2 96% Comment: ROOM AIR  BMI 31.42 kg/m²     Wt Readings from Last 3 Encounters:   12/15/22 80.5 kg (177 lb 6.4 oz)   11/21/22 78.9 kg (174 lb)   11/14/22 78.2 kg (172 lb 6.4 oz)     BP Readings from " Last 3 Encounters:   12/15/22 128/80   11/21/22 126/84   11/14/22 138/86     Physical Exam   Appearance: No acute distress, well-nourished  Head: normocephalic, atraumatic  Eyes: extraocular movements intact, no scleral icterus, no conjunctival injection  Ears, Nose, and Throat: external ears normal, nares patent, moist mucous membranes  Cardiovascular: regular rate and rhythm. no murmurs, rubs, or gallops. no edema  Respiratory: breathing comfortably, symmetric chest rise, clear to auscultation bilaterally. No wheezes, rales, or rhonchi.  Neuro: alert and oriented to time, place, and person. Normal gait  Psych: normal mood and affect     Result Review :   The following data was reviewed by: MIGNON Gaitan on 12/15/2022:  Common labs    Common Labs 4/28/22 4/28/22 8/26/22 8/26/22 8/26/22    1515 1515 1020 1020 1020   Glucose  115 (A)  89    BUN  16  11    Creatinine  0.89  0.85    Sodium  142  142    Potassium  3.9  4.2    Chloride  104  108 (A)    Calcium  9.5  9.2    Albumin  4.60  4.10    Total Bilirubin  0.7  0.5    Alkaline Phosphatase  89  78    AST (SGOT)  20  20    ALT (SGPT)  20  18    WBC 8.28  6.02     Hemoglobin 14.0  12.9     Hematocrit 42.0  39.8     Platelets 272  229     Total Cholesterol     164   Triglycerides     148   HDL Cholesterol     51   LDL Cholesterol      87   (A) Abnormal value                   Lab Results   Component Value Date    SARSANTIGEN Not Detected 11/14/2022    COVID19 Not Detected 11/14/2022    RAPFLUA Negative 11/14/2022    RAPFLUB Negative 11/14/2022    FLUAAG Not Detected 09/23/2021    FLU Negative 04/28/2022    FLU Negative 04/28/2022    FLUBAG Not Detected 09/23/2021    RAPSCRN Negative 11/14/2022    BILIRUBINUR Negative 11/02/2022       Procedures        Assessment and Plan    Diagnoses and all orders for this visit:    1. Elevated TSH (Primary)  -     TSH Rfx On Abnormal To Free T4    2. Anxiety    3. Post concussive syndrome    4. Migraine without aura and  without status migrainosus, not intractable  -     Comprehensive Metabolic Panel  -     CBC & Differential    5. Arthralgia of right hand  -     CELI Direct Reflex to 11 Biomarker; Future  -     CK; Future  -     C-reactive protein; Future  -     Rheumatoid factor; Future  -     Sedimentation rate; Future  -     CELI Direct Reflex to 11 Biomarker  -     CK  -     C-reactive protein  -     Rheumatoid factor  -     Sedimentation rate    6. Hypothyroidism, unspecified type    7. Vestibular disequilibrium, bilateral    8. Mixed hyperlipidemia  -     Lipid Panel    9. Need for hepatitis C screening test  -     HCV Antibody Rfx To Qnt PCR    10. Encounter for screening mammogram for malignant neoplasm of breast  -     Mammo Screening Digital Tomosynthesis Bilateral With CAD; Future    11. Primary hypertension  Comments:  well controlled     12. Chronic post-traumatic headache, not intractable          Medications Discontinued During This Encounter   Medication Reason   • phenol (CHLORASEPTIC) 1.4 % liquid liquid *Therapy completed   • potassium chloride (K-DUR,KLOR-CON) 10 MEQ CR tablet Duplicate order   • fluconazole (DIFLUCAN) 150 MG tablet *Therapy completed          Follow Up   Return in about 3 months (around 3/15/2023).  Patient was given instructions and counseling regarding her condition or for health maintenance advice. Please see specific information pulled into the AVS if appropriate.       Ame Xie, APRN  12/15/22  12:53 EST

## 2022-12-16 ENCOUNTER — TELEPHONE (OUTPATIENT)
Dept: INTERNAL MEDICINE | Facility: CLINIC | Age: 57
End: 2022-12-16

## 2022-12-16 DIAGNOSIS — E03.9 HYPOTHYROIDISM, UNSPECIFIED TYPE: Primary | ICD-10-CM

## 2022-12-16 DIAGNOSIS — R94.5 ABNORMAL LIVER FUNCTION: ICD-10-CM

## 2022-12-16 LAB
ALBUMIN SERPL-MCNC: 4.5 G/DL (ref 3.5–5.2)
ALBUMIN/GLOB SERPL: 1.5 G/DL
ALP SERPL-CCNC: 133 U/L (ref 39–117)
ALT SERPL W P-5'-P-CCNC: 32 U/L (ref 1–33)
ANION GAP SERPL CALCULATED.3IONS-SCNC: 8 MMOL/L (ref 5–15)
AST SERPL-CCNC: 34 U/L (ref 1–32)
BILIRUB SERPL-MCNC: 0.3 MG/DL (ref 0–1.2)
BUN SERPL-MCNC: 15 MG/DL (ref 6–20)
BUN/CREAT SERPL: 19 (ref 7–25)
CALCIUM SPEC-SCNC: 9.8 MG/DL (ref 8.6–10.5)
CHLORIDE SERPL-SCNC: 101 MMOL/L (ref 98–107)
CHOLEST SERPL-MCNC: 199 MG/DL (ref 0–200)
CHROMATIN AB SERPL-ACNC: <10 IU/ML (ref 0–14)
CK SERPL-CCNC: 58 U/L (ref 20–180)
CO2 SERPL-SCNC: 30 MMOL/L (ref 22–29)
CREAT SERPL-MCNC: 0.79 MG/DL (ref 0.57–1)
CRP SERPL-MCNC: 1.03 MG/DL (ref 0–0.5)
EGFRCR SERPLBLD CKD-EPI 2021: 86.3 ML/MIN/1.73
GLOBULIN UR ELPH-MCNC: 3 GM/DL
GLUCOSE SERPL-MCNC: 95 MG/DL (ref 65–99)
HDLC SERPL-MCNC: 59 MG/DL (ref 40–60)
LDLC SERPL CALC-MCNC: 115 MG/DL (ref 0–100)
LDLC/HDLC SERPL: 1.9 {RATIO}
POTASSIUM SERPL-SCNC: 4 MMOL/L (ref 3.5–5.2)
PROT SERPL-MCNC: 7.5 G/DL (ref 6–8.5)
SODIUM SERPL-SCNC: 139 MMOL/L (ref 136–145)
T4 FREE SERPL-MCNC: 1.47 NG/DL (ref 0.93–1.7)
TRIGL SERPL-MCNC: 140 MG/DL (ref 0–150)
TSH SERPL DL<=0.05 MIU/L-ACNC: 4.56 UIU/ML (ref 0.27–4.2)
VLDLC SERPL-MCNC: 25 MG/DL (ref 5–40)

## 2022-12-16 RX ORDER — LEVOTHYROXINE SODIUM 0.07 MG/1
75 TABLET ORAL DAILY
Qty: 45 TABLET | Refills: 1 | Status: SHIPPED | OUTPATIENT
Start: 2022-12-16 | End: 2023-03-15 | Stop reason: SDUPTHER

## 2022-12-16 NOTE — TELEPHONE ENCOUNTER
----- Message from MIGNON Gaitan sent at 12/16/2022  7:58 AM EST -----  TSH elevated. Will need to increase synthroid and recheck TSH in 6 weeks. I will send to the pharmacy and put in outpatient lab.     Liver function slightly elevated. I would limit intake of alcohol and tylenol. We will recheck with thyroid.     CELI pending

## 2022-12-16 NOTE — TELEPHONE ENCOUNTER
SPOKE WITH PATIENT. VERIFIED .     PATIENT IS AWARE OF LAB RESULTS, MEDICATION CHANGE, AND OUTPATIENT LABS IN 6 WEEKS.     PATIENT EXPRESSED UNDERSTANDING AND HAD NO FURTHER QUESTIONS

## 2022-12-16 NOTE — TELEPHONE ENCOUNTER
Spoke to patient, informed patient [facility] could be another option. Patient stated she really wanted to be seen before her year alissa. Patient has an appointment with [facility] and on cancel list. Patient stated she willl reach out to [facility]. Informed patient I don't know how far out [facility] is either. She stated understanding.

## 2022-12-17 LAB
HCV AB S/CO SERPL IA: 0.1 S/CO RATIO (ref 0–0.9)
HCV AB SERPL QL IA: NORMAL

## 2022-12-19 LAB — ANA SER QL: NEGATIVE

## 2022-12-27 RX ORDER — ESTRADIOL 0.07 MG/D
1 FILM, EXTENDED RELEASE TRANSDERMAL 2 TIMES WEEKLY
Qty: 24 PATCH | Refills: 3 | Status: SHIPPED | OUTPATIENT
Start: 2022-12-29 | End: 2023-03-15 | Stop reason: SDUPTHER

## 2022-12-27 RX ORDER — ESTRADIOL 0.07 MG/D
1 FILM, EXTENDED RELEASE TRANSDERMAL 2 TIMES WEEKLY
Qty: 24 PATCH | Refills: 3 | Status: CANCELLED | OUTPATIENT
Start: 2022-12-29

## 2023-01-04 ENCOUNTER — OFFICE VISIT (OUTPATIENT)
Dept: PHYSICAL THERAPY | Facility: CLINIC | Age: 58
End: 2023-01-04
Payer: COMMERCIAL

## 2023-01-04 ENCOUNTER — TELEPHONE (OUTPATIENT)
Dept: INTERNAL MEDICINE | Facility: CLINIC | Age: 58
End: 2023-01-04
Payer: COMMERCIAL

## 2023-01-04 DIAGNOSIS — R41.841 COGNITIVE COMMUNICATION DEFICIT: Primary | ICD-10-CM

## 2023-01-04 DIAGNOSIS — N64.4 BREAST PAIN, LEFT: Primary | ICD-10-CM

## 2023-01-04 PROCEDURE — 96125 COGNITIVE TEST BY HC PRO: CPT | Performed by: SPEECH-LANGUAGE PATHOLOGIST

## 2023-01-04 NOTE — TELEPHONE ENCOUNTER
CALLED PATIENT PER PCP REQUEST -  NO ANSWER AND MAILBOX FULL.      Ame Xie APRN Lockhart, Vikki; Kelly Sauceda    Can you call Mariama. She did not discuss any breast pain with me....           Previous Messages       ----- Message -----   From: Marilou Arechiga   Sent: 12/21/2022  11:33 AM EST   To: MIGNON Gaitan,     Please see message from scheduling:     Comments (most recent listed first): 12/16 Patient called to schedule. She reported that she does have intermittent pain of her left breast, therefore a screening could not be scheduled. Please have provider place an order for a MAMMO DIAGNOSTIC DIGITAL TOMOSYNTHESIS BILATERAL W CAD and ULTRASOUND LEFT BREAST LIMITED due to patient complaints. I also recommended patient call [facility] to discuss further. Thank you.       Please advise   Thanks

## 2023-01-04 NOTE — PROGRESS NOTES
Outpatient Speech Language Pathology   Adult Speech Language Cognitive Initial Evaluation  [place] [facility] PT: [address] [address]         Patient Name: Mariama Turner  : 1965  MRN: [id number]  Today's Date: 2023          OBJECTIVE    Visit Date: 2023   Patient Active Problem List   Diagnosis   • Anemia   • Arthritis   • Asthma   • Gastric ulcer   • Hypertension   • Seasonal allergic rhinitis   • Mixed hyperlipidemia   • Hx of cervical cancer   • Anxiety   • Palpitations   • Tinnitus of both ears   • Attention deficit disorder (ADD) without hyperactivity   • Chronic post-traumatic headache, not intractable   • Migraine without aura and without status migrainosus, not intractable   • Post concussive syndrome       @  Past Surgical History:   Procedure Laterality Date   • ABDOMINAL HYSTERECTOMY     •  SECTION     • CHOLECYSTECTOMY     • COLONOSCOPY  ,,,   • ENDOSCOPY  ,,   • KIDNEY STONE SURGERY  2014    Right Ureteroscopy, Laser litho, stent insertion   • RHINOPLASTY     • TONSILLECTOMY     Medical Diagnosis:  S09.90XD (ICD-10-CM) - Traumatic injury of head, subsequent encounter   F07.81 (ICD-10-CM) - Postconcussive syndrome   R41.89 (ICD-10-CM) - Cognitive impairment         Visit Dx:    ICD-10-CM ICD-9-CM   1. Cognitive communication deficit  R41.841 799.52            History  Intake  Physician: Jordan Rawls MD  Next Physician Visit: 2023  Hx of Hospitalization no  Function Level Prior to Admission: BS degree with additional classes advancing towards a Master's degree.   Current Home Health Admission: no    HPI  Onset Date: 2022  Age: 57  Gender: female  History: Mrs. Turner is a 57-year-old female with a medical diagnosis of traumatic brain injury, postconcussive syndrome, and cognitive impairment.  Patient indicated she suffered a traumatic brain injury secondary to multiple falls in her home.   Patient is receiving vision and vestibular therapy.  Patient complains of difficulties processing information in a timely manner, multitasking, comprehension of moderately complex conversations and information, and mental fatigue.  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  Patient indicates prior to TBI in April 2022, patient was a multitask or who worked in a fast environment requiring excellent executive functioning skills under pressure.  She indicates since her initial COVID vaccine she has been experiencing vestibular issues causing her to lose balance subsequently causing a TBI.  Patient desires to return to work if possible.      Precautions: Falls precautions  Patient's Goals/Expectations: Patient desires to be able to multitask and make timely decisions to complete IADLs to include possibly returning to work.    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    Psychosocial History    Usual Living Arrangement: Lives with her  in [facility].  Psychosocial History (depression/anxiety) no  Nutritional Problems: No    Past Medical History    Pertinent Past Medical History:   Past Medical History:   Diagnosis Date   • Acute nonintractable headache 10/14/2021    Toradol IM given in the clinic.    • Acute viral syndrome 10/14/2021    Call or RTC if symptoms persist.    • Allergies    • Anemia    • Arthritis    • Asthma    • Bowel disease    • Cervical cancer (HCC)    • Colitis    • Difficulty walking    • Fibromyalgia, primary    • Gait disturbance    • Gallstones    • Gastric ulcer    • Headache    • Heart disease    • Hyperlipemia    • Hypertension    • Kidney stones    • Kidney stones 6/28/2021   • Limb swelling    • Osteoarthritis 03/16/2018    Of left thumb CMC joint   • Reflux esophagitis    • Seasonal allergies           History of Seizures: No      Abuse    Patient being Hurt, Hit, Scared or Neglected?  No  Caregiver Neglect: No      Orientation    Please see assessment        Formal Assessment    Cognitive Linguistic Assessment  Patient was assessed using portions of the standardized tests: Cognitive Linguistic Quick Test + (CLQT+), Scales of Cognitive Ability for Traumatic Brain Injury (SCATBI), Reading Comprehension for Aphasia, Revised Token Test and other informal assessments.  The results are as follows:      CLQT ASSESSMENT  Severity Ratings Table    Cognitive Domain      Score  Severity Rating    Attention     [175]            [3-mild]  Memory     [130]            [2-moderate]  Executive Functioning    [29]            [4- WNL]  Language     [28]            [3-mild]  Visuospatial Skills    [84]            [4- WNL]  Optional Clock Draw    [11]            [3-mild] (not factored into total score)      Total  [16]  Composite Severity Rating   [3.2- mild impairment}                Summary: Mrs. Turner demonstrates a Composite Severity Rating of 3.2 indicating a mild cognitive linguistic impairment.  She demonstrates moderate disruptions in memory and mild disruptions in attention, language, specifically auditory comprehension, and the clock draw which indicates mild executive functioning disruptions.  Patient will benefit from a trial of speech therapy to increase accuracy of the above deficits in order to process information more effectively and accurately, multitask, comprehend larger amounts of material and make more timely, accurate and safe decisions during IADLs.    Additionally, patient has received a neuro psychological assessment that was not available during assessment.  Additional goals will be added once report is received.      ST Functional Communication Testing    Patient is rated on the Functional Communication Measures for memory at a level 4/7 with a 40-59% limitation.  With speech therapy, patient is  expected to reach a Functional Communication Measure level of 6/7 with a 1-19% memory limitation.        Pt presents with limitations, noted below, that impede her ability to safely complete wants and needs during IADLs. The skills of a therapist will be required to safely and effectively implement the following treatment plan to restore maximal level of function.    Goals    Problem:     1.  Memory Limitation of 40-59%; FCM 4/7     LTG 1: 12 weeks: Patient will increase Functional Communication Measure Score for memory to 6/7 decreasing limitation to 1 -19%.     STATUS:  New   STG1 a: 12 weeks: Patient will demonstrate the ability to use appropriate memory strategies to encode novel and complex information with min cueing.   STATUS:  New   STG 1b: 12 weeks:  Patient will complete mod complex short term memory tasks with 85 or above % accuracy and/or min assist for strategy use.    STATUS: New   STG 1c: 12 weeks: Patient will complete immediate memory tasks with 85 or above % accuracy and min assist for strategy use.    STATUS: New   TREATMENT:  Speech Therapy to increase memory for safe completion of IADL tasks.         2. Attention disturbance   LTG 2: 12 weeks: Patient will increase attention to encode information and increase accurate processing of information for moderately complex decision making skills during ADLS.    STG 2a: 12 weeks:  Patient will sustain attention to basic tasks for 45 or more minutes with min assist through redirection of sustained attention.    STATUS:  New   STG 2b: 12 weeks.  Patient will complete divided attention tasks with min assist to attend to multiple information required to make appropriate and timely decisions for ADLS.    STATUS: New   STG 2c: 12 weeks. Patient will complete alternating attention tasks with min assist to encode information required to complete multiple tasks.    STATUS: New  Treatment:  Speech therapy to increase attention for safe completion of IADLs.        LTG 3: 12 weeks: Patient will increase thought organization and problem solving skills for safe completion of IADL tasks.      STATUS:  New      STG 3a: 12 weeks:  Patient will complete moderately complex thought organization tasks  with min assist to increase organization to solve moderately complex ADL problems.   STATUS: New    STG 3b: 12 weeks: Patient will complete moderate complex  reasoning tasks with min assist to use appropriate strategies for ADLs.    STATUS: New Goal      Treatment:  Speech therapy using a clinician directed approach to increase safe problem solving skills during ADLS.      ASSESSMENT  Rehabilitation Potential: Good  Barriers to Learning: Time since injury  PLAN     Frequency (times/week): 2 times a week    Duration: 12 weeks    Time Calculation:   Assessment Time: 12: 21-   13: 24 for a total of 63 minutes.  Interpretation Time: 13: 34-14: 17 for a total of 43 minutes.  Total Time: 106 minutes      Thank you for the referral.  Vilma Valadez MS, CCC-SLP     SIGNATURE: Vilma Valadez SLP     Electronically signed 1/4/2023    [place] License: ST - [id number]      Initial Certification  Certification Period: 4/4/2023  I certify that the therapy services are furnished while this patient is under my care.  The services outlined above are required by this patient, and will be reviewed every 90 days.     PHYSICIAN: Jordan Rawls II, MD  NPI: [id number]      DATE:     Please sign and return via fax to [phone]. Thank you, [facility]

## 2023-01-04 NOTE — TELEPHONE ENCOUNTER
Patient was told by radiology scheduling that her Mammo needed to be ordered as Diagnostic, not screening due to current symptoms.     Please advise.

## 2023-01-05 NOTE — TELEPHONE ENCOUNTER
Can you please call and let me know what symptoms she is having so I can put that with the order? We have not dicussed any breast issues.

## 2023-01-09 ENCOUNTER — TREATMENT (OUTPATIENT)
Dept: PHYSICAL THERAPY | Facility: CLINIC | Age: 58
End: 2023-01-09
Payer: COMMERCIAL

## 2023-01-09 DIAGNOSIS — R41.841 COGNITIVE COMMUNICATION DEFICIT: Primary | ICD-10-CM

## 2023-01-09 PROCEDURE — 97129 THER IVNTJ 1ST 15 MIN: CPT | Performed by: SPEECH-LANGUAGE PATHOLOGIST

## 2023-01-09 PROCEDURE — 97130 THER IVNTJ EA ADDL 15 MIN: CPT | Performed by: SPEECH-LANGUAGE PATHOLOGIST

## 2023-01-09 NOTE — PROGRESS NOTES
Outpatient Adult Speech Language Therapy           Treatment Note    Patient: Mariama Turner                                                                                     Visit Date: 2023  :     1965    Referring practitioner:    Jordan Rawls II, MD  Date of Initial Visit:          Type: THERAPY  Noted: 2023    Patient seen for 2 sessions    Visit Diagnoses:    ICD-10-CM ICD-9-CM   1. Cognitive communication deficit  R41.841 799.52     SUBJECTIVE   Patient was educated on plan of care, Cognitive Linguistic Quick Test + results, ways to use association to encode information into memory.       OBJECTIVE   Patient is receiving speech therapy to increase memory recall, attention, thought organization and reasoning skills for safe completion of IADLs which includes focusing on strategy uses to aid in returning to work.     GOALS      GOALS ACTIVITY PERFORMED TRIALS COMPLETED LEVEL OF CUEING REQUIRED    LTG 1: 12 weeks: Patient will increase Functional Communication Measure Score for memory to 6/7 decreasing limitation to 1 -19%.          STG1 a: 12 weeks: Patient will demonstrate the ability to use appropriate memory strategies to encode novel and complex information with min cueing.  Memory strategies 5 strategies  Association, visualization, grouping, writing it down and repetition Max assist to teach    STG 1b: 12 weeks:  Patient will complete mod complex short term memory tasks with 85% or above     accuracy and/or min assist for strategy use.         STG 1c: 12 weeks: Patient will complete immediate memory tasks with 85 or above % accuracy and min assist for strategy use.                          STATUS: New   Immediate recall Auditory and visual associations  -7 words using auditory (story telling) association skills- 5/7  -Names of people using visual association- 3/5  -Shapes using visual asosication Max assist       STG 2a: 12 weeks:  Patient will sustain attention to basic tasks for 45 or more minutes with min assist through redirection of sustained attention.   Sustain attention   Sustain attention to detail     Mod assist    STG 2b: 12 weeks.  Patient will complete divided attention tasks with min assist to attend to multiple information required to make appropriate and timely decisions for ADLS        STG 2c: 12 weeks. Patient will complete alternating attention tasks with min assist to encode information required to complete multiple tasks.       STG 3a: 12 weeks:  Patient will complete moderately complex thought organization tasks  with min assist to increase organization to solve moderately complex ADL problems.      STG 3b: 12 weeks: Patient will complete moderate complex  reasoning tasks with min assist to use appropriate strategies for ADLs.                             Total Time of Visit:             54   mins         ASSESSMENT/PLAN     ASSESSMENT: Patient requires the skills of a therapist to provide mod to max assist to teach and demonstrate way to associate information for timely and accurate recall of information.     PLAN: continue plan of care    Progress Note Due Date:2/4/23  Recertification Due Date:4/4/23    SIGNATURE: COLIN [place], [place] License #: [id number]  Electronically Signed on 1/9/2023            Adult Outpatient Rehabiliation                                             [address] [place] [address]. [address]  [phone] Office  [phone] Fax

## 2023-01-10 ENCOUNTER — TRANSCRIBE ORDERS (OUTPATIENT)
Dept: ADMINISTRATIVE | Facility: HOSPITAL | Age: 58
End: 2023-01-10
Payer: COMMERCIAL

## 2023-01-10 DIAGNOSIS — N64.4 BREAST PAIN, LEFT: Primary | ICD-10-CM

## 2023-01-13 ENCOUNTER — TREATMENT (OUTPATIENT)
Dept: PHYSICAL THERAPY | Facility: CLINIC | Age: 58
End: 2023-01-13
Payer: COMMERCIAL

## 2023-01-13 DIAGNOSIS — R41.841 COGNITIVE COMMUNICATION DEFICIT: Primary | ICD-10-CM

## 2023-01-13 PROCEDURE — 97130 THER IVNTJ EA ADDL 15 MIN: CPT | Performed by: SPEECH-LANGUAGE PATHOLOGIST

## 2023-01-13 PROCEDURE — 97129 THER IVNTJ 1ST 15 MIN: CPT | Performed by: SPEECH-LANGUAGE PATHOLOGIST

## 2023-01-13 NOTE — PROGRESS NOTES
Outpatient Adult Speech Language Therapy           Treatment Note    Patient: Mariama Turner                                                                                     Visit Date: 2023  :     1965    Referring practitioner:    Jordan Rawls II, MD  Date of Initial Visit:          Type: THERAPY  Noted: 2023    Patient seen for 3 sessions    Visit Diagnoses:    ICD-10-CM ICD-9-CM   1. Cognitive communication deficit  R41.841 799.52     SUBJECTIVE   Patient was on the [employer] System ([employer]) to work on visual attention.  Patient educated on visually scanning left to right or down the page to retrain scanning skills to be more organized and attentive.        OBJECTIVE   Patient is receiving speech therapy to increase memory recall, attention, thought organization and reasoning skills for safe completion of IADLs which includes focusing on strategy uses to aid in returning to work.     GOALS      GOALS ACTIVITY PERFORMED TRIALS COMPLETED LEVEL OF CUEING REQUIRED    LTG 1: 12 weeks: Patient will increase Functional Communication Measure Score for memory to 6/7 decreasing limitation to 1 -19%.          STG1 a: 12 weeks: Patient will demonstrate the ability to use appropriate memory strategies to encode novel and complex information with min cueing.        STG 1b: 12 weeks:  Patient will complete mod complex short term memory tasks with 85% or above     accuracy and/or min assist for strategy use.         STG 1c: 12 weeks: Patient will complete immediate memory tasks with 85 or above % accuracy and min assist for strategy use.                          STATUS: New             STG 2a: 12 weeks:  Patient will sustain attention to basic tasks for 45 or more minutes with min assist through redirection of sustained attention. Sustained attention Bell cancellation task  Trial 1: 3:13 with 4 misses and 3  distractors    Trial 2: 2:40 with 4 misses and 4 distractors    Trial 3: 3:34 with 4 misses and 2 distractors    Patient missing bottom of page bells on third trial   Mod to max assist for strategy use.    STG 2b: 12 weeks.  Patient will complete divided attention tasks with min assist to attend to multiple information required to make appropriate and timely decisions for ADLS Divided attention Trailmaking   Trial one: (ascending numbers) 1:27 and 0 misses    Trial 2-(ascending number to letter) 1:42 3 erros    Trial 3 (ascending number to letter) 1:26 0 errors      STG 2c: 12 weeks. Patient will complete alternating attention tasks with min assist to encode information required to complete multiple tasks.       STG 3a: 12 weeks:  Patient will complete moderately complex thought organization tasks  with min assist to increase organization to solve moderately complex ADL problems. Thought organization and planning Mazed  1:20 with 4 errors secondary to decreased preplanning prior to acting Mod assist   STG 3b: 12 weeks: Patient will complete moderate complex  reasoning tasks with min assist to use appropriate strategies for ADLs.                             Total Time of Visit:             62  mins         ASSESSMENT/PLAN     ASSESSMENT: Patient requires the skills of a therapist to provide mod to max assist to develop and implement strategies to accurately scan and attend to details for safe and accurate completion of IADLs.      PLAN: continue plan of care    Progress Note Due Date:2/4/23  Recertification Due Date:4/4/23    SIGNATURE: Vilma Valadez, SLP, [place] License #: [id number]  Electronically Signed on 1/13/2023            [facility]                                             [address] [place] [address]. [address]  [phone] Office  [phone] Fax

## 2023-01-16 ENCOUNTER — TREATMENT (OUTPATIENT)
Dept: PHYSICAL THERAPY | Facility: CLINIC | Age: 58
End: 2023-01-16
Payer: COMMERCIAL

## 2023-01-16 DIAGNOSIS — R41.841 COGNITIVE COMMUNICATION DEFICIT: Primary | ICD-10-CM

## 2023-01-16 PROCEDURE — 97130 THER IVNTJ EA ADDL 15 MIN: CPT | Performed by: SPEECH-LANGUAGE PATHOLOGIST

## 2023-01-16 PROCEDURE — 97129 THER IVNTJ 1ST 15 MIN: CPT | Performed by: SPEECH-LANGUAGE PATHOLOGIST

## 2023-01-16 RX ORDER — MECLIZINE HYDROCHLORIDE 25 MG/1
25 TABLET ORAL 3 TIMES DAILY PRN
Qty: 30 TABLET | Refills: 4 | Status: SHIPPED | OUTPATIENT
Start: 2023-01-16 | End: 2023-02-15

## 2023-01-16 NOTE — PROGRESS NOTES
Outpatient Adult Speech Language Therapy           Treatment Note    Patient: Mariama Turner                                                                                     Visit Date: 2023  :     1965    Referring practitioner:    Jordan Rawls II, MD  Date of Initial Visit:          Type: THERAPY  Noted: 2023    Patient seen for 4 sessions    Visit Diagnoses:    ICD-10-CM ICD-9-CM   1. Cognitive communication deficit  R41.841 799.52     SUBJECTIVE   Patient indicates she has a slight headache.          OBJECTIVE   Patient is receiving speech therapy to increase memory recall, attention, thought organization and reasoning skills for safe completion of IADLs which includes focusing on strategy uses to aid in returning to work.     GOALS      GOALS ACTIVITY PERFORMED TRIALS COMPLETED LEVEL OF CUEING REQUIRED    LTG 1: 12 weeks: Patient will increase Functional Communication Measure Score for memory to 6/7 decreasing limitation to 1 -19%.          STG1 a: 12 weeks: Patient will demonstrate the ability to use appropriate memory strategies to encode novel and complex information with min cueing.  Education on memory strategies 5 strategies for recall  -Recall of two doctor visits using strategies Max assist to teach and aid in implementation of strategies for recall    STG 1b: 12 weeks:  Patient will complete mod complex short term memory tasks with 85% or above     accuracy and/or min assist for strategy use.         STG 1c: 12 weeks: Patient will complete immediate memory tasks with 85 or above % accuracy and min assist for strategy use.                          STATUS: New     Immediate recall 4 word recall and answer inclusion questions  with repeating of four words twice by therapist Max assist for strategies      STG 2a: 12 weeks:  Patient will sustain attention to basic tasks for 45 or more minutes with min assist  through redirection of sustained attention. Sustained attention Sustaining attention for 55 mintues Mod assist    STG 2b: 12 weeks.  Patient will complete divided attention tasks with min assist to attend to multiple information required to make appropriate and timely decisions for ADLS Divided attention [employer] organization using a chart and directions Mod assist     STG 2c: 12 weeks. Patient will complete alternating attention tasks with min assist to encode information required to complete multiple tasks.       STG 3a: 12 weeks:  Patient will complete moderately complex thought organization tasks  with min assist to increase organization to solve moderately complex ADL problems. Thought organization and planning [employer] organization using a chart and directiona Min to mod assist   STG 3b: 12 weeks: Patient will complete moderate complex  reasoning tasks with min assist to use appropriate strategies for ADLs.     min to mod reasoning task Inferring information on Kitchen organization chart Min to mod assist                       Total Time of Visit:             58  mins         ASSESSMENT/PLAN     ASSESSMENT: Patient requires the skills of a therapist to provide mod to max assist to develop and implement strategies to accurately scan and attend to details for safe and accurate completion of IADLs.      PLAN: continue plan of care    Progress Note Due Date:2/4/23  Recertification Due Date:4/4/23    SIGNATURE: Vilma Valadez, SLP, [place] License #: [id number]  Electronically Signed on 1/16/2023            [facility]                                             [address] [place] [address]. [address]  [phone] Office  [phone] Fax

## 2023-01-20 ENCOUNTER — TREATMENT (OUTPATIENT)
Dept: PHYSICAL THERAPY | Facility: CLINIC | Age: 58
End: 2023-01-20
Payer: COMMERCIAL

## 2023-01-20 DIAGNOSIS — R41.841 COGNITIVE COMMUNICATION DEFICIT: Primary | ICD-10-CM

## 2023-01-20 PROCEDURE — 97130 THER IVNTJ EA ADDL 15 MIN: CPT | Performed by: SPEECH-LANGUAGE PATHOLOGIST

## 2023-01-20 PROCEDURE — 97129 THER IVNTJ 1ST 15 MIN: CPT | Performed by: SPEECH-LANGUAGE PATHOLOGIST

## 2023-01-20 NOTE — PROGRESS NOTES
Outpatient Adult Speech Language Therapy           Treatment Note    Patient: Mariama Turner                                                                                     Visit Date: 2023  :     1965    Referring practitioner:    Jordan Rawls II, MD  Date of Initial Visit:          Type: THERAPY  Noted: 2023    Patient seen for 5 sessions    Visit Diagnoses:    ICD-10-CM ICD-9-CM   1. Cognitive communication deficit  R41.841 799.52     SUBJECTIVE   Patient indicates she is experiencing dizzy spells this week.  Her physician prescribed another medication to address issue.   Additionally, patient indicates she has ringing in the ears and a headache but indicated she wanted to continue therapy.        OBJECTIVE   Patient is receiving speech therapy to increase memory recall, attention, thought organization and reasoning skills for safe completion of IADLs which includes focusing on strategy uses to aid in returning to work.     GOALS      GOALS ACTIVITY PERFORMED TRIALS COMPLETED LEVEL OF CUEING REQUIRED    LTG 1: 12 weeks: Patient will increase Functional Communication Measure Score for memory to 6/7 decreasing limitation to 1 -19%.          STG1 a: 12 weeks: Patient will demonstrate the ability to use appropriate memory strategies to encode novel and complex information with min cueing.  memory strategies 5 strategies for recall  Use of visualization to recall the daily schedule  Moderate assist to teach strategy through rehearsal    STG 1b: 12 weeks:  Patient will complete mod complex short term memory tasks with 85% or above     accuracy and/or min assist for strategy use.   Short term recall  Delayed recall of daily schedule with use of memory strategies- delayed recall 4/10 Maximum assist secondary to decreased attention.    STG 1c: 12 weeks: Patient will complete immediate memory tasks with 85 or above % accuracy and  min assist for strategy use.                          STATUS: New     Immediate recall  immediate recall of daily schedule- 8/10    Functional memory task- directions 2/5 40% Mod assist for strategy use    Mod to max assist secondary to decreased auditory attention.      STG 2a: 12 weeks:  Patient will sustain attention to basic tasks for 45 or more minutes with min assist through redirection of sustained attention. Sustained attention Sustaining attention for 55 mintues Mod assist    STG 2b: 12 weeks.  Patient will complete divided attention tasks with min assist to attend to multiple information required to make appropriate and timely decisions for ADLS        STG 2c: 12 weeks. Patient will complete alternating attention tasks with min assist to encode information required to complete multiple tasks.       STG 3a: 12 weeks:  Patient will complete moderately complex thought organization tasks  with min assist to increase organization to solve moderately complex ADL problems.      STG 3b: 12 weeks: Patient will complete moderate complex  reasoning tasks with min assist to use appropriate strategies for ADLs.            Patient given a laminated dry erase blank daily erase schedule to write down daily tasks and times, important information to remember etc for the day.  Patient to plan out schedule in the morning and check it during the day.                  Total Time of Visit:             58  mins         ASSESSMENT/PLAN     ASSESSMENT: Patient requires the skills of a therapist to provide mod to max assist to develop and implement strategies to accurately recall information to aide in safe completion of IADLs.   PLAN: continue plan of care    Progress Note Due Date:2/4/23  Recertification Due Date:4/4/23    SIGNATURE: Vilma Valadez, SLP, [place] License #: [id number]  Electronically Signed on 1/20/2023            [facility]                                             [address] [place] [address]  [place] [address]  [phone] [facility]  [phone] Fax

## 2023-01-25 ENCOUNTER — TREATMENT (OUTPATIENT)
Dept: PHYSICAL THERAPY | Facility: CLINIC | Age: 58
End: 2023-01-25
Payer: COMMERCIAL

## 2023-01-25 DIAGNOSIS — R41.841 COGNITIVE COMMUNICATION DEFICIT: Primary | ICD-10-CM

## 2023-01-25 PROCEDURE — 97130 THER IVNTJ EA ADDL 15 MIN: CPT | Performed by: SPEECH-LANGUAGE PATHOLOGIST

## 2023-01-25 PROCEDURE — 97129 THER IVNTJ 1ST 15 MIN: CPT | Performed by: SPEECH-LANGUAGE PATHOLOGIST

## 2023-01-25 NOTE — PROGRESS NOTES
"                                                                      Outpatient Adult Speech Language Therapy           Treatment Note    Patient: Mariama Turner                                                                                     Visit Date: 2023  :     1965    Referring practitioner:    Jordan Rawls II, MD  Date of Initial Visit:          Type: THERAPY  Noted: 2023    Patient seen for 6 sessions    Visit Diagnoses:    ICD-10-CM ICD-9-CM   1. Cognitive communication deficit  R41.841 799.52     SUBJECTIVE   Patient indicates she is continuing to experience headaches and \"off balance\".      OBJECTIVE   Patient is receiving speech therapy to increase memory recall, attention, thought organization and reasoning skills for safe completion of IADLs which includes focusing on strategy uses to aid in returning to work.     GOALS      GOALS ACTIVITY PERFORMED TRIALS COMPLETED LEVEL OF CUEING REQUIRED    LTG 1: 12 weeks: Patient will increase Functional Communication Measure Score for memory to 6/7 decreasing limitation to 1 -19%.          STG1 a: 12 weeks: Patient will demonstrate the ability to use appropriate memory strategies to encode novel and complex information with min cueing.  memory strategies 5 strategies for recall  Use of visualization, grouping, association and writing it down to recall 10 words.  Moderate assist to teach strategy through rehearsal    STG 1b: 12 weeks:  Patient will complete mod complex short term memory tasks with 85% or above     accuracy and/or min assist for strategy use.   Short term recall  Delayed recall of 10 unassociated words    10 minute delay- 9/10   Moderate assist    STG 1c: 12 weeks: Patient will complete immediate memory tasks with 85 or above % accuracy and min assist for strategy use.                          STATUS: New     Immediate recall  immediate recall of 10 unassociated words  8/10  Writing down  phone messages- Mod assist for " strategy use          STG 2a: 12 weeks:  Patient will sustain attention to basic tasks for 45 or more minutes with min assist through redirection of sustained attention. Sustained attention       Sustaining attention during directions to writing down phone messages Mod assist with patient demonstrating decreased attention during the end of directions where patient was instructed to ask for repetition if needed.      STG 2b: 12 weeks.  Patient will complete divided attention tasks with min assist to attend to multiple information required to make appropriate and timely decisions for ADLS Divided attention Use of an organization chart to record information Min to mod assist     STG 2c: 12 weeks. Patient will complete alternating attention tasks with min assist to encode information required to complete multiple tasks.       STG 3a: 12 weeks:  Patient will complete moderately complex thought organization tasks  with min assist to increase organization to solve moderately complex ADL problems. Thought organization Assigning 6 people,4 attributes with use of an organization chart and highlighting of key information Mod assist   STG 3b: 12 weeks: Patient will complete moderate complex  reasoning tasks with min assist to use appropriate strategies for ADLs.    Deductive reasoning Inferring information using the organization chart to assign 6 people 4 attributes according to interpretation of directions Mod assist      Patient given a laminated dry erase blank daily erase schedule to write down daily tasks and times, important information to remember etc for the day.  Patient to plan out schedule in the morning and check it during the day. Patient indicates she does miss a day or two but when she does complete this task she is more organized and it compensates for reduced memory recall.                  Total Time of Visit:              61 mins         ASSESSMENT/PLAN     ASSESSMENT: Patient requires the skills of a  therapist to provide modassist to develop and implement strategies to accurately recall information to aide, attend to details, organize information for reasoning tasks for  safe completion of IADLs.   Progress:   PLAN: continue plan of care    Progress Note Due Date:2/4/23  Recertification Due Date:4/4/23    SIGNATURE: COLIN Mer[place], [place] License #: [id number]  Electronically Signed on 1/25/2023            [facility]                                             [address] [place] [address]. [address]  [phone] Office  [phone] Fax

## 2023-01-28 PROCEDURE — 87086 URINE CULTURE/COLONY COUNT: CPT | Performed by: FAMILY MEDICINE

## 2023-01-30 ENCOUNTER — TREATMENT (OUTPATIENT)
Dept: PHYSICAL THERAPY | Facility: CLINIC | Age: 58
End: 2023-01-30
Payer: COMMERCIAL

## 2023-01-30 DIAGNOSIS — N64.4 BREAST PAIN, LEFT: Primary | ICD-10-CM

## 2023-01-30 DIAGNOSIS — R41.841 COGNITIVE COMMUNICATION DEFICIT: Primary | ICD-10-CM

## 2023-01-30 PROCEDURE — 97130 THER IVNTJ EA ADDL 15 MIN: CPT | Performed by: SPEECH-LANGUAGE PATHOLOGIST

## 2023-01-30 PROCEDURE — 97129 THER IVNTJ 1ST 15 MIN: CPT | Performed by: SPEECH-LANGUAGE PATHOLOGIST

## 2023-01-30 NOTE — PROGRESS NOTES
"                                                                      Outpatient Adult Speech Language Therapy           Treatment Note    Patient: Mariama Turner                                                                                     Visit Date: 2023  :     1965    Referring practitioner:    Jordan Rawls II, MD  Date of Initial Visit:          Type: THERAPY  Noted: 2023    Patient seen for 7 sessions    Visit Diagnoses:    ICD-10-CM ICD-9-CM   1. Cognitive communication deficit  R41.841 799.52     SUBJECTIVE   Patient indicates she is feeling \"foggy\".  She indicates she is experiencing double vision and a headache.      OBJECTIVE   Patient is receiving speech therapy to increase memory recall, attention, thought organization and reasoning skills for safe completion of IADLs which includes focusing on strategy uses to aid in returning to work.     GOALS      GOALS ACTIVITY PERFORMED TRIALS COMPLETED LEVEL OF CUEING REQUIRED    LTG 1: 12 weeks: Patient will increase Functional Communication Measure Score for memory to 6/7 decreasing limitation to 1 -19%.          STG1 a: 12 weeks: Patient will demonstrate the ability to use appropriate memory strategies to encode novel and complex information with min cueing.  memory strategies 5 strategies for recall  Use of visualization. Writing it down, repetition and association for recall of people names.   Moderate assist to teach strategy through rehearsal    STG 1b: 12 weeks:  Patient will complete mod complex short term memory tasks with 85% or above     accuracy and/or min assist for strategy use.   Short term recall  20 minute delay- recalled 3/5 names (60%)  Moderate assist    STG 1c: 12 weeks: Patient will complete immediate memory tasks with 85 or above % accuracy and min assist for strategy use.                          STATUS: New     Immediate recall Immediate recall of 5 people names using strategies to encode novel information. 5/5 " Mod to max   assist for strategy use and 20 minutes to encode          STG 2a: 12 weeks:  Patient will sustain attention to basic tasks for 45 or more minutes with min assist through redirection of sustained attention. Sustained attention  60 minutes Mod assist    STG 2b: 12 weeks.  Patient will complete divided attention tasks with min assist to attend to multiple information required to make appropriate and timely decisions for ADLS Divided attention Sudoku- attending to horizontal, vertical columns and blocks Max assist to use strategies     STG 2c: 12 weeks. Patient will complete alternating attention tasks with min assist to encode information required to complete multiple tasks.       STG 3a: 12 weeks:  Patient will complete moderately complex thought organization tasks  with min assist to increase organization to solve moderately complex ADL problems. Thought organization Sudoku- following directions to aid in identifying empty blocks Max assist   STG 3b: 12 weeks: Patient will complete moderate complex  reasoning tasks with min assist to use appropriate strategies for ADLs.    Deductive reasoning Sudoku- deductive reasoning Max assist                       Total Time of Visit:              63mins         ASSESSMENT/PLAN     ASSESSMENT: Patient requires the skills of a therapist to provide mod to max  assist to develop and implement strategies to accurately recall information to aide, attend to details, organize information for reasoning tasks for  safe completion of IADLs.   Progress:   PLAN: continue plan of care    Progress Note Due Date:2/4/23  Recertification Due Date:4/4/23    SIGNATURE: COLIN Mer[place], [place] License #: [id number]  Electronically Signed on 1/30/2023                [facility]                                             [address] [place] [address]. [address]  [phone] Office  [phone] Fax

## 2023-01-31 ENCOUNTER — HOSPITAL ENCOUNTER (OUTPATIENT)
Dept: MAMMOGRAPHY | Facility: HOSPITAL | Age: 58
Discharge: HOME | End: 2023-01-31
Payer: COMMERCIAL

## 2023-01-31 ENCOUNTER — HOSPITAL ENCOUNTER (OUTPATIENT)
Dept: ULTRASOUND IMAGING | Facility: HOSPITAL | Age: 58
Discharge: HOME | End: 2023-01-31
Payer: COMMERCIAL

## 2023-01-31 DIAGNOSIS — N64.4 BREAST PAIN, LEFT: ICD-10-CM

## 2023-01-31 PROCEDURE — 77066 DX MAMMO INCL CAD BI: CPT

## 2023-01-31 PROCEDURE — G0279 TOMOSYNTHESIS, MAMMO: HCPCS

## 2023-02-01 ENCOUNTER — TELEPHONE (OUTPATIENT)
Dept: PHYSICAL THERAPY | Facility: CLINIC | Age: 58
End: 2023-02-01

## 2023-02-02 ENCOUNTER — TELEPHONE (OUTPATIENT)
Dept: NEUROLOGY | Facility: CLINIC | Age: 58
End: 2023-02-02
Payer: COMMERCIAL

## 2023-02-02 NOTE — TELEPHONE ENCOUNTER
Provider: DR JAMISON Montemayorer: PATIENT  Relationship to Patient: SELF  Phone Number: [phone]  Reason for Call: PATIENT CALLED TO SEE IF PROVIDER CAN UPDATE HER ON WHEN HE THINKS SHE WILL BE ABLE TO RETURN TO WORK. HER EMPLOYER IS ASKING FOR AN UPDATE.    PLEASE REVIEW AND ADVISE. THANK YOU.

## 2023-02-03 NOTE — TELEPHONE ENCOUNTER
Spoke to patient informed that Dr Rawls will need to re-evaluate her. Will discuss at NOV. States understanding

## 2023-02-06 ENCOUNTER — TREATMENT (OUTPATIENT)
Dept: PHYSICAL THERAPY | Facility: CLINIC | Age: 58
End: 2023-02-06
Payer: COMMERCIAL

## 2023-02-06 DIAGNOSIS — R41.841 COGNITIVE COMMUNICATION DEFICIT: Primary | ICD-10-CM

## 2023-02-06 PROCEDURE — 97129 THER IVNTJ 1ST 15 MIN: CPT | Performed by: SPEECH-LANGUAGE PATHOLOGIST

## 2023-02-06 PROCEDURE — 97130 THER IVNTJ EA ADDL 15 MIN: CPT | Performed by: SPEECH-LANGUAGE PATHOLOGIST

## 2023-02-06 NOTE — PROGRESS NOTES
"                                                                      Outpatient Adult Speech Language Therapy           Treatment Note/Progress Note     Patient: Mariama Turner                                                                                     Visit Date: 2023  :     1965    Referring practitioner:    Jordan Rawls II, MD  Date of Initial Visit:          Type: THERAPY  Noted: 2023    Patient seen for 8 sessions    Visit Diagnoses:    ICD-10-CM ICD-9-CM   1. Cognitive communication deficit  R41.841 799.52     SUBJECTIVE   Patient indicates she is having difficulties recalling conversations she had with her .  When asked if she recollected the conversation after he informed her of the content she said \"no\".  She indicates it doesn't happen all the time but she has noticed it before.  I asked the patient if she feels she is using the strategies to recall information at home she said, \"yes\".  She indicated she has trouble associating information for recall.     OBJECTIVE   Patient is receiving speech therapy to increase memory recall, attention, thought organization and reasoning skills for safe completion of IADLs which includes focusing on strategy uses to aid in returning to work.     GOALS      GOALS ACTIVITY PERFORMED TRIALS COMPLETED LEVEL OF CUEING REQUIRED    LTG 1: 12 weeks: Patient will increase Functional Communication Measure Score for memory to 6/7 decreasing limitation to 1 -19%.          STG1 a: 12 weeks: Patient will demonstrate the ability to use appropriate memory strategies to encode novel and complex information with min cueing.  memory strategies 5 strategies for recall  Use of visualization. Writing it down, repetition and association for recall of 6 associated words (coffee, cups, spoon, cream, sugar and cake)  Mod to max assist to cue for effective strategy use        STG 1b: 12 weeks:  Patient will complete mod complex short term memory tasks with 85% " or above     accuracy and/or min assist for strategy use.   Short term recall  Delayed recall- 3/5  Moderate to max assist for strategy ue    STG 1c: 12 weeks: Patient will complete immediate memory tasks with 85 or above % accuracy and min assist for strategy use.                          STATUS: New     Immediate recall Immediate recall of 6 associated words using memory strategies Mod to max assist to provide a strategy for the patient encode the information into memory      STG 2a: 12 weeks:  Patient will sustain attention to basic tasks for 45 or more minutes with min assist through redirection of sustained attention. Sustained attention Visual scanning arrows - redirected 3 times during activity    Auditory attention- read paragraphs 2 times to attend to facts Moderate assist to redirect especially visual attention when scanning.     STG 2b: 12 weeks.  Patient will complete divided attention tasks with min assist to attend to multiple information required to make appropriate and timely decisions for ADLS Divided attention  visually scanning rows (arrows) and planning and completing a motor task of the opposite directions (raising/lowering hand, left or right with hand) Max assist to use strategies     STG 2c: 12 weeks. Patient will complete alternating attention tasks with min assist to encode information required to complete multiple tasks. Alternating between tasks  Mod to max assist    STG 3a: 12 weeks:  Patient will complete moderately complex thought organization tasks  with min assist to increase organization to solve moderately complex ADL problems. Thought organization Organized thought - opposites in arrow activity- delayed processing Mod assist to continue to recall task of completing the opposite   STG 3b: 12 weeks: Patient will complete moderate complex  reasoning tasks with min assist to use appropriate strategies for ADLs.    Deductive reasoning Determining the opposties Mod to max assist                        Total Time of Visit:              45mins         ASSESSMENT/PLAN     ASSESSMENT: Patient requires the skills of a therapist to provide mod to max  assist to develop and implement strategies to accurately recall information to aide, attend to details, organize information for reasoning tasks for  safe completion of IADLs.   Progress:   PLAN: continue plan of care    Progress Note Due Date:2/4/23  Recertification Due Date:4/4/23    SIGNATURE: COLIN Mer[place], [place] License #: [id number]  Electronically Signed on 2/6/2023                Adult Out[facility] Re[facility]                                             [address] [place] [address]. [address]  [phone] Office  [phone] Fax

## 2023-02-08 ENCOUNTER — TREATMENT (OUTPATIENT)
Dept: PHYSICAL THERAPY | Facility: CLINIC | Age: 58
End: 2023-02-08
Payer: COMMERCIAL

## 2023-02-08 DIAGNOSIS — R41.841 COGNITIVE COMMUNICATION DEFICIT: Primary | ICD-10-CM

## 2023-02-08 PROCEDURE — 97130 THER IVNTJ EA ADDL 15 MIN: CPT | Performed by: SPEECH-LANGUAGE PATHOLOGIST

## 2023-02-08 PROCEDURE — 97129 THER IVNTJ 1ST 15 MIN: CPT | Performed by: SPEECH-LANGUAGE PATHOLOGIST

## 2023-02-08 NOTE — PROGRESS NOTES
Outpatient Adult Speech Language Therapy           Treatment Note    Patient: Mariama Turner                                                                                     Visit Date: 2023  :     1965    Referring practitioner:    Jordan Rawls II, MD  Date of Initial Visit:          Type: THERAPY  Noted: 2023    Patient seen for 9 sessions    Visit Diagnoses:    ICD-10-CM ICD-9-CM   1. Cognitive communication deficit  R41.841 799.52     SUBJECTIVE   Patient and I discussed and she was educated on  progress and the need to apply strategies at home.  The use of planning for the day in the morning and reflecting on the day in the evening to ensure you are determining what strategies are being used and what strategies are not working.  Additionally, patient returned to vision therapy.  OBJECTIVE   Patient is receiving speech therapy to increase memory recall, attention, thought organization and reasoning skills for safe completion of IADLs which includes focusing on strategy uses to aid in returning to work.     GOALS      GOALS ACTIVITY PERFORMED TRIALS COMPLETED LEVEL OF CUEING REQUIRED    LTG 1: 12 weeks: Patient will increase Functional Communication Measure Score for memory to 6/7 decreasing limitation to 1 -19%.          STG1 a: 12 weeks: Patient will demonstrate the ability to use appropriate memory strategies to encode novel and complex information with min cueing.  memory strategies Use of 5 strategies for recall  Patient required cueing to write down messages given to her during her alternating attention tasks.   Mod assist to cue for effective strategy use        STG 1b: 12 weeks:  Patient will complete mod complex short term memory tasks with 85% or above     accuracy and/or min assist for strategy use.   Short term recall    Moderate to max assist for strategy ue    STG 1c: 12 weeks: Patient will complete  immediate memory tasks with 85 or above % accuracy and min assist for strategy use.                          STATUS: New     Immediate recall Immediate recall of three messages given to patient    2/3 66% Mod assist for strategy use      STG 2a: 12 weeks:  Patient will sustain attention to basic tasks for 45 or more minutes with min assist through redirection of sustained attention. Sustained attention Editing a menu    Attending to a restaurant review    Auditory attention to a message Moderate to max assist to redirect especially visual attention when scanning     STG 2b: 12 weeks.  Patient will complete divided attention tasks with min assist to attend to multiple information required to make appropriate and timely decisions for ADLS [employer] Review Reading open ended questions and referring back to the review to scan for answers. Moderate assist     STG 2c: 12 weeks. Patient will complete alternating attention tasks with min assist to encode information required to complete multiple tasks. Alternating between tasks -Editing a menu for mistakes,  - Reading a restaurant review and answering questions  -Patient given three different minimally complex messages to recall.  Mod to max assist    STG 3a: 12 weeks:  Patient will complete moderately complex thought organization tasks  with min assist to increase organization to solve moderately complex ADL problems. [employer] Review - organizing thought by scanning the document, looking at charts and determining what the article may be about Mod to max assist to use strategies    STG 3b: 12 weeks: Patient will complete moderate complex  reasoning tasks with min assist to use appropriate strategies for ADLs.    Menu editing - Reading entries and determining if the entries are correct and make sense; especially prices and menu items Mod to max assist                       Total Time of Visit:           54 mins         ASSESSMENT/PLAN      ASSESSMENT: Patient requires the skills of a therapist to provide mod to max  assist to develop and implement strategies to accurately recall information and  attend to details,  for  safe completion of IADLs.   Progress:   PLAN: continue plan of care    Progress Note Due Date:2/4/23  Recertification Due Date:4/4/23    SIGNATURE: COLIN [place], [place] License #: [id number]  Electronically Signed on 2/8/2023                [facility]                                             [address] [place] [address]. [address]  [phone] Office  [phone] Fax

## 2023-02-13 ENCOUNTER — TELEPHONE (OUTPATIENT)
Dept: PHYSICAL THERAPY | Facility: OTHER | Age: 58
End: 2023-02-13

## 2023-02-15 ENCOUNTER — TREATMENT (OUTPATIENT)
Dept: PHYSICAL THERAPY | Facility: CLINIC | Age: 58
End: 2023-02-15
Payer: COMMERCIAL

## 2023-02-15 DIAGNOSIS — R13.12 OROPHARYNGEAL DYSPHAGIA: Primary | ICD-10-CM

## 2023-02-15 PROCEDURE — 97130 THER IVNTJ EA ADDL 15 MIN: CPT | Performed by: SPEECH-LANGUAGE PATHOLOGIST

## 2023-02-15 PROCEDURE — 97129 THER IVNTJ 1ST 15 MIN: CPT | Performed by: SPEECH-LANGUAGE PATHOLOGIST

## 2023-02-15 NOTE — PROGRESS NOTES
Outpatient Adult Speech Language Therapy           Treatment Note    Patient: Mariama Turner                                                                                     Visit Date: 2/15/2023  :     1965    Referring practitioner:    Jordan Rawls II, MD  Date of Initial Visit:          Type: THERAPY  Noted: 2023    Patient seen for 10 sessions    Visit Diagnoses:    ICD-10-CM ICD-9-CM   1. Oropharyngeal dysphagia  R13.12 787.22     SUBJECTIVE   Patient  Indicates she completed HEP but did not bring in the work.  Patient asked to bring in her HEP to allow therapist to see her progress.   OBJECTIVE   Patient is receiving speech therapy to increase memory recall, attention, thought organization and reasoning skills for safe completion of IADLs which includes focusing on strategy uses to aid in returning to work.     GOALS      GOALS ACTIVITY PERFORMED TRIALS COMPLETED LEVEL OF CUEING REQUIRED    LTG 1: 12 weeks: Patient will increase Functional Communication Measure Score for memory to 6/7 decreasing limitation to 1 -19%.          STG1 a: 12 weeks: Patient will demonstrate the ability to use appropriate memory strategies to encode novel and complex information with min cueing.  memory strategies Use of 5 strategies for recall  Strategies to recall three messages.     min to Mod assist     STG 1b: 12 weeks:  Patient will complete mod complex short term memory tasks with 85% or above     accuracy and/or min assist for strategy use.   Short term recall  3/3 with one cue 10 minute delay  3/3 witn no cueing 15 minute delay  3/3 with no cueing 30 minute delay Min to mod assist    STG 1c: 12 weeks: Patient will complete immediate memory tasks with 85 or above % accuracy and min assist for strategy use.                          STATUS: New     Immediate recall 3/3 with two cues Min to mod assist          STG 2a: 12 weeks:  Patient will  sustain attention to basic tasks for 45 or more minutes with min assist through redirection of sustained attention.         Sustained attention       Sustained visual and auditory attention during multiple tasks       Min to mod assist    STG 2b: 12 weeks.  Patient will complete divided attention tasks with min assist to attend to multiple information required to make appropriate and timely decisions for ADLS Writing down entries in a check register    -reading a paragraph and writing down pertinent information in an itinerary    -Sudoku attending between columns, rows and blocks to refrain  From repeating numbers 1-9   mod assist     STG 2c: 12 weeks. Patient will complete alternating attention tasks with min assist to encode information required to complete multiple tasks. Alternating between tasks -taking down messages to recall  -balancing a checkbook  -arranging an itinerary  -Working on a Sudoku activity mod assist    STG 3a: 12 weeks:  Patient will complete moderately complex thought organization tasks  with min assist to increase organization to solve moderately complex ADL problems.  Writing down debits and credits in a check register     Use of chart to arrange an itinerary -arranging entries by date, and check number    -use of chart Moderate assist    STG 3b: 12 weeks: Patient will complete moderate complex  reasoning tasks with min assist to use appropriate strategies for ADLs.     Balancing a checkbook    Developing an itinerary using information given in the paragraph Deductive reasoning for each activity Moderate assist to cue to attend to date and check number and to attend to verbage in the paragraph                       Total Time of Visit:           55 mins         ASSESSMENT/PLAN     ASSESSMENT: Patient requires the skills of a therapist to provide mod assist to develop and implement strategies to accurately recall information and  attend to details, and complete reasoning and thought  organization tasks  for  safe completion of IADLs.   Progress: Reduced cueing to mod assist for attention  PLAN: continue plan of care    Progress Note Due Date:2/4/23  Recertification Due Date:4/4/23    SIGNATURE: Vilma Valadez SLP, [place] License #: [id number]  Electronically Signed on 2/15/2023                [facility]                                             [address] [place] [address]. [address]  [phone] Office  [phone] Fax

## 2023-02-22 ENCOUNTER — TREATMENT (OUTPATIENT)
Dept: PHYSICAL THERAPY | Facility: CLINIC | Age: 58
End: 2023-02-22
Payer: COMMERCIAL

## 2023-02-22 DIAGNOSIS — R41.841 COGNITIVE COMMUNICATION DEFICIT: Primary | ICD-10-CM

## 2023-02-22 PROCEDURE — 97129 THER IVNTJ 1ST 15 MIN: CPT | Performed by: SPEECH-LANGUAGE PATHOLOGIST

## 2023-02-22 PROCEDURE — 97130 THER IVNTJ EA ADDL 15 MIN: CPT | Performed by: SPEECH-LANGUAGE PATHOLOGIST

## 2023-02-22 NOTE — PROGRESS NOTES
Outpatient Adult Speech Language Therapy           Treatment Note    Patient: Mariama Turner                                                                                     Visit Date: 2023  :     1965    Referring practitioner:    Jordan Rawls II, MD  Date of Initial Visit:          Type: THERAPY  Noted: 2023    Patient seen for 11 sessions    Visit Diagnoses:    ICD-10-CM ICD-9-CM   1. Cognitive communication deficit  R41.841 799.52     SUBJECTIVE   Patient  Indicates her vision therapist requested she complete a quilt activity that she started.  It involves vision therapy goals and speech therapy goals.  Patient able to work on recalling where she was on the project, attending to a instruction video, sorting fabric and then she indicated she had a headache and had to stop.  Patient worked on the project for approximately one hour.    OBJECTIVE   Patient is receiving speech therapy to increase memory recall, attention, thought organization and reasoning skills for safe completion of IADLs which includes focusing on strategy uses to aid in returning to work.     GOALS      GOALS ACTIVITY PERFORMED TRIALS COMPLETED LEVEL OF CUEING REQUIRED    LTG 1: 12 weeks: Patient will increase Functional Communication Measure Score for memory to 6/7 decreasing limitation to 1 -19%.          STG1 a: 12 weeks: Patient will demonstrate the ability to use appropriate memory strategies to encode novel and complex information with min cueing.  memory strategies Use of 5 strategies for recall  Strategies to recall 5 associated words    min assist    STG 1b: 12 weeks:  Patient will complete mod complex short term memory tasks with 85% or above     accuracy and/or min assist for strategy use.   Short term recall  4/5 with one cue 10 minute delay  5/5witn no cueing 15 minute delay from previous recall  5/5 with no cueing 20 minute delay from  previous recall Min assist    STG 1c: 12 weeks: Patient will complete immediate memory tasks with 85 or above % accuracy and min assist for strategy use.                          STATUS: New     Immediate memory Attending to time itinerary and completing tasks according to timeline Total assist to cue patient every time to attend to itinerary            STG 2a: 12 weeks:  Patient will sustain attention to basic tasks for 45 or more minutes with min assist through redirection of sustained attention.         Sustained attention       Sustained visual attention during multiple tasks     Min to mod assist      STG 2b: 12 weeks.  Patient will complete divided attention tasks with min assist to attend to multiple information required to make appropriate and timely decisions for ADLS Divided attention -sorting medication into correct pill box    -4/6 66%  medications were placed in boxes correctly     Moderate assist secondary to decreased thought organization and attention.  Patient changed the routine she had in filling the boxes part way through a task on one medication and attending to directions ( and versus or) on medicine label.  .      STG 2c: 12 weeks. Patient will complete alternating attention tasks with min assist to encode information required to complete multiple tasks. Alternating between tasks -attending to time itinerary  -sorting pills  -memory task  -calendar activity  -deductive reasoning task Moderate assist with cueing from therapist to change tasks.    STG 3a: 12 weeks:  Patient will complete moderately complex thought organization tasks  with min assist to increase organization to solve moderately complex ADL problems. Thought organization -sorting medication  -time management    * changed her routine of placing pills in by each day, closing the boxes and going to the next day.  She then opened up multiple days and boxes and made medication errors at this time.   Mod assist to cue to double check  for medication errors   STG 3b: 12 weeks: Patient will complete moderate complex  reasoning tasks with min assist to use appropriate strategies for ADLs.     deductive reasoning  assigning 5 people three attributes according to directions requiring deductive reasoning.      Mod assist to attend to the concrete directions first.                          Total Time of Visit:          57  mins         ASSESSMENT/PLAN     ASSESSMENT: Patient requires the skills of a therapist to provide mod assist to develop and implement strategies to accurately recall information and  attend to details, and accurately complete reasoning and thought organization tasks  for  safe completion of IADLs.   Progress: Reduce cueing to min assist with short term recall activity.     PLAN: continue plan of care    Progress Note Due Date:2/4/23  Recertification Due Date:4/4/23    SIGNATURE: Vilma Valadez, SLP, [place] License #: [id number]  Electronically Signed on 2/22/2023                [facility]                                             [address] [place] [address]. [address]  [phone] Office  [phone] Fax

## 2023-02-27 ENCOUNTER — TREATMENT (OUTPATIENT)
Dept: PHYSICAL THERAPY | Facility: CLINIC | Age: 58
End: 2023-02-27
Payer: COMMERCIAL

## 2023-02-27 DIAGNOSIS — R41.841 COGNITIVE COMMUNICATION DEFICIT: Primary | ICD-10-CM

## 2023-02-27 PROCEDURE — 97129 THER IVNTJ 1ST 15 MIN: CPT | Performed by: SPEECH-LANGUAGE PATHOLOGIST

## 2023-02-27 PROCEDURE — 97130 THER IVNTJ EA ADDL 15 MIN: CPT | Performed by: SPEECH-LANGUAGE PATHOLOGIST

## 2023-02-27 NOTE — PROGRESS NOTES
Outpatient Adult Speech Language Therapy           Treatment Note    Patient: Mariama Turner                                                                                     Visit Date: 2023  :     1965    Referring practitioner:    Jordan Rawls II, MD  Date of Initial Visit:          Type: THERAPY  Noted: 2023    Patient seen for 12 sessions    Visit Diagnoses:    ICD-10-CM ICD-9-CM   1. Cognitive communication deficit  R41.841 799.52     SUBJECTIVE   Patient did not bring in HEP.  Discussed use of calendar and to do list every day.    OBJECTIVE   Patient is receiving speech therapy to increase memory recall, attention, thought organization and reasoning skills for safe completion of IADLs which includes focusing on strategy uses to aid in returning to work.     GOALS      GOALS ACTIVITY PERFORMED TRIALS COMPLETED LEVEL OF CUEING REQUIRED    LTG 1: 12 weeks: Patient will increase Functional Communication Measure Score for memory to 6/7 decreasing limitation to 1 -19%.          STG1 a: 12 weeks: Patient will demonstrate the ability to use appropriate memory strategies to encode novel and complex information with min cueing.  memory strategies      STG 1b: 12 weeks:  Patient will complete mod complex short term memory tasks with 85% or above     accuracy and/or min assist for strategy use.   Short term recall       STG 1c: 12 weeks: Patient will complete immediate memory tasks with 85 or above % accuracy and min assist for strategy use.                          STATUS: New     Immediate recall Rules of Sequence  -hold in working memory- multiple sequences of cards in a run and card missing             STG 2a: 12 weeks:  Patient will sustain attention to basic tasks for 45 or more minutes with min assist through redirection of sustained attention.         Sustained attention  - attending to Sequence for 50 mintues No  assist          STG 2b: 12 weeks.  Patient will complete divided attention tasks with min assist to attend to multiple information required to make appropriate and timely decisions for ADLS Divided attention - attending to cards in hand, cards on the board, opponent's cards on the board Min to mod assist     STG 2c: 12 weeks. Patient will complete alternating attention tasks with min assist to encode information required to complete multiple tasks. Alternating between tasks      STG 3a: 12 weeks:  Patient will complete moderately complex thought organization tasks  with min assist to increase organization to solve moderately complex ADL problems.  thought organization Cards to play during sequence and at what time during the activity Min to mod assist   STG 3b: 12 weeks: Patient will complete moderate complex  reasoning tasks with min assist to use appropriate strategies for ADLs.     moderate reasoning Strategy use, blocking opponent. reasoning Mod assist                       Total Time of Visit:           56  mins         ASSESSMENT/PLAN     ASSESSMENT: Patient requires the skills of a therapist to provide min to mod assist to develop and implement strategies to accurately recall information, attend to details, and complete reasoning and thought organization tasks  for  safe completion of IADLs.   Progress: Reduced cueing to min to mod assist for attention  PLAN: continue plan of care    Progress Note Due Date:2/4/23  Recertification Due Date:4/4/23    SIGNATURE: Vilma Valadez, SLP, [place] License #: [id number]  Electronically Signed on 2/27/2023                [facility]                                             [address] [place] [address]. [address]  [phone] Office  [phone] Fax

## 2023-03-01 ENCOUNTER — TREATMENT (OUTPATIENT)
Dept: PHYSICAL THERAPY | Facility: CLINIC | Age: 58
End: 2023-03-01
Payer: COMMERCIAL

## 2023-03-01 DIAGNOSIS — R41.841 COGNITIVE COMMUNICATION DEFICIT: Primary | ICD-10-CM

## 2023-03-01 PROCEDURE — 97130 THER IVNTJ EA ADDL 15 MIN: CPT | Performed by: SPEECH-LANGUAGE PATHOLOGIST

## 2023-03-01 PROCEDURE — 97129 THER IVNTJ 1ST 15 MIN: CPT | Performed by: SPEECH-LANGUAGE PATHOLOGIST

## 2023-03-01 NOTE — PROGRESS NOTES
Outpatient Adult Speech Language Therapy           Treatment Note    Patient: Mariama Turner                                                                                     Visit Date: 3/1/2023  :     1965    Referring practitioner:    Jordan Rawls II, MD  Date of Initial Visit:          Type: THERAPY  Noted: 2023    Patient seen for 13 sessions    Visit Diagnoses:    ICD-10-CM ICD-9-CM   1. Cognitive communication deficit  R41.841 799.52     SUBJECTIVE   Patient attempting to complete a 9 patch quilt.  She is labeling her squares but indicates she needs to take some time to reorient herself to the project.  She indicates she is attending to her environment/tasks better but struggles with mental fatigue.   Patient missed he last eye appointment and was able to indicated more consistent use of calendar/agenda will benefit her.      Education: calendar use and preplanning the day and week.  OBJECTIVE   Patient is receiving speech therapy to increase memory recall, attention, thought organization and reasoning skills for safe completion of IADLs which includes focusing on strategy uses to aid in returning to work.     GOALS      GOALS ACTIVITY PERFORMED TRIALS COMPLETED LEVEL OF CUEING REQUIRED    LTG 1: 12 weeks: Patient will increase Functional Communication Measure Score for memory to 6/7 decreasing limitation to 1 -19%.          STG1 a: 12 weeks: Patient will demonstrate the ability to use appropriate memory strategies to encode novel and complex information with min cueing.  memory strategies Repetition, visualization, writing it down  Phone messages1 time 3 we Mod assist    STG 1b: 12 weeks:  Patient will complete mod complex short term memory tasks with 85% or above     accuracy and/or min assist for strategy use.   Short term recall  5 short phone messages    10 minutes- 2/5  15 minutes 3/5  20 minutes 3/5  30 minutes 3/5  Mod assist for strategy use    STG 1c: 12 weeks: Patient will complete immediate memory tasks with 85 or above % accuracy and min assist for strategy use.       Immediate recall   Reading directions and completing task without looking back on the directions  4/10      Writing down phone messages Mod assist to ask for repetition when needed.           STG 2a: 12 weeks:  Patient will sustain attention to basic tasks for 45 or more minutes with min assist through redirection of sustained attention.               STG 2b: 12 weeks.  Patient will complete divided attention tasks with min assist to attend to multiple information required to make appropriate and timely decisions for ADLS Divided attention       STG 2c: 12 weeks. Patient will complete alternating attention tasks with min assist to encode information required to complete multiple tasks. Alternating between tasks      STG 3a: 12 weeks:  Patient will complete moderately complex thought organization tasks  with min assist to increase organization to solve moderately complex ADL problems.  thought organization     STG 3b: 12 weeks: Patient will complete moderate complex  reasoning tasks with min assist to use appropriate strategies for ADLs.     moderate reasoning                         Total Time of Visit:           57  mins         ASSESSMENT/PLAN     ASSESSMENT: Patient requires the skills of a therapist to provide mod assist to develop and implement strategies to accurately recall information for safe completion of IADLS.     Progress:  PLAN: continue plan of care    Progress Note Due Date:3/6/23  Recertification Due Date:4/4/23    SIGNATURE: COLIN [place], [place] License #: [id number]  Electronically Signed on 3/1/2023                [facility]                                             [address] [place] [address]. [address]  [phone] Office  [phone] Fax

## 2023-03-07 ENCOUNTER — TREATMENT (OUTPATIENT)
Dept: PHYSICAL THERAPY | Facility: CLINIC | Age: 58
End: 2023-03-07
Payer: OTHER GOVERNMENT

## 2023-03-07 DIAGNOSIS — R41.841 COGNITIVE COMMUNICATION DEFICIT: Primary | ICD-10-CM

## 2023-03-07 PROCEDURE — 97130 THER IVNTJ EA ADDL 15 MIN: CPT | Performed by: SPEECH-LANGUAGE PATHOLOGIST

## 2023-03-07 PROCEDURE — 97129 THER IVNTJ 1ST 15 MIN: CPT | Performed by: SPEECH-LANGUAGE PATHOLOGIST

## 2023-03-07 NOTE — PROGRESS NOTES
Outpatient Adult Speech Language Therapy           Treatment Note/Progress Note    Patient: Mariama Turner                                                                                     Visit Date: 3/7/2023  :     1965    Referring practitioner:    Jordan Rawls II, MD  Date of Initial Visit:          Type: THERAPY  Noted: 2023    Patient seen for 14 sessions    Visit Diagnoses:    ICD-10-CM ICD-9-CM   1. Cognitive communication deficit  R41.841 799.52     SUBJECTIVE       Education: The need to self cue for strategy use to encode information into memory.    OBJECTIVE   Patient is receiving speech therapy to increase memory recall, attention, thought organization and reasoning skills for safe completion of IADLs which includes focusing on strategy uses to aid in returning to work.     GOALS      GOALS ACTIVITY PERFORMED TRIALS COMPLETED LEVEL OF CUEING REQUIRED    LTG 1: 12 weeks: Patient will increase Functional Communication Measure Score for memory to 6/7 decreasing limitation to 1 -19%.          STG1 a: 12 weeks: Patient will demonstrate the ability to use appropriate memory strategies to encode novel and complex information with min cueing.  memory strategies Repetition, visualization, writing it down   Mod assist    STG 1b: 12 weeks:  Patient will complete mod complex short term memory tasks with 85% or above     accuracy and/or min assist for strategy use.   Short term recall  Grocery list- 10 minute delay-9/10 with cue to check list.  Patient corrected to 10/10 accuracy    20 minute delay grocery list- 9/10    Pictured objects with foils -delayed 10 minutes 15/15 - 100%      Names delayed 10 minutes 1/4 25%  With reteaching of strategies to encode name- 3/4 with 10 minute delay         Min to mod assist    STG 1c: 12 weeks: Patient will complete immediate memory tasks with 85 or above % accuracy and min assist for strategy  use.     Patient given the story recall portion of the Cognitive Linguistic Quick Test               Recall of grocery list  Patient scored a 14 with a scaled score of 7.  Task cutoff score is 6 indicating within  Normal limits for  immediate recall for a patient between 18-69,         4/10 without cueing  8/10 with min to mod cueing to use the listed strategies    Recall of faces seen with use of foils patient was 10 yes but also identified 8 foils as positive  With a total raw score of 2/15         Min assist to cue patient to use strategies for recall            Min to mod assist to use the strategies once cued.       Max assist for visually identifying     STG 2a: 12 weeks:  Patient will sustain attention to basic tasks for 45 or more minutes with min assist through redirection of sustained attention. Sustain attention to tasks      STG 2b: 12 weeks.  Patient will complete divided attention tasks with min assist to attend to multiple information required to make appropriate and timely decisions for ADLS Divided attention- writing down checks into a register and balancing a checkbook Attending to debit or credit , date, payee or creditor Min to mod assist     STG 2c: 12 weeks. Patient will complete alternating attention tasks with min assist to encode information required to complete multiple tasks. Alternating between tasks- check book activity, memory task, recall of a story  Min assist to pick back up at correct spot    * goal met 3/7/23    STG 3a: 12 weeks:  Patient will complete moderately complex thought organization tasks  with min assist to increase organization to solve moderately complex ADL problems.  thought organization- check registrer Writing in order of day checks, debits and credits transactions took place    Min to mod assist to double check and use strategies   STG 3b: 12 weeks: Patient will complete moderate complex  reasoning tasks with min assist to use appropriate strategies for ADLs.      moderate reasoning- balancing the checking account Balancing a checkbook Min to mod assist to check math computation                       Total Time of Visit:       63  mins         ASSESSMENT/PLAN     ASSESSMENT: Patient requires the skills of a therapist to provide reduced cueing to min to mod assist to develop and implement strategies to accurately recall information and organize thought and and accurate problem solving for safe completion of IADLS.     Progress: Patient is alternating well between tasks.  She is beginning to encode objects into memory with good accuracy (100%) but requires more assist with recall of people and information by using appropriate  strategies to encode more novel information.   PLAN: continue plan of care    Progress Note Due Date:3/6/23  Recertification Due Date:4/4/23    SIGNATURE: COLIN [place], [place] License #: [id number]  Electronically Signed on 3/7/2023                [facility]                                             [address] [place] [address]. [address]  [phone] Office  [phone] Fax

## 2023-03-15 ENCOUNTER — OFFICE VISIT (OUTPATIENT)
Dept: INTERNAL MEDICINE | Facility: CLINIC | Age: 58
End: 2023-03-15
Payer: OTHER GOVERNMENT

## 2023-03-15 VITALS
HEIGHT: 63 IN | SYSTOLIC BLOOD PRESSURE: 118 MMHG | TEMPERATURE: 98.2 F | BODY MASS INDEX: 30.62 KG/M2 | OXYGEN SATURATION: 96 % | HEART RATE: 62 BPM | DIASTOLIC BLOOD PRESSURE: 71 MMHG | WEIGHT: 172.8 LBS

## 2023-03-15 DIAGNOSIS — J06.9 VIRAL URI WITH COUGH: ICD-10-CM

## 2023-03-15 DIAGNOSIS — R20.2 PARESTHESIA: ICD-10-CM

## 2023-03-15 DIAGNOSIS — E03.9 HYPOTHYROIDISM, UNSPECIFIED TYPE: Primary | ICD-10-CM

## 2023-03-15 DIAGNOSIS — E78.2 MIXED HYPERLIPIDEMIA: ICD-10-CM

## 2023-03-15 DIAGNOSIS — R42 DIZZINESS: ICD-10-CM

## 2023-03-15 DIAGNOSIS — R94.5 ABNORMAL LIVER FUNCTION: ICD-10-CM

## 2023-03-15 DIAGNOSIS — H69.93 EUSTACHIAN TUBE DISORDER, BILATERAL: ICD-10-CM

## 2023-03-15 LAB
ALBUMIN SERPL-MCNC: 4.2 G/DL (ref 3.5–5.2)
ALBUMIN/GLOB SERPL: 1.3 G/DL
ALP SERPL-CCNC: 108 U/L (ref 39–117)
ALT SERPL W P-5'-P-CCNC: 28 U/L (ref 1–33)
ANION GAP SERPL CALCULATED.3IONS-SCNC: 11.3 MMOL/L (ref 5–15)
AST SERPL-CCNC: 29 U/L (ref 1–32)
BASOPHILS # BLD AUTO: 0.08 10*3/MM3 (ref 0–0.2)
BASOPHILS NFR BLD AUTO: 1.6 % (ref 0–1.5)
BILIRUB SERPL-MCNC: 0.5 MG/DL (ref 0–1.2)
BUN SERPL-MCNC: 16 MG/DL (ref 6–20)
BUN/CREAT SERPL: 17 (ref 7–25)
CALCIUM SPEC-SCNC: 9.8 MG/DL (ref 8.6–10.5)
CHLORIDE SERPL-SCNC: 103 MMOL/L (ref 98–107)
CHOLEST SERPL-MCNC: 236 MG/DL (ref 0–200)
CO2 SERPL-SCNC: 25.7 MMOL/L (ref 22–29)
CREAT SERPL-MCNC: 0.94 MG/DL (ref 0.57–1)
DEPRECATED RDW RBC AUTO: 38.7 FL (ref 37–54)
EGFRCR SERPLBLD CKD-EPI 2021: 70 ML/MIN/1.73
EOSINOPHIL # BLD AUTO: 0.19 10*3/MM3 (ref 0–0.4)
EOSINOPHIL NFR BLD AUTO: 3.7 % (ref 0.3–6.2)
ERYTHROCYTE [DISTWIDTH] IN BLOOD BY AUTOMATED COUNT: 12.4 % (ref 12.3–15.4)
GLOBULIN UR ELPH-MCNC: 3.2 GM/DL
GLUCOSE SERPL-MCNC: 81 MG/DL (ref 65–99)
HCT VFR BLD AUTO: 42.2 % (ref 34–46.6)
HDLC SERPL-MCNC: 60 MG/DL (ref 40–60)
HGB BLD-MCNC: 14.3 G/DL (ref 12–15.9)
IMM GRANULOCYTES # BLD AUTO: 0.02 10*3/MM3 (ref 0–0.05)
IMM GRANULOCYTES NFR BLD AUTO: 0.4 % (ref 0–0.5)
LDLC SERPL CALC-MCNC: 146 MG/DL (ref 0–100)
LDLC/HDLC SERPL: 2.38 {RATIO}
LYMPHOCYTES # BLD AUTO: 0.85 10*3/MM3 (ref 0.7–3.1)
LYMPHOCYTES NFR BLD AUTO: 16.7 % (ref 19.6–45.3)
MCH RBC QN AUTO: 28.9 PG (ref 26.6–33)
MCHC RBC AUTO-ENTMCNC: 33.9 G/DL (ref 31.5–35.7)
MCV RBC AUTO: 85.3 FL (ref 79–97)
MONOCYTES # BLD AUTO: 0.62 10*3/MM3 (ref 0.1–0.9)
MONOCYTES NFR BLD AUTO: 12.2 % (ref 5–12)
NEUTROPHILS NFR BLD AUTO: 3.32 10*3/MM3 (ref 1.7–7)
NEUTROPHILS NFR BLD AUTO: 65.4 % (ref 42.7–76)
NRBC BLD AUTO-RTO: 0 /100 WBC (ref 0–0.2)
PLATELET # BLD AUTO: 261 10*3/MM3 (ref 140–450)
PMV BLD AUTO: 11 FL (ref 6–12)
POTASSIUM SERPL-SCNC: 4 MMOL/L (ref 3.5–5.2)
PROT SERPL-MCNC: 7.4 G/DL (ref 6–8.5)
RBC # BLD AUTO: 4.95 10*6/MM3 (ref 3.77–5.28)
SODIUM SERPL-SCNC: 140 MMOL/L (ref 136–145)
TRIGL SERPL-MCNC: 166 MG/DL (ref 0–150)
TSH SERPL DL<=0.05 MIU/L-ACNC: 1.69 UIU/ML (ref 0.27–4.2)
VLDLC SERPL-MCNC: 30 MG/DL (ref 5–40)
WBC NRBC COR # BLD: 5.08 10*3/MM3 (ref 3.4–10.8)

## 2023-03-15 PROCEDURE — 80061 LIPID PANEL: CPT | Performed by: NURSE PRACTITIONER

## 2023-03-15 PROCEDURE — 84443 ASSAY THYROID STIM HORMONE: CPT | Performed by: NURSE PRACTITIONER

## 2023-03-15 PROCEDURE — 80053 COMPREHEN METABOLIC PANEL: CPT | Performed by: NURSE PRACTITIONER

## 2023-03-15 PROCEDURE — 85025 COMPLETE CBC W/AUTO DIFF WBC: CPT | Performed by: NURSE PRACTITIONER

## 2023-03-15 RX ORDER — FLUTICASONE PROPIONATE 50 MCG
2 SPRAY, SUSPENSION (ML) NASAL DAILY
Qty: 16 G | Refills: 0 | Status: SHIPPED | OUTPATIENT
Start: 2023-03-15

## 2023-03-15 RX ORDER — FUROSEMIDE 40 MG/1
40 TABLET ORAL DAILY
Qty: 90 TABLET | Refills: 3 | Status: SHIPPED | OUTPATIENT
Start: 2023-03-15

## 2023-03-15 RX ORDER — ATENOLOL 25 MG/1
25 TABLET ORAL
Qty: 90 TABLET | Refills: 3 | Status: SHIPPED | OUTPATIENT
Start: 2023-03-15

## 2023-03-15 RX ORDER — POTASSIUM CHLORIDE 750 MG/1
10 TABLET, FILM COATED, EXTENDED RELEASE ORAL 2 TIMES DAILY
Qty: 180 TABLET | Refills: 3 | Status: SHIPPED | OUTPATIENT
Start: 2023-03-15

## 2023-03-15 RX ORDER — MECLIZINE HYDROCHLORIDE 25 MG/1
25 TABLET ORAL 3 TIMES DAILY PRN
Qty: 90 TABLET | Refills: 1 | Status: SHIPPED | OUTPATIENT
Start: 2023-03-15

## 2023-03-15 RX ORDER — EZETIMIBE 10 MG/1
10 TABLET ORAL DAILY
Qty: 90 TABLET | Refills: 3 | Status: SHIPPED | OUTPATIENT
Start: 2023-03-15

## 2023-03-15 RX ORDER — ALBUTEROL SULFATE 90 UG/1
2 AEROSOL, METERED RESPIRATORY (INHALATION) EVERY 4 HOURS PRN
Qty: 18 G | Refills: 3 | Status: SHIPPED | OUTPATIENT
Start: 2023-03-15

## 2023-03-15 RX ORDER — SCOLOPAMINE TRANSDERMAL SYSTEM 1 MG/1
1 PATCH, EXTENDED RELEASE TRANSDERMAL
Qty: 24 PATCH | Refills: 1 | Status: SHIPPED | OUTPATIENT
Start: 2023-03-15

## 2023-03-15 RX ORDER — CETIRIZINE HYDROCHLORIDE 10 MG/1
10 TABLET ORAL DAILY
Qty: 90 TABLET | Refills: 1 | Status: SHIPPED | OUTPATIENT
Start: 2023-03-15

## 2023-03-15 RX ORDER — ESTRADIOL 0.07 MG/D
1 FILM, EXTENDED RELEASE TRANSDERMAL 2 TIMES WEEKLY
Qty: 24 PATCH | Refills: 3 | Status: SHIPPED | OUTPATIENT
Start: 2023-03-16

## 2023-03-15 RX ORDER — LEVOTHYROXINE SODIUM 0.07 MG/1
75 TABLET ORAL DAILY
Qty: 90 TABLET | Refills: 3 | Status: SHIPPED | OUTPATIENT
Start: 2023-03-15

## 2023-03-15 NOTE — PROGRESS NOTES
"Chief Complaint  Anxiety (Follow up)    Subjective        Mariama Turner presents to [facility]  History of Present Illness    Objective   Vital Signs:  /71 (BP Location: Left arm, Patient Position: Sitting, Cuff Size: Adult)   Pulse 62   Temp 98.2 °F (36.8 °C) (Temporal)   Ht 160 cm (63\")   Wt 78.4 kg (172 lb 12.8 oz)   SpO2 96%   BMI 30.61 kg/m²   Estimated body mass index is 30.61 kg/m² as calculated from the following:    Height as of this encounter: 160 cm (63\").    Weight as of this encounter: 78.4 kg (172 lb 12.8 oz).             Physical Exam   Result Review :                   Assessment and Plan   There are no diagnoses linked to this encounter.         Follow Up   No follow-ups on file.  Patient was given instructions and counseling regarding her condition or for health maintenance advice. Please see specific information pulled into the AVS if appropriate.       "

## 2023-03-15 NOTE — PROGRESS NOTES
Chief Complaint  Anxiety (Follow up)    Subjective          Mariama Turner presents to [facility]  History of Present Illness    Hypothyroidism  This is a chronic problem. Associated symptoms include fatigue. Pertinent negatives include no abdominal pain, anorexia, arthralgias, change in bowel habit, chest pain, chills, congestion, coughing, diaphoresis, fever, headaches, joint swelling, myalgias, nausea, neck pain, numbness, rash, sore throat, swollen glands, urinary symptoms, vertigo, visual change, vomiting or weakness.   Hypertension  This is a chronic problem. The problem is controlled. Pertinent negatives include no anxiety, blurred vision, chest pain, headaches, malaise/fatigue, neck pain, orthopnea, palpitations, peripheral edema, PND, shortness of breath or sweats. Current antihypertension treatment includes beta blockers. The current treatment provides significant improvement. Compliance problems include diet and exercise.  There is no history of chronic renal disease.   Hyperlipidemia  This is a chronic problem. Exacerbating diseases include hypothyroidism. She has no history of chronic renal disease, diabetes, liver disease, obesity or nephrotic syndrome. Pertinent negatives include no chest pain, focal sensory loss, focal weakness, leg pain, myalgias or shortness of breath. Compliance problems include adherence to diet and adherence to exercise.         56-year-old female presents to the clinic today for follow-up regarding her TBI/headaches. Has completed eye therapy, vestibular therapy, and is seeing a neurologist and ophthalmologist for her condition. 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       Current Outpatient Medications   Medication Instructions   • albuterol sulfate  (90 Base) MCG/ACT inhaler 2 puffs, Inhalation, Every 4 Hours PRN   • atenolol  "(TENORMIN) 25 mg, Oral, Every Night at Bedtime   • cetirizine (ZYRTEC) 10 mg, Oral, Daily   • estradiol (Kendra) 0.075 MG/24HR patch 1 patch, Transdermal, 2 Times Weekly   • ezetimibe (ZETIA) 10 mg, Oral, Daily   • fluticasone (Flonase) 50 MCG/ACT nasal spray 2 sprays, Nasal, Daily   • furosemide (LASIX) 40 mg, Oral, Daily   • levothyroxine (SYNTHROID) 75 mcg, Oral, Daily   • meclizine (ANTIVERT) 25 mg, Oral, 3 Times Daily PRN   • nitrofurantoin (macrocrystal-monohydrate) (MACROBID) 100 mg, Oral, 2 Times Daily   • pitavastatin calcium (LIVALO) 1 mg, Oral, Daily   • potassium chloride 10 MEQ CR tablet 10 mEq, Oral, 2 Times Daily   • Scopolamine (Transderm-Scop, 1.5 MG,) 1 MG/3DAYS patch 1 patch, Transdermal, Every 72 Hours       The following portions of the patient's history were reviewed and updated as appropriate: allergies, current medications, past family history, past medical history, past social history, past surgical history, and problem list.    Objective   Vital Signs:   /71 (BP Location: Left arm, Patient Position: Sitting, Cuff Size: Adult)   Pulse 62   Temp 98.2 °F (36.8 °C) (Temporal)   Ht 160 cm (63\")   Wt 78.4 kg (172 lb 12.8 oz)   SpO2 96%   BMI 30.61 kg/m²     Wt Readings from Last 3 Encounters:   03/15/23 78.4 kg (172 lb 12.8 oz)   01/28/23 77.6 kg (171 lb)   12/15/22 80.5 kg (177 lb 6.4 oz)     BP Readings from Last 3 Encounters:   03/15/23 118/71   01/28/23 120/75   12/15/22 128/80     Physical Exam   Appearance: No acute distress, well-nourished  Head: normocephalic, atraumatic  Eyes: extraocular movements intact, no scleral icterus, no conjunctival injection  Ears, Nose, and Throat: external ears normal, nares patent, moist mucous membranes  Cardiovascular: regular rate and rhythm. no murmurs, rubs, or gallops. no edema  Respiratory: breathing comfortably, symmetric chest rise, clear to auscultation bilaterally. No wheezes, rales, or rhonchi.  Neuro: alert and oriented to time, " place, and person. Normal gait  Psych: normal mood and affect     Result Review :   The following data was reviewed by: MIGNON Gaitan on 03/15/2023:  Common labs    Common Labs 8/26/22 8/26/22 8/26/22 12/15/22 12/15/22 12/15/22 3/15/23 3/15/23 3/15/23    1020 1020 1020 1701 1701 1701 1305 1305 1305   Glucose  89    95  81    BUN  11    15  16    Creatinine  0.85    0.79  0.94    Sodium  142    139  140    Potassium  4.2    4.0  4.0    Chloride  108 (A)    101  103    Calcium  9.2    9.8  9.8    Albumin  4.10    4.50  4.2    Total Bilirubin  0.5    0.3  0.5    Alkaline Phosphatase  78    133 (A)  108    AST (SGOT)  20    34 (A)  29    ALT (SGPT)  18    32  28    WBC 6.02   4.04   5.08     Hemoglobin 12.9   14.9   14.3     Hematocrit 39.8   44.6   42.2     Platelets 229   231   261     Total Cholesterol   164  199    236 (A)   Triglycerides   148  140    166 (A)   HDL Cholesterol   51  59    60   LDL Cholesterol    87  115 (A)    146 (A)   (A) Abnormal value                   Lab Results   Component Value Date    SARSANTIGEN Not Detected 11/14/2022    COVID19 Not Detected 11/14/2022    RAPFLUA Negative 11/14/2022    RAPFLUB Negative 11/14/2022    FLUAAG Not Detected 09/23/2021    FLU Negative 04/28/2022    FLU Negative 04/28/2022    FLUBAG Not Detected 09/23/2021    RAPSCRN Negative 11/14/2022    BILIRUBINUR Negative 01/28/2023       Procedures        Assessment and Plan    Diagnoses and all orders for this visit:    1. Hypothyroidism, unspecified type (Primary)  -     TSH Rfx On Abnormal To Free T4  -     Cancel: Comprehensive Metabolic Panel  -     CBC & Differential  -     levothyroxine (Synthroid) 75 MCG tablet; Take 1 tablet by mouth Daily.  Dispense: 90 tablet; Refill: 3  -     Cancel: TSH    2. Mixed hyperlipidemia  Assessment & Plan:  Lipid abnormalities are will recheck lipids today and increase meds if needed .  Nutritional counseling was provided. and Pharmacotherapy as ordered.  Lipids will be  reassessed in 3 months.    Orders:  -     Lipid Panel  -     Cancel: Comprehensive Metabolic Panel  -     CBC & Differential  -     ezetimibe (ZETIA) 10 MG tablet; Take 1 tablet by mouth Daily.  Dispense: 90 tablet; Refill: 3    3. Viral URI with cough  -     fluticasone (Flonase) 50 MCG/ACT nasal spray; 2 sprays into the nostril(s) as directed by provider Daily.  Dispense: 16 g; Refill: 0    4. Dizziness  Comments:  avoiding diuretics. no help with meclizine. will Rx scopalamine.   Orders:  -     Scopolamine (Transderm-Scop, 1.5 MG,) 1 MG/3DAYS patch; Place 1 patch on the skin as directed by provider Every 72 (Seventy-Two) Hours.  Dispense: 24 patch; Refill: 1    5. Eustachian tube disorder, bilateral    6. Paresthesia  -     Cancel: EMG 2 Limbs; Future  -     EMG 2 Limbs; Future    7. Abnormal liver function  -     Comprehensive metabolic panel    Other orders  -     potassium chloride 10 MEQ CR tablet; Take 1 tablet by mouth 2 (Two) Times a Day.  Dispense: 180 tablet; Refill: 3  -     albuterol sulfate  (90 Base) MCG/ACT inhaler; Inhale 2 puffs Every 4 (Four) Hours As Needed for Wheezing or Shortness of Air.  Dispense: 18 g; Refill: 3  -     atenolol (TENORMIN) 25 MG tablet; Take 1 tablet by mouth every night at bedtime.  Dispense: 90 tablet; Refill: 3  -     furosemide (LASIX) 40 MG tablet; Take 1 tablet by mouth Daily.  Dispense: 90 tablet; Refill: 3  -     pitavastatin calcium (Livalo) 1 MG tablet tablet; Take 1 tablet by mouth Daily.  Dispense: 90 tablet; Refill: 3  -     estradiol (Kendra) 0.075 MG/24HR patch; Place 1 patch on the skin as directed by provider 2 (Two) Times a Week.  Dispense: 24 patch; Refill: 3  -     meclizine (ANTIVERT) 25 MG tablet; Take 1 tablet by mouth 3 (Three) Times a Day As Needed for Dizziness.  Dispense: 90 tablet; Refill: 1  -     cetirizine (zyrTEC) 10 MG tablet; Take 1 tablet by mouth Daily.  Dispense: 90 tablet; Refill: 1        Medications Discontinued During This  Encounter   Medication Reason   • albuterol sulfate  (90 Base) MCG/ACT inhaler Reorder   • fluticasone (Flonase) 50 MCG/ACT nasal spray Reorder   • Scopolamine (Transderm-Scop, 1.5 MG,) 1 MG/3DAYS patch Reorder   • furosemide (LASIX) 40 MG tablet Reorder   • potassium chloride 10 MEQ CR tablet Reorder   • pitavastatin calcium (Livalo) 1 MG tablet tablet Reorder   • ezetimibe (ZETIA) 10 MG tablet Reorder   • atenolol (TENORMIN) 25 MG tablet Reorder   • levothyroxine (Synthroid) 75 MCG tablet Reorder   • estradiol (Kendra) 0.075 MG/24HR patch Reorder          Follow Up   Return in about 3 months (around 6/15/2023).  Patient was given instructions and counseling regarding her condition or for health maintenance advice. Please see specific information pulled into the AVS if appropriate.       Ame Xie, MIGNON  03/20/23  09:16 EDT

## 2023-03-20 NOTE — ASSESSMENT & PLAN NOTE
Lipid abnormalities are will recheck lipids today and increase meds if needed .  Nutritional counseling was provided. and Pharmacotherapy as ordered.  Lipids will be reassessed in 3 months.

## 2023-03-20 NOTE — ASSESSMENT & PLAN NOTE
Chart reviewed - patient is currently listed as being in the OR.   Hypertension is improving with treatment.  Continue current treatment regimen.  Dietary sodium restriction.  Weight loss.  Regular aerobic exercise.  Blood pressure will be reassessed in 3 months.

## 2023-03-21 ENCOUNTER — TELEPHONE (OUTPATIENT)
Dept: INTERNAL MEDICINE | Facility: CLINIC | Age: 58
End: 2023-03-21
Payer: OTHER GOVERNMENT

## 2023-03-21 NOTE — TELEPHONE ENCOUNTER
Attempted to contact patient regarding lab results. Left Voicemail to call our office back.     HUB OK TO READ/ ADVISE:  ----- Message from MIGNON Gaitan sent at 3/20/2023  8:33 PM EDT -----  TSH reassuring. Continue current dose.   Cholesterol levels continue to elevate. Follow low cholesterol, low saturated fat diet   The 10-year ASCVD risk score (Rukhsana DK, et al., 2019) is: 3.7%

## 2023-03-21 NOTE — TELEPHONE ENCOUNTER
----- Message from MIGNON Gaitan sent at 3/20/2023  8:33 PM EDT -----  TSH reassuring. Continue current dose.   Cholesterol levels continue to elevate. Follow low cholesterol, low saturated fat diet   The 10-year ASCVD risk score (Rukhsana MCDERMOTT, et al., 2019) is: 3.7%

## 2023-03-22 ENCOUNTER — OFFICE VISIT (OUTPATIENT)
Dept: NEUROLOGY | Facility: CLINIC | Age: 58
End: 2023-03-22
Payer: OTHER GOVERNMENT

## 2023-03-22 VITALS
DIASTOLIC BLOOD PRESSURE: 68 MMHG | OXYGEN SATURATION: 97 % | HEART RATE: 53 BPM | BODY MASS INDEX: 30.92 KG/M2 | SYSTOLIC BLOOD PRESSURE: 124 MMHG | WEIGHT: 174.5 LBS | HEIGHT: 63 IN

## 2023-03-22 DIAGNOSIS — S09.90XD TRAUMATIC INJURY OF HEAD, SUBSEQUENT ENCOUNTER: ICD-10-CM

## 2023-03-22 DIAGNOSIS — F07.81 POSTCONCUSSIVE SYNDROME: ICD-10-CM

## 2023-03-22 DIAGNOSIS — R41.89 COGNITIVE IMPAIRMENT: ICD-10-CM

## 2023-03-22 DIAGNOSIS — R42 DIZZINESS: Primary | ICD-10-CM

## 2023-03-22 DIAGNOSIS — H83.2X9 VESTIBULAR DYSFUNCTION, UNSPECIFIED LATERALITY: ICD-10-CM

## 2023-03-22 PROCEDURE — 99214 OFFICE O/P EST MOD 30 MIN: CPT | Performed by: PSYCHIATRY & NEUROLOGY

## 2023-03-22 NOTE — PROGRESS NOTES
Chief Complaint   Patient presents with   • Head Injury     Follow up       Very dizzy today       Patient ID: Mariama Turner is a 57 y.o. female.    HPI:  I had the pleasure of seeing your patient.  She is a 57-year-old female here for the management of postconcussive syndrome, dizziness and cognitive impairment.  She has been approximately the same.  Her double vision has improved significantly.  She has received vision therapy which she feels has helped.    She does still have significant forgetfulness.  She does mention that she still has dizziness on a daily basis.  She typically experiences it when walking or with changes in head position.  No new onset focal weakness or numbness of her arms or legs.  No loss of vision.    The following portions of the patient's history were reviewed and updated as appropriate: allergies, current medications, past family history, past medical history, past social history, past surgical history and problem list.    Review of Systems   Neurological: Positive for dizziness, weakness (rt hand) and headaches. Negative for tremors, light-headedness and numbness.   Psychiatric/Behavioral: Positive for decreased concentration. Negative for agitation, behavioral problems, confusion and sleep disturbance. The patient is not nervous/anxious and is not hyperactive.       I have reviewed the review of systems above performed by my medical assistant.      Vitals:    03/22/23 0949   BP: 124/68   Pulse: 53   SpO2: 97%       Neurologic Exam     Mental Status   Oriented to person, place, and time.   Concentration: normal.   Level of consciousness: alert  Knowledge: consistent with education (No deficits found.).     Cranial Nerves     CN II   Visual fields full to confrontation.     CN III, IV, VI   Pupils are equal, round, and reactive to light.  Extraocular motions are normal.   CN III: no CN III palsy  CN VI: no CN VI palsy    CN V   Facial sensation intact.     CN VII   Facial expression  full, symmetric.     CN VIII   CN VIII normal.     CN IX, X   CN IX normal.   CN X normal.     CN XI   CN XI normal.     CN XII   CN XII normal.     Motor Exam     Strength   Right neck flexion: 5/5  Left neck flexion: 5/5  Right neck extension: 5/5  Left neck extension: 5/5  Right deltoid: 5/5  Left deltoid: 5/5  Right biceps: 5/5  Left biceps: 5/5  Right triceps: 5/5  Left triceps: 5/5  Right wrist flexion: 5/5  Left wrist flexion: 5/5  Right wrist extension: 5/5  Left wrist extension: 5/5  Right interossei: 5/5  Left interossei: 5/5  Right abdominals: 5/5  Left abdominals: 5/5  Right iliopsoas: 5/5  Left iliopsoas: 5/5  Right quadriceps: 5/5  Left quadriceps: 5/5  Right hamstrin/5  Left hamstrin/5  Right glutei: 5/5  Left glutei: 5/5  Right anterior tibial: 5/5  Left anterior tibial: 5/5  Right posterior tibial: 5/5  Left posterior tibial: 5/5  Right peroneal: 5/5  Left peroneal: 5/5  Right gastroc: 5/5  Left gastroc: 5/5    Sensory Exam   Light touch normal.   Vibration normal.     Gait, Coordination, and Reflexes     Gait  Gait: normal    Reflexes   Right brachioradialis: 2+  Left brachioradialis: 2+  Right biceps: 2+  Left biceps: 2+  Right triceps: 2+  Left triceps: 2+  Right patellar: 2+  Left patellar: 2+  Right achilles: 2+  Left achilles: 2+  Right : 2+  Left : 2+Station is normal.       Physical Exam  Vitals reviewed.   Constitutional:       Appearance: She is well-developed.   HENT:      Head: Normocephalic and atraumatic.   Eyes:      Extraocular Movements: EOM normal.      Pupils: Pupils are equal, round, and reactive to light.   Cardiovascular:      Rate and Rhythm: Normal rate and regular rhythm.   Pulmonary:      Breath sounds: Normal breath sounds.   Musculoskeletal:         General: Normal range of motion.   Skin:     General: Skin is warm.   Neurological:      Mental Status: She is oriented to person, place, and time.      Gait: Gait is intact.      Deep Tendon Reflexes:       Reflex Scores:       Tricep reflexes are 2+ on the right side and 2+ on the left side.       Bicep reflexes are 2+ on the right side and 2+ on the left side.       Brachioradialis reflexes are 2+ on the right side and 2+ on the left side.       Patellar reflexes are 2+ on the right side and 2+ on the left side.       Achilles reflexes are 2+ on the right side and 2+ on the left side.        Procedures    Assessment/Plan: She is going to continue with her scheduled visit with the [facility].  I will give her a call tomorrow to discuss that visit to see how things went and what the plan may be.  We will also await their report.  We will see her back in 4 months or sooner if needed.  A total of 30 minutes was spent face-to-face with the patient today.  Of that greater than 50% of this time was spent discussing signs and symptoms of dizziness, postconcussive syndrome, cognitive impairment, patient education, plan of care and prognosis.       Diagnoses and all orders for this visit:    1. Dizziness (Primary)    2. Vestibular dysfunction, unspecified laterality    3. Postconcussive syndrome    4. Traumatic injury of head, subsequent encounter    5. Cognitive impairment           Jordan Rawls II, MD

## 2023-04-18 ENCOUNTER — TELEPHONE (OUTPATIENT)
Dept: INTERNAL MEDICINE | Facility: CLINIC | Age: 58
End: 2023-04-18
Payer: OTHER GOVERNMENT

## 2023-04-18 NOTE — TELEPHONE ENCOUNTER
Pharmacy Name: [facility] [place] [facility] [place], [place] - [address] - [phone] [facility] [phone]      Pharmacy representative phone number: [phone]    What medication are you calling in regards to: estradiol (Kendra) 0.075 MG/24HR patch    What question does the pharmacy have: NEEDING TO SWITCH TO GENERIC AND NEED PERMISSION TO DO SO.    Who is the provider that prescribed the medication: DI

## 2023-05-01 ENCOUNTER — PATIENT MESSAGE (OUTPATIENT)
Dept: NEUROLOGY | Facility: CLINIC | Age: 58
End: 2023-05-01
Payer: OTHER GOVERNMENT

## 2023-05-01 NOTE — LETTER
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 It is my recommendation that she remain on disability due to the limitations of these disease processes such as significant forgetfulness, daily dizziness, vestibular dysfunction, and vision changes.        Sincerely,         Jordan Rawls MD            CC: Mariama Turner

## 2023-05-04 ENCOUNTER — OFFICE VISIT (OUTPATIENT)
Dept: INTERNAL MEDICINE | Facility: CLINIC | Age: 58
End: 2023-05-04
Payer: OTHER GOVERNMENT

## 2023-05-04 VITALS
OXYGEN SATURATION: 94 % | HEIGHT: 63 IN | SYSTOLIC BLOOD PRESSURE: 129 MMHG | HEART RATE: 51 BPM | TEMPERATURE: 98.2 F | WEIGHT: 174.38 LBS | DIASTOLIC BLOOD PRESSURE: 77 MMHG | BODY MASS INDEX: 30.9 KG/M2

## 2023-05-04 DIAGNOSIS — E78.2 MIXED HYPERLIPIDEMIA: ICD-10-CM

## 2023-05-04 DIAGNOSIS — G44.329 CHRONIC POST-TRAUMATIC HEADACHE, NOT INTRACTABLE: ICD-10-CM

## 2023-05-04 DIAGNOSIS — H93.13 TINNITUS OF BOTH EARS: ICD-10-CM

## 2023-05-04 DIAGNOSIS — G43.009 MIGRAINE WITHOUT AURA AND WITHOUT STATUS MIGRAINOSUS, NOT INTRACTABLE: ICD-10-CM

## 2023-05-04 DIAGNOSIS — I10 PRIMARY HYPERTENSION: ICD-10-CM

## 2023-05-04 DIAGNOSIS — N64.4 BREAST PAIN, LEFT: Primary | ICD-10-CM

## 2023-05-04 DIAGNOSIS — F07.81 POST CONCUSSIVE SYNDROME: ICD-10-CM

## 2023-05-04 NOTE — PROGRESS NOTES
Chief Complaint  Traumatic Brain Injury (Follow up )    Subjective          Mariama Turner presents to [facility]  History of Present Illness    Hypothyroidism  This is a chronic problem. Associated symptoms include fatigue. Pertinent negatives include no abdominal pain, anorexia, arthralgias, change in bowel habit, chest pain, chills, congestion, coughing, diaphoresis, fever, headaches, joint swelling, myalgias, nausea, neck pain, numbness, rash, sore throat, swollen glands, urinary symptoms, vertigo, visual change, vomiting or weakness.   Hypertension  This is a chronic problem. The problem is controlled. Pertinent negatives include no anxiety, blurred vision, chest pain, headaches, malaise/fatigue, neck pain, orthopnea, palpitations, peripheral edema, PND, shortness of breath or sweats. Current antihypertension treatment includes beta blockers. The current treatment provides significant improvement. Compliance problems include diet and exercise.  There is no history of chronic renal disease.   Hyperlipidemia  This is a chronic problem. Exacerbating diseases include hypothyroidism. She has no history of chronic renal disease, diabetes, liver disease, obesity or nephrotic syndrome. Pertinent negatives include no chest pain, focal sensory loss, focal weakness, leg pain, myalgias or shortness of breath. Compliance problems include adherence to diet and adherence to exercise.         56-year-old female presents to the clinic today for follow-up regarding her TBI/headaches. Has completed eye therapy, vestibular therapy, and is seeing a neurologist and ophthalmologist for her condition. 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     [facility]ls [facility]   Left ear fistula   Symptoms started after first vaccine thinks virus hit the right ear     Current Outpatient  "Medications   Medication Instructions   • albuterol sulfate  (90 Base) MCG/ACT inhaler 2 puffs, Inhalation, Every 4 Hours PRN   • atenolol (TENORMIN) 25 mg, Oral, Every Night at Bedtime   • cetirizine (ZYRTEC) 10 mg, Oral, Daily   • estradiol (Kendra) 0.075 MG/24HR patch 1 patch, Transdermal, 2 Times Weekly   • ezetimibe (ZETIA) 10 mg, Oral, Daily   • fluticasone (Flonase) 50 MCG/ACT nasal spray 2 sprays, Nasal, Daily   • furosemide (LASIX) 40 mg, Oral, Daily   • levothyroxine (SYNTHROID) 75 mcg, Oral, Daily   • meclizine (ANTIVERT) 25 mg, Oral, 3 Times Daily PRN   • pitavastatin calcium (LIVALO) 1 mg, Oral, Daily   • potassium chloride 10 MEQ CR tablet 10 mEq, Oral, 2 Times Daily   • Scopolamine (Transderm-Scop, 1.5 MG,) 1 MG/3DAYS patch 1 patch, Transdermal, Every 72 Hours       The following portions of the patient's history were reviewed and updated as appropriate: allergies, current medications, past family history, past medical history, past social history, past surgical history, and problem list.    Objective   Vital Signs:   /77 (BP Location: Right arm, Patient Position: Sitting, Cuff Size: Adult)   Pulse 51   Temp 98.2 °F (36.8 °C) (Temporal)   Ht 160 cm (63\")   Wt 79.1 kg (174 lb 6 oz)   SpO2 94%   BMI 30.89 kg/m²     Wt Readings from Last 3 Encounters:   05/04/23 79.1 kg (174 lb 6 oz)   03/22/23 79.2 kg (174 lb 8 oz)   03/15/23 78.4 kg (172 lb 12.8 oz)     BP Readings from Last 3 Encounters:   05/04/23 129/77   03/22/23 124/68   03/15/23 118/71     Physical Exam   Appearance: No acute distress, well-nourished  Head: normocephalic, atraumatic  Eyes: extraocular movements intact, no scleral icterus, no conjunctival injection  Ears, Nose, and Throat: external ears normal, nares patent, moist mucous membranes  Cardiovascular: regular rate and rhythm. no murmurs, rubs, or gallops. no edema  Respiratory: breathing comfortably, symmetric chest rise, clear to auscultation bilaterally. No " wheezes, rales, or rhonchi.  Neuro: alert and oriented to time, place, and person. Normal gait  Psych: normal mood and affect     Result Review :   The following data was reviewed by: MIGNON Gaitan on 05/04/2023:  Common labs        8/26/2022    10:20 12/15/2022    17:01 3/15/2023    13:05   Common Labs   Glucose 89   95   81     BUN 11   15   16     Creatinine 0.85   0.79   0.94     Sodium 142   139   140     Potassium 4.2   4.0   4.0     Chloride 108   101   103     Calcium 9.2   9.8   9.8     Albumin 4.10   4.50   4.2     Total Bilirubin 0.5   0.3   0.5     Alkaline Phosphatase 78   133   108     AST (SGOT) 20   34   29     ALT (SGPT) 18   32   28     WBC 6.02   4.04   5.08     Hemoglobin 12.9   14.9   14.3     Hematocrit 39.8   44.6   42.2     Platelets 229   231   261     Total Cholesterol 164   199   236     Triglycerides 148   140   166     HDL Cholesterol 51   59   60     LDL Cholesterol  87   115   146         Data reviewed: last 3 OV with Dr. Rawls - neurology - labs from last 3 months. Medical records from [facility] that pt brought to clinic with her      Lab Results   Component Value Date    SARSANTIGEN Not Detected 11/14/2022    COVID19 Not Detected 11/14/2022    RAPFLUA Negative 11/14/2022    RAPFLUB Negative 11/14/2022    FLUAAG Not Detected 09/23/2021    FLU Negative 04/28/2022    FLU Negative 04/28/2022    FLUBAG Not Detected 09/23/2021    RAPSCRN Negative 11/14/2022    BILIRUBINUR Negative 01/28/2023       Procedures        Assessment and Plan    Diagnoses and all orders for this visit:    1. Breast pain, left (Primary)  -     MRI Breast Left With & Without Contrast; Future    2. Chronic post-traumatic headache, not intractable    3. Mixed hyperlipidemia    4. Primary hypertension    5. Post concussive syndrome    6. Tinnitus of both ears  Overview:  intermittent; resolved at present. No medications on list that would appear to cause this      7. Migraine without aura and  without status migrainosus, not intractable    Wrote letter for patient's disability and signed.     Medications Discontinued During This Encounter   Medication Reason   • nitrofurantoin, macrocrystal-monohydrate, (MACROBID) 100 MG capsule *Therapy completed        I spent 50 minutes caring for Mariama on this date of service. This time includes time spent by me in the following activities:preparing for the visit, reviewing tests, obtaining and/or reviewing a separately obtained history, performing a medically appropriate examination and/or evaluation , counseling and educating the patient/family/caregiver, ordering medications, tests, or procedures, referring and communicating with other health care professionals , documenting information in the medical record, independently interpreting results and communicating that information with the patient/family/caregiver and care coordination  Follow Up   Return in about 3 months (around 8/4/2023).  Patient was given instructions and counseling regarding her condition or for health maintenance advice. Please see specific information pulled into the AVS if appropriate.       Ame Xie, MIGNON  05/12/23  14:39 EDT

## 2023-05-12 NOTE — TELEPHONE ENCOUNTER
From: Mariama Turner  To: Jordan Rawls  Sent: 5/1/2023 12:56 PM EDT  Subject: Visit follow up    Dr. 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 At that time was receiving long term disability through [facility], however, they have denied my claim, I have a short time to appeal their decision. I have to obtain documentation from providers and send to them in one packet as it all has to be sent and reviewed during only one appeal. Can you please write a statement regarding the need for long term disability so that I can include it in the packet that I have to send them? Social security disability has been filed as you suggested but unfortunately it takes up to a year and I have no income at this time. I appreciate you and your assistance with this.

## 2023-06-01 ENCOUNTER — TELEPHONE (OUTPATIENT)
Dept: CARDIOLOGY | Facility: CLINIC | Age: 58
End: 2023-06-01

## 2023-06-01 NOTE — TELEPHONE ENCOUNTER
Caller: CHRISTIAN    Relationship: SELF    Best call back number: [phone]    What is the best time to reach you: ANY    Who are you requesting to speak with (clinical staff, provider,  specific staff member): ANY    Do you know the name of the person who called:     What was the call regarding: PATIENT CALLED IN DUE TO NOT BEING ABLE TO KEEP SCHEDULED APPT.  SCHEDULED FIRST AVAIL THAT PATIENT COULD DO. ONLY WANTED TO SEE DR. SÁNCHEZ. PLEASE ADVISE IF APPT IS OK. THANK YOU!    Do you require a callback: ONLY IF APPT ISN'T OK

## 2023-06-02 ENCOUNTER — APPOINTMENT (OUTPATIENT)
Dept: CT IMAGING | Facility: HOSPITAL | Age: 58
End: 2023-06-02
Payer: OTHER GOVERNMENT

## 2023-06-02 ENCOUNTER — HOSPITAL ENCOUNTER (EMERGENCY)
Facility: HOSPITAL | Age: 58
Discharge: HOME | End: 2023-06-02
Attending: EMERGENCY MEDICINE
Payer: OTHER GOVERNMENT

## 2023-06-02 ENCOUNTER — APPOINTMENT (OUTPATIENT)
Dept: GENERAL RADIOLOGY | Facility: HOSPITAL | Age: 58
End: 2023-06-02
Payer: OTHER GOVERNMENT

## 2023-06-02 ENCOUNTER — TELEPHONE (OUTPATIENT)
Dept: INTERNAL MEDICINE | Facility: CLINIC | Age: 58
End: 2023-06-02

## 2023-06-02 VITALS
BODY MASS INDEX: 30.89 KG/M2 | OXYGEN SATURATION: 96 % | DIASTOLIC BLOOD PRESSURE: 74 MMHG | HEART RATE: 73 BPM | HEIGHT: 63 IN | RESPIRATION RATE: 20 BRPM | SYSTOLIC BLOOD PRESSURE: 139 MMHG | TEMPERATURE: 98.4 F

## 2023-06-02 DIAGNOSIS — S70.02XA CONTUSION OF LEFT HIP, INITIAL ENCOUNTER: ICD-10-CM

## 2023-06-02 DIAGNOSIS — G44.329 CHRONIC POST-TRAUMATIC HEADACHE, NOT INTRACTABLE: Primary | ICD-10-CM

## 2023-06-02 DIAGNOSIS — R42 DIZZINESS: ICD-10-CM
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
## 2023-06-02 LAB
ALBUMIN SERPL-MCNC: 4.4 G/DL (ref 3.5–5.2)
ALBUMIN/GLOB SERPL: 1.5 G/DL
ALP SERPL-CCNC: 106 U/L (ref 39–117)
ALT SERPL W P-5'-P-CCNC: 25 U/L (ref 1–33)
ANION GAP SERPL CALCULATED.3IONS-SCNC: 13.7 MMOL/L (ref 5–15)
APTT PPP: 29.9 SECONDS (ref 24.2–34.2)
AST SERPL-CCNC: 26 U/L (ref 1–32)
BASOPHILS # BLD AUTO: 0.06 10*3/MM3 (ref 0–0.2)
BASOPHILS NFR BLD AUTO: 0.7 % (ref 0–1.5)
BILIRUB SERPL-MCNC: 0.8 MG/DL (ref 0–1.2)
BUN SERPL-MCNC: 17 MG/DL (ref 6–20)
BUN/CREAT SERPL: 19.1 (ref 7–25)
CALCIUM SPEC-SCNC: 9.1 MG/DL (ref 8.6–10.5)
CHLORIDE SERPL-SCNC: 102 MMOL/L (ref 98–107)
CO2 SERPL-SCNC: 24.3 MMOL/L (ref 22–29)
CREAT SERPL-MCNC: 0.89 MG/DL (ref 0.57–1)
DEPRECATED RDW RBC AUTO: 41.3 FL (ref 37–54)
EGFRCR SERPLBLD CKD-EPI 2021: 74.8 ML/MIN/1.73
EOSINOPHIL # BLD AUTO: 0.09 10*3/MM3 (ref 0–0.4)
EOSINOPHIL NFR BLD AUTO: 1.1 % (ref 0.3–6.2)
ERYTHROCYTE [DISTWIDTH] IN BLOOD BY AUTOMATED COUNT: 13.2 % (ref 12.3–15.4)
GLOBULIN UR ELPH-MCNC: 3 GM/DL
GLUCOSE BLDC GLUCOMTR-MCNC: 90 MG/DL (ref 70–99)
GLUCOSE SERPL-MCNC: 86 MG/DL (ref 65–99)
HCT VFR BLD AUTO: 43.5 % (ref 34–46.6)
HGB BLD-MCNC: 14.7 G/DL (ref 12–15.9)
HOLD SPECIMEN: NORMAL
HOLD SPECIMEN: NORMAL
IMM GRANULOCYTES # BLD AUTO: 0.02 10*3/MM3 (ref 0–0.05)
IMM GRANULOCYTES NFR BLD AUTO: 0.2 % (ref 0–0.5)
INR PPP: 1.1 (ref 0.86–1.15)
LYMPHOCYTES # BLD AUTO: 0.94 10*3/MM3 (ref 0.7–3.1)
LYMPHOCYTES NFR BLD AUTO: 11 % (ref 19.6–45.3)
MCH RBC QN AUTO: 29.3 PG (ref 26.6–33)
MCHC RBC AUTO-ENTMCNC: 33.8 G/DL (ref 31.5–35.7)
MCV RBC AUTO: 86.8 FL (ref 79–97)
MONOCYTES # BLD AUTO: 1.12 10*3/MM3 (ref 0.1–0.9)
MONOCYTES NFR BLD AUTO: 13.1 % (ref 5–12)
NEUTROPHILS NFR BLD AUTO: 6.29 10*3/MM3 (ref 1.7–7)
NEUTROPHILS NFR BLD AUTO: 73.9 % (ref 42.7–76)
NRBC BLD AUTO-RTO: 0 /100 WBC (ref 0–0.2)
PLATELET # BLD AUTO: 253 10*3/MM3 (ref 140–450)
PMV BLD AUTO: 10.1 FL (ref 6–12)
POTASSIUM SERPL-SCNC: 3.7 MMOL/L (ref 3.5–5.2)
PROT SERPL-MCNC: 7.4 G/DL (ref 6–8.5)
PROTHROMBIN TIME: 14.2 SECONDS (ref 11.8–14.9)
RBC # BLD AUTO: 5.01 10*6/MM3 (ref 3.77–5.28)
SODIUM SERPL-SCNC: 140 MMOL/L (ref 136–145)
TROPONIN T SERPL HS-MCNC: <6 NG/L
WBC NRBC COR # BLD: 8.52 10*3/MM3 (ref 3.4–10.8)
WHOLE BLOOD HOLD COAG: NORMAL
WHOLE BLOOD HOLD SPECIMEN: NORMAL

## 2023-06-02 PROCEDURE — 96375 TX/PRO/DX INJ NEW DRUG ADDON: CPT

## 2023-06-02 PROCEDURE — 96374 THER/PROPH/DIAG INJ IV PUSH: CPT

## 2023-06-02 PROCEDURE — 25010000002 KETOROLAC TROMETHAMINE PER 15 MG: Performed by: EMERGENCY MEDICINE

## 2023-06-02 PROCEDURE — 85025 COMPLETE CBC W/AUTO DIFF WBC: CPT

## 2023-06-02 PROCEDURE — 85730 THROMBOPLASTIN TIME PARTIAL: CPT

## 2023-06-02 PROCEDURE — 93005 ELECTROCARDIOGRAM TRACING: CPT | Performed by: EMERGENCY MEDICINE

## 2023-06-02 PROCEDURE — 99284 EMERGENCY DEPT VISIT MOD MDM: CPT

## 2023-06-02 PROCEDURE — 84484 ASSAY OF TROPONIN QUANT: CPT

## 2023-06-02 PROCEDURE — 71045 X-RAY EXAM CHEST 1 VIEW: CPT

## 2023-06-02 PROCEDURE — 93005 ELECTROCARDIOGRAM TRACING: CPT

## 2023-06-02 PROCEDURE — 85610 PROTHROMBIN TIME: CPT

## 2023-06-02 PROCEDURE — 99204 OFFICE O/P NEW MOD 45 MIN: CPT | Performed by: PSYCHIATRY & NEUROLOGY

## 2023-06-02 PROCEDURE — 25010000002 PROCHLORPERAZINE 10 MG/2ML SOLUTION: Performed by: EMERGENCY MEDICINE

## 2023-06-02 PROCEDURE — 80053 COMPREHEN METABOLIC PANEL: CPT

## 2023-06-02 PROCEDURE — 25010000002 DIPHENHYDRAMINE PER 50 MG: Performed by: EMERGENCY MEDICINE

## 2023-06-02 PROCEDURE — 70450 CT HEAD/BRAIN W/O DYE: CPT

## 2023-06-02 PROCEDURE — 82948 REAGENT STRIP/BLOOD GLUCOSE: CPT

## 2023-06-02 RX ORDER — SODIUM CHLORIDE 0.9 % (FLUSH) 0.9 %
10 SYRINGE (ML) INJECTION AS NEEDED
Status: DISCONTINUED | OUTPATIENT
Start: 2023-06-02 | End: 2023-06-02 | Stop reason: HOSPADM

## 2023-06-02 RX ORDER — PROCHLORPERAZINE EDISYLATE 5 MG/ML
10 INJECTION INTRAMUSCULAR; INTRAVENOUS ONCE
Status: COMPLETED | OUTPATIENT
Start: 2023-06-02 | End: 2023-06-02

## 2023-06-02 RX ORDER — KETOROLAC TROMETHAMINE 30 MG/ML
30 INJECTION, SOLUTION INTRAMUSCULAR; INTRAVENOUS ONCE
Status: COMPLETED | OUTPATIENT
Start: 2023-06-02 | End: 2023-06-02

## 2023-06-02 RX ORDER — DIPHENHYDRAMINE HYDROCHLORIDE 50 MG/ML
25 INJECTION INTRAMUSCULAR; INTRAVENOUS ONCE
Status: COMPLETED | OUTPATIENT
Start: 2023-06-02 | End: 2023-06-02

## 2023-06-02 RX ADMIN — KETOROLAC TROMETHAMINE 30 MG: 30 INJECTION, SOLUTION INTRAMUSCULAR; INTRAVENOUS at 19:27

## 2023-06-02 RX ADMIN — PROCHLORPERAZINE EDISYLATE 10 MG: 5 INJECTION, SOLUTION INTRAMUSCULAR; INTRAVENOUS at 19:27

## 2023-06-02 RX ADMIN — SODIUM CHLORIDE 1000 ML: 9 INJECTION, SOLUTION INTRAVENOUS at 19:27

## 2023-06-02 RX ADMIN — DIPHENHYDRAMINE HYDROCHLORIDE 25 MG: 50 INJECTION INTRAMUSCULAR; INTRAVENOUS at 19:27

## 2023-06-02 NOTE — DISCHARGE INSTRUCTIONS
All of your blood work and CT scan of the head looked okay.    Nothing serious or life-threatening was found.    Please get some rest tonight to try to relieve your headache and follow-up with your doctor for further evaluation as needed.

## 2023-06-02 NOTE — TELEPHONE ENCOUNTER
Patient is wanting a same day for dizziness and a fall she just had. Patient stated she is pretty bruised up. Was informed to go to ER or urgent care to get checked out.

## 2023-06-02 NOTE — ED PROVIDER NOTES
"Time: 6:48 PM EDT  Date of encounter:  6/2/2023  Independent Historian/Clinical History and Information was obtained by:   Patient and Family  Chief Complaint: Headache, dizziness, right arm paresthesia today    History is limited by: N/A    History of Present Illness:  Patient is a 57 y.o. year old female who presents to the emergency department for evaluation of 10 out of 10 acute on chronic headache with photophobia, dizziness and room spinning symptoms and some intermittent right arm paresthesias like her arm felt \"asleep\".    She has a history of chronic daily headaches ever since receiving the initial COVID-19 vaccination back in 2021.    She also has history of TBI that is worsened her headaches.    She is currently undergoing therapy for symptoms of dizziness and daily headaches.    Today she had a bad headache and felt dizzy and actually fell down onto her left side, bruising her left hip and left side of the head.        She denies any fevers or cough or congestion or recent illness.    HPI    Patient Care Team  Primary Care Provider: Ame Xie APRN    Past Medical History:     Allergies   Allergen Reactions   • Contrast Dye (Echo Or Unknown Ct/Mr) Unknown - High Severity   • Iodine Unknown - High Severity   • Ceftriaxone Itching   • Iodinated Contrast Media Unknown - Low Severity     Past Medical History:   Diagnosis Date   • Acute nonintractable headache 10/14/2021    Toradol IM given in the clinic.    • Acute viral syndrome 10/14/2021    Call or RTC if symptoms persist.    • Allergies    • Anemia    • Arthritis    • Asthma    • Bowel disease    • Cervical cancer    • Colitis    • Difficulty walking    • Fibromyalgia, primary    • Gait disturbance    • Gallstones    • Gastric ulcer    • Headache    • Heart disease    • Hyperlipemia    • Hypertension    • Kidney stones    • Kidney stones 06/28/2021   • Limb swelling    • Osteoarthritis 03/16/2018    Of left thumb CMC joint   • Reflux " esophagitis    • Seasonal allergies    • TBI (traumatic brain injury)     2022     Past Surgical History:   Procedure Laterality Date   • ABDOMINAL HYSTERECTOMY     •  SECTION     • CHOLECYSTECTOMY     • COLONOSCOPY  ,,,2018   • ENDOSCOPY  ,,   • KIDNEY STONE SURGERY  2014    Right Ureteroscopy, Laser litho, stent insertion   • RHINOPLASTY     • TONSILLECTOMY       Family History   Problem Relation Age of Onset   • Heart disease Mother    • Cancer Mother    • Stroke Father    • Diabetes Father    • Hypertension Father    • Arthritis Maternal Grandmother        Home Medications:  Prior to Admission medications    Medication Sig Start Date End Date Taking? Authorizing Provider   albuterol sulfate  (90 Base) MCG/ACT inhaler Inhale 2 puffs Every 4 (Four) Hours As Needed for Wheezing or Shortness of Air. 3/15/23   Ame Xie APRN   atenolol (TENORMIN) 25 MG tablet Take 1 tablet by mouth every night at bedtime. 3/15/23   Ame Xie APRN   cetirizine (zyrTEC) 10 MG tablet Take 1 tablet by mouth Daily. 3/15/23   Ame Xie APRN   estradiol (Kendra) 0.075 MG/24HR patch Place 1 patch on the skin as directed by provider 2 (Two) Times a Week. 3/16/23   Ame Xie APRN   ezetimibe (ZETIA) 10 MG tablet Take 1 tablet by mouth Daily. 3/15/23   Ame Xie APRN   fluticasone (Flonase) 50 MCG/ACT nasal spray 2 sprays into the nostril(s) as directed by provider Daily. 3/15/23   Ame Xie APRN   furosemide (LASIX) 40 MG tablet Take 1 tablet by mouth Daily. 3/15/23   Ame Xie APRN   levothyroxine (Synthroid) 75 MCG tablet Take 1 tablet by mouth Daily. 3/15/23   Ame Xie APRN   meclizine (ANTIVERT) 25 MG tablet Take 1 tablet by mouth 3 (Three) Times a Day As Needed for Dizziness. 3/15/23   Ame Xie APRN   pitavastatin calcium (Livalo) 1 MG tablet tablet Take 1 tablet by mouth Daily.  "3/15/23   Ame Xie APRN   potassium chloride 10 MEQ CR tablet Take 1 tablet by mouth 2 (Two) Times a Day. 3/15/23   Ame Xie APRN   Scopolamine (Transderm-Scop, 1.5 MG,) 1 MG/3DAYS patch Place 1 patch on the skin as directed by provider Every 72 (Seventy-Two) Hours. 3/15/23   Ame Xie APRN        Social History:   Social History     Tobacco Use   • Smoking status: Never   • Smokeless tobacco: Never   Vaping Use   • Vaping Use: Never used   Substance Use Topics   • Alcohol use: Yes     Comment: Some day / rarely once a year   • Drug use: Never         Review of Systems:  Review of Systems   I performed a 10 point review of systems which was all negative, except for the positives found in the HPI above.  Physical Exam:  /74   Pulse 73   Temp 98.4 °F (36.9 °C)   Resp 20   Ht 160 cm (63\")   SpO2 96%   BMI 30.89 kg/m²     Physical Exam   General: Awake alert and in no obvious distress    HEENT: Head normocephalic atraumatic, eyes PERRLA EOMI, nose normal, oropharynx normal.    Neck: Supple full range of motion, no meningismus, no lymphadenopathy    Heart: Regular rate and rhythm, no murmurs or rubs, 2+ radial pulses bilaterally    Lungs: Clear to auscultation bilaterally without wheezes or crackles, no respiratory distress    Abdomen: Soft, nontender, nondistended, no rebound or guarding    Skin: Warm, dry, no rash    Musculoskeletal: Normal range of motion, no lower extremity edema; reproducible tenderness over the left anterior iliac crest but no obvious bruising or crepitus or deformity.    Neurologic: Oriented x3, no motor deficits no sensory deficits    Psychiatric: Mood appears stable, no psychosis          Procedures:  Procedures      Medical Decision Making:      Comorbidities that affect care:    History of TBI, daily headaches, chronic dizziness    External Notes reviewed:    Previous Clinic Note: I reviewed her urgent care note from earlier today where she was " seen for dizziness and bad headache and fall      The following orders were placed and all results were independently analyzed by me:  Orders Placed This Encounter   Procedures   • CT Head Without Contrast Stroke Protocol   • XR Chest 1 View   • [place] Draw   • Comprehensive Metabolic Panel   • Protime-INR   • aPTT   • Single High Sensitivity Troponin T   • CBC Auto Differential   • Initiate Department's Acute Stroke Process (Team D, Code 19, etc)   • Perform NIH Stroke Scale   • Measure Actual Weight   • Head of Bed 30 Degrees or Less   • Undress and Gown   • Continuous Pulse Oximetry   • Vital Signs   • Notify MD for SBP < 80 or > 200   • Notify Provider for SBP greater than 140 if hemorrhagic Stroke   • No Hypotonic Fluids   • RN to Place Order SLP Consult (IF swallow screen failed) - Eval & Treat Choosing Reason of RN Dysphagia Screen Failed   • POC Glucose Once   • ECG 12 Lead ED Triage Standing Order; Acute Stroke (Onset <12 hrs)   • CBC & Differential   • Green Top (Gel)   • Lavender Top   • Gold Top - SST   • Light Blue Top       Medications Given in the Emergency Department:  Medications   diphenhydrAMINE (BENADRYL) injection 25 mg (25 mg Intravenous Given 6/2/23 1927)   ketorolac (TORADOL) injection 30 mg (30 mg Intravenous Given 6/2/23 1927)   prochlorperazine (COMPAZINE) injection 10 mg (10 mg Intravenous Given 6/2/23 1927)   sodium chloride 0.9 % bolus 1,000 mL (0 mL Intravenous Stopped 6/2/23 2042)        ED Course:    ED Course as of 06/03/23 0139   Fri Jun 02, 2023   1616 The patient was seen and examined by me, MIGNON Rasheed, while in triage. Orders placed. Patient is awaiting disposition.   [AR]   1831 EKG: I interpreted her twelve-lead EKG is sinus rhythm at 69 bpm, normal P waves, normal QRS, normal ST segments, normal T waves; large artifact present, incorrect QTc interval measurement by EKG machine. [VS]      ED Course User Index  [AR] Elizabeth Gutierrez APRN  [VS] Beni Gillette MD        Labs:    Lab Results (last 24 hours)     Procedure Component Value Units Date/Time    POC Glucose Once [[id number]]  (Normal) Collected: 06/02/23 1614    Specimen: Blood Updated: 06/02/23 1616     Glucose 90 mg/dL      Comment: Serial Number: [id number]:  [id number]       CBC & Differential [[id number]]  (Abnormal) Collected: 06/02/23 1625    Specimen: Blood Updated: 06/02/23 1635    Narrative:      The following orders were created for panel order CBC & Differential.  Procedure                               Abnormality         Status                     ---------                               -----------         ------                     CBC Auto Differential[[id number]]        Abnormal            Final result                 Please view results for these tests on the individual orders.    Comprehensive Metabolic Panel [[id number]] Collected: 06/02/23 1625    Specimen: Blood Updated: 06/02/23 1702     Glucose 86 mg/dL      BUN 17 mg/dL      Creatinine 0.89 mg/dL      Sodium 140 mmol/L      Potassium 3.7 mmol/L      Chloride 102 mmol/L      CO2 24.3 mmol/L      Calcium 9.1 mg/dL      Total Protein 7.4 g/dL      Albumin 4.4 g/dL      ALT (SGPT) 25 U/L      AST (SGOT) 26 U/L      Alkaline Phosphatase 106 U/L      Total Bilirubin 0.8 mg/dL      Globulin 3.0 gm/dL      A/G Ratio 1.5 g/dL      BUN/Creatinine Ratio 19.1     Anion Gap 13.7 mmol/L      eGFR 74.8 mL/min/1.73     Narrative:      GFR Normal >60  Chronic Kidney Disease <60  Kidney Failure <15      Protime-INR [[id number]]  (Normal) Collected: 06/02/23 1625    Specimen: Blood Updated: 06/02/23 1705     Protime 14.2 Seconds      INR 1.10    Narrative:      Suggested Therapeutic Ranges For Oral Anticoagulant Therapy:  Level of Therapy                      INR Target Range  Standard Dose                            2.0-3.0  High Dose                                2.5-3.5  Patients not receiving anticoagulant  Therapy Normal Range                      0.86-1.15    aPTT [[id number]]  (Normal) Collected: 06/02/23 1625    Specimen: Blood Updated: 06/02/23 1705     PTT 29.9 seconds     Single High Sensitivity Troponin T [[id number]]  (Normal) Collected: 06/02/23 1625    Specimen: Blood Updated: 06/02/23 1702     HS Troponin T <6 ng/L     Narrative:      High Sensitive Troponin T Reference Range:  <10.0 ng/L- Negative Female for AMI  <15.0 ng/L- Negative Male for AMI  >=10 - Abnormal Female indicating possible myocardial injury.  >=15 - Abnormal Male indicating possible myocardial injury.   Clinicians would have to utilize clinical acumen, EKG, Troponin, and serial changes to determine if it is an Acute Myocardial Infarction or myocardial injury due to an underlying chronic condition.         CBC Auto Differential [[id number]]  (Abnormal) Collected: 06/02/23 1625    Specimen: Blood Updated: 06/02/23 1635     WBC 8.52 10*3/mm3      RBC 5.01 10*6/mm3      Hemoglobin 14.7 g/dL      Hematocrit 43.5 %      MCV 86.8 fL      MCH 29.3 pg      MCHC 33.8 g/dL      RDW 13.2 %      RDW-SD 41.3 fl      MPV 10.1 fL      Platelets 253 10*3/mm3      Neutrophil % 73.9 %      Lymphocyte % 11.0 %      Monocyte % 13.1 %      Eosinophil % 1.1 %      Basophil % 0.7 %      Immature Grans % 0.2 %      Neutrophils, Absolute 6.29 10*3/mm3      Lymphocytes, Absolute 0.94 10*3/mm3      Monocytes, Absolute 1.12 10*3/mm3      Eosinophils, Absolute 0.09 10*3/mm3      Basophils, Absolute 0.06 10*3/mm3      Immature Grans, Absolute 0.02 10*3/mm3      nRBC 0.0 /100 WBC            Imaging:    XR Chest 1 View    Result Date: 6/2/2023  PROCEDURE: XR CHEST 1 VW  COMPARISON: [facility], , XR CHEST 1 VW, 4/28/2022, 18:17.  INDICATIONS: Acute Stroke Protocol (Onset < 12 hrs)  FINDINGS:  The lungs are clear bilaterally.  The cardiac and mediastinal silhouettes appear normal.  No effusion is seen.        1. No acute cardiopulmonary disease       [place] M.D.       Electronically Signed and  Approved By: [place] M.D. on 6/02/2023 at 17:14             CT Head Without Contrast Stroke Protocol    Result Date: 6/2/2023  PROCEDURE: CT HEAD WO CONTRAST STROKE PROTOCOL  COMPARISON:  [facility], CT, CT HEAD WO CONTRAST, 4/28/2022, 18:31. INDICATIONS: Neuro deficit, acute, stroke suspected/HEADACHE, DIZZY  PROTOCOL:   Standard imaging protocol performed    RADIATION:   DLP: 890.4 mGy*cm   MA and/or KV was adjusted to minimize radiation dose.     TECHNIQUE: After obtaining the patient's consent, CT images were obtained without non-ionic intravenous contrast material.  FINDINGS:  There is no acute intracranial hemorrhage or extra-axial collection. The ventricles appear normal in caliber, with no evidence of mass effect or midline shift. The basal cisterns are patent. The gray-white differentiation is preserved.  The calvarium is intact. The paranasal sinuses are clear. The mastoid air cells are well-aerated.       No acute intracranial process identified.  Results were called to the ED staff at time of dictation.     KAELYN NEIL MD       Electronically Signed and Approved By: KAELYN NEIL MD on 6/02/2023 at 16:39                 Differential Diagnosis and Discussion:    Dizziness: Based on the patient's history, signs, and symptoms, the diffential diagnosis includes but is not limited to meningitis, stroke, sepsis, subarachnoid hemorrhage, intracranial bleeding, encephalitis, vertigo, electrolyte imbalance, and metabolic disorders.  Headache: Differential diagnosis includes but is not limited to migraine, cluster headache, hypertension, tumor, subarachnoid bleeding, pseudotumor cerebri, temporal arteritis, infections, tension headache, and TMJ syndrome.    All labs were reviewed and interpreted by me.  All X-rays impressions were independently interpreted by me.  EKG was interpreted by me.  CT scan radiology impression was interpreted by me.    MDM           This patient is a pleasant  57-year-old female with history of TBI and with previous history of chronic, daily headaches and intermittent dizziness and vertigo at baseline, now presenting with a bad headache and dizziness episode and fall onto her left side.    CT of the head was negative for intracranial bleed or fracture.    Given some dizziness and headache and some numbness and tingling down the arm she was evaluated by our on-call teleneurologist, who felt that she did not show signs of acute ischemic stroke and recommended no further imaging after CT of the head was negative.    I am treating her with an IV migraine cocktail including Compazine and Benadryl and Toradol and fluids and will reassess.    Otherwise all of her blood work and CT scan looks okay and I think this is an acute exacerbation of her chronic headache and dizziness for which she will be given instructions to rest and follow-up with her PCP.                Patient Care Considerations:          Consultants/Shared Management Plan:    Consultant: I have discussed the case with The on-call teleneurologist, who states They have evaluated the patient and have low suspicion for stroke and no further imaging needed other than the CT of the head that was negative.  They favor classic migraine headache with aura.    Social Determinants of Health:    Patient has presented with family members who are responsible, reliable and will ensure follow up care.      Disposition and Care Coordination:    Discharged: I considered escalation of care by admitting this patient for observation, however the patient has improved and is suitable and  stable for discharge.    I have explained the patient´s condition, diagnoses and treatment plan based on the information available to me at this time. I have answered questions and addressed any concerns. The patient has a good  understanding of the patient´s diagnosis, condition, and treatment plan as can be expected at this point. The vital signs  have been stable. The patient´s condition is stable and appropriate for discharge from the emergency department.      The patient will pursue further outpatient evaluation with the primary care physician or other designated or consulting physician as outlined in the discharge instructions. They are agreeable to this plan of care and follow-up instructions have been explained in detail. The patient has received these instructions in written format and have expressed an understanding of the discharge instructions. The patient is aware that any significant change in condition or worsening of symptoms should prompt an immediate return to this or the closest emergency department or call to 911.  I have explained discharge medications and the need for follow up with the patient/caretakers. This was also printed in the discharge instructions. Patient was discharged with the following medications and follow up:      Medication List      No changes were made to your prescriptions during this visit.      Ame Xie, APRN  [address] [address]  [phone]    Call in 2 days  As needed, If symptoms worsen, for a follow-up appointment       Final diagnoses:   Chronic post-traumatic headache, not intractable   Dizziness   Fall, initial encounter   Contusion of left hip, initial encounter        ED Disposition     ED Disposition   Discharge    Condition   Stable    Comment   --             This medical record created using voice recognition software.           Beni Gillette MD  06/03/23 0139

## 2023-06-02 NOTE — CONSULTS
[facility]  [facility]      Patient Name:   Mariama Turner  YOB: 1965  Identification Number:   MRN - [id number]  Date of Service:   06/02/2023 16:19:07    Diagnosis:      •  R51.9 - Headache, unspecified    Impression:      • 56 yo female with a history of migraine headaches presents with headache and arm pain and tingling;j suspect migraine with aura. Recommend a migraine headache cocktail to allow benefit assessment. If symptoms don't resolve or remain troubling, consider stroke work up as felt indicated. No indication for thrombolytic therapy nor need for interventional radiology assessment. Neurology will sign off; call for questions or concerns.    Our recommendations are outlined below.    Recommendations:    DVT prophylaxis:      •  Choice of Primary Team    Disposition:      •  Sign Out    Sign Out:      •  Discussed with Emergency Department Provider        ------------------------------------------------------------------------------    Advanced Imaging:  Advanced Imaging Deferred because:    Obtain if felt indicated      Metrics:  Last Known Well: 06/02/2023 02:00:00  [facility] Notification Time: 06/02/2023 16:18:22  Arrival Time: 06/02/2023 16:16:57  Stamp Time: 06/02/2023 16:19:07  Initial Response Time: 06/02/2023 16:20:03  Symptoms: Headache.  Initial patient interaction: 06/02/2023 16:27:01  NIHSS Assessment Completed: 06/02/2023 16:27:35  Patient is not a candidate for Thrombolytic.  Thrombolytic Medical Decision: 06/02/2023 16:27:36  Patient was not deemed candidate for Thrombolytic because of following reasons:  Other Diagnosis suspected.    I personally Reviewed the CT Head and it Showed No Acute Hemorrhage or Acute Core Infarct    Primary Provider Notified of Diagnostic Impression and Management Plan on: 06/02/2023 16:43:09        ------------------------------------------------------------------------------    History of Present  Illness:  Patient is a 57 year old Female.    Patient was brought by private transportation with symptoms of Headache.  58 yo female with a worsening headache over time with a gait instability and fall. She has right arm pain and tingling but no weakness. After her fall today, her right arm was asleep but is now also heavy. She has no leg weakness nor sensory changes.      Past Medical History:      • Hypertension      • Hyperlipidemia      • Migraine Headaches      • There is no history of Diabetes Mellitus      • There is no history of Stroke      • There is no history of Seizures  Othere PMH:  anxiety;    Medications:    No Anticoagulant use   No Antiplatelet use  Reviewed EMR for current medications    Allergies:   Reviewed  Description: iodine;    Social History:  Smoking: No  Alcohol Use: No  Drug Use: No    Family History:    There is no family history of premature cerebrovascular disease pertinent to this consultation    ROS :  14 Points Review of Systems was performed and was negative except mentioned in HPI.    Past Surgical History:  There Is No Surgical History Contributory To Today’s Visit        Examination:  BP(131/74), Pulse(79), Blood Glucose(90)  1A: Level of Consciousness - Alert; keenly responsive + 0  1B: Ask Month and Age - 1 Question Right + 1  1C: Blink Eyes & Squeeze Hands - Performs Both Tasks + 0  2: Test Horizontal Extraocular Movements - Normal + 0  3: Test Visual Fields - No Visual Loss + 0  4: Test Facial Palsy (Use Grimace if Obtunded) - Normal symmetry + 0  5A: Test Left Arm Motor Drift - No Drift for 10 Seconds + 0  5B: Test Right Arm Motor Drift - Drift, but doesn't hit bed + 1  6A: Test Left Leg Motor Drift - No Drift for 5 Seconds + 0  6B: Test Right Leg Motor Drift - No Drift for 5 Seconds + 0  7: Test Limb Ataxia (FNF/Heel-Shin) - No Ataxia + 0  8: Test Sensation - Mild-Moderate Loss: Less Sharp/More Dull + 1  9: Test Language/Aphasia - Normal; No aphasia + 0  10: Test  Dysarthria - Normal + 0  11: Test Extinction/Inattention - No abnormality + 0    NIHSS Score: 3  NIHSS Free Text : upper visual field difficulties; right hemibody and face less sensation;    Pre-Morbid Modified Laureen Scale:  1 Points = No significant disability despite symptoms; able to carry out all usual duties and activities    I reviewed the available imaging via A.I. software Rapid and initiated discussion with the primary provider    Patient/Family was informed the Neurology Consult would occur via TeleHealth consult by way of interactive audio and video telecommunications and consented to receiving care in this manner.      Patient is being evaluated for possible acute neurologic impairment and high probability of imminent or life-threatening deterioration. I spent total of 35 minutes providing care to this patient, including time for face to face visit via telemedicine, review of medical records, imaging studies and discussion of findings with providers, the patient and/or family.      Dr Herman Velazco      [facility]  [phone]    Case [id number]

## 2023-06-03 LAB — QT INTERVAL: 682 MS

## 2023-07-31 ENCOUNTER — OFFICE VISIT (OUTPATIENT)
Dept: CARDIOLOGY | Facility: CLINIC | Age: 58
End: 2023-07-31
Payer: OTHER GOVERNMENT

## 2023-07-31 VITALS
SYSTOLIC BLOOD PRESSURE: 145 MMHG | WEIGHT: 160 LBS | DIASTOLIC BLOOD PRESSURE: 76 MMHG | HEIGHT: 63 IN | BODY MASS INDEX: 28.35 KG/M2 | HEART RATE: 67 BPM

## 2023-07-31 DIAGNOSIS — E78.2 MIXED HYPERLIPIDEMIA: ICD-10-CM

## 2023-07-31 DIAGNOSIS — R00.2 PALPITATIONS: Primary | ICD-10-CM

## 2023-07-31 DIAGNOSIS — I10 PRIMARY HYPERTENSION: ICD-10-CM

## 2023-07-31 DIAGNOSIS — I34.0 NONRHEUMATIC MITRAL VALVE REGURGITATION: ICD-10-CM

## 2023-07-31 PROCEDURE — 99214 OFFICE O/P EST MOD 30 MIN: CPT | Performed by: INTERNAL MEDICINE

## 2023-08-01 ENCOUNTER — OFFICE VISIT (OUTPATIENT)
Dept: NEUROLOGY | Facility: CLINIC | Age: 58
End: 2023-08-01
Payer: OTHER GOVERNMENT

## 2023-08-01 VITALS
DIASTOLIC BLOOD PRESSURE: 78 MMHG | BODY MASS INDEX: 28.34 KG/M2 | HEIGHT: 63 IN | HEART RATE: 55 BPM | OXYGEN SATURATION: 97 % | SYSTOLIC BLOOD PRESSURE: 126 MMHG

## 2023-08-01 DIAGNOSIS — F07.81 POSTCONCUSSIVE SYNDROME: ICD-10-CM

## 2023-08-01 DIAGNOSIS — H83.2X9 VESTIBULAR DYSFUNCTION, UNSPECIFIED LATERALITY: Primary | ICD-10-CM

## 2023-08-01 DIAGNOSIS — R41.89 COGNITIVE IMPAIRMENT: ICD-10-CM

## 2023-08-01 PROCEDURE — 99214 OFFICE O/P EST MOD 30 MIN: CPT | Performed by: PSYCHIATRY & NEUROLOGY

## 2023-08-10 ENCOUNTER — OFFICE VISIT (OUTPATIENT)
Dept: INTERNAL MEDICINE | Facility: CLINIC | Age: 58
End: 2023-08-10
Payer: OTHER GOVERNMENT

## 2023-08-10 VITALS
OXYGEN SATURATION: 97 % | HEIGHT: 63 IN | SYSTOLIC BLOOD PRESSURE: 127 MMHG | DIASTOLIC BLOOD PRESSURE: 75 MMHG | HEART RATE: 54 BPM | TEMPERATURE: 98.1 F | BODY MASS INDEX: 27.75 KG/M2 | WEIGHT: 156.6 LBS

## 2023-08-10 DIAGNOSIS — E78.2 MIXED HYPERLIPIDEMIA: ICD-10-CM

## 2023-08-10 DIAGNOSIS — E03.9 HYPOTHYROIDISM, UNSPECIFIED TYPE: ICD-10-CM

## 2023-08-10 DIAGNOSIS — I10 PRIMARY HYPERTENSION: ICD-10-CM

## 2023-08-10 DIAGNOSIS — H92.03 OTALGIA, BILATERAL: ICD-10-CM

## 2023-08-10 DIAGNOSIS — H91.93 BILATERAL HEARING LOSS, UNSPECIFIED HEARING LOSS TYPE: Primary | ICD-10-CM

## 2023-08-10 DIAGNOSIS — M51.36 BULGING LUMBAR DISC: ICD-10-CM

## 2023-08-10 DIAGNOSIS — B00.1 FEVER BLISTER: ICD-10-CM

## 2023-08-10 PROCEDURE — 99214 OFFICE O/P EST MOD 30 MIN: CPT | Performed by: NURSE PRACTITIONER

## 2023-08-10 RX ORDER — VALACYCLOVIR HYDROCHLORIDE 1 G/1
1000 TABLET, FILM COATED ORAL 2 TIMES DAILY
Qty: 14 TABLET | Refills: 0 | Status: SHIPPED | OUTPATIENT
Start: 2023-08-10 | End: 2023-08-17

## 2023-08-10 NOTE — PROGRESS NOTES
Chief Complaint  Balance Issues (Patient states she is here to follow up on hearing tests. ) and Back Pain (Had MRI, results in chart. )    Subjective          Mariama Turner presents to [facility]  History of Present Illness      Hypothyroidism  This is a chronic problem. Associated symptoms include fatigue. Pertinent negatives include no abdominal pain, anorexia, arthralgias, change in bowel habit, chest pain, chills, congestion, coughing, diaphoresis, fever, headaches, joint swelling, myalgias, nausea, neck pain, numbness, rash, sore throat, swollen glands, urinary symptoms, vertigo, visual change, vomiting or weakness.   Hypertension  This is a chronic problem. The problem is controlled. Pertinent negatives include no anxiety, blurred vision, chest pain, headaches, malaise/fatigue, neck pain, orthopnea, palpitations, peripheral edema, PND, shortness of breath or sweats. Current antihypertension treatment includes beta blockers. The current treatment provides significant improvement. Compliance problems include diet and exercise.  There is no history of chronic renal disease.   Hyperlipidemia  This is a chronic problem. Exacerbating diseases include hypothyroidism. She has no history of chronic renal disease, diabetes, liver disease, obesity or nephrotic syndrome. Pertinent negatives include no chest pain, focal sensory loss, focal weakness, leg pain, myalgias or shortness of breath. Compliance problems include adherence to diet and adherence to exercise.         56-year-old female presents to the clinic today for follow-up regarding her TBI/headaches.   Had f/u hearing test and it was worse.   Current Outpatient Medications   Medication Instructions    albuterol sulfate  (90 Base) MCG/ACT inhaler 2 puffs, Inhalation, Every 4 Hours PRN    atenolol (TENORMIN) 25 mg, Oral, Every Night at Bedtime    cetirizine (ZYRTEC) 10 mg, Oral, Daily    estradiol (Kendra)  "0.075 MG/24HR patch 1 patch, Transdermal, 2 Times Weekly    ezetimibe (ZETIA) 10 mg, Oral, Daily    fluticasone (Flonase) 50 MCG/ACT nasal spray 2 sprays, Nasal, Daily    furosemide (LASIX) 40 mg, Oral, Daily    levothyroxine (SYNTHROID) 75 mcg, Oral, Daily    lidocaine (LIDODERM) 5 % 1 patch, Transdermal, Every 24 Hours, Remove & Discard patch within 12 hours or as directed by MD    meclizine (ANTIVERT) 25 mg, Oral, 3 Times Daily PRN    methocarbamol (ROBAXIN) 500 mg, Oral, 3 Times Daily    naproxen sodium (ALEVE) 440 mg, Oral, 3 Times Daily PRN    pitavastatin calcium (LIVALO) 1 mg, Oral, Daily    potassium chloride 10 MEQ CR tablet 10 mEq, Oral, 2 Times Daily    Scopolamine (Transderm-Scop, 1.5 MG,) 1 MG/3DAYS patch 1 patch, Transdermal, Every 72 Hours    valACYclovir (VALTREX) 1,000 mg, Oral, 2 Times Daily       The following portions of the patient's history were reviewed and updated as appropriate: allergies, current medications, past family history, past medical history, past social history, past surgical history, and problem list.    Objective   Vital Signs:   /75 (BP Location: Right arm, Patient Position: Sitting)   Pulse 54   Temp 98.1 øF (36.7 øC) (Temporal)   Ht 160 cm (63\")   Wt 71 kg (156 lb 9.6 oz)   SpO2 97%   BMI 27.74 kg/mý     BP Readings from Last 3 Encounters:   08/10/23 127/75   08/01/23 126/78   07/31/23 145/76     Wt Readings from Last 3 Encounters:   08/10/23 71 kg (156 lb 9.6 oz)   07/31/23 72.6 kg (160 lb)   06/26/23 73.9 kg (163 lb)         Physical Exam     Appearance: No acute distress, well-nourished  Head: normocephalic, atraumatic  Eyes: extraocular movements intact, no scleral icterus, no conjunctival injection  Ears, Nose, and Throat: external ears normal, nares patent, moist mucous membranes  Cardiovascular: regular rate and rhythm. no murmurs, rubs, or gallops. no edema  Respiratory: breathing comfortably, symmetric chest rise, clear to auscultation bilaterally. No " wheezes, rales, or rhonchi.  Neuro: alert and oriented to time, place, and person. Normal gait  Psych: normal mood and affect     Result Review :   The following data was reviewed by: MIGNON Gaitan on 08/10/2023:  Common labs          12/15/2022    17:01 3/15/2023    13:05 6/2/2023    16:25   Common Labs   Glucose 95  81  86    BUN 15  16  17    Creatinine 0.79  0.94  0.89    Sodium 139  140  140    Potassium 4.0  4.0  3.7    Chloride 101  103  102    Calcium 9.8  9.8  9.1    Albumin 4.50  4.2  4.4    Total Bilirubin 0.3  0.5  0.8    Alkaline Phosphatase 133  108  106    AST (SGOT) 34  29  26    ALT (SGPT) 32  28  25    WBC 4.04  5.08  8.52    Hemoglobin 14.9  14.3  14.7    Hematocrit 44.6  42.2  43.5    Platelets 231  261  253    Total Cholesterol 199  236     Triglycerides 140  166     HDL Cholesterol 59  60     LDL Cholesterol  115  146              Lab Results   Component Value Date    SARSANTIGEN Not Detected 11/14/2022    COVID19 Not Detected 11/14/2022    RAPFLUA Negative 11/14/2022    RAPFLUB Negative 11/14/2022    FLUAAG Not Detected 09/23/2021    FLU Negative 04/28/2022    FLU Negative 04/28/2022    FLUBAG Not Detected 09/23/2021    RAPSCRN Negative 11/14/2022    INR 1.10 06/02/2023    BILIRUBINUR Negative 01/28/2023       Procedures        Assessment and Plan    Diagnoses and all orders for this visit:    1. Bilateral hearing loss, unspecified hearing loss type (Primary)  -     Ambulatory Referral to Audiology  -     Ambulatory Referral to ENT (Otolaryngology)    2. Otalgia, bilateral  -     Ambulatory Referral to Audiology  -     Ambulatory Referral to ENT (Otolaryngology)    3. Bulging lumbar disc  -     Ambulatory Referral to Pain Management    4. Fever blister  -     valACYclovir (Valtrex) 1000 MG tablet; Take 1 tablet by mouth 2 (Two) Times a Day for 7 days.  Dispense: 14 tablet; Refill: 0    5. Primary hypertension    6. Mixed hyperlipidemia    7. Hypothyroidism, unspecified  type          There are no discontinued medications.       Follow Up   Return in about 3 months (around 11/10/2023).  Patient was given instructions and counseling regarding her condition or for health maintenance advice. Please see specific information pulled into the AVS if appropriate.       Ame Xie, MIGNON  08/10/23  12:53 EDT

## 2023-08-30 ENCOUNTER — ANCILLARY ORDERS (OUTPATIENT)
Dept: INTERNAL MEDICINE | Facility: CLINIC | Age: 58
End: 2023-08-30
Payer: OTHER GOVERNMENT

## 2023-08-30 ENCOUNTER — HOSPITAL ENCOUNTER (OUTPATIENT)
Dept: MRI IMAGING | Facility: HOSPITAL | Age: 58
Discharge: HOME | End: 2023-08-30
Admitting: NURSE PRACTITIONER
Payer: OTHER GOVERNMENT

## 2023-08-30 VITALS — DIASTOLIC BLOOD PRESSURE: 59 MMHG | SYSTOLIC BLOOD PRESSURE: 157 MMHG | OXYGEN SATURATION: 99 % | HEART RATE: 63 BPM

## 2023-08-30 DIAGNOSIS — N64.4 BREAST PAIN, LEFT: Primary | ICD-10-CM

## 2023-08-30 DIAGNOSIS — G44.329 CHRONIC POST-TRAUMATIC HEADACHE, NOT INTRACTABLE: ICD-10-CM

## 2023-08-30 DIAGNOSIS — N64.4 BREAST PAIN, LEFT: ICD-10-CM

## 2023-08-30 DIAGNOSIS — E78.2 MIXED HYPERLIPIDEMIA: ICD-10-CM

## 2023-08-30 DIAGNOSIS — I10 PRIMARY HYPERTENSION: ICD-10-CM

## 2023-08-30 DIAGNOSIS — G43.009 MIGRAINE WITHOUT AURA AND WITHOUT STATUS MIGRAINOSUS, NOT INTRACTABLE: ICD-10-CM

## 2023-08-30 DIAGNOSIS — H93.13 TINNITUS OF BOTH EARS: ICD-10-CM

## 2023-08-30 DIAGNOSIS — F07.81 POST CONCUSSIVE SYNDROME: ICD-10-CM

## 2023-08-30 PROCEDURE — 0 GADOBENATE DIMEGLUMINE 529 MG/ML SOLUTION: Performed by: NURSE PRACTITIONER

## 2023-08-30 PROCEDURE — 77049 MRI BREAST C-+ W/CAD BI: CPT

## 2023-08-30 PROCEDURE — A9577 INJ MULTIHANCE: HCPCS | Performed by: NURSE PRACTITIONER

## 2023-08-30 RX ADMIN — GADOBENATE DIMEGLUMINE 12 ML: 529 INJECTION, SOLUTION INTRAVENOUS at 11:14

## 2023-08-30 NOTE — NURSING NOTE
"Pt brought from MRI d/t unbalanced gait, headache, and brain fog. Pt has a hx of TBI in April 2022. Blood pressure slightly elevated from pt reported baseline. Other vitals stable. Pt complains of 6/10 headache, and feeling \"off balanced\", but feeling better. Dr. Grove spoke with pt at bedside, pt okay to go home when she feels she is safe to do so.   "

## 2023-08-30 NOTE — NURSING NOTE
Pt remains with unsteady gait. Called family member to drive her home and have someone stay with her for the next several hours.

## 2023-09-12 DIAGNOSIS — M54.42 ACUTE MIDLINE LOW BACK PAIN WITH LEFT-SIDED SCIATICA: ICD-10-CM

## 2023-09-18 ENCOUNTER — DOCUMENTATION (OUTPATIENT)
Dept: PHYSICAL THERAPY | Facility: CLINIC | Age: 58
End: 2023-09-18
Payer: OTHER GOVERNMENT

## 2023-09-18 NOTE — PROGRESS NOTES
Speech Discharge Statement        Outpatient Speech Language Pathology   Adult Speech Language/Cognition Discharge     Patient Name: Mariama Turner  : 1965  MRN: [id number]  Today's Date: 2023         Visit Date: 2023        Visit Dx:  Cognitive Communication Deficit    Patient's History:Speech Therapy    Reason for Discharge:Unexpected discharge.  Patient received 14 visits but did not return for d/c purposes so goal will be marked at not met      Discharge Instructions:   None given secondary to unexpected discharge      Functional Communication Measures:  N/A  Assessment:    N/A      Speech Therapy Goals:  Problem:      1.  Memory Limitation of 40-59%; FCM 4/7                 LTG 1: 12 weeks: Patient will increase Functional Communication Measure Score for memory to 6/7 decreasing limitation to 1 -19%.                              STATUS:  Not met              STG1 a: 12 weeks: Patient will demonstrate the ability to use appropriate memory strategies to encode novel and complex information with min cueing.              STATUS:  Not met              STG 1b: 12 weeks:  Patient will complete mod complex short term memory tasks with 85 or above % accuracy and/or min assist for strategy use.                          STATUS: Not met                STG 1c: 12 weeks: Patient will complete immediate memory tasks with 85 or above % accuracy and min assist for strategy use.                          STATUS: Not met              TREATMENT:  Speech Therapy to increase memory for safe completion of IADL tasks.                      2. Attention disturbance              LTG 2: 12 weeks: Patient will increase attention to encode information and increase accurate processing of information for moderately complex decision making skills during ADLS.     STATUS: Not met              STG 2a: 12 weeks:  Patient will sustain attention to basic tasks for 45 or more minutes with min assist through redirection  of sustained attention.                          STATUS:  Not met              STG 2b: 12 weeks.  Patient will complete divided attention tasks with min assist to attend to multiple information required to make appropriate and timely decisions for ADLS.                          STATUS: Not met              STG 2c: 12 weeks. Patient will complete alternating attention tasks with min assist to encode information required to complete multiple tasks.                          STATUS: Not met  Treatment:  Speech therapy to increase attention for safe completion of IADLs.         LTG 3: 12 weeks: Patient will increase thought organization and problem solving skills for safe completion of IADL tasks.                            STATUS:  Not met                  STG 3a: 12 weeks:  Patient will complete moderately complex thought organization tasks  with min assist to increase organization to solve moderately complex ADL problems.              STATUS: Not met              STG 3b: 12 weeks: Patient will complete moderate complex  reasoning tasks with min assist to use appropriate strategies for ADLs.               STATUS: Not met        Treatment:  Speech therapy using a clinician directed approach to increase safe problem solving skills during ADLS.         Recommendations: Discharge             ST Plan,DC  PLAN    ST SIGNATURE: COLIN Merino    Electronically signed 9/18/2023    [place]  License:  [id number]

## 2023-10-02 ENCOUNTER — OFFICE VISIT (OUTPATIENT)
Dept: CARDIOLOGY | Facility: CLINIC | Age: 58
End: 2023-10-02
Payer: OTHER GOVERNMENT

## 2023-10-02 VITALS
BODY MASS INDEX: 26.47 KG/M2 | WEIGHT: 149.4 LBS | DIASTOLIC BLOOD PRESSURE: 78 MMHG | HEART RATE: 85 BPM | SYSTOLIC BLOOD PRESSURE: 130 MMHG | HEIGHT: 63 IN

## 2023-10-02 DIAGNOSIS — R00.2 PALPITATIONS: Primary | ICD-10-CM

## 2023-10-02 DIAGNOSIS — I34.0 NONRHEUMATIC MITRAL VALVE REGURGITATION: ICD-10-CM

## 2023-10-02 DIAGNOSIS — I10 PRIMARY HYPERTENSION: ICD-10-CM

## 2023-10-02 DIAGNOSIS — E78.2 MIXED HYPERLIPIDEMIA: ICD-10-CM

## 2023-10-02 PROCEDURE — 99214 OFFICE O/P EST MOD 30 MIN: CPT

## 2023-10-02 NOTE — PROGRESS NOTES
Chief Complaint  Hypertension, Hyperlipidemia, and Follow-up (2 mo f/u, post testing. )    Subjective        History of Present Illness  Mariama Turner presents to [facility] for follow up.   Ms. Turner is a 58-year-old female with past medical history outlined below, significant for hypertension and hyperlipidemia presents for follow-up on recent testing.  She continues to have episodes of low heart rate which she is notified of on her smart watch.  She has occasional palpitations.  She has occasional chest discomfort that occurs at rest.  She has had 2 episodes of these in the last couple of months.  She states they last about 30 minutes at a time.  She denies any shortness of breath, dizziness, lightheadedness, syncope.  She has been really watching her diet and only drinks water.    Past Medical History:   Diagnosis Date    Acute nonintractable headache 10/14/2021    Toradol IM given in the clinic.     Acute viral syndrome 10/14/2021    Call or RTC if symptoms persist.     Allergies     Anemia     Arthritis     Asthma     Bowel disease     Cervical cancer     Colitis     Difficulty walking     Fibromyalgia, primary     Gait disturbance     Gallstones     Gastric ulcer     Headache     Heart disease     Hyperlipemia     Hypertension     Kidney stones     Kidney stones 2021    Limb swelling     Osteoarthritis 2018    Of left thumb CMC joint    Reflux esophagitis     Seasonal allergies     TBI (traumatic brain injury)     2022       ALLERGY  Allergies   Allergen Reactions    Contrast Dye (Echo Or Unknown Ct/Mr) Unknown - High Severity    Iodine Unknown - High Severity    Ceftriaxone Itching    Iodinated Contrast Media Unknown - Low Severity        Past Surgical History:   Procedure Laterality Date    ABDOMINAL HYSTERECTOMY       SECTION      CHOLECYSTECTOMY      COLONOSCOPY  ,,,2018    ENDOSCOPY  2005,,2018    KIDNEY STONE SURGERY   11/04/2014    Right Ureteroscopy, Laser litho, stent insertion    RHINOPLASTY      TONSILLECTOMY          Social History     Socioeconomic History    Marital status:    Tobacco Use    Smoking status: Never    Smokeless tobacco: Never   Vaping Use    Vaping Use: Never used   Substance and Sexual Activity    Alcohol use: Yes     Comment: Some day / rarely once a year    Drug use: Never    Sexual activity: Defer       Family History   Problem Relation Age of Onset    Heart disease Mother     Cancer Mother     Stroke Father     Diabetes Father     Hypertension Father     Arthritis Maternal Grandmother         Current Outpatient Medications on File Prior to Visit   Medication Sig    albuterol sulfate  (90 Base) MCG/ACT inhaler Inhale 2 puffs Every 4 (Four) Hours As Needed for Wheezing or Shortness of Air.    atenolol (TENORMIN) 25 MG tablet Take 1 tablet by mouth every night at bedtime.    cetirizine (zyrTEC) 10 MG tablet Take 1 tablet by mouth Daily.    estradiol (Kendra) 0.075 MG/24HR patch Place 1 patch on the skin as directed by provider 2 (Two) Times a Week.    ezetimibe (ZETIA) 10 MG tablet Take 1 tablet by mouth Daily.    fluticasone (Flonase) 50 MCG/ACT nasal spray 2 sprays into the nostril(s) as directed by provider Daily.    furosemide (LASIX) 40 MG tablet Take 1 tablet by mouth Daily.    levothyroxine (Synthroid) 75 MCG tablet Take 1 tablet by mouth Daily.    lidocaine (LIDODERM) 5 % Place 1 patch on the skin as directed by provider Daily. Remove & Discard patch within 12 hours or as directed by MD    meclizine (ANTIVERT) 25 MG tablet Take 1 tablet by mouth 3 (Three) Times a Day As Needed for Dizziness.    methocarbamol (ROBAXIN) 500 MG tablet Take 1 tablet by mouth 3 (Three) Times a Day. (Patient taking differently: Take 1 tablet by mouth 3 (Three) Times a Day As Needed.)    naproxen sodium (ALEVE) 220 MG tablet Take 2 tablets by mouth 3 (Three) Times a Day As Needed.    pitavastatin calcium  "(Livalo) 1 MG tablet tablet Take 1 tablet by mouth Daily.    potassium chloride 10 MEQ CR tablet Take 1 tablet by mouth 2 (Two) Times a Day.    Scopolamine (Transderm-Scop, 1.5 MG,) 1 MG/3DAYS patch Place 1 patch on the skin as directed by provider Every 72 (Seventy-Two) Hours. (Patient taking differently: Place 1 patch on the skin as directed by provider Every 72 (Seventy-Two) Hours. PRN)     No current facility-administered medications on file prior to visit.       Objective   Vitals:    10/02/23 0918   BP: 130/78   Pulse: 85   Weight: 67.8 kg (149 lb 6.4 oz)   Height: 160 cm (63\")       Physical Exam  Constitutional:       General: She is awake. She is not in acute distress.     Appearance: Normal appearance.   HENT:      Head: Normocephalic.      Nose: Nose normal. No congestion.   Eyes:      Extraocular Movements: Extraocular movements intact.      Conjunctiva/sclera: Conjunctivae normal.      Pupils: Pupils are equal, round, and reactive to light.   Neck:      Thyroid: No thyromegaly.      Vascular: No JVD.   Cardiovascular:      Rate and Rhythm: Normal rate and regular rhythm.      Chest Wall: PMI is not displaced.      Pulses: Normal pulses.      Heart sounds: Normal heart sounds, S1 normal and S2 normal. No murmur heard.    No friction rub. No gallop. No S3 or S4 sounds.   Pulmonary:      Effort: Pulmonary effort is normal.      Breath sounds: Normal breath sounds. No wheezing, rhonchi or rales.   Abdominal:      General: Bowel sounds are normal.      Palpations: Abdomen is soft.      Tenderness: There is no abdominal tenderness.   Musculoskeletal:      Cervical back: No tenderness.      Right lower leg: No edema.      Left lower leg: No edema.   Lymphadenopathy:      Cervical: No cervical adenopathy.   Skin:     General: Skin is warm and dry.      Capillary Refill: Capillary refill takes less than 2 seconds.      Coloration: Skin is not cyanotic.      Findings: No petechiae or rash.      Nails: There is no " clubbing.   Neurological:      Mental Status: She is alert.   Psychiatric:         Mood and Affect: Mood normal.         Behavior: Behavior is cooperative.         Result Review     The following data was reviewed by MIGNON Osullivan on 10/02/23.    No results found for: PROBNP  CMP          12/15/2022    17:01 3/15/2023    13:05 6/2/2023    16:25   CMP   Glucose 95  81  86    BUN 15  16  17    Creatinine 0.79  0.94  0.89    EGFR 86.3  70.0  74.8    Sodium 139  140  140    Potassium 4.0  4.0  3.7    Chloride 101  103  102    Calcium 9.8  9.8  9.1    Total Protein 7.5  7.4  7.4    Albumin 4.50  4.2  4.4    Globulin 3.0  3.2  3.0    Total Bilirubin 0.3  0.5  0.8    Alkaline Phosphatase 133  108  106    AST (SGOT) 34  29  26    ALT (SGPT) 32  28  25    Albumin/Globulin Ratio 1.5  1.3  1.5    BUN/Creatinine Ratio 19.0  17.0  19.1    Anion Gap 8.0  11.3  13.7      CBC w/diff          12/15/2022    17:01 3/15/2023    13:05 6/2/2023    16:25   CBC w/Diff   WBC 4.04  5.08  8.52    RBC 5.15  4.95  5.01    Hemoglobin 14.9  14.3  14.7    Hematocrit 44.6  42.2  43.5    MCV 86.6  85.3  86.8    MCH 28.9  28.9  29.3    MCHC 33.4  33.9  33.8    RDW 12.8  12.4  13.2    Platelets 231  261  253    Neutrophil Rel % 59.2  65.4  73.9    Immature Granulocyte Rel % 0.2  0.4  0.2    Lymphocyte Rel % 18.8  16.7  11.0    Monocyte Rel % 16.1  12.2  13.1    Eosinophil Rel % 4.7  3.7  1.1    Basophil Rel % 1.0  1.6  0.7       Lipid Panel          12/15/2022    17:01 3/15/2023    13:05   Lipid Panel   Total Cholesterol 199  236    Triglycerides 140  166    HDL Cholesterol 59  60    VLDL Cholesterol 25  30    LDL Cholesterol  115  146    LDL/HDL Ratio 1.90  2.38        Results for orders placed in visit on 09/27/22    Adult Transthoracic Echo Complete W/ Cont if Necessary Per Protocol    Interpretation Summary  Normal left ventricular systolic function with an estimated ejection fraction of 55%.  No regional wall motion abnormalities were  observed.  Left ventricular diastolic function was normal.  Mildly dilated left atrium.  Mild-moderate mitral regurgitation, but no hemodynamically significant valvular pathology.  Estimated right ventricular systolic pressure was within normal limits.  No evidence of pericardial effusion.           Procedures    Assessment & Plan  Diagnoses and all orders for this visit:    1. Palpitations (Primary)    2. Primary hypertension    3. Mixed hyperlipidemia    4. Nonrheumatic mitral valve regurgitation    1.  Recent Holter monitor demonstrated no significant abnormal heart rhythms, average heart rate 64 maximum heart rate 142.  No significant bradycardia was noted.  Patient was reassured.  2.  Blood pressure is very well controlled.  Continue atenolol.  3.  Continue Livalo and Zetia, plan to check a lipid panel at next follow-up visit.  4.  Recent echocardiogram demonstrated mild to moderate mitral regurgitation with no hemodynamically significant valvular pathology.  Plan to repeat an echocardiogram in 1 year.    Follow Up   Return in about 6 months (around 4/2/2024) for With Dr. Smith.    Patient was given instructions and counseling regarding her condition or for health maintenance advice. Please see specific information pulled into the AVS if appropriate.     Karie Foster, APRN  10/02/23  09:55 EDT    Dictated Utilizing Dragon Dictation

## 2023-10-11 ENCOUNTER — TELEPHONE (OUTPATIENT)
Dept: NEUROLOGY | Facility: CLINIC | Age: 58
End: 2023-10-11

## 2023-10-11 NOTE — TELEPHONE ENCOUNTER
Provider: JAMISON    Caller: DR.JOHN LIN    Phone Number: [phone]     Reason for Call: DR.JOHN STANFORD CALLED AND IS NEEDING A RETURN CALL TO COMPLETE A PEER TO PEER.    PLEASE REVIEW AND ADVISE.  THANK YOU

## 2023-11-13 ENCOUNTER — TELEPHONE (OUTPATIENT)
Dept: INTERNAL MEDICINE | Facility: CLINIC | Age: 58
End: 2023-11-13
Payer: OTHER GOVERNMENT

## 2023-11-13 DIAGNOSIS — R79.89 ELEVATED TSH: Primary | ICD-10-CM

## 2023-11-13 DIAGNOSIS — I10 PRIMARY HYPERTENSION: ICD-10-CM

## 2023-11-13 DIAGNOSIS — E78.2 MIXED HYPERLIPIDEMIA: ICD-10-CM

## 2023-11-13 NOTE — TELEPHONE ENCOUNTER
Pt is coming in tomorrow 11/14/2023 for flu shot.  Wants to know if you would order a thyroid test to be drawn while she's here at 1:30?   Stated she's been feeling extremely tired & her hair is dry.  Thinks her thyroid medication may need adjusted.

## 2023-11-13 NOTE — TELEPHONE ENCOUNTER
Spoke with patient. Verified .   Made aware labs could be completed but we will go ahead and draw everything that is needed for upcoming appt.

## 2023-11-14 ENCOUNTER — CLINICAL SUPPORT (OUTPATIENT)
Dept: INTERNAL MEDICINE | Facility: CLINIC | Age: 58
End: 2023-11-14
Payer: OTHER GOVERNMENT

## 2023-11-14 DIAGNOSIS — Z23 NEED FOR INFLUENZA VACCINATION: Primary | ICD-10-CM

## 2023-11-14 LAB
ALBUMIN SERPL-MCNC: 4.2 G/DL (ref 3.5–5.2)
ALBUMIN/GLOB SERPL: 1.4 G/DL
ALP SERPL-CCNC: 113 U/L (ref 39–117)
ALT SERPL W P-5'-P-CCNC: 24 U/L (ref 1–33)
ANION GAP SERPL CALCULATED.3IONS-SCNC: 11.4 MMOL/L (ref 5–15)
AST SERPL-CCNC: 23 U/L (ref 1–32)
BASOPHILS # BLD AUTO: 0.08 10*3/MM3 (ref 0–0.2)
BASOPHILS NFR BLD AUTO: 1 % (ref 0–1.5)
BILIRUB SERPL-MCNC: 0.5 MG/DL (ref 0–1.2)
BUN SERPL-MCNC: 29 MG/DL (ref 8–23)
BUN/CREAT SERPL: 37.7 (ref 7–25)
CALCIUM SPEC-SCNC: 10 MG/DL (ref 8.6–10.5)
CHLORIDE SERPL-SCNC: 100 MMOL/L (ref 98–107)
CHOLEST SERPL-MCNC: 198 MG/DL (ref 0–200)
CO2 SERPL-SCNC: 27.6 MMOL/L (ref 22–29)
CREAT SERPL-MCNC: 0.77 MG/DL (ref 0.57–1)
DEPRECATED RDW RBC AUTO: 40 FL (ref 37–54)
EGFRCR SERPLBLD CKD-EPI 2021: 88.4 ML/MIN/1.73
EOSINOPHIL # BLD AUTO: 0.16 10*3/MM3 (ref 0–0.4)
EOSINOPHIL NFR BLD AUTO: 2.1 % (ref 0.3–6.2)
ERYTHROCYTE [DISTWIDTH] IN BLOOD BY AUTOMATED COUNT: 12.5 % (ref 12.3–15.4)
GLOBULIN UR ELPH-MCNC: 2.9 GM/DL
GLUCOSE SERPL-MCNC: 91 MG/DL (ref 65–99)
HCT VFR BLD AUTO: 40.9 % (ref 34–46.6)
HDLC SERPL-MCNC: 71 MG/DL (ref 40–60)
HGB BLD-MCNC: 13.6 G/DL (ref 12–15.9)
IMM GRANULOCYTES # BLD AUTO: 0.02 10*3/MM3 (ref 0–0.05)
IMM GRANULOCYTES NFR BLD AUTO: 0.3 % (ref 0–0.5)
LDLC SERPL CALC-MCNC: 109 MG/DL (ref 0–100)
LDLC/HDLC SERPL: 1.51 {RATIO}
LYMPHOCYTES # BLD AUTO: 0.97 10*3/MM3 (ref 0.7–3.1)
LYMPHOCYTES NFR BLD AUTO: 12.6 % (ref 19.6–45.3)
MCH RBC QN AUTO: 29.6 PG (ref 26.6–33)
MCHC RBC AUTO-ENTMCNC: 33.3 G/DL (ref 31.5–35.7)
MCV RBC AUTO: 88.9 FL (ref 79–97)
MONOCYTES # BLD AUTO: 0.78 10*3/MM3 (ref 0.1–0.9)
MONOCYTES NFR BLD AUTO: 10.1 % (ref 5–12)
NEUTROPHILS NFR BLD AUTO: 5.69 10*3/MM3 (ref 1.7–7)
NEUTROPHILS NFR BLD AUTO: 73.9 % (ref 42.7–76)
NRBC BLD AUTO-RTO: 0 /100 WBC (ref 0–0.2)
PLATELET # BLD AUTO: 262 10*3/MM3 (ref 140–450)
PMV BLD AUTO: 11.1 FL (ref 6–12)
POTASSIUM SERPL-SCNC: 4.1 MMOL/L (ref 3.5–5.2)
PROT SERPL-MCNC: 7.1 G/DL (ref 6–8.5)
RBC # BLD AUTO: 4.6 10*6/MM3 (ref 3.77–5.28)
SODIUM SERPL-SCNC: 139 MMOL/L (ref 136–145)
TRIGL SERPL-MCNC: 99 MG/DL (ref 0–150)
TSH SERPL DL<=0.05 MIU/L-ACNC: 1.76 UIU/ML (ref 0.27–4.2)
VLDLC SERPL-MCNC: 18 MG/DL (ref 5–40)
WBC NRBC COR # BLD: 7.7 10*3/MM3 (ref 3.4–10.8)

## 2023-11-14 PROCEDURE — 80061 LIPID PANEL: CPT | Performed by: NURSE PRACTITIONER

## 2023-11-14 PROCEDURE — 90471 IMMUNIZATION ADMIN: CPT | Performed by: NURSE PRACTITIONER

## 2023-11-14 PROCEDURE — 90686 IIV4 VACC NO PRSV 0.5 ML IM: CPT | Performed by: NURSE PRACTITIONER

## 2023-11-14 PROCEDURE — 84443 ASSAY THYROID STIM HORMONE: CPT | Performed by: NURSE PRACTITIONER

## 2023-11-14 PROCEDURE — 36415 COLL VENOUS BLD VENIPUNCTURE: CPT | Performed by: NURSE PRACTITIONER

## 2023-11-14 PROCEDURE — 85025 COMPLETE CBC W/AUTO DIFF WBC: CPT | Performed by: NURSE PRACTITIONER

## 2023-11-14 PROCEDURE — 80053 COMPREHEN METABOLIC PANEL: CPT | Performed by: NURSE PRACTITIONER

## 2023-11-21 ENCOUNTER — OFFICE VISIT (OUTPATIENT)
Dept: INTERNAL MEDICINE | Facility: CLINIC | Age: 58
End: 2023-11-21
Payer: OTHER GOVERNMENT

## 2023-11-21 VITALS
BODY MASS INDEX: 27.11 KG/M2 | OXYGEN SATURATION: 98 % | WEIGHT: 153 LBS | HEART RATE: 63 BPM | DIASTOLIC BLOOD PRESSURE: 76 MMHG | SYSTOLIC BLOOD PRESSURE: 120 MMHG | TEMPERATURE: 97.3 F | HEIGHT: 63 IN

## 2023-11-21 DIAGNOSIS — E78.2 MIXED HYPERLIPIDEMIA: ICD-10-CM

## 2023-11-21 DIAGNOSIS — I10 PRIMARY HYPERTENSION: ICD-10-CM

## 2023-11-21 DIAGNOSIS — E03.9 HYPOTHYROIDISM, UNSPECIFIED TYPE: ICD-10-CM

## 2023-11-21 DIAGNOSIS — R53.83 OTHER FATIGUE: Primary | ICD-10-CM

## 2023-11-21 LAB
IRON 24H UR-MRATE: 97 MCG/DL (ref 37–145)
VIT B12 BLD-MCNC: 531 PG/ML (ref 211–946)

## 2023-11-21 PROCEDURE — 99214 OFFICE O/P EST MOD 30 MIN: CPT | Performed by: NURSE PRACTITIONER

## 2023-11-21 PROCEDURE — 82607 VITAMIN B-12: CPT | Performed by: NURSE PRACTITIONER

## 2023-11-21 PROCEDURE — 82652 VIT D 1 25-DIHYDROXY: CPT | Performed by: NURSE PRACTITIONER

## 2023-11-21 PROCEDURE — 83540 ASSAY OF IRON: CPT | Performed by: NURSE PRACTITIONER

## 2023-11-21 RX ORDER — LEVOTHYROXINE SODIUM 88 UG/1
88 TABLET ORAL DAILY
Qty: 90 TABLET | Refills: 1 | Status: SHIPPED | OUTPATIENT
Start: 2023-11-21

## 2023-11-27 LAB
1,25(OH)2D SERPL-MCNC: 77.2 PG/ML (ref 24.8–81.5)
EBV NA IGG SER IA-ACNC: >600 U/ML (ref 0–17.9)
EBV VCA IGG SER IA-ACNC: >600 U/ML (ref 0–17.9)
EBV VCA IGM SER IA-ACNC: 55.6 U/ML (ref 0–35.9)
SERVICE CMNT-IMP: ABNORMAL

## 2023-12-05 ENCOUNTER — TELEPHONE (OUTPATIENT)
Dept: INTERNAL MEDICINE | Facility: CLINIC | Age: 58
End: 2023-12-05
Payer: OTHER GOVERNMENT

## 2023-12-05 NOTE — TELEPHONE ENCOUNTER
----- Message from MIGNON Gaitan sent at 11/28/2023  1:56 PM EST -----  Positive for EBV virus currently active. Patient has Mono

## 2023-12-05 NOTE — TELEPHONE ENCOUNTER
Spoke with patient. Verified .   Made aware of results. Patient verbalized understanding. Patient had no further questions.

## 2024-01-04 ENCOUNTER — OFFICE VISIT (OUTPATIENT)
Dept: INTERNAL MEDICINE | Facility: CLINIC | Age: 59
End: 2024-01-04
Payer: OTHER GOVERNMENT

## 2024-01-04 VITALS
HEIGHT: 63 IN | OXYGEN SATURATION: 97 % | DIASTOLIC BLOOD PRESSURE: 73 MMHG | HEART RATE: 74 BPM | WEIGHT: 153 LBS | BODY MASS INDEX: 27.11 KG/M2 | SYSTOLIC BLOOD PRESSURE: 120 MMHG | TEMPERATURE: 97.7 F

## 2024-01-04 DIAGNOSIS — B00.1 COLD SORE: ICD-10-CM

## 2024-01-04 DIAGNOSIS — M54.42 ACUTE MIDLINE LOW BACK PAIN WITH LEFT-SIDED SCIATICA: ICD-10-CM

## 2024-01-04 DIAGNOSIS — Z79.899 LONG TERM CURRENT USE OF DIURETIC: ICD-10-CM

## 2024-01-04 DIAGNOSIS — E03.9 HYPOTHYROIDISM, UNSPECIFIED TYPE: Primary | ICD-10-CM

## 2024-01-04 DIAGNOSIS — R42 DIZZINESS: ICD-10-CM

## 2024-01-04 DIAGNOSIS — E78.2 MIXED HYPERLIPIDEMIA: ICD-10-CM

## 2024-01-04 PROCEDURE — 99214 OFFICE O/P EST MOD 30 MIN: CPT | Performed by: NURSE PRACTITIONER

## 2024-01-04 PROCEDURE — 80048 BASIC METABOLIC PNL TOTAL CA: CPT | Performed by: NURSE PRACTITIONER

## 2024-01-04 PROCEDURE — 84443 ASSAY THYROID STIM HORMONE: CPT | Performed by: NURSE PRACTITIONER

## 2024-01-04 RX ORDER — EZETIMIBE 10 MG/1
10 TABLET ORAL DAILY
Qty: 90 TABLET | Refills: 3 | Status: SHIPPED | OUTPATIENT
Start: 2024-01-04

## 2024-01-04 RX ORDER — FUROSEMIDE 40 MG/1
40 TABLET ORAL DAILY
Qty: 90 TABLET | Refills: 3 | Status: SHIPPED | OUTPATIENT
Start: 2024-01-04

## 2024-01-04 RX ORDER — CETIRIZINE HYDROCHLORIDE 10 MG/1
10 TABLET ORAL DAILY
Qty: 90 TABLET | Refills: 1 | Status: SHIPPED | OUTPATIENT
Start: 2024-01-04

## 2024-01-04 RX ORDER — ATENOLOL 25 MG/1
25 TABLET ORAL
Qty: 90 TABLET | Refills: 3 | Status: SHIPPED | OUTPATIENT
Start: 2024-01-04

## 2024-01-04 RX ORDER — SCOLOPAMINE TRANSDERMAL SYSTEM 1 MG/1
1 PATCH, EXTENDED RELEASE TRANSDERMAL
Qty: 24 EACH | Refills: 1 | Status: SHIPPED | OUTPATIENT
Start: 2024-01-04

## 2024-01-04 RX ORDER — PITAVASTATIN 1 MG/1
1 TABLET, FILM COATED ORAL DAILY
Qty: 90 TABLET | Refills: 3 | Status: SHIPPED | OUTPATIENT
Start: 2024-01-04

## 2024-01-04 RX ORDER — POTASSIUM CHLORIDE 750 MG/1
10 TABLET, FILM COATED, EXTENDED RELEASE ORAL 2 TIMES DAILY
Qty: 180 TABLET | Refills: 3 | Status: SHIPPED | OUTPATIENT
Start: 2024-01-04

## 2024-01-04 RX ORDER — VALACYCLOVIR HYDROCHLORIDE 1 G/1
1000 TABLET, FILM COATED ORAL 2 TIMES DAILY
Qty: 14 TABLET | Refills: 3 | Status: SHIPPED | OUTPATIENT
Start: 2024-01-04 | End: 2024-01-11

## 2024-01-04 RX ORDER — METHOCARBAMOL 500 MG/1
500 TABLET, FILM COATED ORAL 3 TIMES DAILY PRN
Qty: 60 TABLET | Refills: 1 | Status: SHIPPED | OUTPATIENT
Start: 2024-01-04

## 2024-01-04 RX ORDER — MECLIZINE HYDROCHLORIDE 25 MG/1
25 TABLET ORAL 3 TIMES DAILY PRN
Qty: 90 TABLET | Refills: 1 | Status: SHIPPED | OUTPATIENT
Start: 2024-01-04

## 2024-01-04 NOTE — PROGRESS NOTES
Chief Complaint  Fatigue (6 week follow-up)    Subjective          Mariama Turner presents to [facility]  History of Present Illness    Wanting Valtrex prescribed. Would like refills on it as well if possible.     Fatigue improving  Hypothyroidism  This is a chronic problem. Associated symptoms include fatigue. Pertinent negatives include no abdominal pain, anorexia, arthralgias, change in bowel habit, chest pain, chills, congestion, coughing, diaphoresis, fever, headaches, joint swelling, myalgias, nausea, neck pain, numbness, rash, sore throat, swollen glands, urinary symptoms, vertigo, visual change, vomiting or weakness.   Hypertension  This is a chronic problem. The problem is controlled. Pertinent negatives include no anxiety, blurred vision, chest pain, headaches, malaise/fatigue, neck pain, orthopnea, palpitations, peripheral edema, PND, shortness of breath or sweats. Current antihypertension treatment includes beta blockers. The current treatment provides significant improvement. Compliance problems include diet and exercise.  There is no history of chronic renal disease.   Hyperlipidemia  This is a chronic problem. Exacerbating diseases include hypothyroidism. She has no history of chronic renal disease, diabetes, liver disease, obesity or nephrotic syndrome. Pertinent negatives include no chest pain, focal sensory loss, focal weakness, leg pain, myalgias or shortness of breath. Compliance problems include adherence to diet and adherence to exercise.       Current Outpatient Medications   Medication Instructions    albuterol sulfate  (90 Base) MCG/ACT inhaler 2 puffs, Inhalation, Every 4 Hours PRN    atenolol (TENORMIN) 25 mg, Oral, Every Night at Bedtime    cetirizine (ZYRTEC) 10 mg, Oral, Daily    estradiol (Kendra) 0.075 MG/24HR patch 1 patch, Transdermal, 2 Times Weekly    ezetimibe (ZETIA) 10 mg, Oral, Daily    fluticasone (Flonase) 50 MCG/ACT  "nasal spray 2 sprays, Nasal, Daily    furosemide (LASIX) 40 mg, Oral, Daily    levothyroxine (SYNTHROID) 88 mcg, Oral, Daily    lidocaine (LIDODERM) 5 % 1 patch, Transdermal, Every 24 Hours, Remove & Discard patch within 12 hours or as directed by MD    meclizine (ANTIVERT) 25 mg, Oral, 3 Times Daily PRN    methocarbamol (ROBAXIN) 500 mg, Oral, 3 Times Daily PRN    naproxen sodium (ALEVE) 440 mg, Oral, 3 Times Daily PRN    pitavastatin calcium (LIVALO) 1 mg, Oral, Daily    potassium chloride 10 MEQ CR tablet 10 mEq, Oral, 2 Times Daily    Scopolamine (Transderm-Scop, 1.5 MG,) 1 MG/3DAYS patch 1 patch, Transdermal, Every 72 Hours, PRN    valACYclovir (VALTREX) 1,000 mg, Oral, 2 Times Daily       The following portions of the patient's history were reviewed and updated as appropriate: allergies, current medications, past family history, past medical history, past social history, past surgical history, and problem list.    Objective   Vital Signs:   /73 (BP Location: Right arm, Patient Position: Sitting, Cuff Size: Adult)   Pulse 74   Temp 97.7 °F (36.5 °C) (Temporal)   Ht 160 cm (63\")   Wt 69.4 kg (153 lb)   SpO2 97%   BMI 27.10 kg/m²     BP Readings from Last 3 Encounters:   01/04/24 120/73   11/21/23 120/76   10/02/23 130/78     Wt Readings from Last 3 Encounters:   01/04/24 69.4 kg (153 lb)   11/21/23 69.4 kg (153 lb)   10/02/23 67.8 kg (149 lb 6.4 oz)           Physical Exam     Appearance: No acute distress, well-nourished  Head: normocephalic, atraumatic  Eyes: extraocular movements intact, no scleral icterus, no conjunctival injection  Ears, Nose, and Throat: external ears normal, nares patent, moist mucous membranes  Cardiovascular: regular rate and rhythm. no murmurs, rubs, or gallops. no edema  Respiratory: breathing comfortably, symmetric chest rise, clear to auscultation bilaterally. No wheezes, rales, or rhonchi.  Neuro: alert and oriented to time, place, and person. Normal gait  Psych: normal " mood and affect     Result Review :   The following data was reviewed by: MIGNON Gaitan on 01/04/2024:  Common labs          3/15/2023    13:05 6/2/2023    16:25 11/14/2023    14:24   Common Labs   Glucose 81  86  91    BUN 16  17  29    Creatinine 0.94  0.89  0.77    Sodium 140  140  139    Potassium 4.0  3.7  4.1    Chloride 103  102  100    Calcium 9.8  9.1  10.0    Albumin 4.2  4.4  4.2    Total Bilirubin 0.5  0.8  0.5    Alkaline Phosphatase 108  106  113    AST (SGOT) 29  26  23    ALT (SGPT) 28  25  24    WBC 5.08  8.52  7.70    Hemoglobin 14.3  14.7  13.6    Hematocrit 42.2  43.5  40.9    Platelets 261  253  262    Total Cholesterol 236   198    Triglycerides 166   99    HDL Cholesterol 60   71    LDL Cholesterol  146   109             Lab Results   Component Value Date    SARSANTIGEN Not Detected 11/14/2022    COVID19 Not Detected 11/14/2022    RAPFLUA Negative 11/14/2022    RAPFLUB Negative 11/14/2022    FLUAAG Not Detected 09/23/2021    FLU Negative 04/28/2022    FLU Negative 04/28/2022    FLUBAG Not Detected 09/23/2021    RAPSCRN Negative 11/14/2022    INR 1.10 06/02/2023    BILIRUBINUR Negative 01/28/2023       Procedures        Assessment and Plan    Diagnoses and all orders for this visit:    1. Hypothyroidism, unspecified type (Primary)  -     TSH    2. Cold sore  -     valACYclovir (Valtrex) 1000 MG tablet; Take 1 tablet by mouth 2 (Two) Times a Day for 7 days.  Dispense: 14 tablet; Refill: 3    3. Acute midline low back pain with left-sided sciatica  Comments:  Discussed conservative measures.  MRI of the lumbar spine ordered.  Muscle relaxers to pharmacy as well.  Discussed monitoring use and precautions.  Orders:  -     methocarbamol (ROBAXIN) 500 MG tablet; Take 1 tablet by mouth 3 (Three) Times a Day As Needed for Muscle Spasms.  Dispense: 60 tablet; Refill: 1    4. Mixed hyperlipidemia  -     ezetimibe (ZETIA) 10 MG tablet; Take 1 tablet by mouth Daily.  Dispense: 90 tablet;  Refill: 3    5. Dizziness  Comments:  avoiding diuretics. no help with meclizine. will Rx scopalamine.   Orders:  -     Scopolamine (Transderm-Scop, 1.5 MG,) 1 MG/3DAYS patch; Place 1 patch on the skin as directed by provider Every 72 (Seventy-Two) Hours. PRN  Dispense: 24 each; Refill: 1    6. Long term current use of diuretic  -     Basic metabolic panel    Other orders  -     furosemide (LASIX) 40 MG tablet; Take 1 tablet by mouth Daily.  Dispense: 90 tablet; Refill: 3  -     atenolol (TENORMIN) 25 MG tablet; Take 1 tablet by mouth every night at bedtime.  Dispense: 90 tablet; Refill: 3  -     meclizine (ANTIVERT) 25 MG tablet; Take 1 tablet by mouth 3 (Three) Times a Day As Needed for Dizziness.  Dispense: 90 tablet; Refill: 1  -     cetirizine (zyrTEC) 10 MG tablet; Take 1 tablet by mouth Daily.  Dispense: 90 tablet; Refill: 1  -     potassium chloride 10 MEQ CR tablet; Take 1 tablet by mouth 2 (Two) Times a Day.  Dispense: 180 tablet; Refill: 3  -     pitavastatin calcium (Livalo) 1 MG tablet tablet; Take 1 tablet by mouth Daily.  Dispense: 90 tablet; Refill: 3          Medications Discontinued During This Encounter   Medication Reason    potassium chloride 10 MEQ CR tablet Reorder    ezetimibe (ZETIA) 10 MG tablet Reorder    atenolol (TENORMIN) 25 MG tablet Reorder    furosemide (LASIX) 40 MG tablet Reorder    Scopolamine (Transderm-Scop, 1.5 MG,) 1 MG/3DAYS patch Reorder    meclizine (ANTIVERT) 25 MG tablet Reorder    cetirizine (zyrTEC) 10 MG tablet Reorder    methocarbamol (ROBAXIN) 500 MG tablet Reorder    pitavastatin calcium (Livalo) 1 MG tablet tablet Reorder          Follow Up   Return in about 20 weeks (around 5/23/2024) for Thyroid.  Patient was given instructions and counseling regarding her condition or for health maintenance advice. Please see specific information pulled into the AVS if appropriate.       Ame Xie, APRN  01/04/24  13:58 EST

## 2024-01-05 LAB
ANION GAP SERPL CALCULATED.3IONS-SCNC: 9.6 MMOL/L (ref 5–15)
BUN SERPL-MCNC: 21 MG/DL (ref 8–23)
BUN/CREAT SERPL: 23.1 (ref 7–25)
CALCIUM SPEC-SCNC: 9.5 MG/DL (ref 8.6–10.5)
CHLORIDE SERPL-SCNC: 103 MMOL/L (ref 98–107)
CO2 SERPL-SCNC: 28.4 MMOL/L (ref 22–29)
CREAT SERPL-MCNC: 0.91 MG/DL (ref 0.57–1)
EGFRCR SERPLBLD CKD-EPI 2021: 72.4 ML/MIN/1.73
GLUCOSE SERPL-MCNC: 91 MG/DL (ref 65–99)
POTASSIUM SERPL-SCNC: 4.5 MMOL/L (ref 3.5–5.2)
SODIUM SERPL-SCNC: 141 MMOL/L (ref 136–145)
TSH SERPL DL<=0.05 MIU/L-ACNC: 1.51 UIU/ML (ref 0.27–4.2)

## 2024-03-22 ENCOUNTER — TELEPHONE (OUTPATIENT)
Dept: CARDIOLOGY | Facility: CLINIC | Age: 59
End: 2024-03-22
Payer: OTHER GOVERNMENT

## 2024-03-22 NOTE — TELEPHONE ENCOUNTER
Received VM from patient asking if she needed antibiotics prior to a dental cleaning    DAVID salazar advised antibiotics were not needed as she has not had any valve replacement/repair. Patient voiced understanding

## 2024-04-09 ENCOUNTER — OFFICE VISIT (OUTPATIENT)
Dept: CARDIOLOGY | Facility: CLINIC | Age: 59
End: 2024-04-09
Payer: OTHER GOVERNMENT

## 2024-04-09 VITALS
SYSTOLIC BLOOD PRESSURE: 115 MMHG | WEIGHT: 167 LBS | BODY MASS INDEX: 29.59 KG/M2 | DIASTOLIC BLOOD PRESSURE: 69 MMHG | HEART RATE: 66 BPM | HEIGHT: 63 IN

## 2024-04-09 DIAGNOSIS — E78.2 MIXED HYPERLIPIDEMIA: Primary | ICD-10-CM

## 2024-04-09 DIAGNOSIS — R00.2 PALPITATIONS: ICD-10-CM

## 2024-04-09 DIAGNOSIS — I34.0 NONRHEUMATIC MITRAL VALVE REGURGITATION: ICD-10-CM

## 2024-04-09 PROCEDURE — 99214 OFFICE O/P EST MOD 30 MIN: CPT | Performed by: INTERNAL MEDICINE

## 2024-04-10 RX ORDER — ATENOLOL 25 MG/1
25 TABLET ORAL
Qty: 90 TABLET | Refills: 3 | Status: SHIPPED | OUTPATIENT
Start: 2024-04-10 | End: 2024-04-15 | Stop reason: SDUPTHER

## 2024-04-10 RX ORDER — POTASSIUM CHLORIDE 750 MG/1
10 TABLET, FILM COATED, EXTENDED RELEASE ORAL 2 TIMES DAILY
Qty: 180 TABLET | Refills: 3 | Status: SHIPPED | OUTPATIENT
Start: 2024-04-10 | End: 2024-04-15 | Stop reason: SDUPTHER

## 2024-04-10 RX ORDER — EZETIMIBE 10 MG/1
10 TABLET ORAL DAILY
Qty: 90 TABLET | Refills: 3 | Status: SHIPPED | OUTPATIENT
Start: 2024-04-10 | End: 2024-04-15 | Stop reason: SDUPTHER

## 2024-04-10 RX ORDER — FUROSEMIDE 40 MG/1
40 TABLET ORAL DAILY
Qty: 90 TABLET | Refills: 3 | Status: SHIPPED | OUTPATIENT
Start: 2024-04-10 | End: 2024-04-15 | Stop reason: SDUPTHER

## 2024-04-10 RX ORDER — PITAVASTATIN CALCIUM 1.04 MG/1
1 TABLET, FILM COATED ORAL DAILY
Qty: 90 TABLET | Refills: 3 | Status: SHIPPED | OUTPATIENT
Start: 2024-04-10

## 2024-04-10 NOTE — PROGRESS NOTES
Chief Complaint  Palpitations (6m F/U)    Subjective        Mariama Turner presents to [facility]  History of present illness:    Patient has not been walking as much due to foot tendinitis along with COVID infection.  She does note some lower extremity swelling that is in her left leg greater than the right leg.  She is watching her salt and using the Lasix.  Patient still notes occasional palpitations but they are not bothering her.      Past Medical History:   Diagnosis Date    Acute nonintractable headache 10/14/2021    Toradol IM given in the clinic.     Acute viral syndrome 10/14/2021    Call or RTC if symptoms persist.     Allergies     Anemia     Arthritis     Asthma     Bowel disease     Cervical cancer     Colitis     Difficulty walking     Fibromyalgia, primary     Gait disturbance     Gallstones     Gastric ulcer     Headache     Heart disease     Hyperlipemia     Hypertension     Kidney stones     Kidney stones 2021    Limb swelling     Osteoarthritis 2018    Of left thumb CMC joint    Reflux esophagitis     Seasonal allergies     TBI (traumatic brain injury)     2022         Past Surgical History:   Procedure Laterality Date    ABDOMINAL HYSTERECTOMY       SECTION      CHOLECYSTECTOMY      COLONOSCOPY  ,,,    ENDOSCOPY  ,,2018    KIDNEY STONE SURGERY  2014    Right Ureteroscopy, Laser litho, stent insertion    RHINOPLASTY      TONSILLECTOMY            Social History     Socioeconomic History    Marital status:    Tobacco Use    Smoking status: Never    Smokeless tobacco: Never   Vaping Use    Vaping status: Never Used   Substance and Sexual Activity    Alcohol use: Yes     Comment: Some day / rarely once a year    Drug use: Never    Sexual activity: Defer         Family History   Problem Relation Age of Onset    Heart disease Mother     Cancer Mother     Stroke Father     Diabetes Father     Hypertension  Father     Arthritis Maternal Grandmother           Allergies   Allergen Reactions    Contrast Dye (Echo Or Unknown Ct/Mr) Unknown - High Severity    Iodine Unknown - High Severity    Ceftriaxone Itching    Iodinated Contrast Media Unknown - Low Severity            Current Outpatient Medications:     albuterol sulfate  (90 Base) MCG/ACT inhaler, Inhale 2 puffs Every 4 (Four) Hours As Needed for Wheezing or Shortness of Air., Disp: 18 g, Rfl: 3    atenolol (TENORMIN) 25 MG tablet, Take 1 tablet by mouth every night at bedtime., Disp: 90 tablet, Rfl: 3    cetirizine (zyrTEC) 10 MG tablet, Take 1 tablet by mouth Daily., Disp: 90 tablet, Rfl: 1    estradiol (Kendra) 0.075 MG/24HR patch, Place 1 patch on the skin as directed by provider 2 (Two) Times a Week., Disp: 24 patch, Rfl: 3    ezetimibe (ZETIA) 10 MG tablet, Take 1 tablet by mouth Daily., Disp: 90 tablet, Rfl: 3    fluticasone (Flonase) 50 MCG/ACT nasal spray, 2 sprays into the nostril(s) as directed by provider Daily., Disp: 16 g, Rfl: 0    furosemide (LASIX) 40 MG tablet, Take 1 tablet by mouth Daily., Disp: 90 tablet, Rfl: 3    levothyroxine (Synthroid) 88 MCG tablet, Take 1 tablet by mouth Daily., Disp: 90 tablet, Rfl: 1    lidocaine (LIDODERM) 5 %, Place 1 patch on the skin as directed by provider Daily. Remove & Discard patch within 12 hours or as directed by MD, Disp: 10 each, Rfl: 0    meclizine (ANTIVERT) 25 MG tablet, Take 1 tablet by mouth 3 (Three) Times a Day As Needed for Dizziness., Disp: 90 tablet, Rfl: 1    methocarbamol (ROBAXIN) 500 MG tablet, Take 1 tablet by mouth 3 (Three) Times a Day As Needed for Muscle Spasms., Disp: 60 tablet, Rfl: 1    naproxen sodium (ALEVE) 220 MG tablet, Take 2 tablets by mouth 3 (Three) Times a Day As Needed., Disp: , Rfl:     pitavastatin calcium (Livalo) 1 MG tablet tablet, Take 1 tablet by mouth Daily., Disp: 90 tablet, Rfl: 3    potassium chloride 10 MEQ CR tablet, Take 1 tablet by mouth 2 (Two) Times a  "Day., Disp: 180 tablet, Rfl: 3    Scopolamine (Transderm-Scop, 1.5 MG,) 1 MG/3DAYS patch, Place 1 patch on the skin as directed by provider Every 72 (Seventy-Two) Hours. PRN, Disp: 24 each, Rfl: 1      ROS:  Cardiac review of systems positive for palpitations and edema.    Objective     /69   Pulse 66   Ht 160 cm (63\")   Wt 75.8 kg (167 lb)   BMI 29.58 kg/m²       General Appearance:   well developed  well nourished  HENT:   oropharynx moist  lips not cyanotic  Respiratory:  no respiratory distress  normal breath sounds  no rales  Cardiovascular:  no jugular venous distention  regular rhythm  S1 normal, S2 normal  no S3, no S4   no murmur  no rub, no thrill  No carotid bruit  pedal pulses normal  lower extremity edema: none    Musculoskeletal:  no clubbing of fingers.   normocephalic, head atraumatic  Skin:   warm, dry  Psychiatric:  judgement and insight appropriate  normal mood and affect    ECHO:  Results for orders placed in visit on 09/27/22    Adult Transthoracic Echo Complete W/ Cont if Necessary Per Protocol    Interpretation Summary  Normal left ventricular systolic function with an estimated ejection fraction of 55%.  No regional wall motion abnormalities were observed.  Left ventricular diastolic function was normal.  Mildly dilated left atrium.  Mild-moderate mitral regurgitation, but no hemodynamically significant valvular pathology.  Estimated right ventricular systolic pressure was within normal limits.  No evidence of pericardial effusion.    STRESS:    CATH:  No results found for this or any previous visit.    BMP:     Glucose   Date Value Ref Range Status   01/04/2024 91 65 - 99 mg/dL Final     BUN   Date Value Ref Range Status   01/04/2024 21 8 - 23 mg/dL Final     Creatinine   Date Value Ref Range Status   01/04/2024 0.91 0.57 - 1.00 mg/dL Final     Sodium   Date Value Ref Range Status   01/04/2024 141 136 - 145 mmol/L Final     Potassium   Date Value Ref Range Status   01/04/2024 4.5 " 3.5 - 5.2 mmol/L Final     Chloride   Date Value Ref Range Status   01/04/2024 103 98 - 107 mmol/L Final     CO2   Date Value Ref Range Status   01/04/2024 28.4 22.0 - 29.0 mmol/L Final     Calcium   Date Value Ref Range Status   01/04/2024 9.5 8.6 - 10.5 mg/dL Final     BUN/Creatinine Ratio   Date Value Ref Range Status   01/04/2024 23.1 7.0 - 25.0 Final     Anion Gap   Date Value Ref Range Status   01/04/2024 9.6 5.0 - 15.0 mmol/L Final     eGFR   Date Value Ref Range Status   01/04/2024 72.4 >60.0 mL/min/1.73 Final     LIPIDS:  Total Cholesterol   Date Value Ref Range Status   11/14/2023 198 0 - 200 mg/dL Final     Triglycerides   Date Value Ref Range Status   11/14/2023 99 0 - 150 mg/dL Final     HDL Cholesterol   Date Value Ref Range Status   11/14/2023 71 (H) 40 - 60 mg/dL Final     LDL Cholesterol    Date Value Ref Range Status   11/14/2023 109 (H) 0 - 100 mg/dL Final     VLDL Cholesterol   Date Value Ref Range Status   11/14/2023 18 5 - 40 mg/dL Final     LDL/HDL Ratio   Date Value Ref Range Status   11/14/2023 1.51  Final         Procedures             ASSESSMENT:  Diagnoses and all orders for this visit:    1. Mixed hyperlipidemia (Primary)    2. Palpitations    3. Nonrheumatic mitral valve regurgitation  -     Cancel: Adult Transthoracic Echo Complete W/ Cont if Necessary Per Protocol; Future  -     Adult Transthoracic Echo Complete W/ Cont if Necessary Per Protocol; Future         PLAN:    1.  Once patient's foot tendinitis has improved she is going to get back to her walking program.  2.  Patient's palpitations are not bothering her.  We will continue the atenolol.  She did have an event recorder on for 12-1/2 days that showed rare PVC, PAC and PAC couplet.  If they got to his where they were bothering her we could consider increasing the atenolol.  3.  Will get an echo in September which will be 2 years from previous looking at her mild to moderate mitral regurgitation.  4.  Blood pressures under good  control.  5.  Continue the Livalo and Zetia.  Patient had cholesterol checked 11/14/2023 with , HDL 71, and triglycerides 99.  These are under good control.  6.  Patient will continue to watch her salt and use the Lasix for her edema.      Return in about 6 months (around 10/9/2024) for deisy webb.     Patient was given instructions and counseling regarding her condition or for health maintenance advice. Please see specific information pulled into the AVS if appropriate.         Curly Smith MD   4/10/2024  12:45 EDT

## 2024-04-15 ENCOUNTER — TELEPHONE (OUTPATIENT)
Dept: CARDIOLOGY | Facility: CLINIC | Age: 59
End: 2024-04-15
Payer: OTHER GOVERNMENT

## 2024-04-15 DIAGNOSIS — E78.2 MIXED HYPERLIPIDEMIA: ICD-10-CM

## 2024-04-15 RX ORDER — EZETIMIBE 10 MG/1
10 TABLET ORAL DAILY
Qty: 90 TABLET | Refills: 3 | Status: SHIPPED | OUTPATIENT
Start: 2024-04-15

## 2024-04-15 RX ORDER — POTASSIUM CHLORIDE 750 MG/1
10 TABLET, FILM COATED, EXTENDED RELEASE ORAL 2 TIMES DAILY
Qty: 180 TABLET | Refills: 3 | Status: SHIPPED | OUTPATIENT
Start: 2024-04-15

## 2024-04-15 RX ORDER — FUROSEMIDE 40 MG/1
40 TABLET ORAL DAILY
Qty: 90 TABLET | Refills: 3 | Status: SHIPPED | OUTPATIENT
Start: 2024-04-15

## 2024-04-15 RX ORDER — ATENOLOL 25 MG/1
25 TABLET ORAL
Qty: 90 TABLET | Refills: 3 | Status: SHIPPED | OUTPATIENT
Start: 2024-04-15

## 2024-04-15 NOTE — TELEPHONE ENCOUNTER
PT CALLED AND STATED CHIKIS MAHONEY DID NOT RECEIVE 4 SCRIPTS SENT IN FROM LAST APPT.  SCRIPTS WERE MARKED AS NO PRINT AND DID NOT GO TO THE PHARMACY.  SENT SCRIPTS AS REQUESTED.

## 2024-05-16 NOTE — TELEPHONE ENCOUNTER
Be good to schedule appointment as soon as you can. May be with any provider.    Discharge Diagnosis  SOB (shortness of breath)    Issues Requiring Follow-Up  Cultures from thoracentesis    Discharge Meds     Your medication list        CONTINUE taking these medications        Instructions Last Dose Given Next Dose Due   acyclovir 400 mg tablet  Commonly known as: Zovirax           albuterol 2.5 mg /3 mL (0.083 %) nebulizer solution           amLODIPine 5 mg tablet  Commonly known as: Norvasc           aspirin 81 mg EC tablet           atorvastatin 20 mg tablet  Commonly known as: Lipitor           carvedilol 25 mg tablet  Commonly known as: Coreg      TAKE 1 TABLET BY MOUTH TWICE DAILY WITH MEALS       ferrous sulfate (325 mg ferrous sulfate) tablet           insulin NPH and regular human 100 unit/mL (70-30) injection  Commonly known as: HumuLIN 70-30, NovoLIN 70-30           ONETOUCH DELICA LANCETS MISC           lancets 30 gauge misc  Commonly known as: OneTouch Delica Plus Lancet      USE 1 TO CHECK GLUCOSE THREE TIMES DAILY AS DIRECTED       lenalidomide 2.5 mg capsule  Commonly known as: Revlimid           levothyroxine 25 mcg tablet  Commonly known as: Synthroid, Levoxyl      Take 1 tablet (25 mcg) by mouth once daily.       mupirocin 2 % ointment  Commonly known as: Bactroban           nitroglycerin 0.4 mg SL tablet  Commonly known as: Nitrostat           OneTouch Verio test strips strip  Generic drug: blood sugar diagnostic           OneTouch Verio test strips strip  Generic drug: blood sugar diagnostic      USE 1 STRIP TO CHECK GLUCOSE THREE TIMES DAILY       pantoprazole 40 mg EC tablet  Commonly known as: ProtoNix      Take 1 tablet by mouth once daily       Pepcid 20 mg tablet  Generic drug: famotidine           potassium chloride CR 10 mEq ER tablet  Commonly known as: Klor-Con                  STOP taking these medications      cefuroxime 500 mg tablet  Commonly known as: Ceftin                 Test Results Pending At Discharge  Pending Labs       Order Current Status    Amylase,  Fluid Collected (05/16/24 1030)    Amylase, Fluid Collected (05/16/24 1030)    Atypical Pneumonia Panel Collected (05/16/24 1030)    Body Fluid Cell Count Collected (05/16/24 1030)    Body Fluid Cell Count With Differential Collected (05/16/24 1030)    Body Fluid Differential Collected (05/16/24 1030)    Cytology (Non-Gynecologic) Collected (05/16/24 1030)    Glucose, Fluid Collected (05/16/24 1030)    Glucose, Fluid Collected (05/16/24 1030)    Lactate Dehydrogenase, Fluid Collected (05/16/24 1030)    Lactate Dehydrogenase, Fluid Collected (05/16/24 1030)    Protein, Total Fluid Collected (05/16/24 1030)    Protein, Total Fluid Collected (05/16/24 1030)    Sterile Fluid Culture/Smear Collected (05/16/24 1030)    Triglycerides, Fluid Collected (05/16/24 1030)    Triglycerides, Fluid Collected (05/16/24 1030)    pH, Body Fluid Collected (05/16/24 1030)    Aspergillus galactomannan antigen In process    Cytomegalovirus antibody, IgM In process    Fungitell Beta-D Glucan Serum In process    T-Spot TB In process            Hospital Course   Shady Becerril is a 88 y.o. year old male with PMH HFpEF, CAD sp CABG, A fib, HTN, low grade B-cell Non-Hodgkin Lymphoma on Revlimid 2.5 mg daily 21 days on and 7 days off presented to OSH for worsening SOB. He said he was just diagnosed with pneumonia 1 week PTA and had finished course of abx. He was also scheduled for thoracentesis for later this month. He continues to have to worsening SOB and non productive cough that he went to the hospital. He was transferred to [facility] for thoracentesis. Pulm and ID were consulted. They recommended monitoring off abx. Thoracentesis was done with 400cc out. Cultures were sent and pending. Pt's symptoms improved. He is hemodynamically stable for discharge at this time with close outpatient follow ups.     The patient was discharged in satisfactory condition.   Medications and side effect profile reviewed with patient.  More than 30  minutes were spent in coordinating patient discharge       Pertinent Physical Exam At Time of Discharge  Physical Exam  Vitals and nursing note reviewed.   Constitutional:       General: He is not in acute distress.     Appearance: Normal appearance. He is not ill-appearing or toxic-appearing.   HENT:      Head: Normocephalic and atraumatic.      Nose: Nose normal.      Mouth/Throat:      Mouth: Mucous membranes are moist.   Eyes:      Extraocular Movements: Extraocular movements intact.      Conjunctiva/sclera: Conjunctivae normal.      Pupils: Pupils are equal, round, and reactive to light.   Cardiovascular:      Rate and Rhythm: Normal rate and regular rhythm.      Heart sounds: No murmur heard.     No gallop.   Pulmonary:      Effort: No respiratory distress.      Breath sounds: Wheezing and rhonchi present. No rales.   Abdominal:      General: Abdomen is flat. Bowel sounds are normal. There is no distension.      Palpations: Abdomen is soft. There is no mass.      Tenderness: There is no abdominal tenderness.   Musculoskeletal:         General: No swelling or tenderness. Normal range of motion.      Cervical back: Normal range of motion and neck supple.   Skin:     General: Skin is warm and dry.   Neurological:      General: No focal deficit present.      Mental Status: He is alert and oriented to person, place, and time. Mental status is at baseline.   Psychiatric:         Mood and Affect: Mood normal.         Behavior: Behavior normal.         Thought Content: Thought content normal.         Judgment: Judgment normal.     Outpatient Follow-Up  Future Appointments   Date Time Provider Department Center   5/16/2024  3:00 PM [facility] DEACON LYNCH   5/29/2024 11:00 AM [facility] DEACON Medley MD  Hospitalist

## 2024-05-21 DIAGNOSIS — E03.9 HYPOTHYROIDISM, UNSPECIFIED TYPE: ICD-10-CM

## 2024-05-21 RX ORDER — LEVOTHYROXINE SODIUM 88 UG/1
88 TABLET ORAL DAILY
Qty: 90 TABLET | Refills: 1 | Status: SHIPPED | OUTPATIENT
Start: 2024-05-21

## 2024-05-21 RX ORDER — CETIRIZINE HYDROCHLORIDE 10 MG/1
10 TABLET ORAL DAILY
Qty: 90 TABLET | Refills: 1 | Status: SHIPPED | OUTPATIENT
Start: 2024-05-21

## 2024-05-21 RX ORDER — CETIRIZINE HYDROCHLORIDE 10 MG/1
10 TABLET ORAL DAILY
Qty: 90 TABLET | Refills: 1 | Status: SHIPPED | OUTPATIENT
Start: 2024-05-21 | End: 2024-05-21

## 2024-05-21 NOTE — TELEPHONE ENCOUNTER
Caller: Mariama Turner    Relationship: Self    Best call back number: [phone]     Requested Prescriptions:   Requested Prescriptions     Pending Prescriptions Disp Refills    levothyroxine (Synthroid) 88 MCG tablet 90 tablet 1     Sig: Take 1 tablet by mouth Daily.    cetirizine (zyrTEC) 10 MG tablet 90 tablet 1     Sig: Take 1 tablet by mouth Daily.        Pharmacy where request should be sent: [facility] [phone] [place], [place] [phone] FX     Last office visit with prescribing clinician: 1/4/2024   Last telemedicine visit with prescribing clinician: Visit date not found   Next office visit with prescribing clinician: 6/25/2024     Does the patient have less than a 3 day supply:  [x] Yes  [] No    Would you like a call back once the refill request has been completed: [x] Yes [] No    If the office needs to give you a call back, can they leave a voicemail: [x] Yes [] No    Marian Billingsley Rep   05/21/24 10:38 EDT

## 2024-06-17 ENCOUNTER — TELEPHONE (OUTPATIENT)
Dept: NEUROLOGY | Facility: CLINIC | Age: 59
End: 2024-06-17
Payer: OTHER GOVERNMENT

## 2024-06-17 NOTE — TELEPHONE ENCOUNTER
S/w pt.  Advised appt on 6/26/24 will be at our new office, [address] [facility], 4th floor

## 2024-06-21 NOTE — PROGRESS NOTES
Chief Complaint  Hypothyroidism (Follow up ), Fatigue (She can sleep 8-10 hours and still be tired ), and Headache (Been having more headaches )    Subjective          Mariama Turner presents to [facility]  History of Present Illness    Hypothyroidism  This is a chronic problem. Associated symptoms include fatigue. Pertinent negatives include no abdominal pain, anorexia, arthralgias, change in bowel habit, chest pain, chills, congestion, coughing, diaphoresis, fever, headaches, joint swelling, myalgias, nausea, neck pain, numbness, rash, sore throat, swollen glands, urinary symptoms, vertigo, visual change, vomiting or weakness.     Hypertension  This is a chronic problem. The problem is controlled. Pertinent negatives include no anxiety, blurred vision, chest pain, headaches, malaise/fatigue, neck pain, orthopnea, palpitations, peripheral edema, PND, shortness of breath or sweats. Current antihypertension treatment includes beta blockers. The current treatment provides significant improvement. Compliance problems include diet and exercise.  There is no history of chronic renal disease.     Hyperlipidemia  This is a chronic problem. Exacerbating diseases include hypothyroidism. She has no history of chronic renal disease, diabetes, liver disease, obesity or nephrotic syndrome. Pertinent negatives include no chest pain, focal sensory loss, focal weakness, leg pain, myalgias or shortness of breath. Compliance problems include adherence to diet and adherence to exercise.      Saw [facility]     Also saw ortho - sukumar dx with arthritis and carpal tunnel of right wrist and arthritis in left wrist.   Went to PT and had joint injections  with not much relief.     Had a fall in her driveway. Unsure if she got dizzy and fell or if she tripped. This happened 2 days before mothers day.   Feels off balance.    states that since this fall she has reverted back to  "holding things and she is walking off balance  Light is bothering her eyes.   Prior to the fall she had exerted herself   Dx with COVID the beginning of April.   Since then she could sleep 8-10 hours and still not feel rested.   Alarm on her watch is going off bradycardia HR < 50   Current Outpatient Medications   Medication Instructions    albuterol sulfate  (90 Base) MCG/ACT inhaler 2 puffs, Inhalation, Every 4 Hours PRN    cetirizine (ZYRTEC) 10 mg, Oral, Daily    estradiol (Kendra) 0.075 MG/24HR patch 1 patch, Transdermal, 2 Times Weekly    ezetimibe (ZETIA) 10 mg, Oral, Daily    fluticasone (Flonase) 50 MCG/ACT nasal spray 2 sprays, Nasal, Daily    furosemide (LASIX) 40 mg, Oral, Daily    levothyroxine (SYNTHROID) 88 mcg, Oral, Daily    lidocaine (LIDODERM) 5 % 1 patch, Transdermal, Every 24 Hours, Remove & Discard patch within 12 hours or as directed by MD    meclizine (ANTIVERT) 25 mg, Oral, 3 Times Daily PRN    methocarbamol (ROBAXIN) 500 mg, Oral, 3 Times Daily PRN    naproxen sodium (ALEVE) 440 mg, Oral, 3 Times Daily PRN    pitavastatin calcium (LIVALO) 1 mg, Oral, Daily    potassium chloride 10 MEQ CR tablet 10 mEq, Oral, 2 Times Daily    Scopolamine (Transderm-Scop, 1.5 MG,) 1 MG/3DAYS patch 1 patch, Transdermal, Every 72 Hours, PRN       The following portions of the patient's history were reviewed and updated as appropriate: allergies, current medications, past family history, past medical history, past social history, past surgical history, and problem list.    Objective   Vital Signs:   /75 (BP Location: Right arm)   Pulse 55   Ht 160 cm (63\")   Wt 73.7 kg (162 lb 6.4 oz)   SpO2 95%   BMI 28.77 kg/m²     BP Readings from Last 3 Encounters:   06/25/24 116/75   04/09/24 115/69   01/04/24 120/73     Wt Readings from Last 3 Encounters:   06/25/24 73.7 kg (162 lb 6.4 oz)   04/09/24 75.8 kg (167 lb)   01/04/24 69.4 kg (153 lb)      Office Visit with Curly Smith MD (04/09/2024) "        Physical Exam  Constitutional:       Appearance: She is overweight.          Appearance: No acute distress, well-nourished  Head: normocephalic, atraumatic  Eyes: extraocular movements intact, no scleral icterus, no conjunctival injection  Ears, Nose, and Throat: external ears normal, nares patent, moist mucous membranes  Cardiovascular: regular rate and rhythm. no murmurs, rubs, or gallops. no edema  Respiratory: breathing comfortably, symmetric chest rise, clear to auscultation bilaterally. No wheezes, rales, or rhonchi.  Neuro: alert and oriented to time, place, and person. Normal gait  Psych: normal mood and affect     Result Review :   The following data was reviewed by: MIGNON Gaitan on 06/25/2024:  Common labs          11/14/2023    14:24 1/4/2024    14:11   Common Labs   Glucose 91  91    BUN 29  21    Creatinine 0.77  0.91    Sodium 139  141    Potassium 4.1  4.5    Chloride 100  103    Calcium 10.0  9.5    Albumin 4.2     Total Bilirubin 0.5     Alkaline Phosphatase 113     AST (SGOT) 23     ALT (SGPT) 24     WBC 7.70     Hemoglobin 13.6     Hematocrit 40.9     Platelets 262     Total Cholesterol 198     Triglycerides 99     HDL Cholesterol 71     LDL Cholesterol  109              Lab Results   Component Value Date    SARSANTIGEN Not Detected 11/14/2022    COVID19 Not Detected 11/14/2022    RAPFLUA Negative 11/14/2022    RAPFLUB Negative 11/14/2022    FLUAAG Not Detected 09/23/2021    FLU Negative 04/28/2022    FLU Negative 04/28/2022    FLUBAG Not Detected 09/23/2021    RAPSCRN Negative 11/14/2022    INR 1.10 06/02/2023    BILIRUBINUR Negative 01/28/2023       Procedures        Assessment and Plan    Diagnoses and all orders for this visit:    1. Primary hypertension (Primary)  Comments:  well controlled on current regimen  Orders:  -     CBC w AUTO Differential  -     Comprehensive metabolic panel    2. Mixed hyperlipidemia  Comments:  will recheck lipids today  Orders:  -     Lipid  panel  -     pitavastatin calcium (Livalo) 1 MG tablet tablet; Take 1 tablet by mouth Daily.  Dispense: 90 tablet; Refill: 3    3. Hypothyroidism, unspecified type  Comments:  will recheck TSH today  Orders:  -     TSH    4. Attention deficit disorder (ADD) without hyperactivity    5. Migraine without aura and without status migrainosus, not intractable    6. Snoring  -     Ambulatory Referral to Sleep Medicine    7. Excessive daytime sleepiness  Comments:  sleep med referral placed  Orders:  -     Ambulatory Referral to Sleep Medicine    8. Overweight (BMI 25.0-29.9)      - Discontinue atenolol today. HR in the 50's and symptomatic. I sent a message to Dr. Lott in [place] [place] to let hm know of the change. I will do close follow up with her as she was taking for palpitations and it has also been helping her migraines.   She has appt with neurology tomorrow and I told her to let him know of this change.         Medications Discontinued During This Encounter   Medication Reason    atenolol (TENORMIN) 25 MG tablet Side effects    pitavastatin calcium (Livalo) 1 MG tablet tablet Reorder          Follow Up   No follow-ups on file.  Patient was given instructions and counseling regarding her condition or for health maintenance advice. Please see specific information pulled into the AVS if appropriate.       Ame Xie, MIGNON  06/25/24  10:07 EDT

## 2024-06-25 ENCOUNTER — OFFICE VISIT (OUTPATIENT)
Dept: INTERNAL MEDICINE | Facility: CLINIC | Age: 59
End: 2024-06-25
Payer: OTHER GOVERNMENT

## 2024-06-25 VITALS
DIASTOLIC BLOOD PRESSURE: 75 MMHG | OXYGEN SATURATION: 95 % | WEIGHT: 162.4 LBS | HEART RATE: 55 BPM | HEIGHT: 63 IN | SYSTOLIC BLOOD PRESSURE: 116 MMHG | BODY MASS INDEX: 28.77 KG/M2

## 2024-06-25 DIAGNOSIS — E03.9 HYPOTHYROIDISM, UNSPECIFIED TYPE: Chronic | ICD-10-CM

## 2024-06-25 DIAGNOSIS — E66.3 OVERWEIGHT (BMI 25.0-29.9): ICD-10-CM

## 2024-06-25 DIAGNOSIS — I10 PRIMARY HYPERTENSION: Primary | ICD-10-CM

## 2024-06-25 DIAGNOSIS — R06.83 SNORING: ICD-10-CM

## 2024-06-25 DIAGNOSIS — F98.8 ATTENTION DEFICIT DISORDER (ADD) WITHOUT HYPERACTIVITY: ICD-10-CM

## 2024-06-25 DIAGNOSIS — G43.009 MIGRAINE WITHOUT AURA AND WITHOUT STATUS MIGRAINOSUS, NOT INTRACTABLE: ICD-10-CM

## 2024-06-25 DIAGNOSIS — E78.2 MIXED HYPERLIPIDEMIA: Chronic | ICD-10-CM

## 2024-06-25 DIAGNOSIS — G47.19 EXCESSIVE DAYTIME SLEEPINESS: ICD-10-CM

## 2024-06-25 LAB
ALBUMIN SERPL-MCNC: 4.4 G/DL (ref 3.5–5.2)
ALBUMIN/GLOB SERPL: 1.6 G/DL
ALP SERPL-CCNC: 131 U/L (ref 39–117)
ALT SERPL W P-5'-P-CCNC: 35 U/L (ref 1–33)
ANION GAP SERPL CALCULATED.3IONS-SCNC: 10.3 MMOL/L (ref 5–15)
AST SERPL-CCNC: 33 U/L (ref 1–32)
BASOPHILS # BLD AUTO: 0.08 10*3/MM3 (ref 0–0.2)
BASOPHILS NFR BLD AUTO: 1.4 % (ref 0–1.5)
BILIRUB SERPL-MCNC: 0.4 MG/DL (ref 0–1.2)
BUN SERPL-MCNC: 21 MG/DL (ref 8–23)
BUN/CREAT SERPL: 23.9 (ref 7–25)
CALCIUM SPEC-SCNC: 10.1 MG/DL (ref 8.6–10.5)
CHLORIDE SERPL-SCNC: 101 MMOL/L (ref 98–107)
CHOLEST SERPL-MCNC: 153 MG/DL (ref 0–200)
CO2 SERPL-SCNC: 26.7 MMOL/L (ref 22–29)
CREAT SERPL-MCNC: 0.88 MG/DL (ref 0.57–1)
DEPRECATED RDW RBC AUTO: 39.9 FL (ref 37–54)
EGFRCR SERPLBLD CKD-EPI 2021: 75.3 ML/MIN/1.73
EOSINOPHIL # BLD AUTO: 0.28 10*3/MM3 (ref 0–0.4)
EOSINOPHIL NFR BLD AUTO: 5 % (ref 0.3–6.2)
ERYTHROCYTE [DISTWIDTH] IN BLOOD BY AUTOMATED COUNT: 12.2 % (ref 12.3–15.4)
GLOBULIN UR ELPH-MCNC: 2.8 GM/DL
GLUCOSE SERPL-MCNC: 80 MG/DL (ref 65–99)
HCT VFR BLD AUTO: 44.1 % (ref 34–46.6)
HDLC SERPL-MCNC: 70 MG/DL (ref 40–60)
HGB BLD-MCNC: 14.8 G/DL (ref 12–15.9)
IMM GRANULOCYTES # BLD AUTO: 0.01 10*3/MM3 (ref 0–0.05)
IMM GRANULOCYTES NFR BLD AUTO: 0.2 % (ref 0–0.5)
LDLC SERPL CALC-MCNC: 69 MG/DL (ref 0–100)
LDLC/HDLC SERPL: 0.98 {RATIO}
LYMPHOCYTES # BLD AUTO: 1.15 10*3/MM3 (ref 0.7–3.1)
LYMPHOCYTES NFR BLD AUTO: 20.5 % (ref 19.6–45.3)
MCH RBC QN AUTO: 30.1 PG (ref 26.6–33)
MCHC RBC AUTO-ENTMCNC: 33.6 G/DL (ref 31.5–35.7)
MCV RBC AUTO: 89.6 FL (ref 79–97)
MONOCYTES # BLD AUTO: 0.73 10*3/MM3 (ref 0.1–0.9)
MONOCYTES NFR BLD AUTO: 13 % (ref 5–12)
NEUTROPHILS NFR BLD AUTO: 3.36 10*3/MM3 (ref 1.7–7)
NEUTROPHILS NFR BLD AUTO: 59.9 % (ref 42.7–76)
NRBC BLD AUTO-RTO: 0 /100 WBC (ref 0–0.2)
PLATELET # BLD AUTO: 259 10*3/MM3 (ref 140–450)
PMV BLD AUTO: 10.7 FL (ref 6–12)
POTASSIUM SERPL-SCNC: 4.8 MMOL/L (ref 3.5–5.2)
PROT SERPL-MCNC: 7.2 G/DL (ref 6–8.5)
RBC # BLD AUTO: 4.92 10*6/MM3 (ref 3.77–5.28)
SODIUM SERPL-SCNC: 138 MMOL/L (ref 136–145)
TRIGL SERPL-MCNC: 73 MG/DL (ref 0–150)
TSH SERPL DL<=0.05 MIU/L-ACNC: 1.74 UIU/ML (ref 0.27–4.2)
VLDLC SERPL-MCNC: 14 MG/DL (ref 5–40)
WBC NRBC COR # BLD AUTO: 5.61 10*3/MM3 (ref 3.4–10.8)

## 2024-06-25 PROCEDURE — 80061 LIPID PANEL: CPT | Performed by: NURSE PRACTITIONER

## 2024-06-25 PROCEDURE — 99214 OFFICE O/P EST MOD 30 MIN: CPT | Performed by: NURSE PRACTITIONER

## 2024-06-25 PROCEDURE — 80053 COMPREHEN METABOLIC PANEL: CPT | Performed by: NURSE PRACTITIONER

## 2024-06-25 PROCEDURE — 84443 ASSAY THYROID STIM HORMONE: CPT | Performed by: NURSE PRACTITIONER

## 2024-06-25 PROCEDURE — 85025 COMPLETE CBC W/AUTO DIFF WBC: CPT | Performed by: NURSE PRACTITIONER

## 2024-06-25 RX ORDER — PITAVASTATIN CALCIUM 1.04 MG/1
1 TABLET, FILM COATED ORAL DAILY
Qty: 90 TABLET | Refills: 3 | Status: SHIPPED | OUTPATIENT
Start: 2024-06-25

## 2024-06-28 ENCOUNTER — OFFICE VISIT (OUTPATIENT)
Dept: NEUROLOGY | Facility: CLINIC | Age: 59
End: 2024-06-28
Payer: OTHER GOVERNMENT

## 2024-06-28 VITALS
DIASTOLIC BLOOD PRESSURE: 60 MMHG | BODY MASS INDEX: 29.06 KG/M2 | HEART RATE: 73 BPM | OXYGEN SATURATION: 98 % | HEIGHT: 63 IN | WEIGHT: 164 LBS | SYSTOLIC BLOOD PRESSURE: 120 MMHG

## 2024-06-28 DIAGNOSIS — F07.81 POST CONCUSSIVE SYNDROME: Primary | ICD-10-CM

## 2024-06-28 DIAGNOSIS — H81.93 VESTIBULAR DYSFUNCTION OF BOTH EARS: ICD-10-CM

## 2024-06-28 NOTE — PROGRESS NOTES
Chief Complaint   Patient presents with    Vestibular dysfunction, unspecified laterality    Balance Issues       Patient ID: Mariama Turner is a 58 y.o. female.    HPI:  I had the pleasure of seeing your patient again today. As you may know she is a 58-year-old female here for the management of postconcussive syndrome. She has significant vestibular dysfunction which has led to some cognitive changes as well as headaches. She recently fell backwards onto her bottom at home.  She states that this situation caused worsening dizziness for her.  She has dizzy spells almost daily.  She has not had any focal weakness or numbness of her arms or legs.  She does have sensitivity to light with her dizziness.  She was seen by the [facility] at the [facility].  Her last visit there was sometime in May of last year.  She has not been back there in quite some time however.  No new symptoms neurologically.  She occasionally will use Antivert.    The following portions of the patient's history were reviewed and updated as appropriate: allergies, current medications, past family history, past medical history, past social history, past surgical history and problem list.    Review of Systems   HENT:  Positive for tinnitus (during HA).    Eyes:  Positive for photophobia (during HA) and visual disturbance (during HA).   Gastrointestinal:  Positive for nausea (during sudden motions).   Musculoskeletal:  Positive for gait problem.   Neurological:  Positive for speech difficulty (during dizzy spells), light-headedness and headaches. Negative for dizziness, tremors, seizures, syncope, facial asymmetry, weakness and numbness.   Psychiatric/Behavioral:  Positive for agitation (during HA). Negative for behavioral problems, confusion, decreased concentration, dysphoric mood, hallucinations, self-injury, sleep disturbance and suicidal ideas. The patient is not nervous/anxious and is not hyperactive.       I have reviewed the review  of systems above performed by my medical assistant.      Vitals:    24 1617   BP: 120/60   Pulse: 73   SpO2: 98%       Neurologic Exam     Mental Status   Oriented to person, place, and time.   Concentration: normal.   Level of consciousness: alert  Knowledge: consistent with education (No deficits found.).     Cranial Nerves     CN II   Visual fields full to confrontation.     CN III, IV, VI   Pupils are equal, round, and reactive to light.  Extraocular motions are normal.   CN III: no CN III palsy  CN VI: no CN VI palsy    CN V   Facial sensation intact.     CN VII   Facial expression full, symmetric.     CN VIII   CN VIII normal.     CN IX, X   CN IX normal.   CN X normal.     CN XI   CN XI normal.     CN XII   CN XII normal.     Motor Exam     Strength   Right neck flexion: 5/5  Left neck flexion: 5/5  Right neck extension: 5/5  Left neck extension: 5/5  Right deltoid: 5/5  Left deltoid: 5/5  Right biceps: 5/5  Left biceps: 5/5  Right triceps: 5/5  Left triceps: 5/5  Right wrist flexion: 5/5  Left wrist flexion: 5/5  Right wrist extension: 5/5  Left wrist extension: 5/5  Right interossei: 5/5  Left interossei: 5/5  Right abdominals: 5/5  Left abdominals: 5/5  Right iliopsoas: 5/5  Left iliopsoas: 5/5  Right quadriceps: 5/5  Left quadriceps: 5/5  Right hamstrin/5  Left hamstrin/5  Right glutei: 5/5  Left glutei: 5/5  Right anterior tibial: 5/5  Left anterior tibial: 5/5  Right posterior tibial: 5/5  Left posterior tibial: 5/5  Right peroneal: 5/5  Left peroneal: 5/5  Right gastroc: 5/5  Left gastroc: 5/5    Sensory Exam   Light touch normal.   Vibration normal.     Gait, Coordination, and Reflexes     Gait  Gait: normal    Reflexes   Right brachioradialis: 2+  Left brachioradialis: 2+  Right biceps: 2+  Left biceps: 2+  Right triceps: 2+  Left triceps: 2+  Right patellar: 2+  Left patellar: 2+  Right achilles: 2+  Left achilles: 2+  Right : 2+  Left : 2+Station is normal.       Physical  Exam  Vitals reviewed.   Constitutional:       Appearance: She is well-developed.   HENT:      Head: Normocephalic and atraumatic.   Eyes:      Extraocular Movements: EOM normal.      Pupils: Pupils are equal, round, and reactive to light.   Cardiovascular:      Rate and Rhythm: Normal rate and regular rhythm.   Pulmonary:      Breath sounds: Normal breath sounds.   Musculoskeletal:         General: Normal range of motion.   Skin:     General: Skin is warm.   Neurological:      Mental Status: She is oriented to person, place, and time.      Gait: Gait is intact.      Deep Tendon Reflexes:      Reflex Scores:       Tricep reflexes are 2+ on the right side and 2+ on the left side.       Bicep reflexes are 2+ on the right side and 2+ on the left side.       Brachioradialis reflexes are 2+ on the right side and 2+ on the left side.       Patellar reflexes are 2+ on the right side and 2+ on the left side.       Achilles reflexes are 2+ on the right side and 2+ on the left side.        Procedures    Assessment/Plan: I advised that she continue to see the folks at the [facility].  She has agreed to return to their office for further assessment and care.  Will see her back in about 4 to 6 months or sooner if needed.  A total of 30 minutes was spent face-to-face with the patient today.  Of that greater than 50% of this time was spent discussing signs and symptoms of vestibular dysfunction, patient education, plan of care and prognosis.         Diagnoses and all orders for this visit:    1. Post concussive syndrome (Primary)    2. Vestibular dysfunction of both ears           Jordan Rawls II, MD

## 2024-07-25 ENCOUNTER — OFFICE VISIT (OUTPATIENT)
Dept: INTERNAL MEDICINE | Facility: CLINIC | Age: 59
End: 2024-07-25
Payer: OTHER GOVERNMENT

## 2024-07-25 VITALS
OXYGEN SATURATION: 97 % | WEIGHT: 162 LBS | SYSTOLIC BLOOD PRESSURE: 136 MMHG | HEART RATE: 66 BPM | DIASTOLIC BLOOD PRESSURE: 74 MMHG | TEMPERATURE: 98 F | HEIGHT: 63 IN | BODY MASS INDEX: 28.7 KG/M2

## 2024-07-25 DIAGNOSIS — F07.81 POST CONCUSSIVE SYNDROME: Primary | ICD-10-CM

## 2024-07-25 DIAGNOSIS — G43.009 MIGRAINE WITHOUT AURA AND WITHOUT STATUS MIGRAINOSUS, NOT INTRACTABLE: ICD-10-CM

## 2024-07-25 DIAGNOSIS — H53.9 VISUAL DISTURBANCE: ICD-10-CM

## 2024-07-25 DIAGNOSIS — R42 DIZZINESS: ICD-10-CM

## 2024-07-25 DIAGNOSIS — J06.9 VIRAL URI WITH COUGH: ICD-10-CM

## 2024-07-25 PROCEDURE — 99214 OFFICE O/P EST MOD 30 MIN: CPT | Performed by: NURSE PRACTITIONER

## 2024-07-25 RX ORDER — FLUTICASONE PROPIONATE 50 MCG
2 SPRAY, SUSPENSION (ML) NASAL DAILY
Qty: 16 G | Refills: 3 | Status: SHIPPED | OUTPATIENT
Start: 2024-07-25 | End: 2024-07-26

## 2024-07-25 RX ORDER — ESTRADIOL 0.07 MG/D
1 FILM, EXTENDED RELEASE TRANSDERMAL 2 TIMES WEEKLY
Qty: 24 PATCH | Refills: 3 | Status: SHIPPED | OUTPATIENT
Start: 2024-07-25 | End: 2024-07-26

## 2024-07-25 RX ORDER — SCOLOPAMINE TRANSDERMAL SYSTEM 1 MG/1
1 PATCH, EXTENDED RELEASE TRANSDERMAL
Qty: 24 EACH | Refills: 1 | Status: SHIPPED | OUTPATIENT
Start: 2024-07-25 | End: 2024-07-26

## 2024-07-25 RX ORDER — VALACYCLOVIR HYDROCHLORIDE 1 G/1
1 TABLET, FILM COATED ORAL DAILY
COMMUNITY
Start: 2024-07-24

## 2024-07-25 NOTE — PROGRESS NOTES
Chief Complaint  Palpitations (Follow-up. Patient follow-up with neuro and [facility] ) and Headache (Patient reports headaches are more frequent. She thinks it is because she was taken off her atenolol last visit. )    Subjective          Mariama Turner presents to [facility]  History of Present Illness    Hypothyroidism  This is a chronic problem. Associated symptoms include fatigue. Pertinent negatives include no abdominal pain, anorexia, arthralgias, change in bowel habit, chest pain, chills, congestion, coughing, diaphoresis, fever, headaches, joint swelling, myalgias, nausea, neck pain, numbness, rash, sore throat, swollen glands, urinary symptoms, vertigo, visual change, vomiting or weakness.     Hypertension  This is a chronic problem. The problem is controlled. Pertinent negatives include no anxiety, blurred vision, chest pain, headaches, malaise/fatigue, neck pain, orthopnea, palpitations, peripheral edema, PND, shortness of breath or sweats. Current antihypertension treatment includes beta blockers. The current treatment provides significant improvement. Compliance problems include diet and exercise.  There is no history of chronic renal disease.     Hyperlipidemia  This is a chronic problem. Exacerbating diseases include hypothyroidism. She has no history of chronic renal disease, diabetes, liver disease, obesity or nephrotic syndrome. Pertinent negatives include no chest pain, focal sensory loss, focal weakness, leg pain, myalgias or shortness of breath. Compliance problems include adherence to diet and adherence to exercise.        The following portions of the patient's history were reviewed and updated as appropriate: allergies, current medications, past family history, past medical history, past social history, past surgical history, and problem list.    Objective   Vital Signs:   /74 (BP Location: Right  "arm, Patient Position: Sitting, Cuff Size: Adult)   Pulse 66   Temp 98 °F (36.7 °C) (Temporal)   Ht 160 cm (63\")   Wt 73.5 kg (162 lb)   SpO2 97%   BMI 28.70 kg/m²     BP Readings from Last 3 Encounters:   07/25/24 136/74   06/28/24 120/60   06/25/24 116/75     Wt Readings from Last 3 Encounters:   07/25/24 73.5 kg (162 lb)   06/28/24 74.4 kg (164 lb)   06/25/24 73.7 kg (162 lb 6.4 oz)           Physical Exam     Appearance: No acute distress, well-nourished  Head: normocephalic, atraumatic  Eyes: extraocular movements intact, no scleral icterus, no conjunctival injection  Ears, Nose, and Throat: external ears normal, nares patent, moist mucous membranes  Cardiovascular: regular rate and rhythm. no murmurs, rubs, or gallops. no edema  Respiratory: breathing comfortably, symmetric chest rise, clear to auscultation bilaterally. No wheezes, rales, or rhonchi.  Neuro: alert and oriented to time, place, and person. Normal gait  Psych: normal mood and affect     Result Review :   The following data was reviewed by: MIGNON Gaitan on 07/25/2024:  Common labs          11/14/2023    14:24 1/4/2024    14:11 6/25/2024    09:00   Common Labs   Glucose 91  91  80    BUN 29  21  21    Creatinine 0.77  0.91  0.88    Sodium 139  141  138    Potassium 4.1  4.5  4.8    Chloride 100  103  101    Calcium 10.0  9.5  10.1    Albumin 4.2   4.4    Total Bilirubin 0.5   0.4    Alkaline Phosphatase 113   131    AST (SGOT) 23   33    ALT (SGPT) 24   35    WBC 7.70   5.61    Hemoglobin 13.6   14.8    Hematocrit 40.9   44.1    Platelets 262   259    Total Cholesterol 198   153    Triglycerides 99   73    HDL Cholesterol 71   70    LDL Cholesterol  109   69             Lab Results   Component Value Date    SARSANTIGEN Not Detected 11/14/2022    COVID19 Not Detected 11/14/2022    RAPFLUA Negative 11/14/2022    RAPFLUB Negative 11/14/2022    FLUAAG Not Detected 09/23/2021    FLU Negative 04/28/2022    FLU Negative 04/28/2022    " FLUBAG Not Detected 09/23/2021    RAPSCRN Negative 11/14/2022    INR 1.10 06/02/2023    BILIRUBINUR Negative 01/28/2023            Assessment and Plan    Diagnoses and all orders for this visit:    1. Post concussive syndrome (Primary)    2. Visual disturbance  -     Ambulatory Referral to Ophthalmology    3. Migraine without aura and without status migrainosus, not intractable    4. Dizziness  Comments:  avoiding diuretics. no help with meclizine. will Rx scopalamine.   Orders:  -     Discontinue: Scopolamine (Transderm-Scop, 1.5 MG,) 1 MG/3DAYS patch; Place 1 patch on the skin as directed by provider Every 72 (Seventy-Two) Hours. PRN  Dispense: 24 each; Refill: 1  -     Scopolamine (Transderm-Scop, 1.5 MG,) 1 MG/3DAYS patch; Place 1 patch on the skin as directed by provider Every 72 (Seventy-Two) Hours. PRN  Dispense: 24 each; Refill: 1    5. Viral URI with cough  -     Discontinue: fluticasone (Flonase) 50 MCG/ACT nasal spray; 2 sprays into the nostril(s) as directed by provider Daily.  Dispense: 16 g; Refill: 3  -     fluticasone (Flonase) 50 MCG/ACT nasal spray; 2 sprays into the nostril(s) as directed by provider Daily.  Dispense: 16 g; Refill: 3    Other orders  -     Discontinue: estradiol (Kendra) 0.075 MG/24HR patch; Place 1 patch on the skin as directed by provider 2 (Two) Times a Week.  Dispense: 24 patch; Refill: 3  -     estradiol (Kendra) 0.075 MG/24HR patch; Place 1 patch on the skin as directed by provider 2 (Two) Times a Week.  Dispense: 24 patch; Refill: 3          Medications Discontinued During This Encounter   Medication Reason    Scopolamine (Transderm-Scop, 1.5 MG,) 1 MG/3DAYS patch Reorder    estradiol (Kendra) 0.075 MG/24HR patch Reorder    fluticasone (Flonase) 50 MCG/ACT nasal spray Reorder    Scopolamine (Transderm-Scop, 1.5 MG,) 1 MG/3DAYS patch     estradiol (Kendra) 0.075 MG/24HR patch     fluticasone (Flonase) 50 MCG/ACT nasal spray           Follow Up   No follow-ups on file.  Patient  was given instructions and counseling regarding her condition or for health maintenance advice. Please see specific information pulled into the AVS if appropriate.       Ame Xie, MIGNON  07/30/24  08:27 EDT

## 2024-07-26 RX ORDER — FLUTICASONE PROPIONATE 50 MCG
2 SPRAY, SUSPENSION (ML) NASAL DAILY
Qty: 16 G | Refills: 3 | Status: SHIPPED | OUTPATIENT
Start: 2024-07-26

## 2024-07-26 RX ORDER — ESTRADIOL 0.07 MG/D
1 FILM, EXTENDED RELEASE TRANSDERMAL 2 TIMES WEEKLY
Qty: 24 PATCH | Refills: 3 | Status: SHIPPED | OUTPATIENT
Start: 2024-07-29

## 2024-07-26 RX ORDER — SCOLOPAMINE TRANSDERMAL SYSTEM 1 MG/1
1 PATCH, EXTENDED RELEASE TRANSDERMAL
Qty: 24 EACH | Refills: 1 | Status: SHIPPED | OUTPATIENT
Start: 2024-07-26

## 2024-09-03 ENCOUNTER — OFFICE VISIT (OUTPATIENT)
Dept: INTERNAL MEDICINE | Facility: CLINIC | Age: 59
End: 2024-09-03
Payer: OTHER GOVERNMENT

## 2024-09-03 VITALS
SYSTOLIC BLOOD PRESSURE: 127 MMHG | HEIGHT: 63 IN | BODY MASS INDEX: 28.17 KG/M2 | HEART RATE: 66 BPM | OXYGEN SATURATION: 96 % | DIASTOLIC BLOOD PRESSURE: 76 MMHG | WEIGHT: 159 LBS | TEMPERATURE: 98 F

## 2024-09-03 DIAGNOSIS — S39.012A STRAIN OF LUMBAR REGION, INITIAL ENCOUNTER: Primary | ICD-10-CM

## 2024-09-03 PROCEDURE — 99213 OFFICE O/P EST LOW 20 MIN: CPT | Performed by: NURSE PRACTITIONER

## 2024-09-03 PROCEDURE — 96372 THER/PROPH/DIAG INJ SC/IM: CPT | Performed by: NURSE PRACTITIONER

## 2024-09-03 RX ORDER — METHYLPREDNISOLONE SODIUM SUCCINATE 125 MG/2ML
125 INJECTION, POWDER, LYOPHILIZED, FOR SOLUTION INTRAMUSCULAR; INTRAVENOUS EVERY 6 HOURS
Status: DISCONTINUED | OUTPATIENT
Start: 2024-09-03 | End: 2024-09-03

## 2024-09-03 RX ORDER — KETOROLAC TROMETHAMINE 30 MG/ML
60 INJECTION, SOLUTION INTRAMUSCULAR; INTRAVENOUS ONCE
Status: COMPLETED | OUTPATIENT
Start: 2024-09-03 | End: 2024-09-03

## 2024-09-03 RX ORDER — METHYLPREDNISOLONE SODIUM SUCCINATE 125 MG/2ML
125 INJECTION, POWDER, LYOPHILIZED, FOR SOLUTION INTRAMUSCULAR; INTRAVENOUS ONCE
Status: COMPLETED | OUTPATIENT
Start: 2024-09-03 | End: 2024-09-03

## 2024-09-03 RX ORDER — DICLOFENAC SODIUM 75 MG/1
75 TABLET, DELAYED RELEASE ORAL 2 TIMES DAILY
Qty: 60 TABLET | Refills: 0 | Status: SHIPPED | OUTPATIENT
Start: 2024-09-03

## 2024-09-03 RX ADMIN — KETOROLAC TROMETHAMINE 60 MG: 30 INJECTION, SOLUTION INTRAMUSCULAR; INTRAVENOUS at 12:29

## 2024-09-03 RX ADMIN — METHYLPREDNISOLONE SODIUM SUCCINATE 125 MG: 125 INJECTION, POWDER, LYOPHILIZED, FOR SOLUTION INTRAMUSCULAR; INTRAVENOUS at 12:29

## 2024-09-03 NOTE — PROGRESS NOTES
Chief Complaint  Back Pain    Subjective          Mariama Turner presents to [facility]  Back Pain  This is a new problem. The current episode started in the past 7 days. Associated symptoms include leg pain. Pertinent negatives include no abdominal pain, bladder incontinence, chest pain, dysuria, fever, headaches, numbness, paresis, paresthesias, pelvic pain, perianal numbness, tingling, weakness or weight loss.       Bent over to pick something up and felt a pull.   Has been taking robaxin   Left side     Yesterday is got worse and now there is a burning pain all across the lower back   Initially it was a sharp pain and it depends how she moves in the bed it is a sharp pain          Current Outpatient Medications   Medication Instructions    albuterol sulfate  (90 Base) MCG/ACT inhaler 2 puffs, Inhalation, Every 4 Hours PRN    cetirizine (ZYRTEC) 10 mg, Oral, Daily    diclofenac (VOLTAREN) 75 mg, Oral, 2 Times Daily    estradiol (Kendra) 0.075 MG/24HR patch 1 patch, Transdermal, 2 Times Weekly    ezetimibe (ZETIA) 10 mg, Oral, Daily    fluticasone (Flonase) 50 MCG/ACT nasal spray 2 sprays, Nasal, Daily    furosemide (LASIX) 40 mg, Oral, Daily    levothyroxine (SYNTHROID) 88 mcg, Oral, Daily    lidocaine (LIDODERM) 5 % 1 patch, Transdermal, Every 24 Hours, Remove & Discard patch within 12 hours or as directed by MD    meclizine (ANTIVERT) 25 mg, Oral, 3 Times Daily PRN    methocarbamol (ROBAXIN) 500 mg, Oral, 3 Times Daily PRN    naproxen sodium (ALEVE) 440 mg, Oral, 3 Times Daily PRN    pitavastatin calcium (LIVALO) 1 mg, Oral, Daily    potassium chloride 10 MEQ CR tablet 10 mEq, Oral, 2 Times Daily    Scopolamine (Transderm-Scop, 1.5 MG,) 1 MG/3DAYS patch 1 patch, Transdermal, Every 72 Hours, PRN    valACYclovir (VALTREX) 1 g, Oral, Daily       The following portions of the patient's history were reviewed and updated as appropriate: allergies, current  "medications, past family history, past medical history, past social history, past surgical history, and problem list.    Objective   Vital Signs:   /76 (BP Location: Left arm, Patient Position: Sitting, Cuff Size: Adult)   Pulse 66   Temp 98 °F (36.7 °C) (Temporal)   Ht 160 cm (63\")   Wt 72.1 kg (159 lb)   SpO2 96%   BMI 28.17 kg/m²     Wt Readings from Last 3 Encounters:   09/03/24 72.1 kg (159 lb)   07/25/24 73.5 kg (162 lb)   06/28/24 74.4 kg (164 lb)     BP Readings from Last 3 Encounters:   09/03/24 127/76   07/25/24 136/74   06/28/24 120/60     Physical Exam  Musculoskeletal:      Lumbar back: Tenderness present. Decreased range of motion.        Appearance: No acute distress, well-nourished  Head: normocephalic, atraumatic  Eyes: extraocular movements intact, no scleral icterus, no conjunctival injection  Ears, Nose, and Throat: external ears normal, nares patent, moist mucous membranes, tympanic membranes clear bilaterally. Tonsils within normal limits. Oropharynx clear.   Cardiovascular: regular rate and rhythm. no murmurs, rubs, or gallops. no edema  Respiratory: breathing comfortably, symmetric chest rise, clear to auscultation bilaterally. No wheezes, rales, or rhonchi.  Neuro: alert and moves all extremities equally  Lymph: no occipital, cervical, submandibular,or supraclavicular lymphadenopathy.      Result Review :   The following data was reviewed by: MIGNON Gaitan on 09/03/2024:  Common labs          11/14/2023    14:24 1/4/2024    14:11 6/25/2024    09:00   Common Labs   Glucose 91  91  80    BUN 29  21  21    Creatinine 0.77  0.91  0.88    Sodium 139  141  138    Potassium 4.1  4.5  4.8    Chloride 100  103  101    Calcium 10.0  9.5  10.1    Albumin 4.2   4.4    Total Bilirubin 0.5   0.4    Alkaline Phosphatase 113   131    AST (SGOT) 23   33    ALT (SGPT) 24   35    WBC 7.70   5.61    Hemoglobin 13.6   14.8    Hematocrit 40.9   44.1    Platelets 262   259    Total " Cholesterol 198   153    Triglycerides 99   73    HDL Cholesterol 71   70    LDL Cholesterol  109   69             Lab Results   Component Value Date    SARSANTIGEN Not Detected 11/14/2022    COVID19 Not Detected 11/14/2022    RAPFLUA Negative 11/14/2022    RAPFLUB Negative 11/14/2022    FLUAAG Not Detected 09/23/2021    FLU Negative 04/28/2022    FLU Negative 04/28/2022    FLUBAG Not Detected 09/23/2021    RAPSCRN Negative 11/14/2022    INR 1.10 06/02/2023    BILIRUBINUR Negative 01/28/2023          Assessment and Plan    Diagnoses and all orders for this visit:    1. Strain of lumbar region, initial encounter (Primary)  -     ketorolac (TORADOL) injection 60 mg  -     Discontinue: methylPREDNISolone sodium succinate (SOLU-Medrol) injection 125 mg  -     diclofenac (VOLTAREN) 75 MG EC tablet; Take 1 tablet by mouth 2 (Two) Times a Day.  Dispense: 60 tablet; Refill: 0  -     methylPREDNISolone sodium succinate (SOLU-Medrol) injection 125 mg          Medications Discontinued During This Encounter   Medication Reason    methylPREDNISolone sodium succinate (SOLU-Medrol) injection 125 mg           Follow Up   Return if symptoms worsen or fail to improve.  Patient was given instructions and counseling regarding her condition or for health maintenance advice. Please see specific information pulled into the AVS if appropriate.       MIGNON Gaitan  09/03/24  12:36 EDT

## 2024-10-09 ENCOUNTER — OFFICE VISIT (OUTPATIENT)
Dept: CARDIOLOGY | Facility: CLINIC | Age: 59
End: 2024-10-09
Payer: MEDICARE

## 2024-10-09 VITALS
HEART RATE: 57 BPM | SYSTOLIC BLOOD PRESSURE: 158 MMHG | HEIGHT: 63 IN | DIASTOLIC BLOOD PRESSURE: 78 MMHG | WEIGHT: 167.8 LBS | BODY MASS INDEX: 29.73 KG/M2

## 2024-10-09 DIAGNOSIS — E78.2 MIXED HYPERLIPIDEMIA: ICD-10-CM

## 2024-10-09 DIAGNOSIS — I34.0 NONRHEUMATIC MITRAL VALVE REGURGITATION: Primary | ICD-10-CM

## 2024-10-09 DIAGNOSIS — I10 PRIMARY HYPERTENSION: ICD-10-CM

## 2024-10-09 PROCEDURE — 99214 OFFICE O/P EST MOD 30 MIN: CPT

## 2024-10-09 PROCEDURE — 3078F DIAST BP <80 MM HG: CPT

## 2024-10-09 PROCEDURE — 1159F MED LIST DOCD IN RCRD: CPT

## 2024-10-09 PROCEDURE — 1160F RVW MEDS BY RX/DR IN RCRD: CPT

## 2024-10-09 PROCEDURE — 3077F SYST BP >= 140 MM HG: CPT

## 2024-10-09 NOTE — PROGRESS NOTES
Chief Complaint  Hyperlipidemia and Follow-up (6 mo f/u. )    Subjective        History of Present Illness  Mariama Turner presents to [facility] for follow up.   Patient is a 59-year-old female with past medical history outlined below, significant for valvular disease, hypertension, hyperlipidemia who presents for routine follow-up.  She is doing well from a cardiac standpoint.  She has no complaints or concerns today.  She denies any chest pain or discomfort, dyspnea, palpitations, edema or syncope.    Past Medical History:   Diagnosis Date    Acute nonintractable headache 10/14/2021    Toradol IM given in the clinic.     Acute viral syndrome 10/14/2021    Call or RTC if symptoms persist.     Allergies     Anemia     Arthritis     Asthma     Bowel disease     Cervical cancer     Colitis     Difficulty walking     Fibromyalgia, primary     Gait disturbance     Gallstones     Gastric ulcer     Headache     Heart disease     Hyperlipemia     Hypertension     Kidney stones     Kidney stones 2021    Limb swelling     Osteoarthritis 2018    Of left thumb CMC joint    Reflux esophagitis     Seasonal allergies     TBI (traumatic brain injury)     2022       ALLERGY  Allergies   Allergen Reactions    Contrast Dye (Echo Or Unknown Ct/Mr) Unknown - High Severity    Iodine Unknown - High Severity    Ceftriaxone Itching    Iodinated Contrast Media Unknown - Low Severity        Past Surgical History:   Procedure Laterality Date    ABDOMINAL HYSTERECTOMY       SECTION      CHOLECYSTECTOMY      COLONOSCOPY  ,,,2018    ENDOSCOPY  ,,2018    KIDNEY STONE SURGERY  2014    Right Ureteroscopy, Laser litho, stent insertion    RHINOPLASTY      TONSILLECTOMY          Social History     Socioeconomic History    Marital status:    Tobacco Use    Smoking status: Never     Passive exposure: Never    Smokeless tobacco: Never   Vaping Use    Vaping status:  Never Used   Substance and Sexual Activity    Alcohol use: Yes     Comment: Some day / rarely once a year    Drug use: Never    Sexual activity: Defer       Family History   Problem Relation Age of Onset    Heart disease Mother     Cancer Mother     Stroke Father     Diabetes Father     Hypertension Father     Arthritis Maternal Grandmother         Current Outpatient Medications on File Prior to Visit   Medication Sig    albuterol sulfate  (90 Base) MCG/ACT inhaler Inhale 2 puffs Every 4 (Four) Hours As Needed for Wheezing or Shortness of Air.    cetirizine (zyrTEC) 10 MG tablet Take 1 tablet by mouth Daily.    diclofenac (VOLTAREN) 75 MG EC tablet Take 1 tablet by mouth 2 (Two) Times a Day.    estradiol (Kendra) 0.075 MG/24HR patch Place 1 patch on the skin as directed by provider 2 (Two) Times a Week.    ezetimibe (ZETIA) 10 MG tablet Take 1 tablet by mouth Daily.    fluticasone (Flonase) 50 MCG/ACT nasal spray 2 sprays into the nostril(s) as directed by provider Daily.    furosemide (LASIX) 40 MG tablet Take 1 tablet by mouth Daily.    levothyroxine (Synthroid) 88 MCG tablet Take 1 tablet by mouth Daily.    meclizine (ANTIVERT) 25 MG tablet Take 1 tablet by mouth 3 (Three) Times a Day As Needed for Dizziness.    methocarbamol (ROBAXIN) 500 MG tablet Take 1 tablet by mouth 3 (Three) Times a Day As Needed for Muscle Spasms.    naproxen sodium (ALEVE) 220 MG tablet Take 2 tablets by mouth 3 (Three) Times a Day As Needed.    pitavastatin calcium (Livalo) 1 MG tablet tablet Take 1 tablet by mouth Daily.    potassium chloride 10 MEQ CR tablet Take 1 tablet by mouth 2 (Two) Times a Day.    Scopolamine (Transderm-Scop, 1.5 MG,) 1 MG/3DAYS patch Place 1 patch on the skin as directed by provider Every 72 (Seventy-Two) Hours. PRN    valACYclovir (VALTREX) 1000 MG tablet Take 1 tablet by mouth Daily.    [DISCONTINUED] lidocaine (LIDODERM) 5 % Place 1 patch on the skin as directed by provider Daily. Remove & Discard  "patch within 12 hours or as directed by MD (Patient not taking: Reported on 9/3/2024)     No current facility-administered medications on file prior to visit.       Objective   Vitals:    10/09/24 0839   BP: 158/78   Pulse: 57   Weight: 76.1 kg (167 lb 12.8 oz)   Height: 160 cm (63\")       Physical Exam  Constitutional:       General: She is awake. She is not in acute distress.     Appearance: Normal appearance.   HENT:      Head: Normocephalic.      Nose: Nose normal. No congestion.   Eyes:      Extraocular Movements: Extraocular movements intact.      Conjunctiva/sclera: Conjunctivae normal.      Pupils: Pupils are equal, round, and reactive to light.   Neck:      Thyroid: No thyromegaly.      Vascular: No JVD.   Cardiovascular:      Rate and Rhythm: Normal rate and regular rhythm.      Chest Wall: PMI is not displaced.      Pulses: Normal pulses.      Heart sounds: Normal heart sounds, S1 normal and S2 normal. No murmur heard.     No friction rub. No gallop. No S3 or S4 sounds.   Pulmonary:      Effort: Pulmonary effort is normal.      Breath sounds: Normal breath sounds. No wheezing, rhonchi or rales.   Abdominal:      General: Bowel sounds are normal.      Palpations: Abdomen is soft.      Tenderness: There is no abdominal tenderness.   Musculoskeletal:      Cervical back: No tenderness.      Right lower leg: No edema.      Left lower leg: No edema.   Lymphadenopathy:      Cervical: No cervical adenopathy.   Skin:     General: Skin is warm and dry.      Capillary Refill: Capillary refill takes less than 2 seconds.      Coloration: Skin is not cyanotic.      Findings: No petechiae or rash.      Nails: There is no clubbing.   Neurological:      Mental Status: She is alert.   Psychiatric:         Mood and Affect: Mood normal.         Behavior: Behavior is cooperative.           Result Review     The following data was reviewed by MIGNON Osullivan on 10/09/24.      [facility]          11/14/2023    14:24 1/4/2024 "    14:11 6/25/2024    09:00   CMP   Glucose 91  91  80    BUN 29  21  21    Creatinine 0.77  0.91  0.88    EGFR 88.4  72.4  75.3    Sodium 139  141  138    Potassium 4.1  4.5  4.8    Chloride 100  103  101    Calcium 10.0  9.5  10.1    Total Protein 7.1   7.2    Albumin 4.2   4.4    Globulin 2.9   2.8    Total Bilirubin 0.5   0.4    Alkaline Phosphatase 113   131    AST (SGOT) 23   33    ALT (SGPT) 24   35    Albumin/Globulin Ratio 1.4   1.6    BUN/Creatinine Ratio 37.7  23.1  23.9    Anion Gap 11.4  9.6  10.3      CBC w/diff          11/14/2023    14:24 6/25/2024    09:00   CBC w/Diff   WBC 7.70  5.61    RBC 4.60  4.92    Hemoglobin 13.6  14.8    Hematocrit 40.9  44.1    MCV 88.9  89.6    MCH 29.6  30.1    MCHC 33.3  33.6    RDW 12.5  12.2    Platelets 262  259    Neutrophil Rel % 73.9  59.9    Immature Granulocyte Rel % 0.3  0.2    Lymphocyte Rel % 12.6  20.5    Monocyte Rel % 10.1  13.0    Eosinophil Rel % 2.1  5.0    Basophil Rel % 1.0  1.4       Lipid Panel          11/14/2023    14:24 6/25/2024    09:00   Lipid Panel   Total Cholesterol 198  153    Triglycerides 99  73    HDL Cholesterol 71  70    VLDL Cholesterol 18  14    LDL Cholesterol  109  69    LDL/HDL Ratio 1.51  0.98        Results for orders placed in visit on 09/27/22    Adult Transthoracic Echo Complete W/ Cont if Necessary Per Protocol    Interpretation Summary  Normal left ventricular systolic function with an estimated ejection fraction of 55%.  No regional wall motion abnormalities were observed.  Left ventricular diastolic function was normal.  Mildly dilated left atrium.  Mild-moderate mitral regurgitation, but no hemodynamically significant valvular pathology.  Estimated right ventricular systolic pressure was within normal limits.  No evidence of pericardial effusion.      === Results for orders placed in visit on 07/31/23 ===    CARDIAC EVENT MONITOR    - Interpretation Summary -    Patient was monitored for 12 days, 19 hours and 19  minutes.    Average heart rate of 64 bpm and maximum heart rate was 142 bpm.  No significant bradycardia noted.    Rare PVC.  Rare PAC, couplet.    1 patient activated events corresponded to normal sinus rhythm.    No significant abnormal heart rhythms noted.          Procedures    Assessment & Plan  Diagnoses and all orders for this visit:    1. Nonrheumatic mitral valve regurgitation (Primary)    2. Primary hypertension    3. Mixed hyperlipidemia      1.  Mild to moderate mitral valve regurgitation on most recent echocardiogram from 2022.  Repeat echocardiogram is pending.  Further recommendations pending results.  Patient is asymptomatic.  2.  Blood pressure is elevated in the office today but she reports normal blood pressure readings at home.  Atenolol was recently discontinued due to bradycardia.  3.  Continue the Livalo.      Follow Up   Return in 6 months (on 4/9/2025) for With Dr. Smith.    Patient was given instructions and counseling regarding her condition or for health maintenance advice. Please see specific information pulled into the AVS if appropriate.     Karie Foster, MIGNON  10/09/24  08:43 EDT    Dictated Utilizing Dragon Dictation

## 2024-10-17 ENCOUNTER — HOSPITAL ENCOUNTER (OUTPATIENT)
Dept: CARDIOLOGY | Facility: HOSPITAL | Age: 59
Discharge: HOME | End: 2024-10-17
Admitting: INTERNAL MEDICINE
Payer: MEDICARE

## 2024-10-17 DIAGNOSIS — I34.0 NONRHEUMATIC MITRAL VALVE REGURGITATION: ICD-10-CM

## 2024-10-17 PROCEDURE — 93306 TTE W/DOPPLER COMPLETE: CPT

## 2024-10-18 LAB
ASCENDING AORTA: 3.1 CM
BH CV ECHO MEAS - AO MAX PG: 6.7 MMHG
BH CV ECHO MEAS - AO MEAN PG: 4 MMHG
BH CV ECHO MEAS - AO ROOT DIAM: 2.9 CM
BH CV ECHO MEAS - AO V2 MAX: 129.2 CM/SEC
BH CV ECHO MEAS - AO V2 VTI: 32.8 CM
BH CV ECHO MEAS - EDV(MOD-SP2): 64.8 ML
BH CV ECHO MEAS - EDV(MOD-SP4): 53.8 ML
BH CV ECHO MEAS - EF(MOD-BP): 57.6 %
BH CV ECHO MEAS - EF(MOD-SP2): 59.1 %
BH CV ECHO MEAS - EF(MOD-SP4): 56.3 %
BH CV ECHO MEAS - ESV(MOD-SP2): 26.5 ML
BH CV ECHO MEAS - ESV(MOD-SP4): 23.5 ML
BH CV ECHO MEAS - IVS/LVPW: 0.8 CM
BH CV ECHO MEAS - IVSD: 0.8 CM
BH CV ECHO MEAS - LA DIMENSION: 3.5 CM
BH CV ECHO MEAS - LAT PEAK E' VEL: 11.8 CM/SEC
BH CV ECHO MEAS - LV MAX PG: 2.6 MMHG
BH CV ECHO MEAS - LV MEAN PG: 1.3 MMHG
BH CV ECHO MEAS - LV V1 MAX: 80 CM/SEC
BH CV ECHO MEAS - LV V1 VTI: 19.8 CM
BH CV ECHO MEAS - LVIDD: 4.5 CM
BH CV ECHO MEAS - LVIDS: 3.2 CM
BH CV ECHO MEAS - LVOT DIAM: 2 CM
BH CV ECHO MEAS - LVPWD: 1 CM
BH CV ECHO MEAS - MED PEAK E' VEL: 9.6 CM/SEC
BH CV ECHO MEAS - MV A MAX VEL: 66.3 CM/SEC
BH CV ECHO MEAS - MV DEC TIME: 194 SEC
BH CV ECHO MEAS - MV E MAX VEL: 85.4 CM/SEC
BH CV ECHO MEAS - MV E/A: 1.29
BH CV ECHO MEAS - TAPSE (>1.6): 2.14 CM
BH CV ECHO MEASUREMENTS AVERAGE E/E' RATIO: 7.98

## 2024-10-21 NOTE — PROGRESS NOTES
"Chief Complaint  Welcome To Medicare, Hyperlipidemia, Hypertension, and Hypothyroidism    Subjective     {Problem List  Visit Diagnosis   Encounters  Notes  Medications  Labs  Result Review Imaging  Media :23}     Mariama Turner presents to [facility]  History of Present Illness      Current Outpatient Medications   Medication Instructions    albuterol sulfate  (90 Base) MCG/ACT inhaler 2 puffs, Inhalation, Every 4 Hours PRN    cetirizine (ZYRTEC) 10 mg, Oral, Daily    diclofenac (VOLTAREN) 75 mg, Oral, 2 Times Daily    estradiol (Kendra) 0.075 MG/24HR patch 1 patch, Transdermal, 2 Times Weekly    ezetimibe (ZETIA) 10 mg, Oral, Daily    fluticasone (Flonase) 50 MCG/ACT nasal spray 2 sprays, Nasal, Daily    furosemide (LASIX) 40 mg, Oral, Daily    levothyroxine (SYNTHROID) 88 mcg, Oral, Daily    meclizine (ANTIVERT) 25 mg, Oral, 3 Times Daily PRN    methocarbamol (ROBAXIN) 500 mg, Oral, 3 Times Daily PRN    naproxen sodium (ALEVE) 440 mg, 3 Times Daily PRN    pitavastatin calcium (LIVALO) 1 mg, Oral, Daily    potassium chloride 10 MEQ CR tablet 10 mEq, Oral, 2 Times Daily    Rimegepant Sulfate (NURTEC) 75 mg, Oral, Every 48 Hours PRN    Rimegepant Sulfate (NURTEC) 75 mg, Oral, Every 48 Hours PRN    Scopolamine (Transderm-Scop, 1.5 MG,) 1 MG/3DAYS patch 1 patch, Transdermal, Every 72 Hours, PRN    valACYclovir (VALTREX) 1 g, Daily       The following portions of the patient's history were reviewed and updated as appropriate: allergies, current medications, past family history, past medical history, past social history, past surgical history, and problem list.    Objective   Vital Signs:   /80 (BP Location: Left arm, Patient Position: Sitting, Cuff Size: Adult)   Pulse 63   Temp 98 °F (36.7 °C) (Temporal)   Ht 160 cm (63\")   Wt 75.3 kg (166 lb)   SpO2 95%   BMI 29.41 kg/m²     BP Readings from Last 3 Encounters:   10/24/24 150/80   10/09/24 " 158/78   09/03/24 127/76     Wt Readings from Last 3 Encounters:   10/24/24 75.3 kg (166 lb)   10/09/24 76.1 kg (167 lb 12.8 oz)   09/03/24 72.1 kg (159 lb)           Physical Exam     Appearance: No acute distress, well-nourished  Head: normocephalic, atraumatic  Eyes: extraocular movements intact, no scleral icterus, no conjunctival injection  Ears, Nose, and Throat: external ears normal, nares patent, moist mucous membranes  Cardiovascular: regular rate and rhythm. no murmurs, rubs, or gallops. no edema  Respiratory: breathing comfortably, symmetric chest rise, clear to auscultation bilaterally. No wheezes, rales, or rhonchi.  Neuro: alert and oriented to time, place, and person. Normal gait  Psych: normal mood and affect     Result Review :{Labs  Result Review  Imaging  Med Tab  Media  Procedures :23}   The following data was reviewed by: MIGNON Gaitan on 10/24/2024:  Common labs          11/14/2023    14:24 1/4/2024    14:11 6/25/2024    09:00   Common Labs   Glucose 91  91  80    BUN 29  21  21    Creatinine 0.77  0.91  0.88    Sodium 139  141  138    Potassium 4.1  4.5  4.8    Chloride 100  103  101    Calcium 10.0  9.5  10.1    Albumin 4.2   4.4    Total Bilirubin 0.5   0.4    Alkaline Phosphatase 113   131    AST (SGOT) 23   33    ALT (SGPT) 24   35    WBC 7.70   5.61    Hemoglobin 13.6   14.8    Hematocrit 40.9   44.1    Platelets 262   259    Total Cholesterol 198   153    Triglycerides 99   73    HDL Cholesterol 71   70    LDL Cholesterol  109   69        {Data reviewed (Optional):73016:::1}     Lab Results   Component Value Date    SARSANTIGEN Not Detected 11/14/2022    COVID19 Not Detected 11/14/2022    RAPFLUA Negative 11/14/2022    RAPFLUB Negative 11/14/2022    FLUAAG Not Detected 09/23/2021    FLU Negative 04/28/2022    FLU Negative 04/28/2022    FLUBAG Not Detected 09/23/2021    RAPSCRN Negative 11/14/2022    INR 1.10 06/02/2023    BILIRUBINUR Negative 01/28/2023             Assessment and Plan {CC Problem List  Visit Diagnosis   ROS  Review (Popup)  Health Maintenance  Quality  BestPractice  Medications  SmartSets  SnapShot Encounters  Media :23}   Diagnoses and all orders for this visit:    1. Welcome to Medicare preventive visit (Primary)    2. Primary hypertension  -     Comprehensive Metabolic Panel  -     CBC & Differential    3. Mixed hyperlipidemia  -     Lipid Panel  -     Comprehensive Metabolic Panel  -     CBC & Differential    4. Hypothyroidism, unspecified type  -     Comprehensive Metabolic Panel  -     CBC & Differential  -     TSH    5. Medication management  -     POC Medline 12 Panel Urine Drug Screen    6. Other migraine without status migrainosus, not intractable  -     Rimegepant Sulfate (NURTEC) 75 MG tablet dispersible tablet; Take 1 tablet by mouth Every Other Day As Needed (migraine).  Dispense: 16 tablet; Refill: 0  -     Rimegepant Sulfate (NURTEC) 75 MG tablet dispersible tablet; Take 1 tablet by mouth Every Other Day As Needed (migraine).  Dispense: 4 tablet; Refill: 0      - nurtec samples     There are no discontinued medications.     {Time Spent (Optional):75956}  Follow Up {Instructions Charge Capture  Follow-up Communications :23}  Return in about 6 months (around 4/24/2025).  Patient was given instructions and counseling regarding her condition or for health maintenance advice. Please see specific information pulled into the AVS if appropriate.       Ame Xie, MIGNON  10/24/24  12:25 EDT

## 2024-10-21 NOTE — PROGRESS NOTES
Subjective   The ABCs of the Annual Wellness Visit  Medicare Wellness Visit      Mariama Turner is a 59 y.o. patient who presents for a Medicare Wellness Visit.    The following portions of the patient's history were reviewed and   updated as appropriate: allergies, current medications, past family history, past medical history, past social history, past surgical history, and problem list.    Compared to one year ago, the patient's physical   health is the same.  Compared to one year ago, the patient's mental   health is the same.    Recent Hospitalizations:  She was not admitted to the hospital during the last year.     Current Medical Providers:  Patient Care Team:  Ame Xie APRN as PCP - General (Internal Medicine & Pediatrics)    Outpatient Medications Prior to Visit   Medication Sig Dispense Refill    albuterol sulfate  (90 Base) MCG/ACT inhaler Inhale 2 puffs Every 4 (Four) Hours As Needed for Wheezing or Shortness of Air. 18 g 3    cetirizine (zyrTEC) 10 MG tablet Take 1 tablet by mouth Daily. 90 tablet 1    diclofenac (VOLTAREN) 75 MG EC tablet Take 1 tablet by mouth 2 (Two) Times a Day. 60 tablet 0    estradiol (Kendra) 0.075 MG/24HR patch Place 1 patch on the skin as directed by provider 2 (Two) Times a Week. 24 patch 3    ezetimibe (ZETIA) 10 MG tablet Take 1 tablet by mouth Daily. 90 tablet 3    fluticasone (Flonase) 50 MCG/ACT nasal spray 2 sprays into the nostril(s) as directed by provider Daily. 16 g 3    furosemide (LASIX) 40 MG tablet Take 1 tablet by mouth Daily. 90 tablet 3    levothyroxine (Synthroid) 88 MCG tablet Take 1 tablet by mouth Daily. 90 tablet 1    meclizine (ANTIVERT) 25 MG tablet Take 1 tablet by mouth 3 (Three) Times a Day As Needed for Dizziness. 90 tablet 1    methocarbamol (ROBAXIN) 500 MG tablet Take 1 tablet by mouth 3 (Three) Times a Day As Needed for Muscle Spasms. 60 tablet 1    naproxen sodium (ALEVE) 220 MG tablet Take 2 tablets by mouth 3 (Three)  "Times a Day As Needed.      pitavastatin calcium (Livalo) 1 MG tablet tablet Take 1 tablet by mouth Daily. 90 tablet 3    potassium chloride 10 MEQ CR tablet Take 1 tablet by mouth 2 (Two) Times a Day. 180 tablet 3    Scopolamine (Transderm-Scop, 1.5 MG,) 1 MG/3DAYS patch Place 1 patch on the skin as directed by provider Every 72 (Seventy-Two) Hours. PRN 24 each 1    valACYclovir (VALTREX) 1000 MG tablet Take 1 tablet by mouth Daily.       No facility-administered medications prior to visit.     No opioid medication identified on active medication list. I have reviewed chart for other potential  high risk medication/s and harmful drug interactions in the elderly.      Aspirin is not on active medication list.  Aspirin use is not indicated based on review of current medical condition/s. Risk of harm outweighs potential benefits.  .    Patient Active Problem List   Diagnosis    Anemia    Arthritis    Asthma    Gastric ulcer    Hypertension    Seasonal allergic rhinitis    Mixed hyperlipidemia    Hx of cervical cancer    Anxiety    Palpitations    Tinnitus of both ears    Attention deficit disorder (ADD) without hyperactivity    Chronic post-traumatic headache, not intractable    Migraine without aura and without status migrainosus, not intractable    Post concussive syndrome    Overweight (BMI 25.0-29.9)     Advance Care Planning Advance Directive is not on file.  ACP discussion was held with the patient during this visit. Patient has an advance directive (not in EMR), copy requested.            Objective   Vitals:    10/24/24 1150   BP: 150/80   BP Location: Left arm   Patient Position: Sitting   Cuff Size: Adult   Pulse: 63   Temp: 98 °F (36.7 °C)   TempSrc: Temporal   SpO2: 95%   Weight: 75.3 kg (166 lb)   Height: 160 cm (63\")   PainSc:   6   PainLoc: Head       Estimated body mass index is 29.41 kg/m² as calculated from the following:    Height as of this encounter: 160 cm (63\").    Weight as of this encounter: " 75.3 kg (166 lb).            Does the patient have evidence of cognitive impairment? No  Lab Results   Component Value Date    TRIG 97 10/24/2024    HDL 65 (H) 10/24/2024     (H) 10/24/2024    VLDL 17 10/24/2024                                                                                                Health  Risk Assessment    Smoking Status:  Social History     Tobacco Use   Smoking Status Never    Passive exposure: Never   Smokeless Tobacco Never     Alcohol Consumption:  Social History     Substance and Sexual Activity   Alcohol Use Yes    Comment: Some day / rarely once a year       Fall Risk Screen  STEADI Fall Risk Assessment was completed, and patient is at HIGH risk for falls. Assessment completed on:10/24/2024    Depression Screening:      10/24/2024    11:53 AM   PHQ-2/PHQ-9 Depression Screening   Little interest or pleasure in doing things Not at all   Feeling down, depressed, or hopeless Not at all     Health Habits and Functional and Cognitive Screening:      10/24/2024    11:54 AM   Functional & Cognitive Status   Do you have difficulty preparing food and eating? Yes   Do you have difficulty bathing yourself, getting dressed or grooming yourself? No   Do you have difficulty using the toilet? No   Do you have difficulty moving around from place to place? Yes   Do you have trouble with steps or getting out of a bed or a chair? Yes   Current Diet Well Balanced Diet   Dental Exam Up to date   Eye Exam Up to date   Exercise (times per week) 0 times per week   Current Exercises Include No Regular Exercise   Do you need help using the phone?  No   Are you deaf or do you have serious difficulty hearing?  Yes   Do you need help to go to places out of walking distance? Yes   Do you need help shopping? Yes   Do you need help preparing meals?  Yes   Do you need help with housework?  Yes   Do you need help with laundry? No   Do you need help taking your medications? No   Do you need help managing money?  Yes   Do you ever drive or ride in a car without wearing a seat belt? No   Have you felt unusual stress, anger or loneliness in the last month? No   Who do you live with? Spouse   If you need help, do you have trouble finding someone available to you? No   Have you been bothered in the last four weeks by sexual problems? No   Do you have difficulty concentrating, remembering or making decisions? Yes           Visual Acuity:  Vision Screening    Right eye Left eye Both eyes   Without correction      With correction 20/30 20/40 20/30       Age-appropriate Screening Schedule:  Refer to the list below for future screening recommendations based on patient's age, sex and/or medical conditions. Orders for these recommended tests are listed in the plan section. The patient has been provided with a written plan.    Health Maintenance List  Health Maintenance   Topic Date Due    ZOSTER VACCINE (1 of 2) Never done    MAMMOGRAM  01/31/2025    COVID-19 Vaccine (2 - 2023-24 season) 11/23/2024 (Originally 9/1/2024)    INFLUENZA VACCINE  03/31/2025 (Originally 8/1/2024)    Pneumococcal Vaccine 0-64 (1 of 2 - PCV) 07/25/2025 (Originally 9/18/1971)    TDAP/TD VACCINES (1 - Tdap) 07/25/2025 (Originally 9/18/1982)    BMI FOLLOWUP  10/09/2025    ANNUAL WELLNESS VISIT  10/24/2025    LIPID PANEL  10/24/2025    COLORECTAL CANCER SCREENING  07/20/2028    HEPATITIS C SCREENING  Completed                                                                                                                                                CMS Preventative Services Quick Reference  Risk Factors Identified During Encounter  Dental Screening Recommended  Vision Screening Recommended    The above risks/problems have been discussed with the patient.  Pertinent information has been shared with the patient in the After Visit Summary.  An After Visit Summary and PPPS were made available to the patient.    Follow Up:   Next Medicare Wellness visit to be  "scheduled in 1 year.          Additional E&M Note during same encounter follows:  Patient has multiple medical problems which are significant and separately identifiable that require additional work above and beyond the Medicare Wellness Visit.      Chief Complaint  Welcome To Medicare, Hyperlipidemia, Hypertension, and Hypothyroidism    Mariama Turner is a 59 y.o. female who presents to [facility]       Objective   Vital Signs:   Vitals:    10/24/24 1150   BP: 150/80   BP Location: Left arm   Patient Position: Sitting   Cuff Size: Adult   Pulse: 63   Temp: 98 °F (36.7 °C)   TempSrc: Temporal   SpO2: 95%   Weight: 75.3 kg (166 lb)   Height: 160 cm (63\")   PainSc:   6   PainLoc: Head       Wt Readings from Last 3 Encounters:   10/28/24 73.9 kg (163 lb)   10/24/24 75.3 kg (166 lb)   10/09/24 76.1 kg (167 lb 12.8 oz)     BP Readings from Last 3 Encounters:   10/28/24 142/84   10/24/24 150/80   10/09/24 158/78       Physical Exam    The following data was reviewed by MIGNON Gaitan on 10/24/2024        Assessment & Plan   Diagnoses and all orders for this visit:    1. Welcome to Medicare preventive visit (Primary)    2. Primary hypertension  Assessment & Plan:      Keep BP log at home.   Cont current medications. Will make adjustments if remains high       Orders:  -     Comprehensive Metabolic Panel  -     CBC & Differential    3. Mixed hyperlipidemia  Assessment & Plan:  Lipid abnormalities are improving with treatment.  Nutritional counseling was provided. and Pharmacotherapy as ordered.  Lipids will be reassessed in 3 months.    Orders:  -     Lipid Panel  -     Comprehensive Metabolic Panel  -     CBC & Differential    4. Hypothyroidism, unspecified type  -     Comprehensive Metabolic Panel  -     CBC & Differential  -     TSH    5. Medication management  -     POC Medline 12 Panel Urine Drug Screen    6. Other migraine without status migrainosus, not " intractable  -     Discontinue: Rimegepant Sulfate (NURTEC) 75 MG tablet dispersible tablet; Take 1 tablet by mouth Every Other Day As Needed (migraine). (Patient not taking: Reported on 10/28/2024)  Dispense: 16 tablet; Refill: 0  -     Discontinue: Rimegepant Sulfate (NURTEC) 75 MG tablet dispersible tablet; Take 1 tablet by mouth Every Other Day As Needed (migraine). (Patient not taking: Reported on 10/28/2024)  Dispense: 4 tablet; Refill: 0    7. Acute midline low back pain with left-sided sciatica  -     Discontinue: traMADol (ULTRAM) 50 MG tablet; Take 1 tablet by mouth Every 6 (Six) Hours As Needed for Moderate Pain.  Dispense: 30 tablet; Refill: 0                  FOLLOW UP  Return in about 6 months (around 4/24/2025).  Patient was given instructions and counseling regarding her condition or for health maintenance advice. Please see specific information pulled into the AVS if appropriate.     MIGNON Gaitan  10/31/24  13:03 EDT

## 2024-10-24 ENCOUNTER — OFFICE VISIT (OUTPATIENT)
Dept: INTERNAL MEDICINE | Facility: CLINIC | Age: 59
End: 2024-10-24
Payer: MEDICARE

## 2024-10-24 ENCOUNTER — TELEPHONE (OUTPATIENT)
Dept: INTERNAL MEDICINE | Facility: CLINIC | Age: 59
End: 2024-10-24

## 2024-10-24 VITALS
HEIGHT: 63 IN | WEIGHT: 166 LBS | SYSTOLIC BLOOD PRESSURE: 150 MMHG | DIASTOLIC BLOOD PRESSURE: 80 MMHG | TEMPERATURE: 98 F | HEART RATE: 63 BPM | OXYGEN SATURATION: 95 % | BODY MASS INDEX: 29.41 KG/M2

## 2024-10-24 DIAGNOSIS — M54.42 ACUTE MIDLINE LOW BACK PAIN WITH LEFT-SIDED SCIATICA: ICD-10-CM

## 2024-10-24 DIAGNOSIS — Z79.899 MEDICATION MANAGEMENT: ICD-10-CM

## 2024-10-24 DIAGNOSIS — I10 PRIMARY HYPERTENSION: ICD-10-CM

## 2024-10-24 DIAGNOSIS — G43.809 OTHER MIGRAINE WITHOUT STATUS MIGRAINOSUS, NOT INTRACTABLE: ICD-10-CM

## 2024-10-24 DIAGNOSIS — Z00.00 WELCOME TO MEDICARE PREVENTIVE VISIT: Primary | ICD-10-CM

## 2024-10-24 DIAGNOSIS — E03.9 HYPOTHYROIDISM, UNSPECIFIED TYPE: ICD-10-CM

## 2024-10-24 DIAGNOSIS — E78.2 MIXED HYPERLIPIDEMIA: ICD-10-CM

## 2024-10-24 LAB
AMPHET+METHAMPHET UR QL: NEGATIVE
AMPHETAMINE INTERNAL CONTROL: NORMAL
AMPHETAMINES UR QL: NEGATIVE
BARBITURATE INTERNAL CONTROL: NORMAL
BARBITURATES UR QL SCN: NEGATIVE
BASOPHILS # BLD AUTO: 0.09 10*3/MM3 (ref 0–0.2)
BASOPHILS NFR BLD AUTO: 1.7 % (ref 0–1.5)
BENZODIAZ UR QL SCN: NEGATIVE
BENZODIAZEPINE INTERNAL CONTROL: NORMAL
BUPRENORPHINE INTERNAL CONTROL: NORMAL
BUPRENORPHINE SERPL-MCNC: NEGATIVE NG/ML
CANNABINOIDS SERPL QL: NEGATIVE
COCAINE INTERNAL CONTROL: NORMAL
COCAINE UR QL: NEGATIVE
DEPRECATED RDW RBC AUTO: 41.5 FL (ref 37–54)
EOSINOPHIL # BLD AUTO: 0.34 10*3/MM3 (ref 0–0.4)
EOSINOPHIL NFR BLD AUTO: 6.3 % (ref 0.3–6.2)
ERYTHROCYTE [DISTWIDTH] IN BLOOD BY AUTOMATED COUNT: 12.8 % (ref 12.3–15.4)
HCT VFR BLD AUTO: 42.4 % (ref 34–46.6)
HGB BLD-MCNC: 14.1 G/DL (ref 12–15.9)
IMM GRANULOCYTES # BLD AUTO: 0.02 10*3/MM3 (ref 0–0.05)
IMM GRANULOCYTES NFR BLD AUTO: 0.4 % (ref 0–0.5)
LYMPHOCYTES # BLD AUTO: 1.06 10*3/MM3 (ref 0.7–3.1)
LYMPHOCYTES NFR BLD AUTO: 19.8 % (ref 19.6–45.3)
MCH RBC QN AUTO: 29.7 PG (ref 26.6–33)
MCHC RBC AUTO-ENTMCNC: 33.3 G/DL (ref 31.5–35.7)
MCV RBC AUTO: 89.5 FL (ref 79–97)
MDMA (ECSTASY) INTERNAL CONTROL: NORMAL
MDMA UR QL SCN: NEGATIVE
METHADONE INTERNAL CONTROL: NORMAL
METHADONE UR QL SCN: NEGATIVE
METHAMPHETAMINE INTERNAL CONTROL: NORMAL
MONOCYTES # BLD AUTO: 0.72 10*3/MM3 (ref 0.1–0.9)
MONOCYTES NFR BLD AUTO: 13.4 % (ref 5–12)
MORPHINE INTERNAL CONTROL: NORMAL
MORPHINE/OPIATES SCREEN, URINE: NEGATIVE
NEUTROPHILS NFR BLD AUTO: 3.13 10*3/MM3 (ref 1.7–7)
NEUTROPHILS NFR BLD AUTO: 58.4 % (ref 42.7–76)
NRBC BLD AUTO-RTO: 0 /100 WBC (ref 0–0.2)
OXYCODONE INTERNAL CONTROL: NORMAL
OXYCODONE UR QL SCN: NEGATIVE
PCP UR QL SCN: NEGATIVE
PHENCYCLIDINE INTERNAL CONTROL: NORMAL
PLATELET # BLD AUTO: 244 10*3/MM3 (ref 140–450)
PMV BLD AUTO: 10.8 FL (ref 6–12)
RBC # BLD AUTO: 4.74 10*6/MM3 (ref 3.77–5.28)
THC INTERNAL CONTROL: NORMAL
WBC NRBC COR # BLD AUTO: 5.36 10*3/MM3 (ref 3.4–10.8)

## 2024-10-24 PROCEDURE — 80061 LIPID PANEL: CPT | Performed by: NURSE PRACTITIONER

## 2024-10-24 PROCEDURE — 85025 COMPLETE CBC W/AUTO DIFF WBC: CPT | Performed by: NURSE PRACTITIONER

## 2024-10-24 PROCEDURE — 80053 COMPREHEN METABOLIC PANEL: CPT | Performed by: NURSE PRACTITIONER

## 2024-10-24 PROCEDURE — 84443 ASSAY THYROID STIM HORMONE: CPT | Performed by: NURSE PRACTITIONER

## 2024-10-24 NOTE — TELEPHONE ENCOUNTER
"Caller: Mariama     Relationship: Self     Best call back number: [phone]     Who are you requesting to speak with : Ame and MA     What was the call regarding: Patient called stating she took the Nurtec sample that was given in office today, says it has made her headaches worse. Patient asked \"is the sample supposed to work that way?\". She also mentioned pharmacy has not received RX.    Requesting callback to discuss.  "

## 2024-10-25 ENCOUNTER — TELEPHONE (OUTPATIENT)
Dept: INTERNAL MEDICINE | Facility: CLINIC | Age: 59
End: 2024-10-25
Payer: OTHER GOVERNMENT

## 2024-10-25 LAB
ALBUMIN SERPL-MCNC: 4.3 G/DL (ref 3.5–5.2)
ALBUMIN/GLOB SERPL: 1.5 G/DL
ALP SERPL-CCNC: 194 U/L (ref 39–117)
ALT SERPL W P-5'-P-CCNC: 55 U/L (ref 1–33)
ANION GAP SERPL CALCULATED.3IONS-SCNC: 12 MMOL/L (ref 5–15)
AST SERPL-CCNC: 41 U/L (ref 1–32)
BILIRUB SERPL-MCNC: 0.6 MG/DL (ref 0–1.2)
BUN SERPL-MCNC: 18 MG/DL (ref 8–23)
BUN/CREAT SERPL: 20.9 (ref 7–25)
CALCIUM SPEC-SCNC: 9.7 MG/DL (ref 8.6–10.5)
CHLORIDE SERPL-SCNC: 101 MMOL/L (ref 98–107)
CHOLEST SERPL-MCNC: 188 MG/DL (ref 0–200)
CO2 SERPL-SCNC: 24 MMOL/L (ref 22–29)
CREAT SERPL-MCNC: 0.86 MG/DL (ref 0.57–1)
EGFRCR SERPLBLD CKD-EPI 2021: 77 ML/MIN/1.73
GLOBULIN UR ELPH-MCNC: 2.8 GM/DL
GLUCOSE SERPL-MCNC: 81 MG/DL (ref 65–99)
HDLC SERPL-MCNC: 65 MG/DL (ref 40–60)
LDLC SERPL CALC-MCNC: 106 MG/DL (ref 0–100)
LDLC/HDLC SERPL: 1.59 {RATIO}
POTASSIUM SERPL-SCNC: 4.3 MMOL/L (ref 3.5–5.2)
PROT SERPL-MCNC: 7.1 G/DL (ref 6–8.5)
SODIUM SERPL-SCNC: 137 MMOL/L (ref 136–145)
TRIGL SERPL-MCNC: 97 MG/DL (ref 0–150)
TSH SERPL DL<=0.05 MIU/L-ACNC: 0.71 UIU/ML (ref 0.27–4.2)
VLDLC SERPL-MCNC: 17 MG/DL (ref 5–40)

## 2024-10-25 RX ORDER — TRAMADOL HYDROCHLORIDE 50 MG/1
50 TABLET ORAL EVERY 6 HOURS PRN
Qty: 30 TABLET | Refills: 0 | Status: SHIPPED | OUTPATIENT
Start: 2024-10-25 | End: 2024-10-28

## 2024-10-25 NOTE — TELEPHONE ENCOUNTER
----- Message from Ame Xie sent at 10/25/2024  2:02 PM EDT -----  Continued elevated and worsening liver function.   Who is her gastro?

## 2024-10-25 NOTE — TELEPHONE ENCOUNTER
Called requesting Dr Nicolas or Dr Glynn rather than Dr Bell gastro referral.  Also would like to double check about her cholesterol meds & her liver function test.

## 2024-10-25 NOTE — TELEPHONE ENCOUNTER
Spoke with  patient. Verified .   Patient reports she has seen Dr. Tolentino in the past. It has been 4 years since she last seen him she thinks.   She does not want to see Dr. Tolentino again. She wants to see Dr. Abdullahi or Dr. Nicolas.  Patient asked if she should stop taking her cholesterol medication but I told her at this time Ame did not say for her to stop taking it.     Patient also made aware Tramadol was sent to pharmacy.

## 2024-10-28 ENCOUNTER — OFFICE VISIT (OUTPATIENT)
Dept: NEUROLOGY | Facility: CLINIC | Age: 59
End: 2024-10-28
Payer: MEDICARE

## 2024-10-28 VITALS
HEIGHT: 63 IN | HEART RATE: 86 BPM | BODY MASS INDEX: 28.88 KG/M2 | WEIGHT: 163 LBS | OXYGEN SATURATION: 92 % | SYSTOLIC BLOOD PRESSURE: 142 MMHG | DIASTOLIC BLOOD PRESSURE: 84 MMHG

## 2024-10-28 DIAGNOSIS — H81.93 VESTIBULAR DYSFUNCTION OF BOTH EARS: ICD-10-CM

## 2024-10-28 DIAGNOSIS — F07.81 POST CONCUSSIVE SYNDROME: Primary | ICD-10-CM

## 2024-10-28 DIAGNOSIS — G43.009 MIGRAINE WITHOUT AURA AND WITHOUT STATUS MIGRAINOSUS, NOT INTRACTABLE: ICD-10-CM

## 2024-10-28 RX ORDER — MAGNESIUM OXIDE/MAG AA CHELATE 300 MG
CAPSULE ORAL
COMMUNITY

## 2024-10-28 NOTE — PROGRESS NOTES
Chief Complaint   Patient presents with    Post concussive syndrome       Patient ID: Mariama Turner is a 59 y.o. female.    HPI:  I had the pleasure of seeing your patient today.  As you may know she is a 59-year-old female here for the management of vestibular dysfunction, headaches and postconcussive syndrome.  She has been having more headaches.  She states that she recently discontinued the propranolol she had been taking previously.  Since that discontinuation her headaches have worsened.  She has sensitivity to light and sound with her headaches usually.  Some nausea.  No vomiting.  She tried Nurtec abortively however apparently that was not very helpful.  She also continues to have dizziness.  She is scheduled to see the folks at the [facility] once again.    The following portions of the patient's history were reviewed and updated as appropriate: allergies, current medications, past family history, past medical history, past social history, past surgical history and problem list.    Review of Systems   Constitutional:  Positive for fatigue.   Eyes:  Positive for photophobia and visual disturbance (occassionally blurred vision).   Gastrointestinal:  Positive for nausea (during nausea).   Musculoskeletal:  Positive for gait problem.   Neurological:  Positive for dizziness, speech difficulty (thoughts into words), light-headedness, numbness (fingertips) and headaches. Negative for tremors, seizures, syncope, facial asymmetry and weakness.   Psychiatric/Behavioral:  Positive for sleep disturbance. Negative for agitation, behavioral problems, confusion, decreased concentration, dysphoric mood, hallucinations, self-injury and suicidal ideas. The patient is not nervous/anxious and is not hyperactive.       I have reviewed the review of systems above performed by my medical assistant.      Vitals:    10/28/24 1253   BP: 142/84   Pulse: 86   SpO2: 92%       Neurological Exam  Mental Status  Awake,  alert and oriented to person, place and time. Recent and remote memory are intact. Speech is normal. Language is fluent with no aphasia. Attention and concentration are normal. Fund of knowledge is appropriate for level of education.    Cranial Nerves  CN I: Sense of smell is normal.  CN II: Visual acuity is normal.  CN III, IV, VI: Extraocular movements intact bilaterally. Pupils equal round and reactive to light bilaterally.  CN V: Facial sensation is normal.  CN VII: Full and symmetric facial movement.  CN XI: Shoulder shrug strength is normal.  CN XII: Tongue midline without atrophy or fasciculations.    Motor  Normal muscle bulk throughout. No fasciculations present. Normal muscle tone. No abnormal involuntary movements. No pronator drift.                                             Right                     Left  Rhomboids                            5                          5  Infraspinatus                          5                          5  Supraspinatus                       5                          5  Deltoid                                   5                          5   Biceps                                   5                          5  Brachioradialis                      5                          5   Triceps                                  5                          5   Pronator                                5                          5   Supinator                              5                           5   Wrist flexor                            5                          5   Wrist extensor                       5                          5   Finger flexor                          5                          5   Finger extensor                     5                          5   Interossei                              5                          5   Abductor pollicis brevis         5                          5   Flexor pollicis brevis             5                          5   Opponens  pollicis                 5                          5  Extensor digitorum               5                          5  Abductor digiti minimi           5                          5   Abdominal                            5                          5  Glutei                                    5                          5  Hip abductor                         5                          5  Hip adductor                         5                          5   Iliopsoas                               5                          5   Quadriceps                           5                          5   Hamstring                             5                          5   Gastrocnemius                     5                           5   Anterior tibialis                      5                          5   Posterior tibialis                    5                          5   Peroneal                               5                          5  Ankle dorsiflexor                   5                          5  Ankle plantar flexor              5                           5  Extensor hallucis longus      5                           5    Sensory  Sensation is intact to light touch, pinprick, vibration and proprioception in all four extremities.    Reflexes  Deep tendon reflexes are 2+ and symmetric in all four extremities.    Right pathological reflexes: Rose Mary's absent.  Left pathological reflexes: Rose Mary's absent.    Coordination    Finger-to-nose, rapid alternating movements and heel-to-shin normal bilaterally without dysmetria.    Gait  Normal casual, toe, heel and tandem gait.       Physical Exam  Vitals reviewed.   Constitutional:       General: She is not in acute distress.     Appearance: She is well-developed.   HENT:      Head: Normocephalic and atraumatic.   Eyes:      Extraocular Movements: Extraocular movements intact.      Pupils: Pupils are equal, round, and reactive to light.   Cardiovascular:      Rate and Rhythm:  Normal rate and regular rhythm.      Heart sounds: Normal heart sounds.   Pulmonary:      Effort: Pulmonary effort is normal. No respiratory distress.      Breath sounds: Normal breath sounds.   Abdominal:      General: Bowel sounds are normal. There is no distension.      Palpations: Abdomen is soft.      Tenderness: There is no abdominal tenderness.   Musculoskeletal:         General: No deformity.      Cervical back: Normal range of motion.   Skin:     General: Skin is warm.      Findings: No rash.   Neurological:      Coordination: Coordination is intact.      Deep Tendon Reflexes: Reflexes are normal and symmetric.   Psychiatric:         Speech: Speech normal.         Judgment: Judgment normal.         Procedures    Assessment/Plan: Our plan is to start her on amitriptyline 25 mg 1 p.o. nightly.  I would also like to prescribe  Ubrelvy for her to try abortively for her migraine headache.  Will see her back in 3 to 4 months or sooner if needed.  A total of 30 minutes was spent face-to-face with the patient today.  Of that greater than 50% of this time was spent discussing signs and symptoms of postconcussive syndrome, headaches, vestibular dysfunction, patient education, plan of care and prognosis.         Diagnoses and all orders for this visit:    1. Post concussive syndrome (Primary)    2. Vestibular dysfunction of both ears    3. Migraine without aura and without status migrainosus, not intractable  -     amitriptyline (ELAVIL) 25 MG tablet; Take 1 tablet by mouth Every Night for 30 days.  Dispense: 30 tablet; Refill: 5  -     ubrogepant (Ubrelvy) 100 MG tablet; Take 1 tablet by mouth 1 (One) Time As Needed (Migraine headache) for up to 30 days.  Dispense: 15 tablet; Refill: 3           Jordan Rawls II, MD

## 2024-11-05 ENCOUNTER — APPOINTMENT (OUTPATIENT)
Dept: CT IMAGING | Facility: HOSPITAL | Age: 59
End: 2024-11-05
Payer: OTHER GOVERNMENT

## 2024-11-05 ENCOUNTER — APPOINTMENT (OUTPATIENT)
Dept: GENERAL RADIOLOGY | Facility: HOSPITAL | Age: 59
End: 2024-11-05
Payer: OTHER GOVERNMENT

## 2024-11-05 ENCOUNTER — TELEPHONE (OUTPATIENT)
Dept: GASTROENTEROLOGY | Facility: CLINIC | Age: 59
End: 2024-11-05
Payer: OTHER GOVERNMENT

## 2024-11-05 ENCOUNTER — HOSPITAL ENCOUNTER (EMERGENCY)
Facility: HOSPITAL | Age: 59
Discharge: HOME | End: 2024-11-05
Attending: EMERGENCY MEDICINE | Admitting: EMERGENCY MEDICINE
Payer: OTHER GOVERNMENT

## 2024-11-05 VITALS
BODY MASS INDEX: 28.4 KG/M2 | HEIGHT: 63 IN | HEART RATE: 60 BPM | DIASTOLIC BLOOD PRESSURE: 71 MMHG | OXYGEN SATURATION: 96 % | RESPIRATION RATE: 20 BRPM | TEMPERATURE: 98 F | SYSTOLIC BLOOD PRESSURE: 147 MMHG | WEIGHT: 160.27 LBS

## 2024-11-05 DIAGNOSIS — S30.1XXA CONTUSION OF ABDOMINAL WALL, INITIAL ENCOUNTER: ICD-10-CM

## 2024-11-05 DIAGNOSIS — S63.501A SPRAIN OF RIGHT WRIST, INITIAL ENCOUNTER: ICD-10-CM

## 2024-11-05 DIAGNOSIS — R74.8 ELEVATED LIVER ENZYMES: Primary | ICD-10-CM
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
## 2024-11-05 DIAGNOSIS — S63.92XA HAND SPRAIN, LEFT, INITIAL ENCOUNTER: Primary | ICD-10-CM

## 2024-11-05 LAB
ALBUMIN SERPL-MCNC: 4.2 G/DL (ref 3.5–5.2)
ALBUMIN/GLOB SERPL: 1.5 G/DL
ALP SERPL-CCNC: 167 U/L (ref 39–117)
ALT SERPL W P-5'-P-CCNC: 33 U/L (ref 1–33)
ANION GAP SERPL CALCULATED.3IONS-SCNC: 10.2 MMOL/L (ref 5–15)
AST SERPL-CCNC: 34 U/L (ref 1–32)
BASOPHILS # BLD AUTO: 0.09 10*3/MM3 (ref 0–0.2)
BASOPHILS NFR BLD AUTO: 1.3 % (ref 0–1.5)
BILIRUB SERPL-MCNC: 0.3 MG/DL (ref 0–1.2)
BUN SERPL-MCNC: 19 MG/DL (ref 8–23)
BUN/CREAT SERPL: 21.8 (ref 7–25)
CALCIUM SPEC-SCNC: 9.3 MG/DL (ref 8.6–10.5)
CHLORIDE SERPL-SCNC: 101 MMOL/L (ref 98–107)
CO2 SERPL-SCNC: 25.8 MMOL/L (ref 22–29)
CREAT SERPL-MCNC: 0.87 MG/DL (ref 0.57–1)
DEPRECATED RDW RBC AUTO: 42.7 FL (ref 37–54)
EGFRCR SERPLBLD CKD-EPI 2021: 75.9 ML/MIN/1.73
EOSINOPHIL # BLD AUTO: 0.29 10*3/MM3 (ref 0–0.4)
EOSINOPHIL NFR BLD AUTO: 4.3 % (ref 0.3–6.2)
ERYTHROCYTE [DISTWIDTH] IN BLOOD BY AUTOMATED COUNT: 13.5 % (ref 12.3–15.4)
GLOBULIN UR ELPH-MCNC: 2.8 GM/DL
GLUCOSE SERPL-MCNC: 97 MG/DL (ref 65–99)
HCT VFR BLD AUTO: 38.9 % (ref 34–46.6)
HGB BLD-MCNC: 13.1 G/DL (ref 12–15.9)
IMM GRANULOCYTES # BLD AUTO: 0.01 10*3/MM3 (ref 0–0.05)
IMM GRANULOCYTES NFR BLD AUTO: 0.1 % (ref 0–0.5)
INR PPP: 0.98 (ref 0.86–1.15)
LYMPHOCYTES # BLD AUTO: 1.2 10*3/MM3 (ref 0.7–3.1)
LYMPHOCYTES NFR BLD AUTO: 17.7 % (ref 19.6–45.3)
MCH RBC QN AUTO: 29.3 PG (ref 26.6–33)
MCHC RBC AUTO-ENTMCNC: 33.7 G/DL (ref 31.5–35.7)
MCV RBC AUTO: 87 FL (ref 79–97)
MONOCYTES # BLD AUTO: 0.97 10*3/MM3 (ref 0.1–0.9)
MONOCYTES NFR BLD AUTO: 14.3 % (ref 5–12)
NEUTROPHILS NFR BLD AUTO: 4.23 10*3/MM3 (ref 1.7–7)
NEUTROPHILS NFR BLD AUTO: 62.3 % (ref 42.7–76)
NRBC BLD AUTO-RTO: 0 /100 WBC (ref 0–0.2)
PLATELET # BLD AUTO: 226 10*3/MM3 (ref 140–450)
PMV BLD AUTO: 10.5 FL (ref 6–12)
POTASSIUM SERPL-SCNC: 3.5 MMOL/L (ref 3.5–5.2)
PROT SERPL-MCNC: 7 G/DL (ref 6–8.5)
PROTHROMBIN TIME: 13.2 SECONDS (ref 11.8–14.9)
RBC # BLD AUTO: 4.47 10*6/MM3 (ref 3.77–5.28)
SODIUM SERPL-SCNC: 137 MMOL/L (ref 136–145)
WBC NRBC COR # BLD AUTO: 6.79 10*3/MM3 (ref 3.4–10.8)

## 2024-11-05 PROCEDURE — 73110 X-RAY EXAM OF WRIST: CPT

## 2024-11-05 PROCEDURE — 85025 COMPLETE CBC W/AUTO DIFF WBC: CPT | Performed by: EMERGENCY MEDICINE

## 2024-11-05 PROCEDURE — 74176 CT ABD & PELVIS W/O CONTRAST: CPT

## 2024-11-05 PROCEDURE — 99284 EMERGENCY DEPT VISIT MOD MDM: CPT

## 2024-11-05 PROCEDURE — 80053 COMPREHEN METABOLIC PANEL: CPT | Performed by: EMERGENCY MEDICINE

## 2024-11-05 PROCEDURE — 96375 TX/PRO/DX INJ NEW DRUG ADDON: CPT

## 2024-11-05 PROCEDURE — 96374 THER/PROPH/DIAG INJ IV PUSH: CPT

## 2024-11-05 PROCEDURE — 73130 X-RAY EXAM OF HAND: CPT

## 2024-11-05 PROCEDURE — 25010000002 MORPHINE PER 10 MG: Performed by: EMERGENCY MEDICINE

## 2024-11-05 PROCEDURE — 71045 X-RAY EXAM CHEST 1 VIEW: CPT

## 2024-11-05 PROCEDURE — 25010000002 ONDANSETRON PER 1 MG: Performed by: EMERGENCY MEDICINE

## 2024-11-05 PROCEDURE — 85610 PROTHROMBIN TIME: CPT | Performed by: EMERGENCY MEDICINE

## 2024-11-05 RX ORDER — SODIUM CHLORIDE 0.9 % (FLUSH) 0.9 %
10 SYRINGE (ML) INJECTION AS NEEDED
Status: DISCONTINUED | OUTPATIENT
Start: 2024-11-05 | End: 2024-11-05 | Stop reason: HOSPADM

## 2024-11-05 RX ORDER — ONDANSETRON 2 MG/ML
4 INJECTION INTRAMUSCULAR; INTRAVENOUS ONCE
Status: COMPLETED | OUTPATIENT
Start: 2024-11-05 | End: 2024-11-05

## 2024-11-05 RX ORDER — ONDANSETRON 4 MG/1
8 TABLET, ORALLY DISINTEGRATING ORAL EVERY 8 HOURS PRN
Qty: 15 TABLET | Refills: 0 | Status: SHIPPED | OUTPATIENT
Start: 2024-11-05 | End: 2024-11-10

## 2024-11-05 RX ORDER — OXYCODONE AND ACETAMINOPHEN 5; 325 MG/1; MG/1
1 TABLET ORAL EVERY 4 HOURS PRN
Qty: 18 TABLET | Refills: 0 | Status: SHIPPED | OUTPATIENT
Start: 2024-11-05 | End: 2024-11-08

## 2024-11-05 RX ADMIN — ONDANSETRON 4 MG: 2 INJECTION INTRAMUSCULAR; INTRAVENOUS at 18:03

## 2024-11-05 RX ADMIN — MORPHINE SULFATE 4 MG: 4 INJECTION, SOLUTION INTRAMUSCULAR; INTRAVENOUS at 18:04
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
## 2024-11-05 NOTE — TELEPHONE ENCOUNTER
Per Estee in ENDO, Dr. Nicolas would like to order PT/INR and Liver US and then see patient in office.

## 2024-11-06 DIAGNOSIS — S63.509D SPRAIN OF WRIST, UNSPECIFIED LATERALITY, SUBSEQUENT ENCOUNTER: ICD-10-CM
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
## 2024-11-06 DIAGNOSIS — S63.90XD: Primary | ICD-10-CM

## 2024-11-06 PROCEDURE — 96372 THER/PROPH/DIAG INJ SC/IM: CPT

## 2024-11-06 PROCEDURE — 99284 EMERGENCY DEPT VISIT MOD MDM: CPT

## 2024-11-06 NOTE — DISCHARGE INSTRUCTIONS
Please keep your left hand and wrist completely immobilized for the next week.  No use of the left hand and wrist until released by your primary care physician.      Please have your doctor recheck your left hand and wrist in 1 week.  If you continue to have pain, please discuss the need for MRI of the left hand and wrist to rule out scaphoid fracture or possibly ulnar collateral ligament tear or any other injury or as an alternative, discussed possible need to orthopedic surgery/hand surgery    Please ice areas of pain and swelling for 20 minutes 5-6 times a day for the next 2 days.    Please return to the emergency medially for headache, vomiting, shortness of breath, intractable chest pain, abdominal pain, numbness or weakness in your arms or legs, loss of bladder or bowel function, discoloration of the extremities, coolness of the extremities, numbness of the extremities or any new symptoms you are concerned with

## 2024-11-07 ENCOUNTER — TELEPHONE (OUTPATIENT)
Dept: ORTHOPEDIC SURGERY | Facility: CLINIC | Age: 59
End: 2024-11-07
Payer: OTHER GOVERNMENT

## 2024-11-07 ENCOUNTER — APPOINTMENT (OUTPATIENT)
Dept: CT IMAGING | Facility: HOSPITAL | Age: 59
End: 2024-11-07
Payer: MEDICARE

## 2024-11-07 ENCOUNTER — HOSPITAL ENCOUNTER (EMERGENCY)
Facility: HOSPITAL | Age: 59
Discharge: HOME | End: 2024-11-07
Attending: EMERGENCY MEDICINE | Admitting: EMERGENCY MEDICINE
Payer: MEDICARE

## 2024-11-07 VITALS
HEART RATE: 82 BPM | WEIGHT: 166.23 LBS | DIASTOLIC BLOOD PRESSURE: 77 MMHG | OXYGEN SATURATION: 96 % | HEIGHT: 63 IN | SYSTOLIC BLOOD PRESSURE: 139 MMHG | TEMPERATURE: 98 F | BODY MASS INDEX: 29.45 KG/M2 | RESPIRATION RATE: 15 BRPM

## 2024-11-07 DIAGNOSIS — S62.92XA CLOSED FRACTURE OF LEFT HAND, INITIAL ENCOUNTER: Primary | ICD-10-CM

## 2024-11-07 PROCEDURE — 63710000001 ONDANSETRON ODT 4 MG TABLET DISPERSIBLE: Performed by: EMERGENCY MEDICINE

## 2024-11-07 PROCEDURE — 25010000002 MORPHINE PER 10 MG: Performed by: EMERGENCY MEDICINE

## 2024-11-07 PROCEDURE — 96372 THER/PROPH/DIAG INJ SC/IM: CPT

## 2024-11-07 PROCEDURE — 73200 CT UPPER EXTREMITY W/O DYE: CPT

## 2024-11-07 RX ORDER — ONDANSETRON 4 MG/1
4 TABLET, ORALLY DISINTEGRATING ORAL ONCE
Status: COMPLETED | OUTPATIENT
Start: 2024-11-07 | End: 2024-11-07

## 2024-11-07 RX ADMIN — ONDANSETRON 4 MG: 4 TABLET, ORALLY DISINTEGRATING ORAL at 01:02

## 2024-11-07 RX ADMIN — MORPHINE SULFATE 8 MG: 4 INJECTION, SOLUTION INTRAMUSCULAR; INTRAVENOUS at 01:02

## 2024-11-07 NOTE — TELEPHONE ENCOUNTER
PATIENT WAS STACY WITH DR COHEN NEXT THURS BUT DR DIETRICH WAS ON CALL AT INITIAL ER VISIT. KARLO SAID HE CAN SEE HER TUES. LVM WITH PATIENT TO SWITCH APPT TO DR DIETRICH ON TUES. ALSO CALLING TO VERIFY PIP IS OPEN AND AVAILABLE.     UPDATE: CALLED [employer] AND THEY VERIFIED THAT THE PIP IS OPEN AND AVAILABLE

## 2024-11-07 NOTE — TELEPHONE ENCOUNTER
Hub staff attempted to follow warm transfer process and was unsuccessful     Caller: CHRISTIAN    Relationship to patient: SELF    Best call back number: [phone]    Patient is needing: NEEDED TO RESCHED APPT TO DR DIETRICH ON  11/12/24- FIRST AVAILABLE WITH DR DIETRICH IS 11/14/24- PLEASE CALL
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
## 2024-11-07 NOTE — DISCHARGE INSTRUCTIONS
Please keep your left hand and wrist immobilized until seen by orthopedic surgery    Please take your current pain medicine as prescribed    Please apply ice to this area for 20 minutes 5-6 times a day for the next 2 days    Please return to emergency room for intractable pain, numbness, discoloration or coldness to the hand or any new symptoms you are concerned with
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
## 2024-11-19 ENCOUNTER — HOSPITAL ENCOUNTER (OUTPATIENT)
Dept: ULTRASOUND IMAGING | Facility: HOSPITAL | Age: 59
Discharge: HOME | End: 2024-11-19
Admitting: INTERNAL MEDICINE
Payer: MEDICARE

## 2024-11-19 DIAGNOSIS — R74.8 ELEVATED LIVER ENZYMES: ICD-10-CM

## 2024-11-19 DIAGNOSIS — E03.9 HYPOTHYROIDISM, UNSPECIFIED TYPE: ICD-10-CM

## 2024-11-19 DIAGNOSIS — M54.42 ACUTE MIDLINE LOW BACK PAIN WITH LEFT-SIDED SCIATICA: ICD-10-CM

## 2024-11-19 PROCEDURE — 76705 ECHO EXAM OF ABDOMEN: CPT

## 2024-11-19 RX ORDER — LEVOTHYROXINE SODIUM 88 UG/1
88 TABLET ORAL DAILY
Qty: 90 TABLET | Refills: 1 | Status: SHIPPED | OUTPATIENT
Start: 2024-11-19

## 2024-11-19 RX ORDER — METHOCARBAMOL 500 MG/1
500 TABLET, FILM COATED ORAL 3 TIMES DAILY PRN
Qty: 60 TABLET | Refills: 1 | Status: SHIPPED | OUTPATIENT
Start: 2024-11-19

## 2024-11-19 NOTE — TELEPHONE ENCOUNTER
Rx Refill Note  Requested Prescriptions     Pending Prescriptions Disp Refills    levothyroxine (Synthroid) 88 MCG tablet 90 tablet 1     Sig: Take 1 tablet by mouth Daily.    methocarbamol (ROBAXIN) 500 MG tablet 60 tablet 1     Sig: Take 1 tablet by mouth 3 (Three) Times a Day As Needed for Muscle Spasms.      Last office visit with prescribing clinician: 10/24/2024   Last telemedicine visit with prescribing clinician: Visit date not found   Next office visit with prescribing clinician: 4/24/2025                         Would you like a call back once the refill request has been completed: [] Yes [] No    If the office needs to give you a call back, can they leave a voicemail: [] Yes [] No    Harley Guzman  11/19/24, 12:15 EST

## 2024-11-20 ENCOUNTER — TREATMENT (OUTPATIENT)
Dept: PHYSICAL THERAPY | Facility: CLINIC | Age: 59
End: 2024-11-20
Payer: MEDICARE

## 2024-11-20 DIAGNOSIS — M25.532 LEFT WRIST PAIN: ICD-10-CM

## 2024-11-20 DIAGNOSIS — S62.172A CLOSED DISPLACED FRACTURE OF TRAPEZIUM OF LEFT WRIST, INITIAL ENCOUNTER: Primary | ICD-10-CM
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
## 2024-11-25 ENCOUNTER — TREATMENT (OUTPATIENT)
Dept: PHYSICAL THERAPY | Facility: CLINIC | Age: 59
End: 2024-11-25
Payer: MEDICARE

## 2024-11-25 DIAGNOSIS — M25.532 LEFT WRIST PAIN: ICD-10-CM

## 2024-11-25 DIAGNOSIS — S62.172A CLOSED DISPLACED FRACTURE OF TRAPEZIUM OF LEFT WRIST, INITIAL ENCOUNTER: Primary | ICD-10-CM

## 2024-11-25 PROCEDURE — 97110 THERAPEUTIC EXERCISES: CPT | Performed by: PHYSICAL THERAPIST

## 2024-11-25 PROCEDURE — 97112 NEUROMUSCULAR REEDUCATION: CPT | Performed by: PHYSICAL THERAPIST

## 2024-11-25 NOTE — PROGRESS NOTES
Occupational Therapy Daily Treatment Note  [address] [place] [id number]      Patient: Mariama Turner   : 1965  Referring practitioner: Tal George MD  Date of Initial Visit: Type: THERAPY  Noted: 2024  Today's Date: 2024  Patient seen for 2 sessions         Subjective Evaluation    Pain  Current pain ratin  Location: L hand/wrist         Mariama Turner reports: it is not a good day, my neck is hurting and my shoulder. My fingers are moving better.    Objective   See Exercise, Manual, and Modality Logs for complete treatment.   Pt did have sharp pain when trying wrist flex/ext in pronation so exercise stopped. Pt tolerated treatment well for range of motion and responding well to desensitization to decrease her pain.    Assessment/Plan    Visit Diagnoses:    ICD-10-CM ICD-9-CM   1. Closed displaced fracture of trapezium of left wrist, initial encounter  S62.172A 814.05   2. Left wrist pain  M25.532 719.43       Continue per POC         Timed:  Manual Therapy:    0     mins  45577;  Therapeutic Exercise:    10     mins  01969;     Therapeutic Activity:    5     mins  33113;  Ultrasound:     0     mins  07603;    Electrical Stimulation:    0     mins 22492;  Neuromuscular Srinivas:    10    mins  70826;      Timed Treatment:   25   mins   Total Treatment:     25   min    ZENY Mendoza/L  Occupational Therapist    Electronically signed   License number [id number]

## 2024-12-04 ENCOUNTER — TREATMENT (OUTPATIENT)
Dept: PHYSICAL THERAPY | Facility: CLINIC | Age: 59
End: 2024-12-04
Payer: MEDICARE

## 2024-12-04 ENCOUNTER — OFFICE VISIT (OUTPATIENT)
Dept: GASTROENTEROLOGY | Facility: CLINIC | Age: 59
End: 2024-12-04
Payer: MEDICARE

## 2024-12-04 VITALS
BODY MASS INDEX: 29.06 KG/M2 | DIASTOLIC BLOOD PRESSURE: 68 MMHG | HEART RATE: 67 BPM | SYSTOLIC BLOOD PRESSURE: 133 MMHG | WEIGHT: 164 LBS | HEIGHT: 63 IN

## 2024-12-04 DIAGNOSIS — R74.8 ELEVATED LIVER ENZYMES: Primary | ICD-10-CM

## 2024-12-04 DIAGNOSIS — R10.9 RIGHT SIDED ABDOMINAL PAIN: ICD-10-CM

## 2024-12-04 DIAGNOSIS — S62.172A CLOSED DISPLACED FRACTURE OF TRAPEZIUM OF LEFT WRIST, INITIAL ENCOUNTER: Primary | ICD-10-CM

## 2024-12-04 DIAGNOSIS — M25.532 LEFT WRIST PAIN: ICD-10-CM

## 2024-12-04 DIAGNOSIS — Z86.0100 HISTORY OF COLON POLYPS: ICD-10-CM

## 2024-12-04 PROCEDURE — 97110 THERAPEUTIC EXERCISES: CPT | Performed by: PHYSICAL THERAPIST

## 2024-12-04 PROCEDURE — 1160F RVW MEDS BY RX/DR IN RCRD: CPT | Performed by: INTERNAL MEDICINE

## 2024-12-04 PROCEDURE — 99203 OFFICE O/P NEW LOW 30 MIN: CPT | Performed by: INTERNAL MEDICINE

## 2024-12-04 PROCEDURE — 1159F MED LIST DOCD IN RCRD: CPT | Performed by: INTERNAL MEDICINE

## 2024-12-04 PROCEDURE — 97530 THERAPEUTIC ACTIVITIES: CPT | Performed by: PHYSICAL THERAPIST

## 2024-12-04 PROCEDURE — 3078F DIAST BP <80 MM HG: CPT | Performed by: INTERNAL MEDICINE

## 2024-12-04 PROCEDURE — 3075F SYST BP GE 130 - 139MM HG: CPT | Performed by: INTERNAL MEDICINE

## 2024-12-04 RX ORDER — ONDANSETRON 4 MG/1
8 TABLET, ORALLY DISINTEGRATING ORAL EVERY 8 HOURS PRN
COMMUNITY
Start: 2024-11-06

## 2024-12-04 RX ORDER — ACETAMINOPHEN AND CODEINE PHOSPHATE 300; 30 MG/1; MG/1
2 TABLET ORAL EVERY 4 HOURS PRN
COMMUNITY
Start: 2024-12-03

## 2024-12-04 NOTE — PROGRESS NOTES
"Chief Complaint  Abdominal Pain (RUQ pain, patient states that side was swollen for a bit and went down. ), Nausea (Patient states after eating is when she mostly feels nauseas. ), and Colonoscopy (Patient would like to discuss doing a colonoscopy )    Subjective          History of Present Illness  Mariama Turner presents to [facility].  Since August of this year patient has had recurrent episodes of right upper quadrant pain.  Which is achy moderate to severe in intensity and radiates to the back.  Is some nausea but no vomiting.  No change in bowel habits.  Patient had a colonoscopy and EGD in 2018 by Dr. Aldana she had a cecum polyp which was 8 mm and was adenomatous.  Patient has history of cholecystectomy for gallstones many years ago.  Currently patient is feeling well.  She also had liver enzymes elevation at times.  It was mostly AST ALT and alk phos.    Objective   Vital Signs:   /68 (BP Location: Right arm, Patient Position: Sitting, Cuff Size: Adult)   Pulse 67   Ht 160 cm (62.99\")   Wt 74.4 kg (164 lb)   BMI 29.06 kg/m²     Physical Exam  Constitutional:       General: She is awake. She is not in acute distress.     Appearance: Normal appearance. She is well-developed and well-groomed.   HENT:      Head: Normocephalic and atraumatic.      Mouth/Throat:      Mouth: Mucous membranes are moist.      Comments: Charleen dental hygiene is good  Eyes:      General: Lids are normal.      Conjunctiva/sclera: Conjunctivae normal.      Pupils: Pupils are equal, round, and reactive to light.   Neck:      Thyroid: No thyroid mass.      Trachea: Trachea normal.   Cardiovascular:      Rate and Rhythm: Normal rate and regular rhythm.      Heart sounds: Normal heart sounds.   Pulmonary:      Effort: Pulmonary effort is normal.      Breath sounds: Normal breath sounds and air entry.   Abdominal:      General: Abdomen is flat. Bowel sounds are normal. There is no distension.      " Palpations: Abdomen is soft. There is no mass.      Tenderness: There is no abdominal tenderness. There is no guarding.   Musculoskeletal:      Cervical back: Neck supple.      Right lower leg: No edema.      Left lower leg: No edema.   Skin:     General: Skin is warm and moist.      Coloration: Skin is not cyanotic, jaundiced or pale.      Findings: No rash.      Nails: There is no clubbing.   Neurological:      Mental Status: She is alert and oriented to person, place, and time.   Psychiatric:         Attention and Perception: Attention normal.         Mood and Affect: Mood and affect normal.         Speech: Speech normal.        Result Review :       Assessment and Plan    Diagnoses and all orders for this visit:    1. Elevated liver enzymes (Primary)    2. Right sided abdominal pain    3. History of colon polyps      PLAN:  MRI/MRCP  Hepatic Function Panel  Depending on results of MRI and labs, we may proceed with EGD to further evaluate.   Patient needs a surveillance colonoscopy for hx of Colon polyps but patient would like to wait.     Follow Up   No follow-ups on file.  Patient was given instructions and counseling regarding her condition or for health maintenance advice. Please see specific information pulled into the AVS if appropriate.

## 2024-12-04 NOTE — PROGRESS NOTES
Occupational Therapy Daily Treatment Note  [address] [place] [address]      Patient: Mariama Turner   : 1965  Referring practitioner: Tal George MD  Date of Initial Visit: Type: THERAPY  Noted: 2024  Today's Date: 2024  Patient seen for 3 sessions         Subjective   Mariama Turner reports: I had several really bad days and went back to the doctor yesterday. The numbness is a lot more frequent.     Objective   See Exercise, Manual, and Modality Logs for complete treatment.   Tenderness over volar and dorsal wrist.     Assessment/Plan    Visit Diagnoses:    ICD-10-CM ICD-9-CM   1. Closed displaced fracture of trapezium of left wrist, initial encounter  S62.172A 814.05   2. Left wrist pain  M25.532 719.43       Continue per POC         Timed:  Manual Therapy:    0     mins  72522;  Therapeutic Exercise:    10     mins  42159;     Therapeutic Activity:    10     mins  01713;  Ultrasound:     0     mins  99125;    Electrical Stimulation:    0     mins 17987;  Neuromuscular Srinivas:    10    mins  64099;      Timed Treatment:   30   mins   Total Treatment:     30   min    Adriana Mejia OTR/L  Occupational Therapist    Electronically signed   License number [id number]

## 2024-12-10 ENCOUNTER — TREATMENT (OUTPATIENT)
Dept: PHYSICAL THERAPY | Facility: CLINIC | Age: 59
End: 2024-12-10
Payer: COMMERCIAL

## 2024-12-10 DIAGNOSIS — M25.532 LEFT WRIST PAIN: ICD-10-CM

## 2024-12-10 DIAGNOSIS — S62.172A CLOSED DISPLACED FRACTURE OF TRAPEZIUM OF LEFT WRIST, INITIAL ENCOUNTER: Primary | ICD-10-CM

## 2024-12-10 PROCEDURE — 97112 NEUROMUSCULAR REEDUCATION: CPT | Performed by: PHYSICAL THERAPIST

## 2024-12-10 PROCEDURE — 97110 THERAPEUTIC EXERCISES: CPT | Performed by: PHYSICAL THERAPIST

## 2024-12-10 PROCEDURE — 97014 ELECTRIC STIMULATION THERAPY: CPT | Performed by: PHYSICAL THERAPIST

## 2024-12-10 NOTE — PROGRESS NOTES
Occupational Therapy Daily Treatment Note  [address] [place] [address]      Patient: Mariama Turner   : 1965  Referring practitioner: Tal George MD  Date of Initial Visit: Type: THERAPY  Noted: 2024  Today's Date: 12/10/2024  Patient seen for 4 sessions         Subjective Evaluation    Pain  Current pain ratin  Location: L wrist         Mariama Turner reports: I been trying to do some exercises in the hot water, but it just tingles as soon as it hits the water. I have had trouble the last 2 days bending it.    Objective   See Exercise, Manual, and Modality Logs for complete treatment.   Pt did have pain when placing cotton balls in bin with finger add/abduction, marble rolls, and large ball rolls. Her pain was reported in her wrist and some in her index finger. TENS trial today with good tolerance.    Assessment/Plan    Visit Diagnoses:    ICD-10-CM ICD-9-CM   1. Closed displaced fracture of trapezium of left wrist, initial encounter  S62.172A 814.05   2. Left wrist pain  M25.532 719.43       Continue per POC         Timed:  Manual Therapy:    0     mins  07427;  Therapeutic Exercise:    10     mins  98113;     Therapeutic Activity:    0     mins  23300;  Ultrasound:     0     mins  49613;    Electrical Stimulation:    0     mins 44692;  Neuromuscular Srinivas:    10    mins  21009;    Untimed:  Electrical Stimulation:    10     mins  83448 ( );  Fluidotherapy        0    mins  72868  Paraffin:                          0    mins  66971;    Timed Treatment:   20   mins   Total Treatment:     30   min    ZENY Mendoza/L  Occupational Therapist    Electronically signed   License number [id number]

## 2024-12-17 ENCOUNTER — TREATMENT (OUTPATIENT)
Dept: PHYSICAL THERAPY | Facility: CLINIC | Age: 59
End: 2024-12-17
Payer: COMMERCIAL

## 2024-12-17 DIAGNOSIS — S62.172A CLOSED DISPLACED FRACTURE OF TRAPEZIUM OF LEFT WRIST, INITIAL ENCOUNTER: Primary | ICD-10-CM

## 2024-12-17 DIAGNOSIS — M25.532 LEFT WRIST PAIN: ICD-10-CM

## 2024-12-17 PROCEDURE — 97112 NEUROMUSCULAR REEDUCATION: CPT | Performed by: PHYSICAL THERAPIST

## 2024-12-17 PROCEDURE — 97110 THERAPEUTIC EXERCISES: CPT | Performed by: PHYSICAL THERAPIST

## 2024-12-17 PROCEDURE — 97530 THERAPEUTIC ACTIVITIES: CPT | Performed by: PHYSICAL THERAPIST

## 2024-12-17 NOTE — PROGRESS NOTES
Occupational Therapy Daily Treatment Note  [address] [place] [address]      Patient: Mariama Turner   : 1965  Referring practitioner: Tal George MD  Date of Initial Visit: Type: THERAPY  Noted: 2024  Today's Date: 2024  Patient seen for 5 sessions         Subjective Evaluation    Pain  Current pain ratin  At worst pain rating: 10  Location: B wrist         Mariama Turner reports: both hands have really been bothering me and my balance is off more today. The L pinky seems to be getting better.    Objective          Observations     Left Wrist/Hand   Positive for edema.     Additional Wrist/Hand Observation Details  Pt wearing prefab wrist support on her L wrist and prefab wrist support on her R wrist, Slight bruising on dorsal L thumb    Tenderness     Left Wrist/Hand   Tenderness in the first dorsal compartment, second dorsal compartment, fifth dorsal compartment, sixth dorsal compartment and carpometacarpal joint.     Neurological Testing     Sensation     Wrist/Hand   Left   Intact: light touch    Additional Neurological Details  Tingling at times in L thumb and index finger    Active Range of Motion     Left Thumb   Flexion     MP: 35 degrees    DIP: 30 degrees    Additional Active Range of Motion Details  deferred  Loose composite fist    Strength/Myotome Testing     Additional Strength Details  deferred    Swelling     Left Wrist/Hand   Circumference wrist: 14.6 cm    Additional Swelling Details  Mild-moderate edema in L hand    Right Wrist/Hand   Circumference wrist: 14.6 cm      See Exercise, Manual, and Modality Logs for complete treatment.     Pt reported lots of pain in her left ulnar wrist and thumb.  Assessment/Plan    Visit Diagnoses:    ICD-10-CM ICD-9-CM   1. Closed displaced fracture of trapezium of left wrist, initial encounter  S62.172A 814.05   2. Left wrist pain  M25.532 719.43       Continue per POC         Timed:  Manual Therapy:    0     mins   91938;  Therapeutic Exercise:    15     mins  97400;     Therapeutic Activity:    10     mins  64132;  Ultrasound:     0     mins  29277;    Electrical Stimulation:    0     mins 10398;  Neuromuscular Srinivas:    15    mins  34251;      Timed Treatment:   40   mins   Total Treatment:     40   min    Adriana Mejia OTR/L  Occupational Therapist    Electronically signed   License number [id number]

## 2024-12-19 ENCOUNTER — TREATMENT (OUTPATIENT)
Dept: PHYSICAL THERAPY | Facility: CLINIC | Age: 59
End: 2024-12-19
Payer: COMMERCIAL

## 2024-12-19 DIAGNOSIS — M25.532 LEFT WRIST PAIN: ICD-10-CM

## 2024-12-19 DIAGNOSIS — S62.172A CLOSED DISPLACED FRACTURE OF TRAPEZIUM OF LEFT WRIST, INITIAL ENCOUNTER: Primary | ICD-10-CM

## 2024-12-19 NOTE — PROGRESS NOTES
Occupational Therapy Daily Treatment Note  [address] [place] [id number]      Patient: Mariama Turner   : 1965  Referring practitioner: Tal George MD  Date of Initial Visit: Type: THERAPY  Noted: 2024  Today's Date: 2024  Patient seen for 6 sessions         Subjective Evaluation    Pain  Current pain ratin (4 R hand)  Location: L hand         Mariama Turner reports: the last few days my R hand has been bothering me more. I have only been trying to take the pain medication at night.    Objective   See Exercise, Manual, and Modality Logs for complete treatment.   Pt tolerated treatment well and was able to touch her palm with her fingers at the end of treatment session. Pt very hypersensitive and painful in her Left thumb.     Assessment/Plan    Visit Diagnoses:    ICD-10-CM ICD-9-CM   1. Closed displaced fracture of trapezium of left wrist, initial encounter  S62.172A 814.05   2. Left wrist pain  M25.532 719.43       Continue per POC         Timed:  Manual Therapy:    0     mins  62032;  Therapeutic Exercise:    10     mins  62216;     Therapeutic Activity:    10     mins  56707;  Ultrasound:     0     mins  58517;    Electrical Stimulation:    0     mins 01400;  Neuromuscular Srinivas:    10    mins  12266;      Timed Treatment:   30   mins   Total Treatment:     30   min    ZENY Mendoza/L  Occupational Therapist    Electronically signed   License number [id number]

## 2024-12-26 ENCOUNTER — TREATMENT (OUTPATIENT)
Dept: PHYSICAL THERAPY | Facility: CLINIC | Age: 59
End: 2024-12-26
Payer: MEDICARE

## 2024-12-26 DIAGNOSIS — S62.172A CLOSED DISPLACED FRACTURE OF TRAPEZIUM OF LEFT WRIST, INITIAL ENCOUNTER: Primary | ICD-10-CM

## 2024-12-26 DIAGNOSIS — M25.532 LEFT WRIST PAIN: ICD-10-CM

## 2024-12-26 NOTE — PROGRESS NOTES
Occupational Therapy Daily Treatment Note  [address] [place] [address]      Patient: Mariama Turner   : 1965  Referring practitioner: Tal George MD  Date of Initial Visit: Type: THERAPY  Noted: 2024  Today's Date: 2024  Patient seen for 7 sessions         Subjective Evaluation    Pain  Current pain ratin  Location: L hand         Mariama Turner reports: I didn't sleep well last night, I had to get up at 5 am to take pain medication.      Objective          Observations     Left Wrist/Hand   Positive for edema.     Additional Wrist/Hand Observation Details  Pt wearing prefab wrist support on her L wrist and prefab wrist support on her R wrist    Tenderness     Left Wrist/Hand   Tenderness in the first dorsal compartment, second dorsal compartment, fifth dorsal compartment, sixth dorsal compartment and carpometacarpal joint.     Neurological Testing     Sensation     Wrist/Hand   Left   Intact: light touch    Additional Neurological Details  Tingling at times in L thumb and index finger    Active Range of Motion     Left Wrist   Wrist flexion: 35 degrees   Wrist extension: 5 degrees   Radial deviation: 0 degrees   Ulnar deviation: 25 degrees     Left Thumb   Flexion     MP: 25 degrees    DIP: 20 degrees    Additional Active Range of Motion Details  Composite fist lacks 6 cm to DPC before exercises and Loose composite fist after exercises    Strength/Myotome Testing     Additional Strength Details  deferred    Swelling     Left Wrist/Hand   Circumference wrist: 18.6 cm    Additional Swelling Details  Mild edema in L hand    Right Wrist/Hand   Circumference wrist: 18.3 cm      See Exercise, Manual, and Modality Logs for complete treatment.     Pt tolerated treatment better today and was able to oppose her thumb to index finger to  10 marbles.   Assessment & Plan       Assessment  Impairments: abnormal coordination, abnormal or restricted ROM, activity intolerance,  impaired physical strength and pain with function   Other impairment: min-modeate assistance for bathing/dressing,  Functional limitations: carrying objects, lifting, pulling and pushing   Assessment details: Writing getting better than it was  Prognosis: good    Goals  Plan Goals: 1. The patient complains of pain in the L wrist.                  LTG 1: 12 weeks:  The patient will report a pain rating of 1/10 or better in order to improve sleep quality and tolerance to performance of activities of daily living.                                  STATUS:  Not met                  STG 1a: 6 weeks:  The patient will report a pain rating of 5/10 or better at worst.                                   STATUS:  Not met  2. The patient has limited ROM of the L wrist.                  LTG 2: 12 weeks:  The patient will demonstrate 50 degrees of wrist flex/ext to allow the patient to wash her hair and back.                                  STATUS:  Not met                   STG 2a: 6 weeks:  The patient will demonstrate good tolerance to range of motion measurements to L wrist.                                  STATUS:  Met                             3. Carrying, Moving, and Handling Objects Functional Limitation                                    LTG 3: 12 weeks:  The patient will achieve a score of 15/55 on the Quick DASH.                                  STATUS:  New                  STG 3a: 6 weeks:  The patient will achieve a score of 30/55 on the Quick DASH.                                    STATUS:  New                        Plan  Planned modality interventions: TENS, thermotherapy (hydrocollator packs) and thermotherapy (paraffin bath)  Planned therapy interventions: manual therapy, functional ROM exercises, fine motor coordination training, flexibility, stretching, strengthening, home exercise program, neuromuscular re-education, soft tissue mobilization and therapeutic activities  Frequency: 2x week  Duration in  weeks: 8  Treatment plan discussed with: patient and family        Visit Diagnoses:    ICD-10-CM ICD-9-CM   1. Closed displaced fracture of trapezium of left wrist, initial encounter  S62.172A 814.05   2. Left wrist pain  M25.532 719.43                Timed:  Manual Therapy:    0     mins  04664;  Therapeutic Exercise:    15     mins  40438;     Therapeutic Activity:    10     mins  36445;  Ultrasound:     0     mins  92824;    Electrical Stimulation:    0     mins 35282;  Neuromuscular Srinivas:    15    mins  92963;      Timed Treatment:   40   mins   Total Treatment:     40   min    ZENY Mendoza/L  Occupational Therapist    Electronically signed   License number [id number]

## 2024-12-31 ENCOUNTER — TREATMENT (OUTPATIENT)
Dept: PHYSICAL THERAPY | Facility: CLINIC | Age: 59
End: 2024-12-31
Payer: MEDICARE

## 2024-12-31 DIAGNOSIS — S62.172A CLOSED DISPLACED FRACTURE OF TRAPEZIUM OF LEFT WRIST, INITIAL ENCOUNTER: Primary | ICD-10-CM

## 2024-12-31 DIAGNOSIS — M25.532 LEFT WRIST PAIN: ICD-10-CM

## 2024-12-31 PROCEDURE — 97110 THERAPEUTIC EXERCISES: CPT | Performed by: OCCUPATIONAL THERAPIST

## 2024-12-31 PROCEDURE — 97112 NEUROMUSCULAR REEDUCATION: CPT | Performed by: OCCUPATIONAL THERAPIST

## 2024-12-31 NOTE — PROGRESS NOTES
"Occupational Therapy Daily Treatment Note  [address] [place] [address]  [address]      Patient: Mariama Turner   : 1965  Referring practitioner: Tal George MD  Date of Initial Visit: Type: THERAPY  Noted: 2024  Today's Date: 2024  Patient seen for 8 sessions           Subjective Evaluation    History of Present Illness    Subjective comment: \"I have a lot of pain\".  Functional status:  I'm not able to use my hand at all since my surgery.\"Pain  Current pain ratin  At best pain rating: 3  At worst pain rating: 10  Quality: throbbing             Objective   See Exercise, Manual, and Modality Logs for complete treatment.       Assessment & Plan       Assessment  Assessment details: Good tolerance to exercises.  Gradual improvement in tolerance to movement and participation in therapy.  Continue with plan of care.        Visit Diagnoses:    ICD-10-CM ICD-9-CM   1. Closed displaced fracture of trapezium of left wrist, initial encounter  S62.172A 814.05   2. Left wrist pain  M25.532 719.43       Progress strengthening /stabilization /functional activity           Timed:  Manual Therapy:                 0     mins  52665  Therapeutic Exercise:      10     mins  20166     Neuromuscular reeducation       10     mins 38911  Therapeutic Activity              10     mins  15642  Ultrasound:                  0     mins  40537     Untimed:  Electrical Stimulation:    0     mins  17570 ( );  Fluidotherapy      0     mins  52701    Timed Treatment:   30   mins   Total Treatment:     30   mins    David Aleman OT, LENNYT  Occupational Therapist    Electronically signed      [place] license #: [id number]  "

## 2025-01-02 ENCOUNTER — TREATMENT (OUTPATIENT)
Dept: PHYSICAL THERAPY | Facility: CLINIC | Age: 60
End: 2025-01-02
Payer: MEDICARE

## 2025-01-02 DIAGNOSIS — M25.532 LEFT WRIST PAIN: ICD-10-CM

## 2025-01-02 DIAGNOSIS — S62.172A CLOSED DISPLACED FRACTURE OF TRAPEZIUM OF LEFT WRIST, INITIAL ENCOUNTER: Primary | ICD-10-CM

## 2025-01-02 NOTE — PROGRESS NOTES
Occupational Therapy Daily Treatment Note  [address] [place] [address]      Patient: Mariama Turner   : 1965  Referring practitioner: Tal George MD  Date of Initial Visit: Type: THERAPY  Noted: 2024  Today's Date: 2025  Patient seen for 9 sessions         Subjective Evaluation    Pain  Current pain ratin  At worst pain rating: 10  Location: L wrist         Mariama Turner reports: My right wrist hurt just turning pages in a catalogue. My left is really hurting on the outside. I can't use a can opener. The fingers are getting easier to move.    Objective   See Exercise, Manual, and Modality Logs for complete treatment.     Functional grasping activity added with sharp pain in her wrist and along her dorsal middle finger. Thumb add/abduction added with good tolerance.  Assessment/Plan    Visit Diagnoses:    ICD-10-CM ICD-9-CM   1. Closed displaced fracture of trapezium of left wrist, initial encounter  S62.172A 814.05   2. Left wrist pain  M25.532 719.43       Continue per POC         Timed:  Manual Therapy:    0     mins  27115;  Therapeutic Exercise:    15     mins  70953;     Therapeutic Activity:    15     mins  43639;  Ultrasound:     0     mins  99538;    Electrical Stimulation:    0     mins 05326;  Neuromuscular Srinivas:    15    mins  85056;      Timed Treatment:   45   mins   Total Treatment:     45   min    ZENY Mendoza/L  Occupational Therapist    Electronically signed   License number [id number]

## 2025-01-07 ENCOUNTER — TELEPHONE (OUTPATIENT)
Dept: PHYSICAL THERAPY | Facility: OTHER | Age: 60
End: 2025-01-07
Payer: MEDICARE

## 2025-01-07 NOTE — TELEPHONE ENCOUNTER
Caller: Mariama Turner    Relationship: Self    What was the call regarding: PATIENT CANCELLED TODAYS APPT BECAUSE OF THE WEATHER

## 2025-01-09 ENCOUNTER — TREATMENT (OUTPATIENT)
Dept: PHYSICAL THERAPY | Facility: CLINIC | Age: 60
End: 2025-01-09
Payer: MEDICARE

## 2025-01-09 DIAGNOSIS — M25.532 LEFT WRIST PAIN: ICD-10-CM

## 2025-01-09 DIAGNOSIS — S62.172A CLOSED DISPLACED FRACTURE OF TRAPEZIUM OF LEFT WRIST, INITIAL ENCOUNTER: Primary | ICD-10-CM

## 2025-01-09 NOTE — PROGRESS NOTES
Occupational Therapy Daily Treatment Note  [address] [place] [address]      Patient: Mariama Turner   : 1965  Referring practitioner: Tal George MD  Date of Initial Visit: Type: THERAPY  Noted: 2024  Today's Date: 2025  Patient seen for 10 sessions         Subjective Evaluation    Pain  Current pain ratin  At worst pain rating: 10  Location: L thumb/wrist       Mariama Turner reports: I been doing my exercises and I can bend the wrist further.    Objective   See Exercise, Manual, and Modality Logs for complete treatment.   Pt was able to stack 12 cones today with good tolerance. Pt also able to oppose middle finger to thumb today and had less pain with exercises.    Assessment/Plan    Visit Diagnoses:    ICD-10-CM ICD-9-CM   1. Closed displaced fracture of trapezium of left wrist, initial encounter  S62.172A 814.05   2. Left wrist pain  M25.532 719.43       Continue per POC for range of motion to increase functional use of her L UE.          Timed:  Manual Therapy:    0     mins  38064;  Therapeutic Exercise:    10     mins  87124;     Therapeutic Activity:    10     mins  71715;  Ultrasound:     0     mins  72495;    Electrical Stimulation:    0     mins 02480;  Neuromuscular Srinivas:    10    mins  30241;      Timed Treatment:   30   mins   Total Treatment:     30   min    ZENY Mendoza/L  Occupational Therapist    Electronically signed   License number [id number]

## 2025-01-14 ENCOUNTER — TREATMENT (OUTPATIENT)
Dept: PHYSICAL THERAPY | Facility: CLINIC | Age: 60
End: 2025-01-14
Payer: MEDICARE

## 2025-01-14 DIAGNOSIS — M25.532 LEFT WRIST PAIN: ICD-10-CM

## 2025-01-14 DIAGNOSIS — S62.172A CLOSED DISPLACED FRACTURE OF TRAPEZIUM OF LEFT WRIST, INITIAL ENCOUNTER: Primary | ICD-10-CM

## 2025-01-14 PROCEDURE — 97112 NEUROMUSCULAR REEDUCATION: CPT | Performed by: PHYSICAL THERAPIST

## 2025-01-14 PROCEDURE — 97110 THERAPEUTIC EXERCISES: CPT | Performed by: PHYSICAL THERAPIST

## 2025-01-14 PROCEDURE — 97530 THERAPEUTIC ACTIVITIES: CPT | Performed by: PHYSICAL THERAPIST

## 2025-01-14 NOTE — PROGRESS NOTES
Occupational Therapy Daily Treatment Note  [address] [place] [address]      Patient: Mariama Turner   : 1965  Referring practitioner: Tal George MD  Date of Initial Visit: Type: THERAPY  Noted: 2024  Today's Date: 2025  Patient seen for 11 sessions         Subjective Evaluation    Pain  Current pain ratin (R 3)  At worst pain rating: 10  Location: L wrist/hand         Mariama Turner reports: They are killing me today. I did check my email and was on the computer all day yesterday. Most of the pain in both hands is on the ulnar side of the wrist.    Objective          Observations     Left Wrist/Hand   Positive for edema.     Additional Wrist/Hand Observation Details  Pt wearing prefab wrist support on her L wrist and prefab wrist support on her R wrist    Tenderness     Left Wrist/Hand   Tenderness in the first dorsal compartment, second dorsal compartment, fifth dorsal compartment, sixth dorsal compartment and carpometacarpal joint.     Neurological Testing     Sensation     Wrist/Hand   Left   Intact: light touch    Additional Neurological Details  Tingling at times in L thumb and index finger    Active Range of Motion     Left Wrist   Wrist flexion: 35 degrees   Wrist extension: 15 degrees   Radial deviation: 0 degrees   Ulnar deviation: 30 degrees     Right Wrist   Wrist flexion: 75 degrees   Wrist extension: 55 degrees     Left Thumb   Flexion     MP: 30 degrees    DIP: 30 degrees    Additional Active Range of Motion Details  Full loose composite fist    Strength/Myotome Testing     Additional Strength Details  deferred    Swelling     Left Wrist/Hand   Circumference wrist: 18.6 cm    Additional Swelling Details  Mild edema in L hand    Right Wrist/Hand   Circumference wrist: 18.3 cm      See Exercise, Manual, and Modality Logs for complete treatment.     Pt tolerated treatment well but continues with pain.  Assessment/Plan    Visit Diagnoses:    ICD-10-CM  ICD-9-CM   1. Closed displaced fracture of trapezium of left wrist, initial encounter  S62.172A 814.05   2. Left wrist pain  M25.532 719.43       Continue per POC         Timed:  Manual Therapy:    0     mins  11985;  Therapeutic Exercise:    15     mins  28484;     Therapeutic Activity:    10     mins  68988;  Ultrasound:     0     mins  26503;    Electrical Stimulation:    0     mins 42861;  Neuromuscular Srinivas:    15    mins  32758;      Timed Treatment:   40   mins   Total Treatment:     40   min    ZENY Mednoza/L  Occupational Therapist    Electronically signed   License number [id number]

## 2025-01-14 NOTE — LETTER
Progress Note  1/14/2025  Tal Lozano MD    Re: Mariama Turner  ________________________________________________________________    Mrs. Mariama Turner, has attended 11 OT sessions.  Treatment has consisted of: range of motion, functional activities and desensitization exercises.     S: Mrs. Mariama Turner states: both her wrist are really bothering her today maybe cause she was on the computer yesterday. Pain 5/10 L, R 3/10 increase to 10/10 at worst.    O: Tenderness in all dorsal compartments of L hand. Tingling in her L thumb and index finger  L Wrist flex 35, ext 15 degrees    RD 0, UD 30 degrees  Thumb flex MP 30, IP 30   Mild swelling in L hand    A: Pt tolerating therapy for range of motion only. Pt progressing very slow.    P: Please advise after your exam.    Thank you for this referral.      Adriana Mejia, OTR/L

## 2025-01-16 ENCOUNTER — TREATMENT (OUTPATIENT)
Dept: PHYSICAL THERAPY | Facility: CLINIC | Age: 60
End: 2025-01-16
Payer: MEDICARE

## 2025-01-16 DIAGNOSIS — S62.172A CLOSED DISPLACED FRACTURE OF TRAPEZIUM OF LEFT WRIST, INITIAL ENCOUNTER: Primary | ICD-10-CM

## 2025-01-16 DIAGNOSIS — M25.532 LEFT WRIST PAIN: ICD-10-CM

## 2025-01-16 NOTE — PROGRESS NOTES
Occupational Therapy Daily Treatment Note  [address] [place], [place] [id number]      Patient: Mariama Turner   : 1965  Referring practitioner: Tal George MD  Date of Initial Visit: Type: THERAPY  Noted: 2024  Today's Date: 2025  Patient seen for 12 sessions         Subjective   Mariama Turner reports: Pt saw MD and instructed to continue therapy on the L wrist and got an injection in her R thumb CMC joint.    Objective   See Exercise, Manual, and Modality Logs for complete treatment.     Hand Manipulation added today using ABC ball with fair tolerance having ulnar sided wrist pain. Pt was able to oppose marble with thumb and ring finger today and perform digital extension of marbles over towel roll.   Assessment/Plan    Visit Diagnoses:    ICD-10-CM ICD-9-CM   1. Closed displaced fracture of trapezium of left wrist, initial encounter  S62.172A 814.05   2. Left wrist pain  M25.532 719.43       Continue per POC         Timed:  Manual Therapy:    0     mins  07462;  Therapeutic Exercise:    10     mins  92767;     Therapeutic Activity:    10     mins  65916;  Ultrasound:     0     mins  91418;    Electrical Stimulation:    0     mins 17089;  Neuromuscular Srinivas:    10    mins  33489;    Timed Treatment:   30   mins   Total Treatment:     30   min    ZENY Mendoza/L  Occupational Therapist    Electronically signed   License number [id number]

## 2025-01-21 ENCOUNTER — TREATMENT (OUTPATIENT)
Dept: PHYSICAL THERAPY | Facility: CLINIC | Age: 60
End: 2025-01-21
Payer: MEDICARE

## 2025-01-21 DIAGNOSIS — S62.172A CLOSED DISPLACED FRACTURE OF TRAPEZIUM OF LEFT WRIST, INITIAL ENCOUNTER: Primary | ICD-10-CM

## 2025-01-21 DIAGNOSIS — M25.532 LEFT WRIST PAIN: ICD-10-CM

## 2025-01-21 PROCEDURE — 97112 NEUROMUSCULAR REEDUCATION: CPT | Performed by: PHYSICAL THERAPIST

## 2025-01-21 PROCEDURE — 97530 THERAPEUTIC ACTIVITIES: CPT | Performed by: PHYSICAL THERAPIST

## 2025-01-21 PROCEDURE — 97110 THERAPEUTIC EXERCISES: CPT | Performed by: PHYSICAL THERAPIST

## 2025-01-21 NOTE — PROGRESS NOTES
Occupational Therapy Daily Treatment Note  [address] [place] [address]      Patient: Mariama Turner   : 1965  Referring practitioner: Tal George MD  Date of Initial Visit: Type: THERAPY  Noted: 2024  Today's Date: 2025  Patient seen for 13 sessions         Subjective Evaluation    Pain  Current pain ratin  At worst pain rating: 10  Location: L thumb         Mariama Turner reports: she has been trying to use her hand some. I have been trying to use heat some, but it does make is swell.    Objective   See Exercise, Manual, and Modality Logs for complete treatment.   Pt was able to oppose her thumb to small finger and  3 marbles.  Pt was able to perform wrist 3 planes over wedge today with RD 5 degrees and UD 35 degrees. Fluidotherapy to L hand today for pain control with good response.  Assessment/Plan    Visit Diagnoses:    ICD-10-CM ICD-9-CM   1. Closed displaced fracture of trapezium of left wrist, initial encounter  S62.172A 814.05   2. Left wrist pain  M25.532 719.43       Continue per POC         Timed:  Manual Therapy:    0     mins  15992;  Therapeutic Exercise:    10     mins  05322;     Therapeutic Activity:    10     mins  52416;  Ultrasound:     0     mins  76349;    Electrical Stimulation:    0     mins 80708;  Neuromuscular Srinivas:    20    mins  22106;      Timed Treatment:   40   mins   Total Treatment:     40   min    ZENY Mendoza/L  Occupational Therapist    Electronically signed   License number [id number]

## 2025-01-23 ENCOUNTER — TREATMENT (OUTPATIENT)
Dept: PHYSICAL THERAPY | Facility: CLINIC | Age: 60
End: 2025-01-23
Payer: MEDICARE

## 2025-01-23 DIAGNOSIS — M25.532 LEFT WRIST PAIN: ICD-10-CM

## 2025-01-23 DIAGNOSIS — S62.172A CLOSED DISPLACED FRACTURE OF TRAPEZIUM OF LEFT WRIST, INITIAL ENCOUNTER: Primary | ICD-10-CM

## 2025-01-23 NOTE — PROGRESS NOTES
Occupational Therapy Daily Treatment Note  [address] [place] [address]      Patient: Mariama Turner   : 1965  Referring practitioner: Tal George MD  Date of Initial Visit: Type: THERAPY  Noted: 2024  Today's Date: 2025  Patient seen for 14 sessions         Subjective Evaluation    Pain  Current pain ratin  Location: L hand       Mariama Turner reports: I really like the heat, it helps a lot.     Objective   See Exercise, Manual, and Modality Logs for complete treatment.   Pt responding well to Fluidotherapy for pain relief. Pt able to oppose and  all 10  marbles today using her small finger and thumb. Pt also able to perform RD/UD with good tolerance. Pt able to perform wrist maze today with little difficulty today and slight increase in pain.     Assessment/Plan    Visit Diagnoses:    ICD-10-CM ICD-9-CM   1. Closed displaced fracture of trapezium of left wrist, initial encounter  S62.172A 814.05   2. Left wrist pain  M25.532 719.43       Continue per POC for range of motion to increase functional use of her L UE.          Timed:  Manual Therapy:    0     mins  68792;  Therapeutic Exercise:    15     mins  58294;     Therapeutic Activity:    10     mins  22211;  Ultrasound:     0     mins  45589;    Electrical Stimulation:    0     mins 65329;  Neuromuscular Srinivas:    20    mins  79715;      Timed Treatment:   45   mins   Total Treatment:     45   min    ZENY Mendoza/L  Occupational Therapist    Electronically signed   License number [id number]

## 2025-01-28 ENCOUNTER — TREATMENT (OUTPATIENT)
Dept: PHYSICAL THERAPY | Facility: CLINIC | Age: 60
End: 2025-01-28
Payer: MEDICARE

## 2025-01-28 DIAGNOSIS — M25.532 LEFT WRIST PAIN: ICD-10-CM

## 2025-01-28 DIAGNOSIS — S62.172A CLOSED DISPLACED FRACTURE OF TRAPEZIUM OF LEFT WRIST, INITIAL ENCOUNTER: Primary | ICD-10-CM

## 2025-01-28 NOTE — PROGRESS NOTES
Occupational Therapy Daily Treatment Note  [address] [place] [address]      Patient: Mariama Turner   : 1965  Referring practitioner: Tal George MD  Date of Initial Visit: Type: THERAPY  Noted: 2024  Today's Date: 2025  Patient seen for 15 sessions         Subjective Evaluation    Pain  Current pain ratin  At worst pain rating: 10  Location: L wrist       Mraiama Turner reports: it is hurting more the last couple days and it woke me up hurting last night. I have not got it closed the last few days I believe from the swelling.    Objective   See Exercise, Manual, and Modality Logs for complete treatment.   L hand composite fist lacks 2 cm to DPC.  Pt was able to complete exercises with no reports of increased pain past 5.  Assessment/Plan    Visit Diagnoses:    ICD-10-CM ICD-9-CM   1. Closed displaced fracture of trapezium of left wrist, initial encounter  S62.172A 814.05   2. Left wrist pain  M25.532 719.43       Continue per POC         Timed:  Manual Therapy:    0     mins  17669;  Therapeutic Exercise:    15     mins  89078;     Therapeutic Activity:    10     mins  84421;  Ultrasound:     0     mins  91011;    Electrical Stimulation:    0     mins 57735;  Neuromuscular Srinivas:    20    mins  27956;      Timed Treatment:   45   mins   Total Treatment:     45   min    ZENY Mendoza/L  Occupational Therapist    Electronically signed   License number [id number]

## 2025-01-30 ENCOUNTER — TREATMENT (OUTPATIENT)
Dept: PHYSICAL THERAPY | Facility: CLINIC | Age: 60
End: 2025-01-30
Payer: MEDICARE

## 2025-01-30 DIAGNOSIS — M25.532 LEFT WRIST PAIN: ICD-10-CM

## 2025-01-30 DIAGNOSIS — S62.172A CLOSED DISPLACED FRACTURE OF TRAPEZIUM OF LEFT WRIST, INITIAL ENCOUNTER: Primary | ICD-10-CM

## 2025-01-30 NOTE — PROGRESS NOTES
Progress Note  [address] [place] [address]      Patient: Mariama Turner   : 1965  Referring practitioner: Tal George MD  Date of Initial Visit: Type: THERAPY  Noted: 2024  Today's Date: 2025  Patient seen for 16 sessions         Subjective Evaluation    Pain  Current pain ratin  At worst pain ratin  Location: L wrist       Mariama Turner reports: there for a while I was crying everyday, but now I just know to suck it up. The left wrist still bothers me on the top and sides.    Objective          Observations     Left Wrist/Hand   Positive for edema.     Additional Wrist/Hand Observation Details  Pt wearing prefab wrist support on her L wrist     Tenderness     Left Wrist/Hand   Tenderness in the first dorsal compartment, second dorsal compartment, fifth dorsal compartment, sixth dorsal compartment and carpometacarpal joint.     Neurological Testing     Sensation     Wrist/Hand   Left   Intact: light touch    Additional Neurological Details  Tingling at times in L thumb and index finger    Active Range of Motion     Left Wrist   Wrist flexion: 35 degrees   Wrist extension: 35 degrees   Radial deviation: 2 degrees   Ulnar deviation: 40 degrees     Right Wrist   Wrist flexion: 75 degrees   Wrist extension: 55 degrees     Left Thumb   Flexion     MP: 30 degrees    DIP: 30 degrees    Additional Active Range of Motion Details  Full loose composite fist    Strength/Myotome Testing     Additional Strength Details  deferred    Swelling     Left Wrist/Hand   Circumference wrist: 14.3 cm    Additional Swelling Details  Mild edema in L hand    Right Wrist/Hand   Circumference wrist: 14.8 cm      See Exercise, Manual, and Modality Logs for complete treatment.       Assessment & Plan       Assessment  Impairments: abnormal coordination, abnormal or restricted ROM, activity intolerance, impaired physical strength and pain with function   Other impairment: min assistance with  bathing R side, fastening jeans and bra, unable to wring out her hair, styling hair easier  Functional limitations: carrying objects, lifting, pulling and pushing   Assessment details: Writing getting better than it was  Prognosis: good    Goals  Plan Goals: 1. The patient complains of pain in the L wrist.                  LTG 1: 12 weeks:  The patient will report a pain rating of 1/10 or better in order to improve sleep quality and tolerance to performance of activities of daily living.                                  STATUS:  Not met                  STG 1a: 6 weeks:  The patient will report a pain rating of 5/10 or better at worst.                                   STATUS:  Not met  2. The patient has limited ROM of the L wrist.                  LTG 2: 12 weeks:  The patient will demonstrate 50 degrees of wrist flex/ext to allow the patient to wash her hair and back.                                  STATUS:  Not met                   STG 2a: 6 weeks:  The patient will demonstrate good tolerance to range of motion measurements to L wrist.                                  STATUS:  Met                             3. Carrying, Moving, and Handling Objects Functional Limitation                                    LTG 3: 12 weeks:  The patient will achieve a score of 15/55 on the Quick DASH.                                  STATUS:  New                  STG 3a: 6 weeks:  The patient will achieve a score of 30/55 on the Quick DASH.                                    STATUS:  New                        Plan  Planned modality interventions: TENS, thermotherapy (hydrocollator packs) and thermotherapy (paraffin bath)  Planned therapy interventions: manual therapy, functional ROM exercises, fine motor coordination training, flexibility, stretching, strengthening, home exercise program, neuromuscular re-education, soft tissue mobilization and therapeutic activities  Frequency: 2x week  Duration in weeks: 4  Treatment plan  discussed with: patient and family      Visit Diagnoses:    ICD-10-CM ICD-9-CM   1. Closed displaced fracture of trapezium of left wrist, initial encounter  S62.172A 814.05   2. Left wrist pain  M25.532 719.43                Timed:  Manual Therapy:    0     mins  22891;  Therapeutic Exercise:    10     mins  92868;     Therapeutic Activity:    10     mins  50105;  Ultrasound:     0     mins  13868;    Electrical Stimulation:    0     mins 18994;  Neuromuscular Srinivas:    20    mins  69275;      Timed Treatment:   40   mins   Total Treatment:     40   min    Adriana Mejia OTR/L  Occupational Therapist    Electronically signed   License number [id number]

## 2025-02-03 ENCOUNTER — OFFICE VISIT (OUTPATIENT)
Dept: NEUROLOGY | Facility: CLINIC | Age: 60
End: 2025-02-03
Payer: MEDICARE

## 2025-02-03 VITALS
DIASTOLIC BLOOD PRESSURE: 86 MMHG | WEIGHT: 165 LBS | HEART RATE: 65 BPM | HEIGHT: 63 IN | BODY MASS INDEX: 29.23 KG/M2 | SYSTOLIC BLOOD PRESSURE: 132 MMHG | OXYGEN SATURATION: 94 %

## 2025-02-03 DIAGNOSIS — R41.89 COGNITIVE IMPAIRMENT: ICD-10-CM

## 2025-02-03 DIAGNOSIS — G43.009 MIGRAINE WITHOUT AURA AND WITHOUT STATUS MIGRAINOSUS, NOT INTRACTABLE: ICD-10-CM

## 2025-02-03 DIAGNOSIS — R51.9 CHRONIC DAILY HEADACHE: ICD-10-CM

## 2025-02-03 DIAGNOSIS — H81.93 VESTIBULAR DYSFUNCTION OF BOTH EARS: ICD-10-CM

## 2025-02-03 DIAGNOSIS — F07.81 POST CONCUSSIVE SYNDROME: Primary | ICD-10-CM

## 2025-02-03 DIAGNOSIS — M54.2 NECK PAIN: ICD-10-CM

## 2025-02-03 DIAGNOSIS — M54.81 BILATERAL OCCIPITAL NEURALGIA: ICD-10-CM

## 2025-02-03 PROCEDURE — 1160F RVW MEDS BY RX/DR IN RCRD: CPT | Performed by: PSYCHIATRY & NEUROLOGY

## 2025-02-03 PROCEDURE — 1159F MED LIST DOCD IN RCRD: CPT | Performed by: PSYCHIATRY & NEUROLOGY

## 2025-02-03 PROCEDURE — 3079F DIAST BP 80-89 MM HG: CPT | Performed by: PSYCHIATRY & NEUROLOGY

## 2025-02-03 PROCEDURE — 99215 OFFICE O/P EST HI 40 MIN: CPT | Performed by: PSYCHIATRY & NEUROLOGY

## 2025-02-03 PROCEDURE — 3075F SYST BP GE 130 - 139MM HG: CPT | Performed by: PSYCHIATRY & NEUROLOGY

## 2025-02-03 PROCEDURE — 64405 NJX AA&/STRD GR OCPL NRV: CPT | Performed by: PSYCHIATRY & NEUROLOGY

## 2025-02-03 RX ORDER — MELOXICAM 7.5 MG/1
7.5 TABLET ORAL DAILY
COMMUNITY
Start: 2025-01-31 | End: 2025-03-02
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
## 2025-02-04 ENCOUNTER — HOSPITAL ENCOUNTER (OUTPATIENT)
Dept: MRI IMAGING | Facility: HOSPITAL | Age: 60
Discharge: HOME | End: 2025-02-04
Admitting: INTERNAL MEDICINE
Payer: MEDICARE

## 2025-02-04 ENCOUNTER — TREATMENT (OUTPATIENT)
Dept: PHYSICAL THERAPY | Facility: CLINIC | Age: 60
End: 2025-02-04
Payer: MEDICARE

## 2025-02-04 DIAGNOSIS — R10.9 RIGHT SIDED ABDOMINAL PAIN: ICD-10-CM

## 2025-02-04 DIAGNOSIS — M25.532 LEFT WRIST PAIN: ICD-10-CM

## 2025-02-04 DIAGNOSIS — R74.8 ELEVATED LIVER ENZYMES: ICD-10-CM

## 2025-02-04 DIAGNOSIS — S62.172A CLOSED DISPLACED FRACTURE OF TRAPEZIUM OF LEFT WRIST, INITIAL ENCOUNTER: Primary | ICD-10-CM

## 2025-02-04 LAB
CREAT BLDA-MCNC: 0.8 MG/DL (ref 0.6–1.3)
EGFRCR SERPLBLD CKD-EPI 2021: 83.9 ML/MIN/1.73

## 2025-02-04 PROCEDURE — 25510000002 GADOBENATE DIMEGLUMINE 529 MG/ML SOLUTION: Performed by: INTERNAL MEDICINE

## 2025-02-04 PROCEDURE — 74183 MRI ABD W/O CNTR FLWD CNTR: CPT

## 2025-02-04 PROCEDURE — 82565 ASSAY OF CREATININE: CPT

## 2025-02-04 PROCEDURE — A9577 INJ MULTIHANCE: HCPCS | Performed by: INTERNAL MEDICINE

## 2025-02-04 RX ORDER — METHYLPREDNISOLONE ACETATE 40 MG/ML
40 INJECTION, SUSPENSION INTRA-ARTICULAR; INTRALESIONAL; INTRAMUSCULAR; SOFT TISSUE ONCE
Status: COMPLETED | OUTPATIENT
Start: 2025-02-04 | End: 2025-02-04

## 2025-02-04 RX ORDER — LIDOCAINE HYDROCHLORIDE 20 MG/ML
1 INJECTION, SOLUTION INFILTRATION; PERINEURAL ONCE
Status: COMPLETED | OUTPATIENT
Start: 2025-02-04 | End: 2025-02-04

## 2025-02-04 RX ADMIN — GADOBENATE DIMEGLUMINE 15 ML: 529 INJECTION, SOLUTION INTRAVENOUS at 13:36

## 2025-02-04 RX ADMIN — METHYLPREDNISOLONE ACETATE 40 MG: 40 INJECTION, SUSPENSION INTRA-ARTICULAR; INTRALESIONAL; INTRAMUSCULAR; SOFT TISSUE at 09:34

## 2025-02-04 RX ADMIN — LIDOCAINE HYDROCHLORIDE 1 ML: 20 INJECTION, SOLUTION INFILTRATION; PERINEURAL at 09:34

## 2025-02-04 NOTE — PROGRESS NOTES
Occupational Therapy Daily Treatment Note  [address] [place] [address]      Patient: Mariama Turner   : 1965  Referring practitioner: Tal George MD  Date of Initial Visit: Type: THERAPY  Noted: 2024  Today's Date: 2025  Patient seen for 17 sessions         Subjective Evaluation    Pain  Current pain rating: 3  At worst pain rating: 10  Location: L wrist       Mariama Turner reports: I feel like the wrist is more of the problem than the thumb now.    Objective          Observations     Left Wrist/Hand   Positive for edema.     Additional Wrist/Hand Observation Details  Pt wearing prefab wrist support on her L wrist     Tenderness     Left Wrist/Hand   Tenderness in the first dorsal compartment, second dorsal compartment, fifth dorsal compartment, sixth dorsal compartment and carpometacarpal joint.     Neurological Testing     Sensation     Wrist/Hand   Left   Intact: light touch    Additional Neurological Details  Tingling at times in L thumb and index finger    Active Range of Motion     Left Wrist   Wrist flexion: 40 degrees   Wrist extension: 35 degrees   Radial deviation: 2 degrees   Ulnar deviation: 40 degrees     Right Wrist   Wrist flexion: 75 degrees   Wrist extension: 55 degrees     Left Thumb   Flexion     MP: 40 degrees    DIP: 40 degrees    Additional Active Range of Motion Details  Full loose composite fist    Strength/Myotome Testing     Additional Strength Details  deferred    Swelling     Left Wrist/Hand   Circumference wrist: 14.3 cm    Additional Swelling Details  Mild edema in L hand    Right Wrist/Hand   Circumference wrist: 14.8 cm      See Exercise, Manual, and Modality Logs for complete treatment.     Pt able to complete all exercises easier today with less pain.  Assessment/Plan    Visit Diagnoses:    ICD-10-CM ICD-9-CM   1. Closed displaced fracture of trapezium of left wrist, initial encounter  S62.172A 814.05   2. Left wrist pain  M25.532 719.43        Continue per POC         Timed:  Manual Therapy:    0     mins  70824;  Therapeutic Exercise:    10     mins  02721;     Therapeutic Activity:    10     mins  73387;  Ultrasound:     0     mins  16390;    Electrical Stimulation:    0     mins 88252;  Neuromuscular Srinivas:    25    mins  39565;      Timed Treatment:   45   mins   Total Treatment:     45   min    ZENY Mendoza/L  Occupational Therapist    Electronically signed   License number [id number]

## 2025-02-05 ENCOUNTER — TELEPHONE (OUTPATIENT)
Dept: GASTROENTEROLOGY | Facility: CLINIC | Age: 60
End: 2025-02-05
Payer: MEDICARE

## 2025-02-05 DIAGNOSIS — R74.8 ELEVATED LIVER ENZYMES: Primary | ICD-10-CM

## 2025-02-05 NOTE — TELEPHONE ENCOUNTER
Called pt to follow up on overdue results for laboratory tests. Pt is agreeable to completing test/s.

## 2025-02-06 ENCOUNTER — LAB (OUTPATIENT)
Dept: LAB | Facility: HOSPITAL | Age: 60
End: 2025-02-06
Payer: MEDICARE

## 2025-02-06 ENCOUNTER — TREATMENT (OUTPATIENT)
Dept: PHYSICAL THERAPY | Facility: CLINIC | Age: 60
End: 2025-02-06
Payer: MEDICARE

## 2025-02-06 DIAGNOSIS — S62.172A CLOSED DISPLACED FRACTURE OF TRAPEZIUM OF LEFT WRIST, INITIAL ENCOUNTER: Primary | ICD-10-CM

## 2025-02-06 DIAGNOSIS — M25.532 LEFT WRIST PAIN: ICD-10-CM

## 2025-02-06 DIAGNOSIS — R10.9 RIGHT SIDED ABDOMINAL PAIN: ICD-10-CM

## 2025-02-06 DIAGNOSIS — R74.8 ELEVATED LIVER ENZYMES: ICD-10-CM

## 2025-02-06 LAB
ALBUMIN SERPL-MCNC: 4.1 G/DL (ref 3.5–5.2)
ALP SERPL-CCNC: 125 U/L (ref 39–117)
ALT SERPL W P-5'-P-CCNC: 21 U/L (ref 1–33)
AST SERPL-CCNC: 21 U/L (ref 1–32)
BILIRUB CONJ SERPL-MCNC: 0.1 MG/DL (ref 0–0.3)
BILIRUB INDIRECT SERPL-MCNC: 0.2 MG/DL
BILIRUB SERPL-MCNC: 0.3 MG/DL (ref 0–1.2)
PROT SERPL-MCNC: 7 G/DL (ref 6–8.5)

## 2025-02-06 PROCEDURE — 36415 COLL VENOUS BLD VENIPUNCTURE: CPT

## 2025-02-06 PROCEDURE — 80076 HEPATIC FUNCTION PANEL: CPT

## 2025-02-06 NOTE — PROGRESS NOTES
Occupational Therapy Daily Treatment Note  [address] [place] [address]      Patient: Mariama Turner   : 1965  Referring practitioner: Tal George MD  Date of Initial Visit: Type: THERAPY  Noted: 2024  Today's Date: 2025  Patient seen for 18 sessions         Subjective   Mariama Turner reports: her R hand has been bothering her a little more.    Objective   See Exercise, Manual, and Modality Logs for complete treatment.   Pt tolerated treatment well with progression to flexbar bending with minimal complaints. Pt was able to perform all other exercise without complaints.    Assessment/Plan    Visit Diagnoses:    ICD-10-CM ICD-9-CM   1. Closed displaced fracture of trapezium of left wrist, initial encounter  S62.172A 814.05   2. Left wrist pain  M25.532 719.43       Continue per POC progressing strengthening exercises as tolerated.         Timed:  Manual Therapy:    0     mins  06379;  Therapeutic Exercise:    15     mins  95936;     Therapeutic Activity:    15     mins  72042;  Ultrasound:     0     mins  21872;    Electrical Stimulation:    0     mins 66337;  Neuromuscular Srinivas:    15    mins  54825;      Timed Treatment:   45   mins   Total Treatment:     45   min    ZENY Mendoza/L  Occupational Therapist    Electronically signed   License number [id number]

## 2025-02-11 ENCOUNTER — TREATMENT (OUTPATIENT)
Dept: PHYSICAL THERAPY | Facility: CLINIC | Age: 60
End: 2025-02-11
Payer: MEDICARE

## 2025-02-11 DIAGNOSIS — S62.172A CLOSED DISPLACED FRACTURE OF TRAPEZIUM OF LEFT WRIST, INITIAL ENCOUNTER: Primary | ICD-10-CM

## 2025-02-11 DIAGNOSIS — M25.532 LEFT WRIST PAIN: ICD-10-CM

## 2025-02-11 PROCEDURE — 97530 THERAPEUTIC ACTIVITIES: CPT | Performed by: PHYSICAL THERAPIST

## 2025-02-11 PROCEDURE — 97112 NEUROMUSCULAR REEDUCATION: CPT | Performed by: PHYSICAL THERAPIST

## 2025-02-11 PROCEDURE — 97110 THERAPEUTIC EXERCISES: CPT | Performed by: PHYSICAL THERAPIST

## 2025-02-11 NOTE — PROGRESS NOTES
Occupational Therapy Daily Treatment Note  [address] [place] [address]      Patient: Mariama Turner   : 1965  Referring practitioner: Jordan Rawls II, MD  Date of Initial Visit: Type: THERAPY  Noted: 2024  Today's Date: 2025  Patient seen for 19 sessions         Subjective   Mariama Turner reports: her worst pain is on the ulnar side of her wrist.    Objective   See Exercise, Manual, and Modality Logs for complete treatment.   Pt tolerated treatment well for range of motion and strengthening. Pt responding well to desensitization for pain control.    Assessment/Plan    Visit Diagnoses:    ICD-10-CM ICD-9-CM   1. Closed displaced fracture of trapezium of left wrist, initial encounter  S62.172A 814.05   2. Left wrist pain  M25.532 719.43       Continue per POC         Timed:  Manual Therapy:    0     mins  51680;  Therapeutic Exercise:    15     mins  28755;     Therapeutic Activity:    15     mins  04560;  Ultrasound:     0     mins  96089;    Electrical Stimulation:    0     mins 52575;  Neuromuscular Srinivas:    15    mins  11245;      Timed Treatment:   45  mins   Total Treatment:    45   min    Adriana Mejia OTR/L  Occupational Therapist    Electronically signed   License number [id number]

## 2025-02-13 ENCOUNTER — PREP FOR SURGERY (OUTPATIENT)
Dept: OTHER | Facility: HOSPITAL | Age: 60
End: 2025-02-13
Payer: MEDICARE

## 2025-02-13 ENCOUNTER — TELEPHONE (OUTPATIENT)
Dept: GASTROENTEROLOGY | Facility: CLINIC | Age: 60
End: 2025-02-13
Payer: MEDICARE

## 2025-02-13 ENCOUNTER — TREATMENT (OUTPATIENT)
Dept: PHYSICAL THERAPY | Facility: CLINIC | Age: 60
End: 2025-02-13
Payer: MEDICARE

## 2025-02-13 DIAGNOSIS — S62.172A CLOSED DISPLACED FRACTURE OF TRAPEZIUM OF LEFT WRIST, INITIAL ENCOUNTER: Primary | ICD-10-CM

## 2025-02-13 DIAGNOSIS — R10.11 RUQ PAIN: ICD-10-CM

## 2025-02-13 DIAGNOSIS — M25.532 LEFT WRIST PAIN: ICD-10-CM

## 2025-02-13 DIAGNOSIS — Z86.0100 HISTORY OF COLON POLYPS: Primary | ICD-10-CM

## 2025-02-13 DIAGNOSIS — Z12.11 SCREENING FOR COLON CANCER: ICD-10-CM

## 2025-02-13 NOTE — PROGRESS NOTES
Occupational Therapy Daily Treatment Note  [address] [place] [address]      Patient: Mariama Turner   : 1965  Referring practitioner: Tal George MD  Date of Initial Visit: Type: THERAPY  Noted: 2024  Today's Date: 2025  Patient seen for 20 sessions         Subjective Evaluation    Pain  Current pain ratin  Location: L wrist         Mariama Turner reports: the biggest part is still the wrist.     Objective   See Exercise, Manual, and Modality Logs for complete treatment.     Pt tolerated treatment well and 1 lb weight added for wrist strengthening with fair tolerance.  Assessment/Plan    Visit Diagnoses:    ICD-10-CM ICD-9-CM   1. Closed displaced fracture of trapezium of left wrist, initial encounter  S62.172A 814.05   2. Left wrist pain  M25.532 719.43       Continue per POC         Timed:  Manual Therapy:    0     mins  58660;  Therapeutic Exercise:    10     mins  00068;     Therapeutic Activity:    10     mins  28907;  Ultrasound:     0     mins  52988;    Electrical Stimulation:    0     mins 28775;  Neuromuscular Srinivas:    25    mins  41796;      Timed Treatment:   45   mins   Total Treatment:     45   min    ZENY Mendoza/L  Occupational Therapist    Electronically signed   License number [id number]

## 2025-02-13 NOTE — TELEPHONE ENCOUNTER
Spoke to patient and informed of MIGNON Larkin result note and recommendations.  Verified patient understanding.     Patient is agreeable to EGD for RUQ pain.  She would also like to have her screening colonoscopy (history of colon polyps) done as well (see last office visit note).    CR entered for second sign.  Dr. Smith, Cardiology  No other clearances needed  Patient prefers a Monday

## 2025-02-13 NOTE — TELEPHONE ENCOUNTER
----- Message from Myah Pabon sent at 2/10/2025  5:38 PM EST -----  These notify patient MRI MRCP is normal, per Dr. Nicolas's note he suggested EGD if this resulted negative, please see if patient agreeable to proceed with EGD for right sided abdominal pain

## 2025-02-18 ENCOUNTER — TREATMENT (OUTPATIENT)
Dept: PHYSICAL THERAPY | Facility: CLINIC | Age: 60
End: 2025-02-18
Payer: MEDICARE

## 2025-02-18 DIAGNOSIS — S62.172A CLOSED DISPLACED FRACTURE OF TRAPEZIUM OF LEFT WRIST, INITIAL ENCOUNTER: Primary | ICD-10-CM

## 2025-02-18 DIAGNOSIS — M25.532 LEFT WRIST PAIN: ICD-10-CM

## 2025-02-18 PROCEDURE — 97112 NEUROMUSCULAR REEDUCATION: CPT | Performed by: PHYSICAL THERAPIST

## 2025-02-18 PROCEDURE — 97110 THERAPEUTIC EXERCISES: CPT | Performed by: PHYSICAL THERAPIST

## 2025-02-18 PROCEDURE — 97530 THERAPEUTIC ACTIVITIES: CPT | Performed by: PHYSICAL THERAPIST

## 2025-02-18 NOTE — TELEPHONE ENCOUNTER
----- Message from Usman Castillo Jr., MD sent at 6/28/2021  8:15 AM EDT -----  Hub please advise.     Normal mammogram   Lvmtcb to schedule HTN f/u appointment for April per staff message.    ----- Message from Kirk PARKER CMA sent at 2/18/2025 11:19 AM CST -----  Please call to schedule a HTN f.u for April- not on PHI.

## 2025-02-18 NOTE — PROGRESS NOTES
Occupational Therapy Daily Treatment Note  [address] [place] [address]      Patient: Mariama Turner   : 1965  Referring practitioner: Tal George MD  Date of Initial Visit: Type: THERAPY  Noted: 2024  Today's Date: 2025  Patient seen for 21 sessions         Subjective   Mariama Turner reports: I saw the doctor and he said to continue therapy and return in 2 months.    Objective   See Exercise, Manual, and Modality Logs for complete treatment.   Pinching added today with fine motor coordination activity using pegboard. Pt tolerated treatment well.    Assessment/Plan    Visit Diagnoses:    ICD-10-CM ICD-9-CM   1. Closed displaced fracture of trapezium of left wrist, initial encounter  S62.172A 814.05   2. Left wrist pain  M25.532 719.43       Continue per POC         Timed:  Manual Therapy:    0     mins  87141;  Therapeutic Exercise:    15     mins  51172;     Therapeutic Activity:    15     mins  85927;  Ultrasound:     0     mins  86596;    Electrical Stimulation:    0     mins 38239;  Neuromuscular Srinivas:    15    mins  11196;      Timed Treatment:   45   mins   Total Treatment:     45   min    ZENY Mendoza/L  Occupational Therapist    Electronically signed   License number [id number]

## 2025-02-19 RX ORDER — VALACYCLOVIR HYDROCHLORIDE 1 G/1
1 TABLET, FILM COATED ORAL DAILY
Qty: 30 TABLET | Refills: 0 | Status: SHIPPED | OUTPATIENT
Start: 2025-02-19

## 2025-02-19 RX ORDER — CETIRIZINE HYDROCHLORIDE 10 MG/1
10 TABLET ORAL DAILY
Qty: 90 TABLET | Refills: 1 | Status: SHIPPED | OUTPATIENT
Start: 2025-02-19

## 2025-02-19 NOTE — TELEPHONE ENCOUNTER
Caller: Mariama Turner    Relationship: Self    Best call back number: [phone]    Requested Prescriptions:   Requested Prescriptions     Pending Prescriptions Disp Refills    valACYclovir (VALTREX) 1000 MG tablet       Sig: Take 1 tablet by mouth Daily.    cetirizine (zyrTEC) 10 MG tablet 90 tablet 1     Sig: Take 1 tablet by mouth Daily.        Pharmacy where request should be sent: [facility] [phone] [place], [place] [id number]      Last office visit with prescribing clinician: 10/24/2024   Last telemedicine visit with prescribing clinician: Visit date not found   Next office visit with prescribing clinician: 4/24/2025     Additional details provided by patient: OUT OF MEDICATION     Does the patient have less than a 3 day supply:  [x] Yes  [] No    Would you like a call back once the refill request has been completed: [] Yes [] No    If the office needs to give you a call back, can they leave a voicemail: [] Yes [] No    Marian Wilkins Rep   02/19/25 10:09 EST

## 2025-02-20 ENCOUNTER — TREATMENT (OUTPATIENT)
Dept: PHYSICAL THERAPY | Facility: CLINIC | Age: 60
End: 2025-02-20
Payer: MEDICARE

## 2025-02-20 DIAGNOSIS — S62.172A CLOSED DISPLACED FRACTURE OF TRAPEZIUM OF LEFT WRIST, INITIAL ENCOUNTER: Primary | ICD-10-CM

## 2025-02-20 DIAGNOSIS — M54.2 PAIN, NECK: Primary | ICD-10-CM

## 2025-02-20 DIAGNOSIS — S13.4XXD WHIPLASH INJURY TO NECK, SUBSEQUENT ENCOUNTER: ICD-10-CM

## 2025-02-20 DIAGNOSIS — G44.86 CERVICOGENIC HEADACHE: ICD-10-CM

## 2025-02-20 DIAGNOSIS — M25.532 LEFT WRIST PAIN: ICD-10-CM

## 2025-02-20 NOTE — PROGRESS NOTES
Occupational Therapy Daily Treatment Note  [address] [place] [address]      Patient: Mariama Turner   : 1965  Referring practitioner: Tal George MD  Date of Initial Visit: Type: THERAPY  Noted: 2024  Today's Date: 2025  Patient seen for 22 sessions         Subjective Evaluation    Pain  Current pain rating: 3  Location: L wrist       Mariama Turner reports: ziplock bags are hard for me to open. I have to use my teeth. Last night I was rubbing around on it and felt a pop and seems some better today.     Objective   See Exercise, Manual, and Modality Logs for complete treatment.     Pt tolerated treatment well, but did have difficulty with fine motor coordination today and her balance was off after having PT first.  Assessment/Plan    Visit Diagnoses:    ICD-10-CM ICD-9-CM   1. Closed displaced fracture of trapezium of left wrist, initial encounter  S62.172A 814.05   2. Left wrist pain  M25.532 719.43       Continue per POC         Timed:  Manual Therapy:    0     mins  61166;  Therapeutic Exercise:    10     mins  59842;     Therapeutic Activity:    10     mins  25807;  Ultrasound:     0     mins  51139;    Electrical Stimulation:    0     mins 82214;  Neuromuscular Srinivas:    25    mins  79505;      Timed Treatment:   45   mins   Total Treatment:     45   min    ZENY Mendoza/L  Occupational Therapist    Electronically signed   License number [id number]
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
## 2025-02-24 ENCOUNTER — TELEPHONE (OUTPATIENT)
Dept: GASTROENTEROLOGY | Facility: CLINIC | Age: 60
End: 2025-02-24
Payer: MEDICARE

## 2025-02-24 NOTE — TELEPHONE ENCOUNTER
Procedure: Colonoscopy and/or EGD     Med Directive: NA     PMH: nonrheumatic mitral valve regurgitation, HTN, HLD     Last Seen: 10/9/2024

## 2025-02-24 NOTE — TELEPHONE ENCOUNTER
CARDIAC Clearance REQUEST    2025      Patient Name: Mariama Turner  : 1965      Dear Dr. Smith,    This patient is waiting to have a Colonoscopy and/or Esophagogastroduodenoscopy which I will perform at [facility] on _3.31.25_. Please respond to this request noting your recommendations regarding clearance from a CARDIOLOGY standpoint.  You may contact our office at [phone] Option 2 with any questions. I appreciate your prompt response in this matter. Please return this form to our office as soon as possible to [phone].    ____ I approve my patient from a cardiology standpoint    ____ I do NOT approve my patient from a cardiology standpoint at this time      Approving physician name (please print): _____________________________________________      Approving physician signature: ________________________________ Date:________________      Sincerely,  [facility] [address]  Dr. Nicolas's Office                        Please fax approval or denial to our office as soon as possible.

## 2025-02-25 ENCOUNTER — TREATMENT (OUTPATIENT)
Dept: PHYSICAL THERAPY | Facility: CLINIC | Age: 60
End: 2025-02-25
Payer: MEDICARE

## 2025-02-25 DIAGNOSIS — G44.86 CERVICOGENIC HEADACHE: ICD-10-CM

## 2025-02-25 DIAGNOSIS — M25.532 LEFT WRIST PAIN: ICD-10-CM

## 2025-02-25 DIAGNOSIS — S62.172A CLOSED DISPLACED FRACTURE OF TRAPEZIUM OF LEFT WRIST, INITIAL ENCOUNTER: Primary | ICD-10-CM

## 2025-02-25 DIAGNOSIS — S13.4XXD WHIPLASH INJURY TO NECK, SUBSEQUENT ENCOUNTER: ICD-10-CM

## 2025-02-25 DIAGNOSIS — M54.2 PAIN, NECK: Primary | ICD-10-CM

## 2025-02-25 NOTE — PROGRESS NOTES
[facility] Physical [facility]                              Physical Therapy Daily Treatment Note    Patient: Mariama Turner   : 1965  Diagnosis/ICD-10 Code:  Pain, neck [M54.2]  Referring practitioner: Jordan Rawls II, MD  Date of Initial Visit: Type: THERAPY  Noted: 2025  Today's Date: 2025  Patient seen for 2 sessions           Subjective   Mariama Turner reports: over the weekend she had headaches/ migraines daily. Pt states some days are worse than other. Today patient states she is feeling a little better and hasn't had one. At the end of the session patient felt much relief in her neck after manual.      Objective   Tenderness noted in R UT.    See Exercise, Manual, and Modality Logs for complete treatment.     Assessment/Plan  Today is patients first treatment session following initial eval to address outlined deficits. Pt tolerated exercises well, with no complaints of increased pain or discomfort and felt most relief with manual therapy. Patient will continue to benefit from skilled physical therapy services to further address pain management and range of motion deficits in order to improve upon their functional mobility for any ADL concerns.      Progress per Plan of Care         Timed:  Manual Therapy:    14     mins  99628;  Therapeutic Exercise:         mins  87728;     Neuromuscular Srinivas:   10    mins  27570;    Therapeutic Activity:          mins  81458;     Gait Training:           mins  98383;        Untimed:  Electrical Stimulation:         mins  15090 ( );  Mechanical Traction:         mins  21871;       Timed Treatment:   24   mins   Total Treatment:     24   mins      Electronically signed:     Laurita Hendrix PTA  Physical Therapist Assistant  [facility] License #: [id number]

## 2025-02-25 NOTE — PROGRESS NOTES
Occupational Therapy Daily Treatment Note  [address] [place] [address]      Patient: Mariama Turner   : 1965  Referring practitioner: Tal George MD  Date of Initial Visit: Type: THERAPY  Noted: 2024  Today's Date: 2025  Patient seen for 23 sessions         Subjective   Mariama Turner reports: I tried to cut meat, but can't seem to hold the food still with my left hand.    Objective   See Exercise, Manual, and Modality Logs for complete treatment.     Pt tolerated treatment well for range of motion and strengthening. Putty cutting added to help strengthening hand for cutting food.  Assessment/Plan    Visit Diagnoses:    ICD-10-CM ICD-9-CM   1. Closed displaced fracture of trapezium of left wrist, initial encounter  S62.172A 814.05   2. Left wrist pain  M25.532 719.43       Continue per POC         Timed:  Manual Therapy:    0     mins  23836;  Therapeutic Exercise:    15     mins  27338;     Therapeutic Activity:    15     mins  58166;  Ultrasound:     0     mins  00759;    Electrical Stimulation:    0     mins 78351;  Neuromuscular Srinivas:    25    mins  22186;      Timed Treatment:   55   mins   Total Treatment:     55   min    ZENY Mendoza/L  Occupational Therapist    Electronically signed   License number [id number]

## 2025-02-26 DIAGNOSIS — Z86.0100 HISTORY OF COLON POLYPS: Primary | ICD-10-CM

## 2025-02-26 DIAGNOSIS — M54.42 ACUTE MIDLINE LOW BACK PAIN WITH LEFT-SIDED SCIATICA: ICD-10-CM

## 2025-02-26 RX ORDER — TRAMADOL HYDROCHLORIDE 50 MG/1
50 TABLET ORAL EVERY 8 HOURS PRN
Qty: 60 TABLET | Refills: 2 | Status: SHIPPED | OUTPATIENT
Start: 2025-02-26

## 2025-02-26 NOTE — TELEPHONE ENCOUNTER
Tried to call patient, had to leave VM to return my call.     HUB OKAY TO RELAY:     Let her know that I sent it to [facility].      She's due for mammo. Can order if she's agreeable

## 2025-02-26 NOTE — TELEPHONE ENCOUNTER
Refill Request for Controlled Substance    Date of Request: 2/26/2025  Medication Requested: Tramadol  Last UDS: 10/24/2024  Last Consent: 10/24/2024  Last OV: 10/24/2024  Next OV: 04/24/2025

## 2025-02-27 ENCOUNTER — TREATMENT (OUTPATIENT)
Dept: PHYSICAL THERAPY | Facility: CLINIC | Age: 60
End: 2025-02-27
Payer: MEDICARE

## 2025-02-27 DIAGNOSIS — M25.532 LEFT WRIST PAIN: ICD-10-CM

## 2025-02-27 DIAGNOSIS — S13.4XXD WHIPLASH INJURY TO NECK, SUBSEQUENT ENCOUNTER: ICD-10-CM

## 2025-02-27 DIAGNOSIS — S62.172A CLOSED DISPLACED FRACTURE OF TRAPEZIUM OF LEFT WRIST, INITIAL ENCOUNTER: Primary | ICD-10-CM

## 2025-02-27 DIAGNOSIS — G44.86 CERVICOGENIC HEADACHE: ICD-10-CM

## 2025-02-27 DIAGNOSIS — M54.2 PAIN, NECK: Primary | ICD-10-CM

## 2025-02-27 NOTE — PROGRESS NOTES
Occupational Therapy Daily Treatment Note  [address] [place] [address]      Patient: Mariama Turner   : 1965  Referring practitioner: Tal George MD  Date of Initial Visit: Type: THERAPY  Noted: 2024  Today's Date: 2025  Patient seen for 24 sessions         Subjective   Mariama Turner reports: it is getting better.     Objective   See Exercise, Manual, and Modality Logs for complete treatment.     Pt tolerated treatment well for range of motion and strengthening and responding well to desensitization for pain control.  Assessment/Plan    Visit Diagnoses:    ICD-10-CM ICD-9-CM   1. Closed displaced fracture of trapezium of left wrist, initial encounter  S62.172A 814.05   2. Left wrist pain  M25.532 719.43       Continue per POC         Timed:  Manual Therapy:    0     mins  65671;  Therapeutic Exercise:    15    mins  63501;     Therapeutic Activity:    15     mins  94251;  Ultrasound:     0     mins  07788;    Electrical Stimulation:    0     mins 57678;  Neuromuscular Srinivas:    15    mins  27825;    Timed Treatment:   45   mins   Total Treatment:     45   min    Adriana Mejia OTR/L  Occupational Therapist    Electronically signed   License number [id number]

## 2025-02-27 NOTE — PROGRESS NOTES
[facility]                              Physical Therapy Daily Treatment Note    Patient: Mariama Turner   : 1965  Diagnosis/ICD-10 Code:  Pain, neck [M54.2]  Referring practitioner: Jordan Rawls II, MD  Date of Initial Visit: Type: THERAPY  Noted: 2025  Today's Date: 2025  Patient seen for 3 sessions           Subjective   Mariama Turner reports: she felt much relief after manual last session. Pt states she does have a headache at time of arrival, but its not as bad as they usually are.       Objective     See Exercise, Manual, and Modality Logs for complete treatment.     Assessment/Plan  Patient tolerated cervical exercises and manual therapy well today, without increased discomfort or pain. Patient will continue to benefit from skilled PT services to address impairments and pain management in order to perform functional daily activities and to return back to the gym.    Progress per Plan of Care         Timed:  Manual Therapy:    15     mins  04226;  Therapeutic Exercise:    10     mins  01063;     Neuromuscular Srinivas:        mins  07630;    Therapeutic Activity:          mins  88986;     Gait Training:           mins  74930;        Untimed:  Electrical Stimulation:         mins  33639 ( );  Mechanical Traction:         mins  43784;       Timed Treatment:   25   mins   Total Treatment:     25   mins      Electronically signed:     Laurita Hendrix PTA  Physical Therapist Assistant  \[facility]\"" License #: [id number]

## 2025-03-04 ENCOUNTER — TREATMENT (OUTPATIENT)
Dept: PHYSICAL THERAPY | Facility: CLINIC | Age: 60
End: 2025-03-04
Payer: MEDICARE

## 2025-03-04 DIAGNOSIS — S62.172A CLOSED DISPLACED FRACTURE OF TRAPEZIUM OF LEFT WRIST, INITIAL ENCOUNTER: Primary | ICD-10-CM

## 2025-03-04 DIAGNOSIS — M25.532 LEFT WRIST PAIN: ICD-10-CM

## 2025-03-04 DIAGNOSIS — M54.2 PAIN, NECK: Primary | ICD-10-CM

## 2025-03-04 DIAGNOSIS — G44.86 CERVICOGENIC HEADACHE: ICD-10-CM

## 2025-03-04 DIAGNOSIS — S13.4XXD WHIPLASH INJURY TO NECK, SUBSEQUENT ENCOUNTER: ICD-10-CM

## 2025-03-04 NOTE — PROGRESS NOTES
Occupational Therapy Daily Treatment Note  [address] [place] [address]      Patient: Mariama Turner   : 1965  Referring practitioner: Tal George MD  Date of Initial Visit: Type: THERAPY  Noted: 2024  Today's Date: 3/4/2025  Patient seen for 25 sessions         Subjective   Mariama Turner reports: I think the reason I have trouble cutting is I can't make a tight fist. I tried driving, but I feel my left hand started swelling more.    Objective   See Exercise, Manual, and Modality Logs for complete treatment.     Pt tolerated treatment well for range of motion, strengthening, and desensitization.  Pt able to perform strengthening exercises with less fatigue and pain.  Assessment/Plan    Visit Diagnoses:    ICD-10-CM ICD-9-CM   1. Closed displaced fracture of trapezium of left wrist, initial encounter  S62.172A 814.05   2. Left wrist pain  M25.532 719.43       Continue per POC         Timed:  Manual Therapy:    0     mins  54424;  Therapeutic Exercise:    15     mins  13125;     Therapeutic Activity:    10     mins  14167;  Ultrasound:     0     mins  00420;    Electrical Stimulation:    0     mins 24151;  Neuromuscular Srinivas:    15    mins  96774;      Timed Treatment:   40   mins   Total Treatment:     40   min    ZENY Mendoza/L  Occupational Therapist    Electronically signed   License number [id number]

## 2025-03-04 NOTE — PROGRESS NOTES
[facility]                              Physical Therapy Daily Treatment Note    Patient: Mariama Turner   : 1965  Diagnosis/ICD-10 Code:  Pain, neck [M54.2]  Referring practitioner: Jordan Rawls II, MD  Date of Initial Visit: Type: THERAPY  Noted: 2025  Today's Date: 3/4/2025  Patient seen for 4 sessions           Subjective   Mariama Turner reports: she continues to get most relief with manual therapy to her neck. Pt does have a headache at time of arrival.       Objective     See Exercise, Manual, and Modality Logs for complete treatment.     Assessment/Plan  Patient tolerated exercises and manual well today. Pt requires CGA during standing activities or exercises due to balance. Patient will continue to benefit from skilled PT services to address impairments and pain management in order to perform functional daily activities.    Progress per Plan of Care         Timed:  Manual Therapy:    14     mins  52703;  Therapeutic Exercise:    12     mins  13663;     Neuromuscular Srinivas:        mins  18031;    Therapeutic Activity:          mins  61605;     Gait Training:           mins  46093;        Untimed:  Electrical Stimulation:         mins  68598 ( );  Mechanical Traction:         mins  90039;       Timed Treatment:   26   mins   Total Treatment:     26   mins      Electronically signed:     Laurita Hendrix PTA  Physical Therapist Assistant  [facility] License #: [id number]

## 2025-03-06 ENCOUNTER — TREATMENT (OUTPATIENT)
Dept: PHYSICAL THERAPY | Facility: CLINIC | Age: 60
End: 2025-03-06
Payer: MEDICARE

## 2025-03-06 DIAGNOSIS — M25.532 LEFT WRIST PAIN: ICD-10-CM

## 2025-03-06 DIAGNOSIS — G44.86 CERVICOGENIC HEADACHE: ICD-10-CM

## 2025-03-06 DIAGNOSIS — S62.172A CLOSED DISPLACED FRACTURE OF TRAPEZIUM OF LEFT WRIST, INITIAL ENCOUNTER: Primary | ICD-10-CM

## 2025-03-06 DIAGNOSIS — S13.4XXD WHIPLASH INJURY TO NECK, SUBSEQUENT ENCOUNTER: ICD-10-CM

## 2025-03-06 DIAGNOSIS — M54.2 PAIN, NECK: Primary | ICD-10-CM

## 2025-03-06 NOTE — PROGRESS NOTES
Occupational Therapy Daily Treatment Note  [address] [place] [address]      Patient: Mariama Turner   : 1965  Referring practitioner: Tal George MD  Date of Initial Visit: Type: THERAPY  Noted: 2024  Today's Date: 3/6/2025  Patient seen for 26 sessions         Subjective   Mariama Turner reports: nothing new today.    Objective   See Exercise, Manual, and Modality Logs for complete treatment.   Pt performed fine motor coordination easier today. Pt tolerated all other exercises well except slight discomfort when performing gripper.    Assessment/Plan    Visit Diagnoses:    ICD-10-CM ICD-9-CM   1. Closed displaced fracture of trapezium of left wrist, initial encounter  S62.172A 814.05   2. Left wrist pain  M25.532 719.43       Continue per POC         Timed:  Manual Therapy:    0     mins  11509;  Therapeutic Exercise:    15     mins  93392;     Therapeutic Activity:    10     mins  27763;  Ultrasound:     0     mins  32193;    Electrical Stimulation:    0     mins 89747;  Neuromuscular Srinivas:    15    mins  07122;      Timed Treatment:   40   mins   Total Treatment:     40   min    ZENY Mendoza/L  Occupational Therapist    Electronically signed   License number [id number]

## 2025-03-06 NOTE — PROGRESS NOTES
"   Physical Therapy Daily Treatment Note                      [facility] [address] [place] [address]    Patient: Mariama Turner   : 1965  Diagnosis/ICD-10 Code:  Pain, neck [M54.2]  Referring practitioner: Jordan Rawls II, MD  Date of Initial Visit: Type: THERAPY  Noted: 2025  Today's Date: 3/6/2025  Patient seen for 5 sessions           Subjective   The patient reported that she does have a headache today. It's not the worst one she has ever had, but she does have one. Pt reports that she thinks her HEP is helping.     Objective   See Exercise, Manual, and Modality Logs for complete treatment.     Assessment/Plan  Patient tolerated today's session well despite the fact that she had a headache. Pt verbalized that horizontal shoulder abduction felt good to her. Pt had good relief with manual, stating that it felt \"great.\" Patient requires continued skilled PT services to address impairments and pain management in order to perform functional daily activities.        Timed:  Manual Therapy:    13     mins  19843;xx  Therapeutic Exercise:    14    mins  91358;     Neuromuscular Srinivas:   0    mins  73567;    Therapeutic Activity:     0     mins  84044;     Gait Trainin     mins  39069;     Aquatics                         0      mins  96025    Un-timed:  Mechanical Traction      0     mins  89152  Dry Needling     0     mins self-pay  Electrical Stimulation:    0     mins  73599 ( );      Timed Treatment:   27   mins   Total Treatment:     27   mins    Ayala Atkinson PT    Electronically signed 3/6/2025    [place] License: PT - [id number]                       "

## 2025-03-11 ENCOUNTER — TREATMENT (OUTPATIENT)
Dept: PHYSICAL THERAPY | Facility: CLINIC | Age: 60
End: 2025-03-11
Payer: MEDICARE

## 2025-03-11 DIAGNOSIS — M54.2 PAIN, NECK: Primary | ICD-10-CM

## 2025-03-11 DIAGNOSIS — S13.4XXD WHIPLASH INJURY TO NECK, SUBSEQUENT ENCOUNTER: ICD-10-CM

## 2025-03-11 DIAGNOSIS — S62.172A CLOSED DISPLACED FRACTURE OF TRAPEZIUM OF LEFT WRIST, INITIAL ENCOUNTER: Primary | ICD-10-CM

## 2025-03-11 DIAGNOSIS — M25.532 LEFT WRIST PAIN: ICD-10-CM

## 2025-03-11 DIAGNOSIS — G44.86 CERVICOGENIC HEADACHE: ICD-10-CM

## 2025-03-11 NOTE — PROGRESS NOTES
"   Physical Therapy Daily Treatment Note                      [facility] [address] [place] [address]    Patient: Mariama Turner   : 1965  Diagnosis/ICD-10 Code:  Pain, neck [M54.2]  Referring practitioner: Jordan Rawls II, MD  Date of Initial Visit: Type: THERAPY  Noted: 2025  Today's Date: 3/11/2025  Patient seen for 6 sessions           Subjective   The patient reported that she has a slight headache today. Pt reports that she can tell a difference in her headaches since having STM done to her neck in PT. Pt reports that her headaches are better and her neck does not feel as tight.    Objective   See Exercise, Manual, and Modality Logs for complete treatment.     Assessment/Plan  Pt tolerated today's session well. Pt did well with new exercises today. She did have palpable tightness in her R side suboccipitals today. Pt continues to have great relief of pain and tension with manual treatments, reporting that she can feel the stress \"melt\" away. Patient requires continued skilled PT services to address impairments and pain management in order to perform functional daily activities. Continue POC.        Timed:  Manual Therapy:    10     mins  26173;  Therapeutic Exercise:    15     mins  75069;     Neuromuscular Srinivas:   0    mins  54471;    Therapeutic Activity:     2     mins  56841;     Gait Trainin     mins  54106;     Aquatics                         0      mins  87254    Un-timed:  Mechanical Traction      0     mins  98808  Dry Needling     0     mins self-pay  Electrical Stimulation:    0     mins  07634 ( );      Timed Treatment:   27   mins   Total Treatment:     27   mins    Ayala Atkinson PT    Electronically signed 3/11/2025    [place] License: PT - [id number]                       "

## 2025-03-11 NOTE — PROGRESS NOTES
Occupational Therapy Daily Treatment Note  [address] [place] [address]      Patient: Mariama Turner   : 1965  Referring practitioner: Tal George MD  Date of Initial Visit: Type: THERAPY  Noted: 2024  Today's Date: 3/11/2025  Patient seen for 27 sessions         Subjective Evaluation    Pain  Current pain ratin  At worst pain ratin  Location: L wrist         Mariama Turner reports: it has been bothering me more the last few days. Driving kills it. The sharp pain is not as bad as it was.     Objective   See Exercise, Manual, and Modality Logs for complete treatment.   Pt tolerated treatment well for range of motion and light strengthening. Pt still having swelling and pain in her L wrist that she feels is limiting her.    Assessment/Plan    Visit Diagnoses:    ICD-10-CM ICD-9-CM   1. Closed displaced fracture of trapezium of left wrist, initial encounter  S62.172A 814.05   2. Left wrist pain  M25.532 719.43       Continue per POC         Timed:  Manual Therapy:    0     mins  19798;  Therapeutic Exercise:    15     mins  59353;     Therapeutic Activity:    10     mins  57952;  Ultrasound:     0     mins  08856;    Electrical Stimulation:    0     mins 56998;  Neuromuscular Srinivas:    20    mins  96945;      Timed Treatment:   45   mins   Total Treatment:     45   min    ZENY Mendoza/L  Occupational Therapist    Electronically signed   License number [id number]

## 2025-03-13 ENCOUNTER — TREATMENT (OUTPATIENT)
Dept: PHYSICAL THERAPY | Facility: CLINIC | Age: 60
End: 2025-03-13
Payer: MEDICARE

## 2025-03-13 DIAGNOSIS — M54.2 PAIN, NECK: Primary | ICD-10-CM

## 2025-03-13 DIAGNOSIS — S13.4XXD WHIPLASH INJURY TO NECK, SUBSEQUENT ENCOUNTER: ICD-10-CM

## 2025-03-13 DIAGNOSIS — S62.172A CLOSED DISPLACED FRACTURE OF TRAPEZIUM OF LEFT WRIST, INITIAL ENCOUNTER: Primary | ICD-10-CM

## 2025-03-13 DIAGNOSIS — G44.86 CERVICOGENIC HEADACHE: ICD-10-CM

## 2025-03-13 DIAGNOSIS — M25.532 LEFT WRIST PAIN: ICD-10-CM

## 2025-03-13 NOTE — PROGRESS NOTES
Occupational Therapy Daily Treatment Note  [address] [place], [place] [id number]      Patient: Mariama Turner   : 1965  Referring practitioner: Tal George MD  Date of Initial Visit: Type: THERAPY  Noted: 2024  Today's Date: 3/13/2025  Patient seen for 28 sessions         Subjective   Mariama Turner reports: I been having pain today and in the R hand too.    Objective   See Exercise, Manual, and Modality Logs for complete treatment.   Pt tolerated treatment well for range of motion and strengthening. Pt did have some discomfort with wrist and fine motor exercises.     Assessment/Plan    Visit Diagnoses:    ICD-10-CM ICD-9-CM   1. Closed displaced fracture of trapezium of left wrist, initial encounter  S62.172A 814.05   2. Left wrist pain  M25.532 719.43       Continue per POC for pain control, range of motion, desensitization, and strengthening to increased functional use of her L UE.         Timed:  Manual Therapy:    0     mins  10915;  Therapeutic Exercise:    15     mins  48413;     Therapeutic Activity:    10     mins  38275;  Ultrasound:     0     mins  34448;    Electrical Stimulation:    0     mins 89150;  Neuromuscular Srinivas:    15    mins  66058;      Timed Treatment:   40   mins   Total Treatment:     40   min    ZEYN Mnedoza/L  Occupational Therapist    Electronically signed   License number [id number]

## 2025-03-13 NOTE — PROGRESS NOTES
Physical Therapy Daily Treatment Note                      [facility] [address] [place] [address]    Patient: Mariama Turner   : 1965  Diagnosis/ICD-10 Code:  Pain, neck [M54.2]  Referring practitioner: Jordan Rawls II, MD  Date of Initial Visit: Type: THERAPY  Noted: 2025  Today's Date: 3/13/2025  Patient seen for 7 sessions           Subjective   The patient reported that she has a headache today. Pt reports that her neck feels a little tighter today. Her hands are also bothering her more today.     Objective   See Exercise, Manual, and Modality Logs for complete treatment.     Assessment/Plan  Pt tolerated today's session well. Pt continues to thrive with neck and scapular exercises, especially when she is able to be in a dimmed room without outside noises. Pt responded well to manual therapy today of her suboccipitals, neck and scapular region. Pt is progressing well, as evidenced by increasing tolerance to PT sessions. Patient requires continued skilled PT services to address impairments and pain management in order to perform functional daily activities. Continue POC.        Timed:  Manual Therapy:    23     mins  28519;  Therapeutic Exercise:    10     mins  38452;     Neuromuscular Srinivas:   0    mins  64832;    Therapeutic Activity:     5     mins  51854;     Gait Trainin     mins  12622;     Aquatics                         0      mins  91787    Un-timed:  Mechanical Traction      0     mins  33857  Dry Needling     0     mins self-pay  Electrical Stimulation:    0     mins  28087 ( );      Timed Treatment:   38   mins   Total Treatment:     38   mins    Ayala Atkinson PT    Electronically signed 3/13/2025    [place] License: PT - [id number]

## 2025-03-17 ENCOUNTER — TELEPHONE (OUTPATIENT)
Dept: GASTROENTEROLOGY | Facility: CLINIC | Age: 60
End: 2025-03-17
Payer: MEDICARE

## 2025-03-17 NOTE — TELEPHONE ENCOUNTER
Verify source of procedure(s): Other  If other, please list source: MRI/MRCP Results  See Telephone Encounter from 2/13/25      TIME OUT-CONFIRM CORRECT PROCEDURE: EGD & Colonoscopy        Cardiology: Dr. Smith - Jose R'nichol  Pulmonology: NO   Blood thinner: NO  GLP-1: NO  Please include any other notes relevant to endo reconciliation: Last Office Visit with Dr. Nicolas, he recommended Colonoscopy but pt wanted to wait.

## 2025-03-18 ENCOUNTER — TREATMENT (OUTPATIENT)
Dept: PHYSICAL THERAPY | Facility: CLINIC | Age: 60
End: 2025-03-18
Payer: MEDICARE

## 2025-03-18 DIAGNOSIS — G44.86 CERVICOGENIC HEADACHE: ICD-10-CM

## 2025-03-18 DIAGNOSIS — S62.172A CLOSED DISPLACED FRACTURE OF TRAPEZIUM OF LEFT WRIST, INITIAL ENCOUNTER: Primary | ICD-10-CM

## 2025-03-18 DIAGNOSIS — S13.4XXD WHIPLASH INJURY TO NECK, SUBSEQUENT ENCOUNTER: ICD-10-CM

## 2025-03-18 DIAGNOSIS — M54.2 PAIN, NECK: Primary | ICD-10-CM

## 2025-03-18 DIAGNOSIS — M25.532 LEFT WRIST PAIN: ICD-10-CM

## 2025-03-18 NOTE — PROGRESS NOTES
Physical Therapy Daily Treatment Note                      [facility] [address] [place] [address]    Patient: Mariama Turner   : 1965  Diagnosis/ICD-10 Code:  Pain, neck [M54.2]  Referring practitioner: Jordan Rawls II, MD  Date of Initial Visit: Type: THERAPY  Noted: 2025  Today's Date: 3/18/2025  Patient seen for 8 sessions           Subjective  The patient reported that she had a terrible headache yesterday, but today is not as bad.     Objective   See Exercise, Manual, and Modality Logs for complete treatment.     Assessment/Plan  Pt tolerated today's session well. Pt continues to thrive with neck and scapular exercises, especially when she is able to be in a dimmed room without outside noises. Pt responded well to manual therapy today of her suboccipitals, neck and scapular region. Pt is progressing well, as evidenced by increasing tolerance to PT sessions. Patient requires continued skilled PT services to address impairments and pain management in order to perform functional daily activities. Further determinations in care to be made at next visit, which is a progress note. Continue POC.        Timed:  Manual Therapy:    12     mins  16885;  Therapeutic Exercise:    14     mins  53680;     Neuromuscular Srinivas:   0    mins  19268;    Therapeutic Activity:     2     mins  44996;     Gait Trainin     mins  03378;     Aquatics                         0      mins  82863    Un-timed:  Mechanical Traction      0     mins  44967  Dry Needling     0     mins self-pay  Electrical Stimulation:    0     mins  81357 ( );      Timed Treatment:   28   mins   Total Treatment:     28   mins    Ayala Atkinson PT    Electronically signed 3/18/2025    [place] License: PT - [id number]

## 2025-03-18 NOTE — PROGRESS NOTES
Progress Note  [address] [place] [address]      Patient: Mariama Turner   : 1965  Referring practitioner: Tal George MD  Date of Initial Visit: Type: THERAPY  Noted: 2024  Today's Date: 3/18/2025  Patient seen for 29 sessions         Subjective Evaluation    Pain  Current pain ratin  At worst pain ratin  Location: L wrist         Mariama Turner reports: yesterday I did not have a good day at all, both hands hurt. Today they are both bothering me. Most of my problem is in the wrist and if I could get the swelling out of the hand I think  I could do more stuff with it. I get numbness and tingling when I hold something for long time or stay in one position for long time.    Objective          Observations     Left Wrist/Hand   Positive for edema.       Tenderness     Left Wrist/Hand   Tenderness in the first dorsal compartment, second dorsal compartment, fifth dorsal compartment, sixth dorsal compartment and carpometacarpal joint.     Neurological Testing     Sensation     Wrist/Hand   Left   Intact: light touch    Additional Neurological Details  Tingling at times in all digits, but getting better    Active Range of Motion     Left Wrist   Wrist flexion: 45 degrees   Wrist extension: 25 degrees   Radial deviation: 2 degrees   Ulnar deviation: 25 degrees     Right Wrist   Wrist flexion: 75 degrees   Wrist extension: 55 degrees     Left Thumb   Flexion     MP: 40 degrees    DIP: 40 degrees    Additional Active Range of Motion Details  Full loose composite fist    Strength/Myotome Testing     Additional Strength Details  deferred    Swelling     Left Wrist/Hand   Circumference wrist: 14.5 cm    Additional Swelling Details  Mild edema in L hand    Right Wrist/Hand   Circumference wrist: 14.8 cm      See Exercise, Manual, and Modality Logs for complete treatment.       Assessment & Plan       Assessment  Impairments: abnormal coordination, abnormal or restricted ROM, activity  intolerance, impaired physical strength and pain with function   Other impairment: difficulty buttoning jeans, donning leggings, pushing down on hand dispencer difficult  Functional limitations: carrying objects, lifting, pulling and pushing   Assessment details: Writing getting better than it was   Bathing independently now  styling hair easier  Shaving under arm getting easier  Prognosis: good    Goals  Plan Goals: 1. The patient complains of pain in the L wrist.                  LTG 1: 12 weeks:  The patient will report a pain rating of 1/10 or better in order to improve sleep quality and tolerance to performance of activities of daily living.                                  STATUS:  Not met                  STG 1a: 6 weeks:  The patient will report a pain rating of 5/10 or better at worst.                                   STATUS:  Not met  2. The patient has limited ROM of the L wrist.                  LTG 2: 12 weeks:  The patient will demonstrate 50 degrees of wrist flex/ext to allow the patient to wash her hair and back.                                  STATUS:   previously met                   STG 2a: 6 weeks:  The patient will demonstrate good tolerance to range of motion measurements to L wrist.                                  STATUS:  Met                             3. Carrying, Moving, and Handling Objects Functional Limitation                                    LTG 3: 12 weeks:  The patient will achieve a score of 15/55 on the Quick DASH.                                  STATUS:  New                  STG 3a: 6 weeks:  The patient will achieve a score of 30/55 on the Quick DASH.                                    STATUS:  New                        Plan  Planned modality interventions: TENS, thermotherapy (hydrocollator packs) and thermotherapy (paraffin bath)  Planned therapy interventions: manual therapy, functional ROM exercises, fine motor coordination training, flexibility, stretching,  strengthening, home exercise program, neuromuscular re-education, soft tissue mobilization and therapeutic activities  Frequency: 2x week  Duration in weeks: 4  Treatment plan discussed with: patient and family        Visit Diagnoses:    ICD-10-CM ICD-9-CM   1. Closed displaced fracture of trapezium of left wrist, initial encounter  S62.172A 814.05   2. Left wrist pain  M25.532 719.43                Timed:  Manual Therapy:    0     mins  81700;  Therapeutic Exercise:    10     mins  12231;     Therapeutic Activity:    10     mins  11710;  Ultrasound:     10     mins  00258;    Electrical Stimulation:    0     mins 74214;  Neuromuscular Srinivas:    10    mins  59321;      Timed Treatment:   40   mins   Total Treatment:     40   min    ZENY Mendoza/L  Occupational Therapist    Electronically signed   License number [id number]

## 2025-03-20 ENCOUNTER — TREATMENT (OUTPATIENT)
Dept: PHYSICAL THERAPY | Facility: CLINIC | Age: 60
End: 2025-03-20
Payer: MEDICARE

## 2025-03-20 DIAGNOSIS — G44.86 CERVICOGENIC HEADACHE: ICD-10-CM

## 2025-03-20 DIAGNOSIS — S62.172A CLOSED DISPLACED FRACTURE OF TRAPEZIUM OF LEFT WRIST, INITIAL ENCOUNTER: Primary | ICD-10-CM

## 2025-03-20 DIAGNOSIS — M25.532 LEFT WRIST PAIN: ICD-10-CM

## 2025-03-20 DIAGNOSIS — S13.4XXD WHIPLASH INJURY TO NECK, SUBSEQUENT ENCOUNTER: ICD-10-CM

## 2025-03-20 DIAGNOSIS — M54.2 PAIN, NECK: Primary | ICD-10-CM

## 2025-03-20 NOTE — PROGRESS NOTES
Occupational Therapy Daily Treatment Note  [address] [place] [address]      Patient: Mariama Turner   : 1965  Referring practitioner: Tal George MD  Date of Initial Visit: Type: THERAPY  Noted: 2024  Today's Date: 3/20/2025  Patient seen for 30 sessions         Subjective Evaluation    Pain  Current pain ratin  Location: L wrist         Mariama Turner reports: I can't put weight on it when I push up out of bed.     Objective   See Exercise, Manual, and Modality Logs for complete treatment.   Pt tolerated treatment well for range of motion, light strengthening, fine motor coordination.     Assessment/Plan    Visit Diagnoses:    ICD-10-CM ICD-9-CM   1. Closed displaced fracture of trapezium of left wrist, initial encounter  S62.172A 814.05   2. Left wrist pain  M25.532 719.43       Continue per POC to increase functional use of her L hand.         Timed:  Manual Therapy:    0     mins  41336;  Therapeutic Exercise:    15     mins  18233;     Therapeutic Activity:    10     mins  31056;  Ultrasound:     0     mins  89386;    Electrical Stimulation:    0     mins 74024;  Neuromuscular Srinivas:    15    mins  07378;      Timed Treatment:   40   mins   Total Treatment:     40   min    ZENY Mendoza/L  Occupational Therapist    Electronically signed   License number [id number]

## 2025-03-20 NOTE — PROGRESS NOTES
Progress Note / Discharge  [facility] [address] [place] [address]      Patient: Mariama Turner   : 1965  Diagnosis/ICD-10 Code:  Pain, neck [M54.2]  Referring practitioner: Jordan Rawls II, MD  Date of Initial Visit: Type: THERAPY  Noted: 2025  Today's Date: 3/20/2025  Patient seen for 9 sessions      Subjective:   Subjective Questionnaire: NDI: discontinued due to symptoms being too multifactorial     Clinical Progress: improved  Home Program Compliance: Yes  Treatment has included: therapeutic exercise, neuromuscular re-education, manual therapy, and therapeutic activity    Subjective     Pt reports that today is not a bad day. Pt thinks her HA intensity has decreased since starting therapy. Pt reports 35-40% improvement in the intensity of her headaches sine starting therapy. She does not feel like she has as much stiffness in her neck.     Objective     Active Range of Motion   Cervical/Thoracic Spine   Cervical     Flexion: 55 degrees   Extension: 45 degrees   Left lateral flexion: 35 degrees   Right lateral flexion: 45 degrees   Left rotation: 45 (pulling pain L side of neck) degrees   Right rotation: 60 degrees     See Exercise, Manual, and Modality Logs for complete treatment.     Assessment/Plan  Pt originally presented to therapy with complaints of neck popping, stiffness and chronic headaches. Therapist assumed that pt's symptoms are definitely multifactorial due to her previous head injuries. Pt was suffering quite frequently from increased headaches. Today was pt's first therapy reassessment day. Pt has made good progress since starting therapy for her head/neck. Pt has been shown home exercises and continues to obtain good relief with manual treatments in clinic. It is likely that pt will never experience a complete reduction in headaches due to her previous head injuries. However, she has reported a 35% decrease in the intensity of her headaches since starting  therapy.  At this time, pt has made good progress and therapist is ready to discharge her to continue working on her HEP. Pt is agreeable to this. Pt will be discharged now.        Goals  Plan Goals: CERVICAL PROBLEMS     1. The patient has limited ROM.                       LTG 1: 12 weeks:  The patient will demonstrate 60° of B cervical spine rotation and 45° of B cervical side bending,  in order to adequately monitor blind spots while driving and improve ability to perform activities of daily living.                          STATUS: not met  STG 1a: 6 weeks:  The patient will demonstrate 40° of B cervical side bending in order to adequately monitor blind spots while driving and improve ability to perform activities of daily living.                                      STATUS: not met                2. The patient has complaints of intensity headaches              LTG 2: 12 weeks:  The patient 50% decrease in headache intensity since starting therapy for her neck, in order to allow her increased tolerance to ADL's and to exercising on her own.                          STATUS: not met              STG 2a: 6 weeks:   The patient 15% decrease in headache intensity since starting therapy for her neck, in order to allow her increased tolerance to ADL's and to exercising on her own.                          STATUS:  MET     3. Carrying, Moving, and Handling Objects Functional Limitation                               LTG 3: 12 weeks:  The patient will demonstrate 30% disability or less on the Neck Disability Index, in order to allow increased tolerance to ADL's, such as sleeping, reading and personal care.                           STATUS:  Discontinued               STG 3a: 6 weeks:  The patient will demonstrate 56% disability or less on the Neck Disability Index.                            STATUS:  Discontinued           Progress toward previous goals: Partially Met      Recommendations: Discharge      Signature:  Ayala Atkinson PT    Electronically signed 3/20/2025    [place] License: PT - [id number]          Timed:  Manual Therapy:    8     mins  86689;  Therapeutic Exercise:    0     mins  51566;     Neuromuscular Srinivas:    0    mins  46074;    Therapeutic Activity:     19     mins  52416;     Gait Trainin     mins  62689;     Aquatics                         0      mins  71300    Un-timed:  Mechanical Traction      0     mins  27259  Dry Needling     0     mins self-pay  Electrical Stimulation:    0     mins  05943 ( );    Timed Treatment:   27   mins   Total Treatment:     27   mins

## 2025-03-24 DIAGNOSIS — M54.42 ACUTE MIDLINE LOW BACK PAIN WITH LEFT-SIDED SCIATICA: ICD-10-CM

## 2025-03-24 RX ORDER — METHOCARBAMOL 500 MG/1
500 TABLET, FILM COATED ORAL 3 TIMES DAILY PRN
Qty: 60 TABLET | Refills: 1 | Status: SHIPPED | OUTPATIENT
Start: 2025-03-24

## 2025-03-25 ENCOUNTER — TREATMENT (OUTPATIENT)
Dept: PHYSICAL THERAPY | Facility: CLINIC | Age: 60
End: 2025-03-25
Payer: MEDICARE

## 2025-03-25 DIAGNOSIS — M25.532 LEFT WRIST PAIN: ICD-10-CM

## 2025-03-25 DIAGNOSIS — S62.172A CLOSED DISPLACED FRACTURE OF TRAPEZIUM OF LEFT WRIST, INITIAL ENCOUNTER: Primary | ICD-10-CM

## 2025-03-25 PROCEDURE — 97110 THERAPEUTIC EXERCISES: CPT | Performed by: PHYSICAL THERAPIST

## 2025-03-25 PROCEDURE — 97530 THERAPEUTIC ACTIVITIES: CPT | Performed by: PHYSICAL THERAPIST

## 2025-03-25 PROCEDURE — 97112 NEUROMUSCULAR REEDUCATION: CPT | Performed by: PHYSICAL THERAPIST

## 2025-03-25 NOTE — PROGRESS NOTES
Occupational Therapy Daily Treatment Note  [address] [place] [address]      Patient: Mariama Turner   : 1965  Referring practitioner: Jordan Rawls II, MD  Date of Initial Visit: Type: THERAPY  Noted: 2024  Today's Date: 3/25/2025  Patient seen for 31 sessions         Subjective   Mariama Turner reports: it is still sore and I have tenderness on the bottom of my arm.    Objective   See Exercise, Manual, and Modality Logs for complete treatment.   Pt tolerated treatment well for range of motion and strengthening.     Assessment/Plan    Visit Diagnoses:    ICD-10-CM ICD-9-CM   1. Closed displaced fracture of trapezium of left wrist, initial encounter  S62.172A 814.05   2. Left wrist pain  M25.532 719.43       Continue per POC         Timed:  Manual Therapy:    0     mins  00278;  Therapeutic Exercise:    15     mins  00597;     Therapeutic Activity:    15     mins  46058;  Ultrasound:     0     mins  64065;    Electrical Stimulation:    0     mins 63665;  Neuromuscular Srinivas:    25    mins  81311;    Timed Treatment:   55   mins   Total Treatment:     55   min    ZENY Mendoza/L  Occupational Therapist    Electronically signed   License number [id number]

## 2025-04-01 ENCOUNTER — TREATMENT (OUTPATIENT)
Dept: PHYSICAL THERAPY | Facility: CLINIC | Age: 60
End: 2025-04-01
Payer: MEDICARE

## 2025-04-01 DIAGNOSIS — S62.172A CLOSED DISPLACED FRACTURE OF TRAPEZIUM OF LEFT WRIST, INITIAL ENCOUNTER: Primary | ICD-10-CM

## 2025-04-01 DIAGNOSIS — M25.532 LEFT WRIST PAIN: ICD-10-CM

## 2025-04-01 NOTE — PROGRESS NOTES
Occupational Therapy Daily Treatment Note  [address] [place] [address]      Patient: Mariama Turner   : 1965  Referring practitioner: Jordan Rawls II, MD  Date of Initial Visit: Type: THERAPY  Noted: 2024  Today's Date: 2025  Patient seen for 32 sessions         Subjective Evaluation    Pain  Current pain rating: 3  Location: L wrist       Mariama Turner reports: the worst part is still trying to get in and out of the bed or anything I have to put pressure on the hand.     Objective   See Exercise, Manual, and Modality Logs for complete treatment.     Progression of tip to palm translation to flat marbles. Pt tolerated treatment well.  Assessment/Plan    Visit Diagnoses:    ICD-10-CM ICD-9-CM   1. Closed displaced fracture of trapezium of left wrist, initial encounter  S62.172A 814.05   2. Left wrist pain  M25.532 719.43       Continue per POC         Timed:  Manual Therapy:    0     mins  68608;  Therapeutic Exercise:    15     mins  33422;     Therapeutic Activity:    10     mins  55867;  Ultrasound:     0     mins  16176;    Electrical Stimulation:    0     mins 00980;  Neuromuscular Srinivas:    15    mins  35591;      Timed Treatment:   40   mins   Total Treatment:     40   min    ZENY Mendoza/L  Occupational Therapist    Electronically signed   License number [id number]

## 2025-04-02 ENCOUNTER — TELEPHONE (OUTPATIENT)
Dept: INTERNAL MEDICINE | Facility: CLINIC | Age: 60
End: 2025-04-02
Payer: MEDICARE

## 2025-04-02 NOTE — TELEPHONE ENCOUNTER
Pt called requesting same day appt.  Complaining of feeling bad after a tick bite.  Since providers are out of office recommended UC.

## 2025-04-08 ENCOUNTER — TREATMENT (OUTPATIENT)
Dept: PHYSICAL THERAPY | Facility: CLINIC | Age: 60
End: 2025-04-08
Payer: MEDICARE

## 2025-04-08 DIAGNOSIS — S62.172A CLOSED DISPLACED FRACTURE OF TRAPEZIUM OF LEFT WRIST, INITIAL ENCOUNTER: Primary | ICD-10-CM

## 2025-04-08 DIAGNOSIS — M25.532 LEFT WRIST PAIN: ICD-10-CM

## 2025-04-08 PROCEDURE — 97530 THERAPEUTIC ACTIVITIES: CPT | Performed by: PHYSICAL THERAPIST

## 2025-04-08 PROCEDURE — 97110 THERAPEUTIC EXERCISES: CPT | Performed by: PHYSICAL THERAPIST

## 2025-04-08 PROCEDURE — 97112 NEUROMUSCULAR REEDUCATION: CPT | Performed by: PHYSICAL THERAPIST

## 2025-04-08 NOTE — PROGRESS NOTES
Occupational Therapy Daily Treatment Note  [address] [place] [address]      Patient: Mariama Turner   : 1965  Referring practitioner: Jordan Rawls II, MD  Date of Initial Visit: Type: THERAPY  Noted: 2024  Today's Date: 2025  Patient seen for 33 sessions         Subjective   Mariama Turner reports: I can't push a shopping cart or push up out of bed with the L wrist. The R one is hurting me pretty bad today because I tried to peel some with it.     Objective   See Exercise, Manual, and Modality Logs for complete treatment.   Pt tolerated treatment well for range of motion. Strengthening limited today due to wrist pain.     Assessment/Plan    Visit Diagnoses:    ICD-10-CM ICD-9-CM   1. Closed displaced fracture of trapezium of left wrist, initial encounter  S62.172A 814.05   2. Left wrist pain  M25.532 719.43       Continue per POC for range of motion, pain control, desensitization, and strengthening for her L UE.         Timed:  Manual Therapy:    0     mins  69939;  Therapeutic Exercise:    15     mins  85304;     Therapeutic Activity:    10     mins  20587;  Ultrasound:     0     mins  33739;    Electrical Stimulation:    0     mins 13020;  Neuromuscular Srinivas:    15    mins  53550;      Timed Treatment:   40   mins   Total Treatment:     40   min    ZENY Mendoza/L  Occupational Therapist    Electronically signed   License number [id number]

## 2025-04-10 ENCOUNTER — OFFICE VISIT (OUTPATIENT)
Dept: NEUROLOGY | Facility: CLINIC | Age: 60
End: 2025-04-10
Payer: MEDICARE

## 2025-04-10 ENCOUNTER — TREATMENT (OUTPATIENT)
Dept: PHYSICAL THERAPY | Facility: CLINIC | Age: 60
End: 2025-04-10
Payer: MEDICARE

## 2025-04-10 VITALS
WEIGHT: 161 LBS | OXYGEN SATURATION: 97 % | DIASTOLIC BLOOD PRESSURE: 84 MMHG | HEART RATE: 66 BPM | BODY MASS INDEX: 28.53 KG/M2 | HEIGHT: 63 IN | SYSTOLIC BLOOD PRESSURE: 120 MMHG

## 2025-04-10 DIAGNOSIS — G43.009 MIGRAINE WITHOUT AURA AND WITHOUT STATUS MIGRAINOSUS, NOT INTRACTABLE: ICD-10-CM

## 2025-04-10 DIAGNOSIS — M25.532 LEFT WRIST PAIN: ICD-10-CM

## 2025-04-10 DIAGNOSIS — H81.93 VESTIBULAR DYSFUNCTION OF BOTH EARS: ICD-10-CM

## 2025-04-10 DIAGNOSIS — S62.172A CLOSED DISPLACED FRACTURE OF TRAPEZIUM OF LEFT WRIST, INITIAL ENCOUNTER: Primary | ICD-10-CM

## 2025-04-10 DIAGNOSIS — R41.89 COGNITIVE IMPAIRMENT: ICD-10-CM

## 2025-04-10 DIAGNOSIS — F07.81 POST CONCUSSIVE SYNDROME: Primary | ICD-10-CM

## 2025-04-10 DIAGNOSIS — M54.81 BILATERAL OCCIPITAL NEURALGIA: ICD-10-CM

## 2025-04-10 DIAGNOSIS — E78.2 MIXED HYPERLIPIDEMIA: ICD-10-CM

## 2025-04-10 RX ORDER — MELOXICAM 7.5 MG/1
7.5 TABLET ORAL DAILY
COMMUNITY
Start: 2025-03-25
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
## 2025-04-10 NOTE — PROGRESS NOTES
Occupational Therapy Daily Treatment Note  [address] [place] [address]      Patient: Mariama Turner   : 1965  Referring practitioner: Jordan Rawls II, MD  Date of Initial Visit: Type: THERAPY  Noted: 2024  Today's Date: 4/10/2025  Patient seen for 34 sessions         Subjective Evaluation    Pain  Current pain rating: 3         Mariama Turner reports: it is not as bad this morning as it was a few days ago after pushing the shopping cart.    Objective   See Exercise, Manual, and Modality Logs for complete treatment.   Pt tolerated treatment well today including wrist strengthening today.    Assessment/Plan    Visit Diagnoses:    ICD-10-CM ICD-9-CM   1. Closed displaced fracture of trapezium of left wrist, initial encounter  S62.172A 814.05   2. Left wrist pain  M25.532 719.43       Continue per POC         Timed:  Manual Therapy:    0     mins  92063;  Therapeutic Exercise:    15     mins  94708;     Therapeutic Activity:    15     mins  60978;  Ultrasound:     0     mins  39029;    Electrical Stimulation:    0     mins 15592;  Neuromuscular Srinivas:    15    mins  48968;      Timed Treatment:   45   mins   Total Treatment:     45   min    Adriana Mejia OTR/L  Occupational Therapist    Electronically signed   License number [id number]

## 2025-04-11 RX ORDER — POTASSIUM CHLORIDE 750 MG/1
10 TABLET, EXTENDED RELEASE ORAL 2 TIMES DAILY
Qty: 180 TABLET | Refills: 0 | Status: SHIPPED | OUTPATIENT
Start: 2025-04-11

## 2025-04-11 RX ORDER — FUROSEMIDE 40 MG/1
40 TABLET ORAL DAILY
Qty: 90 TABLET | Refills: 0 | Status: SHIPPED | OUTPATIENT
Start: 2025-04-11

## 2025-04-11 RX ORDER — EZETIMIBE 10 MG/1
10 TABLET ORAL DAILY
Qty: 90 TABLET | Refills: 0 | Status: SHIPPED | OUTPATIENT
Start: 2025-04-11

## 2025-04-14 ENCOUNTER — TREATMENT (OUTPATIENT)
Dept: PHYSICAL THERAPY | Facility: CLINIC | Age: 60
End: 2025-04-14
Payer: MEDICARE

## 2025-04-14 DIAGNOSIS — S62.172A CLOSED DISPLACED FRACTURE OF TRAPEZIUM OF LEFT WRIST, INITIAL ENCOUNTER: Primary | ICD-10-CM

## 2025-04-14 DIAGNOSIS — M25.532 LEFT WRIST PAIN: ICD-10-CM

## 2025-04-14 PROCEDURE — 97110 THERAPEUTIC EXERCISES: CPT | Performed by: PHYSICAL THERAPIST

## 2025-04-14 PROCEDURE — 97112 NEUROMUSCULAR REEDUCATION: CPT | Performed by: PHYSICAL THERAPIST

## 2025-04-14 PROCEDURE — 97530 THERAPEUTIC ACTIVITIES: CPT | Performed by: PHYSICAL THERAPIST

## 2025-04-14 NOTE — LETTER
Progress Note  4/14/2025  Jordan Rawls II, MD    Re: Mariama Turner  ________________________________________________________________    Mrs. Mariama Turner, has attended 35 OT sessions.  Treatment has consisted of: fluidotherapy, densensitization, TherEx for range of motion and light strengthening     S: Mrs. Mariama Turner states: her wrist bothers her and the swelling. The thumb is much better. She states she is not able to put any weight on her wrist even pushing shopping cart.    O: wrist flexion 50, Ext 35 degrees  RD 5 degrees, UD 25 degrees   Full composite fist  L 6 lbs, R 28 lbs       A: Pt range of motion and functional use slowly increasing.     P: Please advise after your exam.    Thank you for this referral.        Adriana Mejia, OTR/L

## 2025-04-14 NOTE — PROGRESS NOTES
Occupational Therapy Daily Treatment Note  [address] [place] [address]      Patient: Mariama Turner   : 1965  Referring practitioner: Jordan Rawls II, MD  Date of Initial Visit: Type: THERAPY  Noted: 2024  Today's Date: 2025  Patient seen for 35 sessions         Subjective Evaluation    Pain  Current pain rating: 3  At worst pain ratin  Location: R wrist         Mariama Turner reports: if she puts any weight on her L wrist the pain goes up quickly.    Objective          Observations     Left Wrist/Hand   Positive for edema.       Tenderness     Left Wrist/Hand   Tenderness in the sixth dorsal compartment. No tenderness in the first dorsal compartment, second dorsal compartment, fifth dorsal compartment, carpometacarpal joint and triangular fibrocartilage complex .     Neurological Testing     Sensation     Wrist/Hand   Left   Intact: light touch    Additional Neurological Details  Tingling at times in all digits, but getting better    Active Range of Motion     Left Wrist   Wrist flexion: 50 degrees   Wrist extension: 35 degrees   Radial deviation: 5 degrees   Ulnar deviation: 25 degrees     Right Wrist   Wrist flexion: 75 degrees   Wrist extension: 55 degrees     Left Thumb   Flexion     MP: 40 degrees    DIP: 40 degrees    Additional Active Range of Motion Details  Full loose composite fist    Strength/Myotome Testing     Left Wrist/Hand      (2nd hand position)     Trial 1: 7 lbs    Trial 2: 5 lbs    Trial 3: 5 lbs    Average: 5.67 lbs    Right Wrist/Hand      (2nd hand position)     Trial 1: 30 lbs    Trial 2: 25 lbs    Trial 3: 30 lbs    Average: 28.33 lbs    Swelling     Left Wrist/Hand     Additional Swelling Details  Mild edema in L dorsal wrist      See Exercise, Manual, and Modality Logs for complete treatment.       Assessment/Plan    Visit Diagnoses:    ICD-10-CM ICD-9-CM   1. Closed displaced fracture of trapezium of left wrist, initial encounter  S62.172A  814.05   2. Left wrist pain  M25.532 719.43       Pt returning to MD today. Continue per POC         Timed:  Manual Therapy:    0     mins  66796;  Therapeutic Exercise:    15     mins  28663;     Therapeutic Activity:    10     mins  25019;  Ultrasound:     0     mins  91260;    Electrical Stimulation:    0     mins 90960;  Neuromuscular Srinivas:    15    mins  86013;      Timed Treatment:   40   mins   Total Treatment:     40   min    ZENY Mendoza/L  Occupational Therapist    Electronically signed   License number [id number]

## 2025-04-17 ENCOUNTER — OFFICE VISIT (OUTPATIENT)
Dept: CARDIOLOGY | Facility: CLINIC | Age: 60
End: 2025-04-17
Payer: MEDICARE

## 2025-04-17 VITALS
SYSTOLIC BLOOD PRESSURE: 138 MMHG | BODY MASS INDEX: 31.28 KG/M2 | HEIGHT: 62 IN | DIASTOLIC BLOOD PRESSURE: 92 MMHG | HEART RATE: 69 BPM | WEIGHT: 170 LBS

## 2025-04-17 DIAGNOSIS — R60.0 EDEMA LEG: Primary | ICD-10-CM

## 2025-04-17 DIAGNOSIS — E78.2 MIXED HYPERLIPIDEMIA: ICD-10-CM

## 2025-04-17 DIAGNOSIS — R07.9 CHEST PAIN, UNSPECIFIED TYPE: ICD-10-CM

## 2025-04-17 DIAGNOSIS — I34.0 NONRHEUMATIC MITRAL VALVE REGURGITATION: ICD-10-CM

## 2025-04-17 DIAGNOSIS — R00.2 PALPITATIONS: ICD-10-CM

## 2025-04-17 PROCEDURE — 3075F SYST BP GE 130 - 139MM HG: CPT | Performed by: INTERNAL MEDICINE

## 2025-04-17 PROCEDURE — 99214 OFFICE O/P EST MOD 30 MIN: CPT | Performed by: INTERNAL MEDICINE

## 2025-04-17 PROCEDURE — 3080F DIAST BP >= 90 MM HG: CPT | Performed by: INTERNAL MEDICINE

## 2025-04-17 RX ORDER — ATENOLOL 25 MG/1
25 TABLET ORAL DAILY
Qty: 90 TABLET | Refills: 3 | Status: SHIPPED | OUTPATIENT
Start: 2025-04-17

## 2025-04-17 NOTE — PROGRESS NOTES
Chief Complaint  Hyperlipidemia, Nonrheumatic mitral valve regurgitation (6m Follow Up), and Palpitations    Subjective        Mariama Turner presents to [facility] CARDIOLOGY  History of present illness:    Patient notes some continued mild edema in her left leg greater than right leg.  She is taking the Lasix and watching her salt.  She is also taking Mobic.  She notes no exertional chest pain.  She states she has not been taking the Livalo as much recently.  She also notes she was taken off the atenolol apparently due to heart rates in the 50s beat per minute range.  She notes since being off the atenolol she has had significant palpitations.  She also notes chest pain that occurs mainly when she is sleeping and awakens her.  It can last up to 45 minutes.  She does not notice it when she is walking around.      Past Medical History:   Diagnosis Date    Acute nonintractable headache 10/14/2021    Toradol IM given in the clinic.     Acute viral syndrome 10/14/2021    Call or RTC if symptoms persist.     Allergies     Anemia     Arthritis     Asthma     Bowel disease     Cervical cancer     Colitis     Difficulty walking     Fibromyalgia, primary     Gait disturbance     Gallstones     Gastric ulcer     Headache     Heart disease     Hyperlipemia     Hypertension     Kidney stones     Kidney stones 2021    Limb swelling     Osteoarthritis 2018    Of left thumb CMC joint    Reflux esophagitis     Seasonal allergies     TBI (traumatic brain injury)     2022         Past Surgical History:   Procedure Laterality Date    ABDOMINAL HYSTERECTOMY       SECTION      CHOLECYSTECTOMY      COLONOSCOPY  ,,,2018    ENDOSCOPY  ,,2018    KIDNEY STONE SURGERY  2014    Right Ureteroscopy, Laser litho, stent insertion    RHINOPLASTY      TONSILLECTOMY            Social History     Socioeconomic History    Marital status:    Tobacco Use    Smoking status:  Never     Passive exposure: Never    Smokeless tobacco: Never   Vaping Use    Vaping status: Never Used   Substance and Sexual Activity    Alcohol use: Yes     Comment: Some day / rarely once a year    Drug use: Never    Sexual activity: Defer         Family History   Problem Relation Age of Onset    Heart disease Mother     Cancer Mother     Stroke Father     Diabetes Father     Hypertension Father     Colon cancer Paternal Aunt     Arthritis Maternal Grandmother           Allergies   Allergen Reactions    Contrast Dye (Echo Or Unknown Ct/Mr) Unknown - High Severity    Iodine Unknown - High Severity    Ceftriaxone Itching    Iodinated Contrast Media Unknown - Low Severity    Nurtec [Rimegepant Sulfate] GI Intolerance            Current Outpatient Medications:     albuterol sulfate  (90 Base) MCG/ACT inhaler, Inhale 2 puffs Every 4 (Four) Hours As Needed for Wheezing or Shortness of Air., Disp: 18 g, Rfl: 3    cetirizine (zyrTEC) 10 MG tablet, Take 1 tablet by mouth Daily., Disp: 90 tablet, Rfl: 1    estradiol (Kendra) 0.075 MG/24HR patch, Place 1 patch on the skin as directed by provider 2 (Two) Times a Week., Disp: 24 patch, Rfl: 3    ezetimibe (ZETIA) 10 MG tablet, Take 1 tablet by mouth Daily., Disp: 90 tablet, Rfl: 0    fluticasone (Flonase) 50 MCG/ACT nasal spray, 2 sprays into the nostril(s) as directed by provider Daily., Disp: 16 g, Rfl: 3    furosemide (LASIX) 40 MG tablet, Take 1 tablet by mouth Daily., Disp: 90 tablet, Rfl: 0    levothyroxine (Synthroid) 88 MCG tablet, Take 1 tablet by mouth Daily., Disp: 90 tablet, Rfl: 1    Magnesium 300 MG capsule, Take  by mouth., Disp: , Rfl:     meclizine (ANTIVERT) 25 MG tablet, Take 1 tablet by mouth 3 (Three) Times a Day As Needed for Dizziness., Disp: 90 tablet, Rfl: 1    meloxicam (MOBIC) 7.5 MG tablet, Take 1 tablet by mouth Daily., Disp: , Rfl:     methocarbamol (ROBAXIN) 500 MG tablet, Take 1 tablet by mouth 3 (Three) Times a Day As Needed for Muscle  "Spasms., Disp: 60 tablet, Rfl: 1    naproxen sodium (ALEVE) 220 MG tablet, Take 2 tablets by mouth 3 (Three) Times a Day As Needed., Disp: , Rfl:     pitavastatin calcium (Livalo) 1 MG tablet tablet, Take 1 tablet by mouth Daily., Disp: 90 tablet, Rfl: 3    polyethylene glycol (GoLYTELY) 236 g solution, Mix solution.  Drink 2/3 of solution at 6:00 PM on the evening prior to procedure.  Drink remaining 1/3 of solution four hours prior to the scheduled arrival-time on the morning of the procedure., Disp: 4000 mL, Rfl: 0    potassium chloride 10 MEQ CR tablet, Take 1 tablet by mouth 2 (Two) Times a Day., Disp: 180 tablet, Rfl: 0    Scopolamine (Transderm-Scop, 1.5 MG,) 1 MG/3DAYS patch, Place 1 patch on the skin as directed by provider Every 72 (Seventy-Two) Hours. PRN, Disp: 24 each, Rfl: 1    traMADol (ULTRAM) 50 MG tablet, Take 1 tablet by mouth Every 8 (Eight) Hours As Needed for Moderate Pain., Disp: 60 tablet, Rfl: 2    ubrogepant (Ubrelvy) 100 MG tablet, Take 1 tablet by mouth 1 (One) Time As Needed (Migraine headache) for up to 30 days., Disp: 15 tablet, Rfl: 3    valACYclovir (VALTREX) 1000 MG tablet, Take 1 tablet by mouth Daily., Disp: 30 tablet, Rfl: 0    acetaminophen-codeine (TYLENOL with CODEINE #3) 300-30 MG per tablet, Take 2 tablets by mouth Every 4 (Four) Hours As Needed. (Patient not taking: Reported on 2/3/2025), Disp: , Rfl:     diclofenac (VOLTAREN) 75 MG EC tablet, Take 1 tablet by mouth 2 (Two) Times a Day. (Patient not taking: Reported on 2/3/2025), Disp: 60 tablet, Rfl: 0    ondansetron ODT (ZOFRAN-ODT) 4 MG disintegrating tablet, Place 2 tablets on the tongue Every 8 (Eight) Hours As Needed. (Patient not taking: Reported on 4/17/2025), Disp: , Rfl:       ROS:  Cardiac review of systems positive for chest pain and palpitations    Objective     /92   Pulse 69   Ht 157.5 cm (62\")   Wt 77.1 kg (170 lb)   BMI 31.09 kg/m²       General Appearance:   well developed  well nourished  HENT: "   oropharynx moist  lips not cyanotic  Respiratory:  no respiratory distress  normal breath sounds  no rales  Cardiovascular:  no jugular venous distention  regular rhythm  S1 normal, S2 normal  no S3, no S4   no murmur  no rub, no thrill  No carotid bruit  pedal pulses normal  lower extremity edema: none    Musculoskeletal:  no clubbing of fingers.   normocephalic, head atraumatic  Skin:   warm, dry  Psychiatric:  judgement and insight appropriate  normal mood and affect    ECHO:  Results for orders placed during the hospital encounter of 10/17/24    Adult Transthoracic Echo Complete W/ Cont if Necessary Per Protocol    Interpretation Summary    Left ventricular ejection fraction appears to be 56 - 60%.    Left ventricular wall thickness is consistent with septal asymmetric hypertrophy.    Left ventricular diastolic function is consistent with (grade I) impaired relaxation and age.    Stable, mild to moderate mitral regurgitation.    STRESS:    CATH:  No results found for this or any previous visit.    BMP:     Glucose   Date Value Ref Range Status   11/05/2024 97 65 - 99 mg/dL Final     BUN   Date Value Ref Range Status   11/05/2024 19 8 - 23 mg/dL Final     Creatinine   Date Value Ref Range Status   02/04/2025 0.80 0.60 - 1.30 mg/dL Final     Comment:     Serial Number: [id number]:  [id number]     Sodium   Date Value Ref Range Status   11/05/2024 137 136 - 145 mmol/L Final     Potassium   Date Value Ref Range Status   11/05/2024 3.5 3.5 - 5.2 mmol/L Final     Comment:     Slight hemolysis detected by analyzer. Result may be falsely elevated.     Chloride   Date Value Ref Range Status   11/05/2024 101 98 - 107 mmol/L Final     CO2   Date Value Ref Range Status   11/05/2024 25.8 22.0 - 29.0 mmol/L Final     Calcium   Date Value Ref Range Status   11/05/2024 9.3 8.6 - 10.5 mg/dL Final     BUN/Creatinine Ratio   Date Value Ref Range Status   11/05/2024 21.8 7.0 - 25.0 Final     Anion Gap   Date Value Ref Range Status    11/05/2024 10.2 5.0 - 15.0 mmol/L Final     eGFR   Date Value Ref Range Status   02/04/2025 83.9 >60.0 mL/min/1.73 Final     LIPIDS:  Total Cholesterol   Date Value Ref Range Status   10/24/2024 188 0 - 200 mg/dL Final     Triglycerides   Date Value Ref Range Status   10/24/2024 97 0 - 150 mg/dL Final     HDL Cholesterol   Date Value Ref Range Status   10/24/2024 65 (H) 40 - 60 mg/dL Final     LDL Cholesterol    Date Value Ref Range Status   10/24/2024 106 (H) 0 - 100 mg/dL Final     VLDL Cholesterol   Date Value Ref Range Status   10/24/2024 17 5 - 40 mg/dL Final     LDL/HDL Ratio   Date Value Ref Range Status   10/24/2024 1.59  Final         Procedures             ASSESSMENT:  Diagnoses and all orders for this visit:    1. Edema leg (Primary)    2. Palpitations    3. Nonrheumatic mitral valve regurgitation    4. Mixed hyperlipidemia    5. Chest pain, unspecified type         PLAN:    1.  Patient's chest pain is very atypical and I do not think it represents a cardiac source.  2.  Blood pressures under good control.  3.  Will add back the atenolol 25 daily.  I did tell her she could try a half a pill.  A normal heart rate is 50 to 100 bpm.  4.  Told patient to try to take a little more of the Livalo.  Her LDL went from 69 up to 106 10/24/2024 with an HDL of 65 and triglycerides of 97.  5.  Encouraged the patient to remain active and call us if any exertional chest pain.  6.  Reviewed patient's echocardiogram 10/18/2024 which shows normal ejection fraction and stable mild to moderate mitral regurgitation.  We may wait till October 2027 to recheck this.      Return in about 1 year (around 4/17/2026).     Patient was given instructions and counseling regarding her condition or for health maintenance advice. Please see specific information pulled into the AVS if appropriate.         Curly Smith MD   4/17/2025  12:55 EDT

## 2025-04-18 ENCOUNTER — TREATMENT (OUTPATIENT)
Dept: PHYSICAL THERAPY | Facility: CLINIC | Age: 60
End: 2025-04-18
Payer: MEDICARE

## 2025-04-18 DIAGNOSIS — M25.532 LEFT WRIST PAIN: ICD-10-CM

## 2025-04-18 DIAGNOSIS — S62.172A CLOSED DISPLACED FRACTURE OF TRAPEZIUM OF LEFT WRIST, INITIAL ENCOUNTER: Primary | ICD-10-CM

## 2025-04-18 NOTE — PROGRESS NOTES
Occupational Therapy Daily Treatment Note  [address] [place] [id number]      Patient: Mariama Turner   : 1965  Referring practitioner: Jordan Rawls II, MD  Date of Initial Visit: Type: THERAPY  Noted: 2024  Today's Date: 2025  Patient seen for 36 sessions         Subjective   Mariama Turner reports: the doctor ordered an MRI I go Thursday.     Objective   See Exercise, Manual, and Modality Logs for complete treatment.   Pt tolerated treatment well for range of motion, desensitization, and stress loading added today.    Assessment/Plan    Visit Diagnoses:    ICD-10-CM ICD-9-CM   1. Closed displaced fracture of trapezium of left wrist, initial encounter  S62.172A 814.05   2. Left wrist pain  M25.532 719.43       Continue per POC         Timed:  Manual Therapy:    0     mins  97571;  Therapeutic Exercise:    15     mins  50801;     Therapeutic Activity:    10     mins  95136;  Ultrasound:     0     mins  38700;    Electrical Stimulation:    0     mins 57978;  Neuromuscular Srinivas:    15    mins  62264;      Timed Treatment:   40   mins   Total Treatment:     40   min    ZENY Mendoza/L  Occupational Therapist    Electronically signed   License number [id number]

## 2025-04-21 NOTE — PRE-PROCEDURE INSTRUCTIONS
Reminded of arrival time at 0830, Entrance C of the [facility] hospital. Instructed to bring or have a  over the age of 18 set up to drive you home the day of procedure.    Instructed on clear liquid diet the day before, nothing red or purple. Call with any questions about the prep or if in need of the prep.  Reminded them not to eat or drink anything am of procedure unless its a sip of water with medications. Hold voltaren, mobic, naproxen, lasix am of procedure.  Patient verbalized understanding.

## 2025-04-21 NOTE — PROGRESS NOTES
Chief Complaint  Hypertension (6 month follow-up), Hyperlipidemia (6 month follow-up/), and Hypothyroidism (6 month follow-up/)    Subjective          Mariama Turner presents to [facility]  History of Present Illness    History of Present Illness      Hypothyroidism  This is a chronic problem. Associated symptoms include fatigue. Pertinent negatives include no abdominal pain, anorexia, arthralgias, change in bowel habit, chest pain, chills, congestion, coughing, diaphoresis, fever, headaches, joint swelling, myalgias, nausea, neck pain, numbness, rash, sore throat, swollen glands, urinary symptoms, vertigo, visual change, vomiting or weakness.     Hypertension  This is a chronic problem. The problem is controlled. Pertinent negatives include no anxiety, blurred vision, chest pain, headaches, malaise/fatigue, neck pain, orthopnea, palpitations, peripheral edema, PND, shortness of breath or sweats. Current antihypertension treatment includes beta blockers. The current treatment provides significant improvement. Compliance problems include diet and exercise.  There is no history of chronic renal disease.     Hyperlipidemia  This is a chronic problem. Exacerbating diseases include hypothyroidism. She has no history of chronic renal disease, diabetes, liver disease, obesity or nephrotic syndrome. Pertinent negatives include no chest pain, focal sensory loss, focal weakness, leg pain, myalgias or shortness of breath. Compliance problems include adherence to diet and adherence to exercise.      Got hearing aids   Current Outpatient Medications   Medication Instructions    albuterol sulfate  (90 Base) MCG/ACT inhaler 2 puffs, Inhalation, Every 4 Hours PRN    atenolol (TENORMIN) 25 mg, Oral, Daily    cetirizine (ZYRTEC) 10 mg, Oral, Daily    estradiol (Kendra) 0.075 MG/24HR patch 1 patch, Transdermal, 2 Times Weekly    ezetimibe (ZETIA) 10 mg, Oral, Daily     "fluticasone (Flonase) 50 MCG/ACT nasal spray 2 sprays, Nasal, Daily    furosemide (LASIX) 40 mg, Oral, Daily    levothyroxine (SYNTHROID) 88 mcg, Oral, Daily    Magnesium 300 MG capsule 1 capsule, Oral, Daily    meclizine (ANTIVERT) 25 mg, Oral, 3 Times Daily PRN    meloxicam (MOBIC) 7.5 mg, Oral, Daily    methocarbamol (ROBAXIN) 500 mg, Oral, 3 Times Daily PRN    naproxen sodium (ALEVE) 440 mg, 3 Times Daily PRN    pitavastatin calcium (LIVALO) 1 mg, Oral, Daily    polyethylene glycol (GoLYTELY) 236 g solution Mix solution.  Drink 2/3 of solution at 6:00 PM on the evening prior to procedure.  Drink remaining 1/3 of solution four hours prior to the scheduled arrival-time on the morning of the procedure.    potassium chloride 10 MEQ CR tablet 10 mEq, Oral, 2 Times Daily    Scopolamine (Transderm-Scop, 1.5 MG,) 1 MG/3DAYS patch 1 patch, Transdermal, Every 72 Hours, PRN    traMADol (ULTRAM) 50 mg, Oral, Every 8 Hours PRN    ubrogepant (UBRELVY) 100 mg, Oral, Once As Needed    valACYclovir (VALTREX) 1,000 mg, Oral, Daily       The following portions of the patient's history were reviewed and updated as appropriate: allergies, current medications, past family history, past medical history, past social history, past surgical history, and problem list.    Objective   Vital Signs:   /78 (BP Location: Right arm, Patient Position: Sitting, Cuff Size: Adult)   Pulse 58   Temp 97.1 °F (36.2 °C) (Temporal)   Ht 157.5 cm (62\")   Wt 78 kg (172 lb)   SpO2 95%   BMI 31.46 kg/m²     BP Readings from Last 3 Encounters:   04/24/25 135/78   04/17/25 138/92   04/10/25 120/84     Wt Readings from Last 3 Encounters:   04/24/25 78 kg (172 lb)   04/17/25 77.1 kg (170 lb)   04/10/25 73 kg (161 lb)           Physical Exam     Appearance: No acute distress, well-nourished  Head: normocephalic, atraumatic  Eyes: extraocular movements intact, no scleral icterus, no conjunctival injection  Ears, Nose, and Throat: external ears normal, " nares patent, moist mucous membranes  Cardiovascular: regular rate and rhythm. no murmurs, rubs, or gallops. no edema  Respiratory: breathing comfortably, symmetric chest rise, clear to auscultation bilaterally. No wheezes, rales, or rhonchi.  Neuro: alert and oriented to time, place, and person. Normal gait  Psych: normal mood and affect     Physical Exam        Result Review :   The following data was reviewed by: MIGNON Gaitan on 04/24/2025:  Common labs          2/4/2025    13:16 2/6/2025    15:47 4/24/2025    12:37   Common Labs   Glucose   84    BUN   14    Creatinine 0.80   0.76    Sodium   139    Potassium   4.0    Chloride   103    Calcium   9.3    Albumin  4.1  4.3    Total Bilirubin  0.3  0.5    Alkaline Phosphatase  125  121    AST (SGOT)  21  20    ALT (SGPT)  21  18    WBC   5.45    Hemoglobin   14.1    Hematocrit   43.2    Platelets   241    Total Cholesterol   186    Triglycerides   168    HDL Cholesterol   62    LDL Cholesterol    95        Results           Lab Results   Component Value Date    SARSANTIGEN Not Detected 11/14/2022    COVID19 Not Detected 11/14/2022    RAPFLUA Negative 11/14/2022    RAPFLUB Negative 11/14/2022    FLUAAG Not Detected 09/23/2021    FLU Negative 04/28/2022    FLU Negative 04/28/2022    FLUBAG Not Detected 09/23/2021    RAPSCRN Negative 11/14/2022    INR 0.98 11/05/2024    BILIRUBINUR Negative 01/28/2023            Assessment and Plan    Diagnoses and all orders for this visit:    1. Primary hypertension (Primary)  -     CBC w AUTO Differential  -     Comprehensive metabolic panel  -     Lipid panel    2. Mixed hyperlipidemia  -     CBC w AUTO Differential  -     Comprehensive metabolic panel  -     Lipid panel  -     ezetimibe (ZETIA) 10 MG tablet; Take 1 tablet by mouth Daily.  Dispense: 90 tablet; Refill: 0  -     pitavastatin calcium (Livalo) 1 MG tablet tablet; Take 1 tablet by mouth Daily.  Dispense: 90 tablet; Refill: 3    3. Hypothyroidism,  unspecified type  -     TSH  -     levothyroxine (Synthroid) 88 MCG tablet; Take 1 tablet by mouth Daily.  Dispense: 90 tablet; Refill: 1    4. Encounter for screening mammogram for malignant neoplasm of breast  -     Mammo Screening Digital Tomosynthesis Bilateral With CAD; Future    5. Arthralgia, unspecified joint  -     CELI Direct Reflex to 11 Biomarker  -     C-reactive protein  -     Cyclic citrul peptide antibody, IgG/IgA  -     Rheumatoid Factor  -     Sedimentation rate    6. Excessive daytime sleepiness  -     Ambulatory Referral to Sleep Medicine    7. Snoring  -     Ambulatory Referral to Sleep Medicine    8. Class 1 obesity with serious comorbidity and body mass index (BMI) of 31.0 to 31.9 in adult, unspecified obesity type  -     Ambulatory Referral to Sleep Medicine    9. Uses hearing aid    10. Migraine without aura and without status migrainosus, not intractable  -     ubrogepant (Ubrelvy) 100 MG tablet; Take 1 tablet by mouth 1 (One) Time As Needed (Migraine headache) for up to 30 days.  Dispense: 15 tablet; Refill: 3    11. Viral URI with cough  -     fluticasone (Flonase) 50 MCG/ACT nasal spray; Administer 2 sprays into the nostril(s) as directed by provider Daily.  Dispense: 16 g; Refill: 3    12. Acute midline low back pain with left-sided sciatica  Comments:  Discussed conservative measures.  MRI of the lumbar spine ordered.  Muscle relaxers to pharmacy as well.  Discussed monitoring use and precautions.  Orders:  -     methocarbamol (ROBAXIN) 500 MG tablet; Take 1 tablet by mouth 3 (Three) Times a Day As Needed for Muscle Spasms.  Dispense: 60 tablet; Refill: 1    13. Mixed hyperlipidemia  Comments:  will recheck lipids today  Orders:  -     CBC w AUTO Differential  -     Comprehensive metabolic panel  -     Lipid panel  -     ezetimibe (ZETIA) 10 MG tablet; Take 1 tablet by mouth Daily.  Dispense: 90 tablet; Refill: 0  -     pitavastatin calcium (Livalo) 1 MG tablet tablet; Take 1 tablet by  mouth Daily.  Dispense: 90 tablet; Refill: 3    14. Medication management  -     Comprehensive metabolic panel; Future  -     Comprehensive metabolic panel; Future    Other orders  -     cetirizine (zyrTEC) 10 MG tablet; Take 1 tablet by mouth Daily.  Dispense: 90 tablet; Refill: 1  -     meclizine (ANTIVERT) 25 MG tablet; Take 1 tablet by mouth 3 (Three) Times a Day As Needed for Dizziness.  Dispense: 90 tablet; Refill: 1  -     atenolol (TENORMIN) 25 MG tablet; Take 1 tablet by mouth Daily.  Dispense: 90 tablet; Refill: 3  -     furosemide (LASIX) 40 MG tablet; Take 1 tablet by mouth Daily.  Dispense: 90 tablet; Refill: 0  -     meloxicam (MOBIC) 7.5 MG tablet; Take 1 tablet by mouth Daily.  Dispense: 90 tablet; Refill: 1  -     potassium chloride 10 MEQ CR tablet; Take 1 tablet by mouth 2 (Two) Times a Day.  Dispense: 180 tablet; Refill: 0  -     valACYclovir (VALTREX) 1000 MG tablet; Take 1 tablet by mouth Daily.  Dispense: 30 tablet; Refill: 0  -     Magnesium 300 MG capsule; Take 1 capsule by mouth Daily.  Dispense: 90 each; Refill: 1        Assessment & Plan      Medications Discontinued During This Encounter   Medication Reason    acetaminophen-codeine (TYLENOL with CODEINE #3) 300-30 MG per tablet *Therapy completed    ondansetron ODT (ZOFRAN-ODT) 4 MG disintegrating tablet     diclofenac (VOLTAREN) 75 MG EC tablet     meclizine (ANTIVERT) 25 MG tablet Reorder    pitavastatin calcium (Livalo) 1 MG tablet tablet Reorder    fluticasone (Flonase) 50 MCG/ACT nasal spray Reorder    Magnesium 300 MG capsule Reorder    levothyroxine (Synthroid) 88 MCG tablet Reorder    valACYclovir (VALTREX) 1000 MG tablet Reorder    cetirizine (zyrTEC) 10 MG tablet Reorder    methocarbamol (ROBAXIN) 500 MG tablet Reorder    furosemide (LASIX) 40 MG tablet Reorder    potassium chloride 10 MEQ CR tablet Reorder    ezetimibe (ZETIA) 10 MG tablet Reorder    meloxicam (MOBIC) 7.5 MG tablet Reorder    ubrogepant (Ubrelvy) 100 MG tablet  Reorder    atenolol (TENORMIN) 25 MG tablet Reorder          Follow Up   Return in about 6 months (around 10/24/2025).  Patient was given instructions and counseling regarding her condition or for health maintenance advice. Please see specific information pulled into the AVS if appropriate.       MIGNON Gaitan  04/25/25  16:26 EDT      Patient or patient representative verbalized consent for the use of Ambient Listening during the visit with  MIGNON Gaitan for chart documentation. 4/25/2025  12:24 EDT

## 2025-04-22 ENCOUNTER — TREATMENT (OUTPATIENT)
Dept: PHYSICAL THERAPY | Facility: CLINIC | Age: 60
End: 2025-04-22
Payer: MEDICARE

## 2025-04-22 DIAGNOSIS — S62.172A CLOSED DISPLACED FRACTURE OF TRAPEZIUM OF LEFT WRIST, INITIAL ENCOUNTER: Primary | ICD-10-CM

## 2025-04-22 DIAGNOSIS — M25.532 LEFT WRIST PAIN: ICD-10-CM

## 2025-04-22 PROCEDURE — 97110 THERAPEUTIC EXERCISES: CPT | Performed by: PHYSICAL THERAPIST

## 2025-04-22 PROCEDURE — 97112 NEUROMUSCULAR REEDUCATION: CPT | Performed by: PHYSICAL THERAPIST

## 2025-04-22 PROCEDURE — 97530 THERAPEUTIC ACTIVITIES: CPT | Performed by: PHYSICAL THERAPIST

## 2025-04-22 NOTE — PROGRESS NOTES
Occupational Therapy Daily Treatment Note  [address] [place] [id number]      Patient: Mariama Turner   : 1965  Referring practitioner: Jordan Rawls II, MD  Date of Initial Visit: Type: THERAPY  Noted: 2024  Today's Date: 2025  Patient seen for 37 sessions         Subjective   Mariama Turner reports: getting out of the bed and turning over is the worst part cause I can't put the weight on my hand. The wrist is the worst part, but I at least can set it on the table now.     Objective   See Exercise, Manual, and Modality Logs for complete treatment.     Pt tolerated treatment well for range of motion, desensitization, and strengthening.  Assessment/Plan    Visit Diagnoses:    ICD-10-CM ICD-9-CM   1. Closed displaced fracture of trapezium of left wrist, initial encounter  S62.172A 814.05   2. Left wrist pain  M25.532 719.43       Continue per POC         Timed:  Manual Therapy:    0     mins  28521;  Therapeutic Exercise:    15     mins  75160;     Therapeutic Activity:    10     mins  17880;  Ultrasound:     0     mins  01761;    Electrical Stimulation:    0     mins 05271;  Neuromuscular Srinivas:    15    mins  33706;      Timed Treatment:   40   mins   Total Treatment:     40   min    ZENY Mendoza/L  Occupational Therapist    Electronically signed   License number [id number]

## 2025-04-24 ENCOUNTER — OFFICE VISIT (OUTPATIENT)
Dept: INTERNAL MEDICINE | Facility: CLINIC | Age: 60
End: 2025-04-24
Payer: MEDICARE

## 2025-04-24 ENCOUNTER — TREATMENT (OUTPATIENT)
Dept: PHYSICAL THERAPY | Facility: CLINIC | Age: 60
End: 2025-04-24
Payer: MEDICARE

## 2025-04-24 VITALS
OXYGEN SATURATION: 95 % | DIASTOLIC BLOOD PRESSURE: 78 MMHG | HEART RATE: 58 BPM | HEIGHT: 62 IN | WEIGHT: 172 LBS | SYSTOLIC BLOOD PRESSURE: 135 MMHG | BODY MASS INDEX: 31.65 KG/M2 | TEMPERATURE: 97.1 F

## 2025-04-24 DIAGNOSIS — G47.19 EXCESSIVE DAYTIME SLEEPINESS: ICD-10-CM

## 2025-04-24 DIAGNOSIS — E03.9 HYPOTHYROIDISM, UNSPECIFIED TYPE: ICD-10-CM

## 2025-04-24 DIAGNOSIS — Z79.899 MEDICATION MANAGEMENT: ICD-10-CM

## 2025-04-24 DIAGNOSIS — M25.532 LEFT WRIST PAIN: ICD-10-CM

## 2025-04-24 DIAGNOSIS — M25.50 ARTHRALGIA, UNSPECIFIED JOINT: ICD-10-CM

## 2025-04-24 DIAGNOSIS — Z97.4 USES HEARING AID: ICD-10-CM

## 2025-04-24 DIAGNOSIS — M54.42 ACUTE MIDLINE LOW BACK PAIN WITH LEFT-SIDED SCIATICA: ICD-10-CM

## 2025-04-24 DIAGNOSIS — I10 PRIMARY HYPERTENSION: Primary | ICD-10-CM

## 2025-04-24 DIAGNOSIS — G43.009 MIGRAINE WITHOUT AURA AND WITHOUT STATUS MIGRAINOSUS, NOT INTRACTABLE: ICD-10-CM

## 2025-04-24 DIAGNOSIS — S62.172A CLOSED DISPLACED FRACTURE OF TRAPEZIUM OF LEFT WRIST, INITIAL ENCOUNTER: Primary | ICD-10-CM

## 2025-04-24 DIAGNOSIS — E66.811 CLASS 1 OBESITY WITH SERIOUS COMORBIDITY AND BODY MASS INDEX (BMI) OF 31.0 TO 31.9 IN ADULT, UNSPECIFIED OBESITY TYPE: ICD-10-CM

## 2025-04-24 DIAGNOSIS — E78.2 MIXED HYPERLIPIDEMIA: ICD-10-CM

## 2025-04-24 DIAGNOSIS — E78.2 MIXED HYPERLIPIDEMIA: Chronic | ICD-10-CM

## 2025-04-24 DIAGNOSIS — R06.83 SNORING: ICD-10-CM

## 2025-04-24 DIAGNOSIS — J06.9 VIRAL URI WITH COUGH: ICD-10-CM

## 2025-04-24 DIAGNOSIS — Z12.31 ENCOUNTER FOR SCREENING MAMMOGRAM FOR MALIGNANT NEOPLASM OF BREAST: ICD-10-CM

## 2025-04-24 LAB
ALBUMIN SERPL-MCNC: 4.3 G/DL (ref 3.5–5.2)
ALBUMIN/GLOB SERPL: 1.5 G/DL
ALP SERPL-CCNC: 121 U/L (ref 39–117)
ALT SERPL W P-5'-P-CCNC: 18 U/L (ref 1–33)
ANION GAP SERPL CALCULATED.3IONS-SCNC: 11 MMOL/L (ref 5–15)
AST SERPL-CCNC: 20 U/L (ref 1–32)
BASOPHILS # BLD AUTO: 0.07 10*3/MM3 (ref 0–0.2)
BASOPHILS NFR BLD AUTO: 1.3 % (ref 0–1.5)
BILIRUB SERPL-MCNC: 0.5 MG/DL (ref 0–1.2)
BUN SERPL-MCNC: 14 MG/DL (ref 8–23)
BUN/CREAT SERPL: 18.4 (ref 7–25)
CALCIUM SPEC-SCNC: 9.3 MG/DL (ref 8.6–10.5)
CHLORIDE SERPL-SCNC: 103 MMOL/L (ref 98–107)
CHOLEST SERPL-MCNC: 186 MG/DL (ref 0–200)
CHROMATIN AB SERPL-ACNC: <10 IU/ML (ref 0–14)
CO2 SERPL-SCNC: 25 MMOL/L (ref 22–29)
CREAT SERPL-MCNC: 0.76 MG/DL (ref 0.57–1)
CRP SERPL-MCNC: <0.3 MG/DL (ref 0–0.5)
DEPRECATED RDW RBC AUTO: 42.6 FL (ref 37–54)
EGFRCR SERPLBLD CKD-EPI 2021: 89.3 ML/MIN/1.73
EOSINOPHIL # BLD AUTO: 0.23 10*3/MM3 (ref 0–0.4)
EOSINOPHIL NFR BLD AUTO: 4.2 % (ref 0.3–6.2)
ERYTHROCYTE [DISTWIDTH] IN BLOOD BY AUTOMATED COUNT: 12.9 % (ref 12.3–15.4)
ERYTHROCYTE [SEDIMENTATION RATE] IN BLOOD: 12 MM/HR (ref 0–30)
GLOBULIN UR ELPH-MCNC: 2.8 GM/DL
GLUCOSE SERPL-MCNC: 84 MG/DL (ref 65–99)
HCT VFR BLD AUTO: 43.2 % (ref 34–46.6)
HDLC SERPL-MCNC: 62 MG/DL (ref 40–60)
HGB BLD-MCNC: 14.1 G/DL (ref 12–15.9)
IMM GRANULOCYTES # BLD AUTO: 0.02 10*3/MM3 (ref 0–0.05)
IMM GRANULOCYTES NFR BLD AUTO: 0.4 % (ref 0–0.5)
LDLC SERPL CALC-MCNC: 95 MG/DL (ref 0–100)
LDLC/HDLC SERPL: 1.46 {RATIO}
LYMPHOCYTES # BLD AUTO: 1.28 10*3/MM3 (ref 0.7–3.1)
LYMPHOCYTES NFR BLD AUTO: 23.5 % (ref 19.6–45.3)
MCH RBC QN AUTO: 29.6 PG (ref 26.6–33)
MCHC RBC AUTO-ENTMCNC: 32.6 G/DL (ref 31.5–35.7)
MCV RBC AUTO: 90.6 FL (ref 79–97)
MONOCYTES # BLD AUTO: 0.62 10*3/MM3 (ref 0.1–0.9)
MONOCYTES NFR BLD AUTO: 11.4 % (ref 5–12)
NEUTROPHILS NFR BLD AUTO: 3.23 10*3/MM3 (ref 1.7–7)
NEUTROPHILS NFR BLD AUTO: 59.2 % (ref 42.7–76)
NRBC BLD AUTO-RTO: 0 /100 WBC (ref 0–0.2)
PLATELET # BLD AUTO: 241 10*3/MM3 (ref 140–450)
PMV BLD AUTO: 10.5 FL (ref 6–12)
POTASSIUM SERPL-SCNC: 4 MMOL/L (ref 3.5–5.2)
PROT SERPL-MCNC: 7.1 G/DL (ref 6–8.5)
RBC # BLD AUTO: 4.77 10*6/MM3 (ref 3.77–5.28)
SODIUM SERPL-SCNC: 139 MMOL/L (ref 136–145)
TRIGL SERPL-MCNC: 168 MG/DL (ref 0–150)
TSH SERPL DL<=0.05 MIU/L-ACNC: 0.59 UIU/ML (ref 0.27–4.2)
VLDLC SERPL-MCNC: 29 MG/DL (ref 5–40)
WBC NRBC COR # BLD AUTO: 5.45 10*3/MM3 (ref 3.4–10.8)

## 2025-04-24 PROCEDURE — 85025 COMPLETE CBC W/AUTO DIFF WBC: CPT | Performed by: NURSE PRACTITIONER

## 2025-04-24 PROCEDURE — 86140 C-REACTIVE PROTEIN: CPT | Performed by: NURSE PRACTITIONER

## 2025-04-24 PROCEDURE — 80053 COMPREHEN METABOLIC PANEL: CPT | Performed by: NURSE PRACTITIONER

## 2025-04-24 PROCEDURE — 85652 RBC SED RATE AUTOMATED: CPT | Performed by: NURSE PRACTITIONER

## 2025-04-24 PROCEDURE — 86200 CCP ANTIBODY: CPT | Performed by: NURSE PRACTITIONER

## 2025-04-24 PROCEDURE — 80061 LIPID PANEL: CPT | Performed by: NURSE PRACTITIONER

## 2025-04-24 PROCEDURE — 84443 ASSAY THYROID STIM HORMONE: CPT | Performed by: NURSE PRACTITIONER

## 2025-04-24 PROCEDURE — 86431 RHEUMATOID FACTOR QUANT: CPT | Performed by: NURSE PRACTITIONER

## 2025-04-24 PROCEDURE — 86038 ANTINUCLEAR ANTIBODIES: CPT | Performed by: NURSE PRACTITIONER

## 2025-04-24 RX ORDER — MAGNESIUM OXIDE/MAG AA CHELATE 300 MG
1 CAPSULE ORAL DAILY
Qty: 90 EACH | Refills: 1 | Status: SHIPPED | OUTPATIENT
Start: 2025-04-24

## 2025-04-24 RX ORDER — EZETIMIBE 10 MG/1
10 TABLET ORAL DAILY
Qty: 90 TABLET | Refills: 0 | Status: SHIPPED | OUTPATIENT
Start: 2025-04-24

## 2025-04-24 RX ORDER — CETIRIZINE HYDROCHLORIDE 10 MG/1
10 TABLET ORAL DAILY
Qty: 90 TABLET | Refills: 1 | Status: SHIPPED | OUTPATIENT
Start: 2025-04-24

## 2025-04-24 RX ORDER — ATENOLOL 25 MG/1
25 TABLET ORAL DAILY
Qty: 90 TABLET | Refills: 3 | Status: SHIPPED | OUTPATIENT
Start: 2025-04-24

## 2025-04-24 RX ORDER — METHOCARBAMOL 500 MG/1
500 TABLET, FILM COATED ORAL 3 TIMES DAILY PRN
Qty: 60 TABLET | Refills: 1 | Status: SHIPPED | OUTPATIENT
Start: 2025-04-24

## 2025-04-24 RX ORDER — MELOXICAM 7.5 MG/1
7.5 TABLET ORAL DAILY
Qty: 90 TABLET | Refills: 1 | Status: SHIPPED | OUTPATIENT
Start: 2025-04-24

## 2025-04-24 RX ORDER — PITAVASTATIN CALCIUM 1.04 MG/1
1 TABLET, FILM COATED ORAL DAILY
Qty: 90 TABLET | Refills: 3 | Status: SHIPPED | OUTPATIENT
Start: 2025-04-24

## 2025-04-24 RX ORDER — LEVOTHYROXINE SODIUM 88 UG/1
88 TABLET ORAL DAILY
Qty: 90 TABLET | Refills: 1 | Status: SHIPPED | OUTPATIENT
Start: 2025-04-24

## 2025-04-24 RX ORDER — MECLIZINE HYDROCHLORIDE 25 MG/1
25 TABLET ORAL 3 TIMES DAILY PRN
Qty: 90 TABLET | Refills: 1 | Status: SHIPPED | OUTPATIENT
Start: 2025-04-24

## 2025-04-24 RX ORDER — VALACYCLOVIR HYDROCHLORIDE 1 G/1
1 TABLET, FILM COATED ORAL DAILY
Qty: 30 TABLET | Refills: 0 | Status: SHIPPED | OUTPATIENT
Start: 2025-04-24

## 2025-04-24 RX ORDER — FLUTICASONE PROPIONATE 50 MCG
2 SPRAY, SUSPENSION (ML) NASAL DAILY
Qty: 16 G | Refills: 3 | Status: SHIPPED | OUTPATIENT
Start: 2025-04-24

## 2025-04-24 RX ORDER — FUROSEMIDE 40 MG/1
40 TABLET ORAL DAILY
Qty: 90 TABLET | Refills: 0 | Status: SHIPPED | OUTPATIENT
Start: 2025-04-24

## 2025-04-24 RX ORDER — POTASSIUM CHLORIDE 750 MG/1
10 TABLET, EXTENDED RELEASE ORAL 2 TIMES DAILY
Qty: 180 TABLET | Refills: 0 | Status: SHIPPED | OUTPATIENT
Start: 2025-04-24

## 2025-04-24 NOTE — PROGRESS NOTES
Occupational Therapy Daily Treatment Note  [address] [place] [address]      Patient: Mariama Turner   : 1965  Referring practitioner: Jordan Rawls II, MD  Date of Initial Visit: Type: THERAPY  Noted: 2024  Today's Date: 2025  Patient seen for 38 sessions         Subjective   Mariama Turner reports: her head is hurting today.    Objective   See Exercise, Manual, and Modality Logs for complete treatment.   Pt tolerated treatment well for range of motion and strengthening as well as desensitization and stress loading.    Assessment/Plan    Visit Diagnoses:    ICD-10-CM ICD-9-CM   1. Closed displaced fracture of trapezium of left wrist, initial encounter  S62.172A 814.05   2. Left wrist pain  M25.532 719.43       Continue per POC to decrease pain and increase functional use of her L UE         Timed:  Manual Therapy:    0     mins  84989;  Therapeutic Exercise:    15     mins  38292;     Therapeutic Activity:    10     mins  59590;  Ultrasound:     0     mins  44464;    Electrical Stimulation:    0     mins 05294;  Neuromuscular Srinivas:    15    mins  35897;      Timed Treatment:   40   mins   Total Treatment:     40   min    Adriana Mejia OTR/L  Occupational Therapist    Electronically signed   License number [id number]

## 2025-04-25 ENCOUNTER — RESULTS FOLLOW-UP (OUTPATIENT)
Dept: INTERNAL MEDICINE | Facility: CLINIC | Age: 60
End: 2025-04-25

## 2025-04-25 DIAGNOSIS — R92.8 ABNORMAL MAMMOGRAM: ICD-10-CM

## 2025-04-25 DIAGNOSIS — N63.10 MASS OF RIGHT BREAST, UNSPECIFIED QUADRANT: Primary | ICD-10-CM

## 2025-04-25 LAB
ANA SER QL: NEGATIVE
CCP IGA+IGG SERPL IA-ACNC: 7 UNITS (ref 0–19)

## 2025-04-29 ENCOUNTER — TREATMENT (OUTPATIENT)
Dept: PHYSICAL THERAPY | Facility: CLINIC | Age: 60
End: 2025-04-29
Payer: MEDICARE

## 2025-04-29 DIAGNOSIS — M25.532 LEFT WRIST PAIN: ICD-10-CM

## 2025-04-29 DIAGNOSIS — S62.172A CLOSED DISPLACED FRACTURE OF TRAPEZIUM OF LEFT WRIST, INITIAL ENCOUNTER: Primary | ICD-10-CM

## 2025-04-29 NOTE — PROGRESS NOTES
Occupational Therapy Daily Treatment Note  [address] [place] [address]      Patient: Mariama Turner   : 1965  Referring practitioner: Jordan Rawls II, MD  Date of Initial Visit: Type: THERAPY  Noted: 2024  Today's Date: 2025  Patient seen for 39 sessions         Subjective   Mariama Turner reports: most of the pain is if I push or pull.  I try to shut the car door and that does not work so well.     Objective   See Exercise, Manual, and Modality Logs for complete treatment.     Pt tolerated treatment well for range of motion, fine motor coordination, stress loading, and light strengthening.  Assessment/Plan    Visit Diagnoses:    ICD-10-CM ICD-9-CM   1. Closed displaced fracture of trapezium of left wrist, initial encounter  S62.172A 814.05   2. Left wrist pain  M25.532 719.43       Pt to have MRI tomorrow and follow up with physician. Continue per POC         Timed:  Manual Therapy:    0     mins  74030;  Therapeutic Exercise:    15     mins  93456;     Therapeutic Activity:    10     mins  00112;  Ultrasound:     0     mins  26902;    Electrical Stimulation:    0     mins 31552;  Neuromuscular Srinivas:    15    mins  02857;      Timed Treatment:   40   mins   Total Treatment:     40   min    ZENY Mendoza/L  Occupational Therapist    Electronically signed   License number [id number]
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
## 2025-05-01 ENCOUNTER — TREATMENT (OUTPATIENT)
Dept: PHYSICAL THERAPY | Facility: CLINIC | Age: 60
End: 2025-05-01
Payer: MEDICARE

## 2025-05-01 DIAGNOSIS — M25.532 LEFT WRIST PAIN: ICD-10-CM

## 2025-05-01 DIAGNOSIS — S62.172A CLOSED DISPLACED FRACTURE OF TRAPEZIUM OF LEFT WRIST, INITIAL ENCOUNTER: Primary | ICD-10-CM

## 2025-05-01 NOTE — PROGRESS NOTES
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 She is also having pain with bottoming in her R thumb. She is supposed to have an MRI on her R thumb to check for ligament tear at MCP joint.    Objective   See Exercise, Manual, and Modality Logs for complete treatment.     Dynamic stabilization added today with good tolerance. Isometrics added for HEP.   Assessment/Plan    Visit Diagnoses:    ICD-10-CM ICD-9-CM   1. Closed displaced fracture of trapezium of left wrist, initial encounter  S62.172A 814.05   2. Left wrist pain  M25.532 719.43       Continue per POC         Timed:  Manual Therapy:    0     mins  64505;  Therapeutic Exercise:    15     mins  80349;     Therapeutic Activity:    10     mins  28021;  Ultrasound:     0     mins  69797;    Electrical Stimulation:    0     mins 99078;  Neuromuscular Srinivas:    15    mins  35654;    Timed Treatment:   40   mins   Total Treatment:     40   min    ZENY Mendoza/L  Occupational Therapist    Electronically signed   License number [id number]

## 2025-05-02 ENCOUNTER — OFFICE VISIT (OUTPATIENT)
Dept: INTERNAL MEDICINE | Facility: CLINIC | Age: 60
End: 2025-05-02
Payer: MEDICARE

## 2025-05-02 VITALS
OXYGEN SATURATION: 96 % | HEART RATE: 55 BPM | DIASTOLIC BLOOD PRESSURE: 79 MMHG | TEMPERATURE: 98.2 F | HEIGHT: 62 IN | BODY MASS INDEX: 31.62 KG/M2 | SYSTOLIC BLOOD PRESSURE: 124 MMHG | WEIGHT: 171.8 LBS

## 2025-05-02 DIAGNOSIS — S29.019A THORACIC MYOFASCIAL STRAIN, INITIAL ENCOUNTER: Primary | ICD-10-CM

## 2025-05-02 RX ORDER — METHYLPREDNISOLONE SODIUM SUCCINATE 125 MG/2ML
125 INJECTION INTRAMUSCULAR; INTRAVENOUS ONCE
Status: COMPLETED | OUTPATIENT
Start: 2025-05-02 | End: 2025-05-02

## 2025-05-02 RX ORDER — KETOROLAC TROMETHAMINE 30 MG/ML
60 INJECTION, SOLUTION INTRAMUSCULAR; INTRAVENOUS ONCE
Status: COMPLETED | OUTPATIENT
Start: 2025-05-02 | End: 2025-05-02

## 2025-05-02 RX ADMIN — KETOROLAC TROMETHAMINE 60 MG: 30 INJECTION, SOLUTION INTRAMUSCULAR; INTRAVENOUS at 11:45

## 2025-05-02 RX ADMIN — METHYLPREDNISOLONE SODIUM SUCCINATE 125 MG: 125 INJECTION INTRAMUSCULAR; INTRAVENOUS at 11:45

## 2025-05-02 NOTE — PROGRESS NOTES
Chief Complaint  Back Pain (Left side mid back )    Jean Paul Turner presents to [facility]  History of Present Illness    History of Present Illness  The patient presents for evaluation of back pain.    An incident occurred while attempting to lift the lid of a large dumpster, resulting in an unusual sensation in the back. Management of symptoms with Robaxin has been attempted, but no relief has been achieved. Additional self-care measures, including the use of a heating pad and ice application, have also been tried without success.    Current Outpatient Medications   Medication Instructions    albuterol sulfate  (90 Base) MCG/ACT inhaler 2 puffs, Inhalation, Every 4 Hours PRN    atenolol (TENORMIN) 25 mg, Oral, Daily    cetirizine (ZYRTEC) 10 mg, Oral, Daily    estradiol (Kendra) 0.075 MG/24HR patch 1 patch, Transdermal, 2 Times Weekly    ezetimibe (ZETIA) 10 mg, Oral, Daily    fluticasone (Flonase) 50 MCG/ACT nasal spray 2 sprays, Nasal, Daily    furosemide (LASIX) 40 mg, Oral, Daily    levothyroxine (SYNTHROID) 88 mcg, Oral, Daily    Magnesium 300 MG capsule 1 capsule, Oral, Daily    meclizine (ANTIVERT) 25 mg, Oral, 3 Times Daily PRN    meloxicam (MOBIC) 7.5 mg, Oral, Daily    methocarbamol (ROBAXIN) 500 mg, Oral, 3 Times Daily PRN    naproxen sodium (ALEVE) 440 mg, 3 Times Daily PRN    pitavastatin calcium (LIVALO) 1 mg, Oral, Daily    polyethylene glycol (GoLYTELY) 236 g solution Mix solution.  Drink 2/3 of solution at 6:00 PM on the evening prior to procedure.  Drink remaining 1/3 of solution four hours prior to the scheduled arrival-time on the morning of the procedure.    potassium chloride 10 MEQ CR tablet 10 mEq, Oral, 2 Times Daily    Scopolamine (Transderm-Scop, 1.5 MG,) 1 MG/3DAYS patch 1 patch, Transdermal, Every 72 Hours, PRN    traMADol (ULTRAM) 50 mg, Oral, Every 8 Hours PRN    ubrogepant (UBRELVY) 100 mg, Oral, Once As  "Needed    valACYclovir (VALTREX) 1,000 mg, Oral, Daily       The following portions of the patient's history were reviewed and updated as appropriate: allergies, current medications, past family history, past medical history, past social history, past surgical history, and problem list.    Objective   Vital Signs:   /79 (BP Location: Left arm, Patient Position: Sitting, Cuff Size: Adult)   Pulse 55   Temp 98.2 °F (36.8 °C) (Temporal)   Ht 157.5 cm (62\")   Wt 77.9 kg (171 lb 12.8 oz)   SpO2 96%   BMI 31.42 kg/m²     BP Readings from Last 3 Encounters:   05/02/25 124/79   04/24/25 135/78   04/17/25 138/92     Wt Readings from Last 3 Encounters:   05/02/25 77.9 kg (171 lb 12.8 oz)   04/24/25 78 kg (172 lb)   04/17/25 77.1 kg (170 lb)           Physical Exam  Musculoskeletal:        Arms:           Appearance: No acute distress, well-nourished  Head: normocephalic, atraumatic  Eyes: extraocular movements intact, no scleral icterus, no conjunctival injection  Ears, Nose, and Throat: external ears normal, nares patent, moist mucous membranes  Cardiovascular: regular rate and rhythm. no murmurs, rubs, or gallops. no edema  Respiratory: breathing comfortably, symmetric chest rise, clear to auscultation bilaterally. No wheezes, rales, or rhonchi.  Neuro: alert and oriented to time, place, and person. Normal gait  Psych: normal mood and affect     Physical Exam        Result Review :   The following data was reviewed by: MIGNON Gaitan on 05/02/2025:  Common labs          2/4/2025    13:16 2/6/2025    15:47 4/24/2025    12:37   Common Labs   Glucose   84    BUN   14    Creatinine 0.80   0.76    Sodium   139    Potassium   4.0    Chloride   103    Calcium   9.3    Albumin  4.1  4.3    Total Bilirubin  0.3  0.5    Alkaline Phosphatase  125  121    AST (SGOT)  21  20    ALT (SGPT)  21  18    WBC   5.45    Hemoglobin   14.1    Hematocrit   43.2    Platelets   241    Total Cholesterol   186    Triglycerides  "  168    HDL Cholesterol   62    LDL Cholesterol    95        Results           Lab Results   Component Value Date    SARSANTIGEN Not Detected 11/14/2022    COVID19 Not Detected 11/14/2022    RAPFLUA Negative 11/14/2022    RAPFLUB Negative 11/14/2022    FLUAAG Not Detected 09/23/2021    FLU Negative 04/28/2022    FLU Negative 04/28/2022    FLUBAG Not Detected 09/23/2021    RAPSCRN Negative 11/14/2022    INR 0.98 11/05/2024    BILIRUBINUR Negative 01/28/2023            Assessment and Plan    Diagnoses and all orders for this visit:    1. Thoracic myofascial strain, initial encounter (Primary)  -     methylPREDNISolone sodium succinate (SOLU-Medrol) injection 125 mg  -     ketorolac (TORADOL) injection 60 mg        Assessment & Plan  1. Back pain.  - Reports difficulty lifting and taking out the trash, with pain now localized higher up the back.  - Robaxin has been ineffective in managing symptoms.  - Discussed the option of Toradol and a steroid injection for pain relief.  - Advised to apply a heating pad  If symptoms worsen or do not improve by next week, patient should contact the clinic.    There are no discontinued medications.       Follow Up   No follow-ups on file.  Patient was given instructions and counseling regarding her condition or for health maintenance advice. Please see specific information pulled into the AVS if appropriate.       MIGNON Gaitan  05/04/25  14:25 EDT      Patient or patient representative verbalized consent for the use of Ambient Listening during the visit with  MIGNON Gaitan for chart documentation. 5/4/2025  14:25 EDT

## 2025-05-04 PROBLEM — R10.11 RUQ PAIN: Status: RESOLVED | Noted: 2025-02-13 | Resolved: 2025-05-04

## 2025-05-05 ENCOUNTER — TREATMENT (OUTPATIENT)
Dept: PHYSICAL THERAPY | Facility: CLINIC | Age: 60
End: 2025-05-05
Payer: MEDICARE

## 2025-05-05 DIAGNOSIS — M25.532 LEFT WRIST PAIN: ICD-10-CM

## 2025-05-05 DIAGNOSIS — S62.172A CLOSED DISPLACED FRACTURE OF TRAPEZIUM OF LEFT WRIST, INITIAL ENCOUNTER: Primary | ICD-10-CM

## 2025-05-05 PROCEDURE — 97110 THERAPEUTIC EXERCISES: CPT | Performed by: PHYSICAL THERAPIST

## 2025-05-05 PROCEDURE — 97530 THERAPEUTIC ACTIVITIES: CPT | Performed by: PHYSICAL THERAPIST

## 2025-05-05 PROCEDURE — 97112 NEUROMUSCULAR REEDUCATION: CPT | Performed by: PHYSICAL THERAPIST

## 2025-05-05 NOTE — PROGRESS NOTES
Occupational Therapy Daily Treatment Note  [address] [place] [address]      Patient: Mariama Turner   : 1965  Referring practitioner: No ref. provider found  Date of Initial Visit: Type: THERAPY  Noted: 2024  Today's Date: 2025  Patient seen for 41 sessions         Subjective Evaluation    Pain  Current pain rating: 3  Location: L wrist         Mariama Turner reports: her wrist popped once and it got better.    Objective   See Exercise, Manual, and Modality Logs for complete treatment.   Pt had loud pop with dynamic stabilization with flexbar with quick burst of pain.    Assessment/Plan    Visit Diagnoses:    ICD-10-CM ICD-9-CM   1. Closed displaced fracture of trapezium of left wrist, initial encounter  S62.172A 814.05   2. Left wrist pain  M25.532 719.43       Continue per POC for pain control, desensitization, range of motion and strengthening to increase her functional use of her L UE.         Timed:  Manual Therapy:    0     mins  29583;  Therapeutic Exercise:    15     mins  92026;     Therapeutic Activity:    15     mins  61582;  Ultrasound:     0     mins  26464;    Electrical Stimulation:    0     mins 34982;  Neuromuscular Srinivas:    15    mins  07430;      Timed Treatment:   45   mins   Total Treatment:     45   min    ZENY Mendoza/L  Occupational Therapist    Electronically signed   License number [id number]

## 2025-05-08 ENCOUNTER — TREATMENT (OUTPATIENT)
Dept: PHYSICAL THERAPY | Facility: CLINIC | Age: 60
End: 2025-05-08
Payer: MEDICARE

## 2025-05-08 DIAGNOSIS — M25.532 LEFT WRIST PAIN: ICD-10-CM

## 2025-05-08 DIAGNOSIS — S62.172A CLOSED DISPLACED FRACTURE OF TRAPEZIUM OF LEFT WRIST, INITIAL ENCOUNTER: Primary | ICD-10-CM

## 2025-05-08 NOTE — PROGRESS NOTES
Occupational Therapy Daily Treatment Note  [address] [place] [address]      Patient: Mariama Turner   : 1965  Referring practitioner: Tal George MD  Date of Initial Visit: Type: THERAPY  Noted: 2024  Today's Date: 2025  Patient seen for 42 sessions         Subjective   Mariama Turner reports: the right thumb and ulnar side of L wrist is giving me most trouble like trying to light a candle with the right hand.    Objective   See Exercise, Manual, and Modality Logs for complete treatment.     Pt tolerated treatment well with increase in pinch resistance today.  Assessment/Plan    Visit Diagnoses:    ICD-10-CM ICD-9-CM   1. Closed displaced fracture of trapezium of left wrist, initial encounter  S62.172A 814.05   2. Left wrist pain  M25.532 719.43       Continue per POC for range of motion, desensitization, and strengthening to decrease pain and increase functional use of her L UE.         Timed:  Manual Therapy:    0     mins  03029;  Therapeutic Exercise:    8     mins  71132;     Therapeutic Activity:    8     mins  75034;  Ultrasound:     0     mins  47293;    Electrical Stimulation:    0     mins 38982;  Neuromuscular Srinivas:    24    mins  93753;      Timed Treatment:   40   mins   Total Treatment:    40   min    ZENY Mendoza/L  Occupational Therapist    Electronically signed   License number [id number]

## 2025-05-12 ENCOUNTER — TREATMENT (OUTPATIENT)
Dept: PHYSICAL THERAPY | Facility: CLINIC | Age: 60
End: 2025-05-12
Payer: MEDICARE

## 2025-05-12 DIAGNOSIS — M25.532 LEFT WRIST PAIN: ICD-10-CM

## 2025-05-12 DIAGNOSIS — S62.172A CLOSED DISPLACED FRACTURE OF TRAPEZIUM OF LEFT WRIST, INITIAL ENCOUNTER: Primary | ICD-10-CM

## 2025-05-12 PROCEDURE — 97112 NEUROMUSCULAR REEDUCATION: CPT | Performed by: PHYSICAL THERAPIST

## 2025-05-12 PROCEDURE — 97530 THERAPEUTIC ACTIVITIES: CPT | Performed by: PHYSICAL THERAPIST

## 2025-05-12 PROCEDURE — 97110 THERAPEUTIC EXERCISES: CPT | Performed by: PHYSICAL THERAPIST

## 2025-05-12 NOTE — PROGRESS NOTES
Occupational Therapy Daily Treatment Note  [address] [place] [address]      Patient: Mariama Turner   : 1965  Referring practitioner: Tal George MD  Date of Initial Visit: Type: THERAPY  Noted: 2024  Today's Date: 2025  Patient seen for 43 sessions         Subjective Evaluation    Pain  Current pain ratin  Location: L wrist       Mariama Turner reports: I was trying to do some typing and flip through some papers earlier so its bothering me more. It is still hard to flip through papers.    Objective   See Exercise, Manual, and Modality Logs for complete treatment.     Pt tolerated treatment well for range of motion and strengthening as well as desensitization.   Assessment/Plan    Visit Diagnoses:    ICD-10-CM ICD-9-CM   1. Closed displaced fracture of trapezium of left wrist, initial encounter  S62.172A 814.05   2. Left wrist pain  M25.532 719.43       Continue per POC for range of motion, strengthening, and desensitization to decrease her pain and increase her strength of her R UE.         Timed:  Manual Therapy:    0     mins  69987;  Therapeutic Exercise:    15     mins  58291;     Therapeutic Activity:    10     mins  93346;  Ultrasound:     0     mins  07365;    Electrical Stimulation:    0     mins 25286;  Neuromuscular Srinivas:    15    mins  34451;      Timed Treatment:   40   mins   Total Treatment:     40   min    ZENY Mendoza/L  Occupational Therapist    Electronically signed   License number [id number]

## 2025-05-16 ENCOUNTER — TELEPHONE (OUTPATIENT)
Dept: PHYSICAL THERAPY | Facility: CLINIC | Age: 60
End: 2025-05-16

## 2025-05-16 NOTE — TELEPHONE ENCOUNTER
Caller: Mariama Turner    Relationship:  Self    PATIENT CALLED REQUESTING TO CANCEL SAME DAY APPT.    Did the patient call AFTER the start time of their scheduled appointment?  []YES  [x]NO    Was the patient's appointment rescheduled? []YES  [x]NO    Any additional information: NOT FEELING WELL

## 2025-05-19 ENCOUNTER — TREATMENT (OUTPATIENT)
Dept: PHYSICAL THERAPY | Facility: CLINIC | Age: 60
End: 2025-05-19
Payer: MEDICARE

## 2025-05-19 DIAGNOSIS — M25.532 LEFT WRIST PAIN: ICD-10-CM

## 2025-05-19 DIAGNOSIS — S62.172A CLOSED DISPLACED FRACTURE OF TRAPEZIUM OF LEFT WRIST, INITIAL ENCOUNTER: Primary | ICD-10-CM

## 2025-05-19 PROCEDURE — 97110 THERAPEUTIC EXERCISES: CPT | Performed by: PHYSICAL THERAPIST

## 2025-05-19 PROCEDURE — 97112 NEUROMUSCULAR REEDUCATION: CPT | Performed by: PHYSICAL THERAPIST

## 2025-05-19 PROCEDURE — 97530 THERAPEUTIC ACTIVITIES: CPT | Performed by: PHYSICAL THERAPIST

## 2025-05-19 NOTE — PROGRESS NOTES
Reevaluation  [address] [place] [address]      Patient: Mariama Turner   : 1965  Referring practitioner: Tal George MD  Date of Initial Visit: Type: THERAPY  Noted: 2024  Today's Date: 2025  Patient seen for 44 sessions         Subjective Evaluation    Pain  Current pain ratin  At worst pain ratin  Location: L wrist         Mariama Turner reports: pain in her wrist when she moves a certain way. It is not as sharp as it used to be. I think it is getting more movement than what it did have.     Objective          Observations     Left Wrist/Hand   Positive for edema.       Tenderness     Left Wrist/Hand   Tenderness in the sixth dorsal compartment. No tenderness in the first dorsal compartment, second dorsal compartment, fifth dorsal compartment, carpometacarpal joint and triangular fibrocartilage complex .     Neurological Testing     Sensation     Wrist/Hand   Left   Intact: light touch    Additional Neurological Details  Tingling resolved    Active Range of Motion     Left Wrist   Wrist flexion: 65 degrees   Wrist extension: 45 degrees   Radial deviation: 10 degrees   Ulnar deviation: 25 degrees     Right Wrist   Wrist flexion: 75 degrees   Wrist extension: 55 degrees     Left Thumb   Flexion     MP: 50 degrees    DIP: 50 degrees    Additional Active Range of Motion Details  Full loose composite fist    Strength/Myotome Testing     Left Wrist/Hand      (2nd hand position)     Trial 1: 17 lbs    Trial 2: 19 lbs    Trial 3: 21 lbs    Average: 19 lbs    Right Wrist/Hand      (2nd hand position)     Trial 1: 30 lbs    Trial 2: 25 lbs    Trial 3: 30 lbs    Average: 28.33 lbs    Swelling     Left Wrist/Hand     Additional Swelling Details  Mild edema in L dorsal wrist      See Exercise, Manual, and Modality Logs for complete treatment.       Assessment & Plan       Assessment  Impairments: abnormal coordination, abnormal or restricted ROM, activity intolerance,  impaired physical strength and pain with function   Other impairment: difficulty pushing carts, out of chair  Functional limitations: carrying objects, lifting, pulling and pushing   Assessment details: Writing getting better than it was   Bathing independently now  styling hair easier  Shaving under arm getting easier  Prognosis: good    Goals  Plan Goals: 1. The patient complains of pain in the L wrist.                  LTG 1: 12 weeks:  The patient will report a pain rating of 1/10 or better in order to improve sleep quality and tolerance to performance of activities of daily living.                                  STATUS:  Not met                  STG 1a: 6 weeks:  The patient will report a pain rating of 5/10 or better at worst.                                   STATUS:   met  2. The patient has limited ROM of the L wrist.                  LTG 2: 12 weeks:  The patient will demonstrate 50 degrees of wrist flex/ext to allow the patient to wash her hair and back.                                  STATUS:    met                   STG 2a: 6 weeks:  The patient will demonstrate good tolerance to range of motion measurements to L wrist.                                  STATUS:  Met                             3. Carrying, Moving, and Handling Objects Functional Limitation                                    LTG 3: 12 weeks:  The patient will achieve a score of 15/55 on the Quick DASH.                                  STATUS:  Not met                  STG 3a: 6 weeks:  The patient will achieve a score of 30/55 on the Quick DASH.                                    STATUS:  Not met                        Plan  Planned modality interventions: TENS, thermotherapy (hydrocollator packs) and thermotherapy (paraffin bath)  Planned therapy interventions: manual therapy, functional ROM exercises, fine motor coordination training, flexibility, stretching, strengthening, home exercise program, neuromuscular re-education, soft  tissue mobilization and therapeutic activities  Frequency: 2x week  Duration in weeks: 12  Treatment plan discussed with: patient and family        Visit Diagnoses:    ICD-10-CM ICD-9-CM   1. Closed displaced fracture of trapezium of left wrist, initial encounter  S62.172A 814.05   2. Left wrist pain  M25.532 719.43                Timed:  Manual Therapy:    0     mins  70750;  Therapeutic Exercise:    15     mins  87377;     Therapeutic Activity:    15     mins  06758;  Ultrasound:     0     mins  39850;    Electrical Stimulation:    0     mins 01127;  Neuromuscular Srinivas:    15    mins  20198;      Timed Treatment:   45   mins   Total Treatment:     45   min    Adriana Mejia OTR/L  Occupational Therapist    Electronically signed   License number [id number]

## 2025-05-22 ENCOUNTER — TREATMENT (OUTPATIENT)
Dept: PHYSICAL THERAPY | Facility: CLINIC | Age: 60
End: 2025-05-22
Payer: MEDICARE

## 2025-05-22 DIAGNOSIS — S62.172A CLOSED DISPLACED FRACTURE OF TRAPEZIUM OF LEFT WRIST, INITIAL ENCOUNTER: Primary | ICD-10-CM

## 2025-05-22 DIAGNOSIS — M25.532 LEFT WRIST PAIN: ICD-10-CM

## 2025-05-22 NOTE — PROGRESS NOTES
Occupational Therapy Daily Treatment Note  [address] [place] [address]      Patient: Mariama Turner   : 1965  Referring practitioner: Tal George MD  Date of Initial Visit: Type: THERAPY  Noted: 2024  Today's Date: 2025  Patient seen for 45 sessions         Subjective Evaluation    Pain  At worst pain rating: 3  Location: L wrist         Mariama Turner reports: pushing the clothespin earlier hurt it. I do get a few pains in the wrist when I bend the wrist certain ways.    Objective   See Exercise, Manual, and Modality Logs for complete treatment.   Pt tolerated treatment well for range of motion, stress loading, light strengthening, and desensitization.    Assessment/Plan    Visit Diagnoses:    ICD-10-CM ICD-9-CM   1. Closed displaced fracture of trapezium of left wrist, initial encounter  S62.172A 814.05   2. Left wrist pain  M25.532 719.43       Continue per POC         Timed:  Manual Therapy:    0     mins  18532;  Therapeutic Exercise:    15     mins  31252;     Therapeutic Activity:    15     mins  97235;  Ultrasound:     0     mins  34236;    Electrical Stimulation:    0     mins 81021;  Neuromuscular Srinivas:    15    mins  56810;  Self Care Management:     0     mins  88333      Timed Treatment:   45   mins   Total Treatment:     45   min    ZENY Mendoza/L  Occupational Therapist    Electronically signed   License number [id number]

## 2025-05-27 ENCOUNTER — TREATMENT (OUTPATIENT)
Dept: PHYSICAL THERAPY | Facility: CLINIC | Age: 60
End: 2025-05-27
Payer: MEDICARE

## 2025-05-27 DIAGNOSIS — M25.532 LEFT WRIST PAIN: ICD-10-CM

## 2025-05-27 DIAGNOSIS — S62.172A CLOSED DISPLACED FRACTURE OF TRAPEZIUM OF LEFT WRIST, INITIAL ENCOUNTER: Primary | ICD-10-CM

## 2025-05-27 PROCEDURE — 97530 THERAPEUTIC ACTIVITIES: CPT | Performed by: PHYSICAL THERAPIST

## 2025-05-27 PROCEDURE — 97110 THERAPEUTIC EXERCISES: CPT | Performed by: PHYSICAL THERAPIST

## 2025-05-27 PROCEDURE — 97112 NEUROMUSCULAR REEDUCATION: CPT | Performed by: PHYSICAL THERAPIST

## 2025-05-27 NOTE — PROGRESS NOTES
Occupational Therapy Daily Treatment Note  [address] [place] [address]      Patient: Mariama Turner   : 1965  Referring practitioner: Tal George MD  Date of Initial Visit: Type: THERAPY  Noted: 2024  Today's Date: 2025  Patient seen for 46 sessions         Subjective   Mariama Turner reports: this weekend I was trying to fan the smoke alarm with a lid and every time I moved it back and forth it popped. I am using it at home. The last couple weeks I have felt like it is getting some relief.     Objective   See Exercise, Manual, and Modality Logs for complete treatment.   Pt tolerated treatment well for range of motion, light strengthening, and desensitization.    Assessment/Plan    Visit Diagnoses:    ICD-10-CM ICD-9-CM   1. Closed displaced fracture of trapezium of left wrist, initial encounter  S62.172A 814.05   2. Left wrist pain  M25.532 719.43       Continue per POC for pain control, range of motion, and strengthening to increase functional use of her L UE.          Timed:  Manual Therapy:    0     mins  99125;  Therapeutic Exercise:    15     mins  51890;     Therapeutic Activity:    10     mins  54105;  Ultrasound:     0     mins  61741;    Electrical Stimulation:    0     mins 16354;  Neuromuscular Srinivas:    15    mins  88717;  Self Care Management:     0     mins  11798        Timed Treatment:   40   mins   Total Treatment:     40   min    Adriana Mejia OTR/L  Occupational Therapist    Electronically signed   License number [id number]

## 2025-05-27 NOTE — PAT
Left message on voicemail with arrival time of  0930.    Bring picture ID and insurance card, have  over [address] to give ride home.     Bring medication list but do not take any meds except inhalers that morning. Bring medications with you to the hospital, including inhalers.     Complete prep and all liquid may need to drink after at least 2 hours prior to arrival. No gum, mints, water, or anything else in mouth after that.  Call [phone] with any questions.     Come to [facility], entrance C.

## 2025-05-28 NOTE — PAT
Left message on voicemail with arrival time of  0930.    Come to [facility], entrance C. Bring picture ID and insurance card, have  over 18 to give ride home.     Bring medication list but do not take any meds except inhalers that morning. Bring medications with you to the hospital, including inhalers.     Complete prep prior to arrival. Clear liquids all day the day before, and start prep as instructed.     Complete prep and any water may need to drink at least 2 hours prior to arrival. No gum, mints, water, or anything else in mouth after that.      Plan to be here at least 3-4 hours. Do not bring any valuables with you to the hospital.     Call [phone] with any questions.

## 2025-05-29 ENCOUNTER — TREATMENT (OUTPATIENT)
Dept: PHYSICAL THERAPY | Facility: CLINIC | Age: 60
End: 2025-05-29
Payer: MEDICARE

## 2025-05-29 DIAGNOSIS — M25.532 LEFT WRIST PAIN: ICD-10-CM

## 2025-05-29 DIAGNOSIS — S62.172A CLOSED DISPLACED FRACTURE OF TRAPEZIUM OF LEFT WRIST, INITIAL ENCOUNTER: Primary | ICD-10-CM

## 2025-05-29 NOTE — PROGRESS NOTES
Occupational Therapy Daily Treatment Note  [address] [place] [id number]      Patient: Mariama Turner   : 1965  Referring practitioner: Tal George MD  Date of Initial Visit: Type: THERAPY  Noted: 2024  Today's Date: 2025  Patient seen for 47 sessions         Subjective   Mariama Turner reports: nothing new today.    Objective   See Exercise, Manual, and Modality Logs for complete treatment.   Pt tolerated treatment well and was able to use 2 lb weight for resisted wrist 3 planes.    Assessment/Plan    Visit Diagnoses:    ICD-10-CM ICD-9-CM   1. Closed displaced fracture of trapezium of left wrist, initial encounter  S62.172A 814.05   2. Left wrist pain  M25.532 719.43       Continue per POC for strengthening and desensitization to increase her functional use of her L UE.         Timed:  Manual Therapy:    0     mins  15815;  Therapeutic Exercise:    15     mins  43363;     Therapeutic Activity:    10     mins  94229;  Ultrasound:     0     mins  31787;    Electrical Stimulation:    0     mins 12590;  Neuromuscular Srinivas:    15    mins  52365;  Self Care Management:     0     mins  67341      Timed Treatment:   40   mins   Total Treatment:     40   min    Adriana Mejia OTR/L  Occupational Therapist    Electronically signed   License number [id number]

## 2025-05-30 ENCOUNTER — ANESTHESIA EVENT (OUTPATIENT)
Dept: GASTROENTEROLOGY | Facility: HOSPITAL | Age: 60
End: 2025-05-30
Payer: MEDICARE

## 2025-05-30 NOTE — ANESTHESIA PREPROCEDURE EVALUATION
Anesthesia Evaluation     Patient summary reviewed and Nursing notes reviewed   NPO Solid Status: > 8 hours  NPO Liquid Status: > 2 hours           Airway   Mallampati: II  TM distance: >3 FB  Neck ROM: full  No difficulty expected  Dental - normal exam     Pulmonary     breath sounds clear to auscultation  (+) asthma,  Cardiovascular     ECG reviewed  Rhythm: regular  Rate: normal    (+) hypertension, valvular problems/murmurs MR, murmur, hyperlipidemia      Neuro/Psych  (+) headaches, psychiatric history Anxiety and ADD  GI/Hepatic/Renal/Endo    (+) obesity, PUD, renal disease- stones, thyroid problem hypothyroidism    Musculoskeletal     (+) myalgias  Abdominal    Substance History      OB/GYN          Other   arthritis,   history of cancer    ROS/Med Hx Other: RUQ pain, Screening, hx polyps     ECHO 10/17/24:   ·  Left ventricular ejection fraction appears to be 56 - 60%.  ·  Left ventricular wall thickness is consistent with septal asymmetric hypertrophy.  ·  Left ventricular diastolic function is consistent with (grade I) impaired relaxation and age.  ·  Stable, mild to moderate mitral regurgitation.     EKG 06/02/23: HR 69,   Sinus rhythm  Abnormal R-wave progression, early transition  Borderline T wave abnormalities  Prolonged QT interval    Cards clearance per Dr. Smith with acceptable risks on 04/23/25                     Anesthesia Plan    ASA 3     general   total IV anesthesia  (Total IV Anesthesia    Patient understands anesthesia not responsible for dental damage.      Discussed risks with pt including aspiration, allergic reactions, apnea, advanced airway placement. Pt verbalized understanding. All questions answered.     )  intravenous induction     Anesthetic plan, risks, benefits, and alternatives have been provided, discussed and informed consent has been obtained with: patient.    Plan discussed with CRNA.        CODE STATUS:

## 2025-06-02 ENCOUNTER — HOSPITAL ENCOUNTER (OUTPATIENT)
Facility: HOSPITAL | Age: 60
Setting detail: HOSPITAL OUTPATIENT SURGERY
Discharge: HOME | End: 2025-06-02
Attending: INTERNAL MEDICINE | Admitting: INTERNAL MEDICINE
Payer: MEDICARE

## 2025-06-02 ENCOUNTER — ANESTHESIA (OUTPATIENT)
Dept: GASTROENTEROLOGY | Facility: HOSPITAL | Age: 60
End: 2025-06-02
Payer: MEDICARE

## 2025-06-02 VITALS
TEMPERATURE: 97.2 F | OXYGEN SATURATION: 98 % | HEART RATE: 55 BPM | DIASTOLIC BLOOD PRESSURE: 68 MMHG | SYSTOLIC BLOOD PRESSURE: 137 MMHG | BODY MASS INDEX: 31.17 KG/M2 | WEIGHT: 170.42 LBS | RESPIRATION RATE: 17 BRPM

## 2025-06-02 DIAGNOSIS — R10.11 RUQ PAIN: ICD-10-CM

## 2025-06-02 DIAGNOSIS — Z12.11 SCREENING FOR COLON CANCER: ICD-10-CM

## 2025-06-02 DIAGNOSIS — Z86.0100 HISTORY OF COLON POLYPS: ICD-10-CM

## 2025-06-02 PROCEDURE — 45385 COLONOSCOPY W/LESION REMOVAL: CPT | Performed by: INTERNAL MEDICINE

## 2025-06-02 PROCEDURE — 25810000003 LACTATED RINGERS PER 1000 ML

## 2025-06-02 PROCEDURE — 25010000002 PROPOFOL 10 MG/ML EMULSION

## 2025-06-02 PROCEDURE — 88305 TISSUE EXAM BY PATHOLOGIST: CPT | Performed by: INTERNAL MEDICINE

## 2025-06-02 PROCEDURE — 25010000002 LIDOCAINE PF 2% 2 % SOLUTION

## 2025-06-02 PROCEDURE — 43239 EGD BIOPSY SINGLE/MULTIPLE: CPT | Performed by: INTERNAL MEDICINE

## 2025-06-02 RX ORDER — SODIUM CHLORIDE, SODIUM LACTATE, POTASSIUM CHLORIDE, CALCIUM CHLORIDE 600; 310; 30; 20 MG/100ML; MG/100ML; MG/100ML; MG/100ML
30 INJECTION, SOLUTION INTRAVENOUS CONTINUOUS
Status: DISCONTINUED | OUTPATIENT
Start: 2025-06-02 | End: 2025-06-02 | Stop reason: HOSPADM

## 2025-06-02 RX ORDER — LIDOCAINE HYDROCHLORIDE 20 MG/ML
INJECTION, SOLUTION EPIDURAL; INFILTRATION; INTRACAUDAL; PERINEURAL AS NEEDED
Status: DISCONTINUED | OUTPATIENT
Start: 2025-06-02 | End: 2025-06-02 | Stop reason: SURG

## 2025-06-02 RX ORDER — PANTOPRAZOLE SODIUM 40 MG/1
40 TABLET, DELAYED RELEASE ORAL DAILY
Qty: 30 TABLET | Refills: 5 | Status: SHIPPED | OUTPATIENT
Start: 2025-06-02

## 2025-06-02 RX ORDER — PROPOFOL 10 MG/ML
VIAL (ML) INTRAVENOUS AS NEEDED
Status: DISCONTINUED | OUTPATIENT
Start: 2025-06-02 | End: 2025-06-02 | Stop reason: SURG

## 2025-06-02 RX ADMIN — PROPOFOL 200 MCG/KG/MIN: 10 INJECTION, EMULSION INTRAVENOUS at 11:21

## 2025-06-02 RX ADMIN — SODIUM CHLORIDE, POTASSIUM CHLORIDE, SODIUM LACTATE AND CALCIUM CHLORIDE 30 ML/HR: 600; 310; 30; 20 INJECTION, SOLUTION INTRAVENOUS at 10:31

## 2025-06-02 RX ADMIN — PROPOFOL 100 MG: 10 INJECTION, EMULSION INTRAVENOUS at 11:21

## 2025-06-02 RX ADMIN — LIDOCAINE HYDROCHLORIDE 100 MG: 20 INJECTION, SOLUTION EPIDURAL; INFILTRATION; INTRACAUDAL; PERINEURAL at 11:21

## 2025-06-02 NOTE — H&P
Pre Procedure History & Physical    Chief Complaint:   Right upper quadrant abdominal pain, history of colon polyps    Subjective     HPI:   Right upper quadrant abdominal pain, past history of colon polyps    Past Medical History:   Past Medical History:   Diagnosis Date    Acute nonintractable headache 10/14/2021    Toradol IM given in the clinic.     Acute viral syndrome 10/14/2021    Call or RTC if symptoms persist.     Allergies     Anemia     Arthritis     Asthma     Bowel disease     Cervical cancer     Colitis     Difficulty walking     Fibromyalgia, primary     Gait disturbance     Gallstones     Gastric ulcer     Headache     Heart disease     Hyperlipemia     Hypertension     Kidney stones     Kidney stones 2021    Limb swelling     Osteoarthritis 2018    Of left thumb CMC joint    Reflux esophagitis     Seasonal allergies     TBI (traumatic brain injury)     2022       Past Surgical History:  Past Surgical History:   Procedure Laterality Date    ABDOMINAL HYSTERECTOMY       SECTION      CHOLECYSTECTOMY      COLONOSCOPY  ,,,    ENDOSCOPY  ,,    KIDNEY STONE SURGERY  2014    Right Ureteroscopy, Laser litho, stent insertion    RHINOPLASTY      TONSILLECTOMY         Family History:  Family History   Problem Relation Age of Onset    Heart disease Mother     Cancer Mother     Stroke Father     Diabetes Father     Hypertension Father     Colon cancer Paternal Aunt     Arthritis Maternal Grandmother        Social History:   reports that she has never smoked. She has never been exposed to tobacco smoke. She has never used smokeless tobacco. She reports current alcohol use. She reports that she does not use drugs.    Medications:   Medications Prior to Admission   Medication Sig Dispense Refill Last Dose/Taking    albuterol sulfate  (90 Base) MCG/ACT inhaler Inhale 2 puffs Every 4 (Four) Hours As Needed for Wheezing or Shortness of Air. 18 g 3      atenolol (TENORMIN) 25 MG tablet Take 1 tablet by mouth Daily. 90 tablet 3     cetirizine (zyrTEC) 10 MG tablet Take 1 tablet by mouth Daily. 90 tablet 1     estradiol (Kendra) 0.075 MG/24HR patch Place 1 patch on the skin as directed by provider 2 (Two) Times a Week. 24 patch 3     ezetimibe (ZETIA) 10 MG tablet Take 1 tablet by mouth Daily. 90 tablet 0     fluticasone (Flonase) 50 MCG/ACT nasal spray Administer 2 sprays into the nostril(s) as directed by provider Daily. 16 g 3     furosemide (LASIX) 40 MG tablet Take 1 tablet by mouth Daily. 90 tablet 0     levothyroxine (Synthroid) 88 MCG tablet Take 1 tablet by mouth Daily. 90 tablet 1     Magnesium 300 MG capsule Take 1 capsule by mouth Daily. 90 each 1     meclizine (ANTIVERT) 25 MG tablet Take 1 tablet by mouth 3 (Three) Times a Day As Needed for Dizziness. 90 tablet 1     meloxicam (MOBIC) 7.5 MG tablet Take 1 tablet by mouth Daily. 90 tablet 1     methocarbamol (ROBAXIN) 500 MG tablet Take 1 tablet by mouth 3 (Three) Times a Day As Needed for Muscle Spasms. 60 tablet 1     naproxen sodium (ALEVE) 220 MG tablet Take 2 tablets by mouth 3 (Three) Times a Day As Needed.       pitavastatin calcium (Livalo) 1 MG tablet tablet Take 1 tablet by mouth Daily. 90 tablet 3     polyethylene glycol (GoLYTELY) 236 g solution Mix solution.  Drink 2/3 of solution at 6:00 PM on the evening prior to procedure.  Drink remaining 1/3 of solution four hours prior to the scheduled arrival-time on the morning of the procedure. (Patient not taking: Reported on 5/2/2025) 4000 mL 0     potassium chloride 10 MEQ CR tablet Take 1 tablet by mouth 2 (Two) Times a Day. 180 tablet 0     Scopolamine (Transderm-Scop, 1.5 MG,) 1 MG/3DAYS patch Place 1 patch on the skin as directed by provider Every 72 (Seventy-Two) Hours. PRN 24 each 1     traMADol (ULTRAM) 50 MG tablet Take 1 tablet by mouth Every 8 (Eight) Hours As Needed for Moderate Pain. 60 tablet 2     valACYclovir (VALTREX) 1000 MG  tablet Take 1 tablet by mouth Daily. 30 tablet 0        Allergies:  Contrast dye (echo or unknown ct/mr), Iodine, Ceftriaxone, Iodinated contrast media, and Nurtec [rimegepant sulfate]        Objective     Blood pressure 148/53, pulse 52, temperature 98.3 °F (36.8 °C), temperature source Temporal, resp. rate 18, weight 77.3 kg (170 lb 6.7 oz), SpO2 100%.    Physical Exam   Constitutional: Pt is oriented to person, place, and time and well-developed, well-nourished, and in no distress.   Mouth/Throat: Oropharynx is clear and moist.   Neck: Normal range of motion.   Cardiovascular: Normal rate, regular rhythm and normal heart sounds.    Pulmonary/Chest: Effort normal and breath sounds normal.   Abdominal: Soft. Nontender  Skin: Skin is warm and dry.   Psychiatric: Mood, memory, affect and judgment normal.     Assessment & Plan     Diagnosis:  Abdominal pain and surveillance for colon polyps    Anticipated Surgical Procedure:  EGD and colonoscopy    The risks, benefits, and alternatives of this procedure have been discussed with the patient or the responsible party- the patient understands and agrees to proceed.

## 2025-06-02 NOTE — ANESTHESIA POSTPROCEDURE EVALUATION
Patient: Mariama Turner    Procedure Summary       Date: 06/02/25 Room / Location: [facility] ENDOSCOPY 4 / [facility] ENDOSCOPY    Anesthesia Start: 1117 Anesthesia Stop: 1152    Procedures:       ESOPHAGOGASTRODUODENOSCOPY WITH BIOPSIES      COLONOSCOPY WITH COLD SNARE POLYPECTOMIES Diagnosis:       RUQ pain      Screening for colon cancer      History of colon polyps      (RUQ pain [R10.11])      (Screening for colon cancer [Z12.11])      (History of colon polyps [Z86.0100])    Surgeons: Ruddy Nicolas MD Provider: Noe Stinson CRNA    Anesthesia Type: general ASA Status: 3            Anesthesia Type: general    Vitals  Vitals Value Taken Time   /68 06/02/25 12:07   Temp 36.2 °C (97.2 °F) 06/02/25 12:05   Pulse 52 06/02/25 12:10   Resp 17 06/02/25 12:05   SpO2 100 % 06/02/25 12:10   Vitals shown include unfiled device data.        Post Anesthesia Care and Evaluation    Patient location during evaluation: bedside  Patient participation: complete - patient participated  Level of consciousness: awake  Pain management: adequate    Airway patency: patent  Anesthetic complications: No anesthetic complications  PONV Status: controlled  Cardiovascular status: acceptable and stable  Respiratory status: acceptable

## 2025-06-03 ENCOUNTER — TREATMENT (OUTPATIENT)
Dept: PHYSICAL THERAPY | Facility: CLINIC | Age: 60
End: 2025-06-03
Payer: MEDICARE

## 2025-06-03 DIAGNOSIS — M25.532 LEFT WRIST PAIN: ICD-10-CM

## 2025-06-03 DIAGNOSIS — S62.172A CLOSED DISPLACED FRACTURE OF TRAPEZIUM OF LEFT WRIST, INITIAL ENCOUNTER: Primary | ICD-10-CM

## 2025-06-03 LAB
CYTO UR: NORMAL
LAB AP CASE REPORT: NORMAL
LAB AP CLINICAL INFORMATION: NORMAL
PATH REPORT.FINAL DX SPEC: NORMAL
PATH REPORT.GROSS SPEC: NORMAL

## 2025-06-03 PROCEDURE — 97112 NEUROMUSCULAR REEDUCATION: CPT | Performed by: PHYSICAL THERAPIST

## 2025-06-03 PROCEDURE — 97530 THERAPEUTIC ACTIVITIES: CPT | Performed by: PHYSICAL THERAPIST

## 2025-06-03 PROCEDURE — 97110 THERAPEUTIC EXERCISES: CPT | Performed by: PHYSICAL THERAPIST

## 2025-06-03 NOTE — PROGRESS NOTES
Occupational Therapy Daily Treatment Note  [address] [place] [address]      Patient: Mariama Turner   : 1965  Referring practitioner: Tal George MD  Date of Initial Visit: Type: THERAPY  Noted: 2024  Today's Date: 6/3/2025  Patient seen for 48 sessions         Subjective   Mariama Turner reports: I had to get off my Mobic for few days and I can feel it.    Objective          Observations     Left Wrist/Hand   Positive for edema.       Tenderness     Left Wrist/Hand   Tenderness in the sixth dorsal compartment. No tenderness in the first dorsal compartment, second dorsal compartment, fifth dorsal compartment, carpometacarpal joint and triangular fibrocartilage complex .     Neurological Testing     Sensation     Wrist/Hand   Left   Intact: light touch    Additional Neurological Details  Tingling resolved    Active Range of Motion     Left Wrist   Wrist flexion: 65 degrees   Wrist extension: 45 degrees   Radial deviation: 10 degrees   Ulnar deviation: 25 degrees     Right Wrist   Wrist flexion: 75 degrees   Wrist extension: 55 degrees     Left Thumb   Flexion     MP: 50 degrees    DIP: 50 degrees    Additional Active Range of Motion Details  Full loose composite fist    Strength/Myotome Testing     Left Wrist/Hand      (2nd hand position)     Trial 1: 20 lbs    Trial 2: 19 lbs    Trial 3: 20 lbs    Average: 19.67 lbs    Right Wrist/Hand      (2nd hand position)     Trial 1: 30 lbs    Trial 2: 25 lbs    Trial 3: 30 lbs    Average: 28.33 lbs    Swelling     Left Wrist/Hand     Additional Swelling Details  Mild edema in L dorsal wrist      See Exercise, Manual, and Modality Logs for complete treatment.   Pt tolerated treatment well.    Assessment/Plan    Visit Diagnoses:    ICD-10-CM ICD-9-CM   1. Closed displaced fracture of trapezium of left wrist, initial encounter  S62.172A 814.05   2. Left wrist pain  M25.532 719.43       Continue per POC for range of motion,  strengthening, desensitization to increase her functional use of her L UE.         Timed:  Manual Therapy:    0     mins  24248;  Therapeutic Exercise:    15     mins  97012;     Therapeutic Activity:    10     mins  20991;  Ultrasound:     0     mins  03993;    Electrical Stimulation:    0     mins 85728;  Neuromuscular Srinivas:    15    mins  21712;  Self Care Management:     0     mins  49632      Timed Treatment:   40   mins   Total Treatment:     40   min    Adriana Mejia OTR/L  Occupational Therapist    Electronically signed   License number [id number]

## 2025-06-05 ENCOUNTER — TREATMENT (OUTPATIENT)
Dept: PHYSICAL THERAPY | Facility: CLINIC | Age: 60
End: 2025-06-05
Payer: MEDICARE

## 2025-06-05 DIAGNOSIS — S62.172A CLOSED DISPLACED FRACTURE OF TRAPEZIUM OF LEFT WRIST, INITIAL ENCOUNTER: Primary | ICD-10-CM

## 2025-06-05 DIAGNOSIS — M25.532 LEFT WRIST PAIN: ICD-10-CM

## 2025-06-05 NOTE — PROGRESS NOTES
Occupational Therapy Daily Treatment Note  [address] [place] [address]      Patient: Mariama Turner   : 1965  Referring practitioner: Tal George MD  Date of Initial Visit: Type: THERAPY  Noted: 2024  Today's Date: 2025  Patient seen for 49 sessions         Subjective   Mariama Turner reports: if I move the left wrist a certain way it pops and hurts. The doctor took me off my mobic and I the right thumb     Objective   See Exercise, Manual, and Modality Logs for complete treatment.     Pt did report popping in her ulnar wrist with RD/UD. Pt performed exercises for dynamic stabilization, desensitization, and strengthening.  Assessment/Plan    Visit Diagnoses:    ICD-10-CM ICD-9-CM   1. Closed displaced fracture of trapezium of left wrist, initial encounter  S62.172A 814.05   2. Left wrist pain  M25.532 719.43       Pt can benefit from continued skilled Occupational Therapy to increase her strength, decrease her pain to increase her functional use of her L hand. Continue per POC         Timed:  Manual Therapy:    0     mins  55063;  Therapeutic Exercise:    15     mins  78292;     Therapeutic Activity:    15     mins  90827;  Ultrasound:     0     mins  71346;    Electrical Stimulation:    0     mins 71081;  Neuromuscular Srinivas:    15    mins  43902;  Self Care Management:     0     mins  73548      Timed Treatment:   45   mins   Total Treatment:     45   min    Adriana Mejia OTR/L  Occupational Therapist    Electronically signed   License number [id number]

## 2025-06-09 ENCOUNTER — TREATMENT (OUTPATIENT)
Dept: PHYSICAL THERAPY | Facility: CLINIC | Age: 60
End: 2025-06-09
Payer: MEDICARE

## 2025-06-09 DIAGNOSIS — S62.172A CLOSED DISPLACED FRACTURE OF TRAPEZIUM OF LEFT WRIST, INITIAL ENCOUNTER: Primary | ICD-10-CM

## 2025-06-09 DIAGNOSIS — M25.532 LEFT WRIST PAIN: ICD-10-CM

## 2025-06-09 PROCEDURE — 97530 THERAPEUTIC ACTIVITIES: CPT | Performed by: PHYSICAL THERAPIST

## 2025-06-09 PROCEDURE — 97110 THERAPEUTIC EXERCISES: CPT | Performed by: PHYSICAL THERAPIST

## 2025-06-09 PROCEDURE — 97112 NEUROMUSCULAR REEDUCATION: CPT | Performed by: PHYSICAL THERAPIST

## 2025-06-09 NOTE — PROGRESS NOTES
Occupational Therapy Daily Treatment Note  [address] [place] [address]      Patient: Mariama Turner   : 1965  Referring practitioner: Tal George MD  Date of Initial Visit: Type: THERAPY  Noted: 2024  Today's Date: 2025  Patient seen for 50 sessions         Subjective Evaluation    Pain  Current pain ratin  At worst pain ratin  Location: L wrist         Mariama Turner reports: I was doing something this morning and got the sharp pain if I move a certain way.    Objective   See Exercise, Manual, and Modality Logs for complete treatment.   Pt tolerated treatment well for desensitization, range of motion, dynamic stabilization, and strengthening exercises.     Assessment/Plan    Visit Diagnoses:    ICD-10-CM ICD-9-CM   1. Closed displaced fracture of trapezium of left wrist, initial encounter  S62.172A 814.05   2. Left wrist pain  M25.532 719.43       Continue per POC for strengthening, desensitization, and dynamic stabilization to increase her functional use of her L hand.         Timed:  Manual Therapy:    0     mins  03464;  Therapeutic Exercise:    15     mins  69793;     Therapeutic Activity:    15    mins  28902;  Ultrasound:     0     mins  92355;    Electrical Stimulation:    0     mins 90898;  Neuromuscular Srinivas:    15    mins  35400;  Self Care Management:     0     mins  64431      Timed Treatment:   45   mins   Total Treatment:     45   min    ZENY Mendoza/L  Occupational Therapist    Electronically signed   License number [id number]

## 2025-06-12 ENCOUNTER — TREATMENT (OUTPATIENT)
Dept: PHYSICAL THERAPY | Facility: CLINIC | Age: 60
End: 2025-06-12
Payer: MEDICARE

## 2025-06-12 DIAGNOSIS — S62.172A CLOSED DISPLACED FRACTURE OF TRAPEZIUM OF LEFT WRIST, INITIAL ENCOUNTER: Primary | ICD-10-CM

## 2025-06-12 DIAGNOSIS — M25.532 LEFT WRIST PAIN: ICD-10-CM

## 2025-06-12 NOTE — PROGRESS NOTES
Occupational Therapy Daily Treatment Note  [address] [place] [address]      Patient: Mariama Turner   : 1965  Referring practitioner: Tal George MD  Date of Initial Visit: Type: THERAPY  Noted: 2024  Today's Date: 2025  Patient seen for 51 sessions         Subjective   Mariama Turner reports: pain still with certain movements. I can lift, but I can't push and pulling is not good either. I can get the door open now, it is not fun, but I can do it.    Objective          Observations     Left Wrist/Hand   Positive for edema.       Tenderness     Left Wrist/Hand   Tenderness in the sixth dorsal compartment. No tenderness in the first dorsal compartment, second dorsal compartment, fifth dorsal compartment, carpometacarpal joint and triangular fibrocartilage complex .     Neurological Testing     Sensation     Wrist/Hand   Left   Intact: light touch    Additional Neurological Details  Tingling resolved    Active Range of Motion     Left Wrist   Wrist flexion: 65 degrees   Wrist extension: 50 degrees   Radial deviation: 10 degrees   Ulnar deviation: 25 degrees     Right Wrist   Wrist flexion: 75 degrees   Wrist extension: 55 degrees     Left Thumb   Flexion     MP: 50 degrees    DIP: 50 degrees    Additional Active Range of Motion Details  Full loose composite fist    Strength/Myotome Testing     Left Wrist/Hand   Wrist extension: 4-  Wrist flexion: 4+     (2nd hand position)     Trial 1: 20 lbs    Trial 2: 24 lbs    Trial 3: 27 lbs    Average: 23.67 lbs    Right Wrist/Hand      (2nd hand position)     Trial 1: 34 lbs    Trial 2: 36 lbs    Trial 3: 36 lbs    Average: 35.33 lbs    Swelling     Left Wrist/Hand     Additional Swelling Details  Mild edema in L dorsal wrist      See Exercise, Manual, and Modality Logs for complete treatment.     Pushing and pulling added today with good tolerance.  Assessment/Plan    Visit Diagnoses:    ICD-10-CM ICD-9-CM   1. Closed displaced  fracture of trapezium of left wrist, initial encounter  S62.172A 814.05   2. Left wrist pain  M25.532 719.43       Continue per POC for strengthening, desensitization, and dynamic stabilization to increase her functional use of her L UE.         Timed:  Manual Therapy:    0     mins  88030;  Therapeutic Exercise:    15     mins  03545;     Therapeutic Activity:    15     mins  75484;  Ultrasound:     0     mins  67173;    Electrical Stimulation:    0     mins 32359;  Neuromuscular Srinivas:    24    mins  48253;  Self Care Management:     0     mins  11441        Timed Treatment:   54   mins   Total Treatment:     54   min    Adriana Mejia OTR/L  Occupational Therapist    Electronically signed   License number [id number]

## 2025-06-16 ENCOUNTER — TREATMENT (OUTPATIENT)
Dept: PHYSICAL THERAPY | Facility: CLINIC | Age: 60
End: 2025-06-16
Payer: MEDICARE

## 2025-06-16 DIAGNOSIS — M25.532 LEFT WRIST PAIN: ICD-10-CM

## 2025-06-16 DIAGNOSIS — S62.172A CLOSED DISPLACED FRACTURE OF TRAPEZIUM OF LEFT WRIST, INITIAL ENCOUNTER: Primary | ICD-10-CM

## 2025-06-16 PROCEDURE — 97110 THERAPEUTIC EXERCISES: CPT | Performed by: PHYSICAL THERAPIST

## 2025-06-16 PROCEDURE — 97112 NEUROMUSCULAR REEDUCATION: CPT | Performed by: PHYSICAL THERAPIST

## 2025-06-16 PROCEDURE — 97530 THERAPEUTIC ACTIVITIES: CPT | Performed by: PHYSICAL THERAPIST

## 2025-06-16 NOTE — PROGRESS NOTES
Occupational Therapy Daily Treatment Note  [address] [place] [address]      Patient: Mariama Turner   : 1965  Referring practitioner: Tal George MD  Date of Initial Visit: Type: THERAPY  Noted: 2024  Today's Date: 2025  Patient seen for 52 sessions         Subjective Evaluation    Pain  Current pain ratin  At worst pain ratin  Location: L wrist       Mariama Turner reports: I went to the gym yesterday and used the machines like I did here, but it is sore today. I did get a big pop when I move it a certain way that really hurts.    Objective   See Exercise, Manual, and Modality Logs for complete treatment.     Pt tolerated treatment well with increased reps for strengthening.  Assessment/Plan    Visit Diagnoses:    ICD-10-CM ICD-9-CM   1. Closed displaced fracture of trapezium of left wrist, initial encounter  S62.172A 814.05   2. Left wrist pain  M25.532 719.43       Continue per POC for desensitization and strengthening of her L UE to increase functional use.         Timed:  Manual Therapy:    0     mins  28913;  Therapeutic Exercise:    10     mins  73001;     Therapeutic Activity:    10     mins  14442;  Ultrasound:     0     mins  38429;    Electrical Stimulation:    0     mins 82132;  Neuromuscular Srinivas:    20    mins  90647;  Self Care Management:     0     mins  18665      Timed Treatment:   40   mins   Total Treatment:     40   min    Adriana Mejia OTR/L  Occupational Therapist    Electronically signed   License number [id number]

## 2025-06-19 ENCOUNTER — TREATMENT (OUTPATIENT)
Dept: PHYSICAL THERAPY | Facility: CLINIC | Age: 60
End: 2025-06-19
Payer: MEDICARE

## 2025-06-19 DIAGNOSIS — S62.172A CLOSED DISPLACED FRACTURE OF TRAPEZIUM OF LEFT WRIST, INITIAL ENCOUNTER: Primary | ICD-10-CM

## 2025-06-19 DIAGNOSIS — M25.532 LEFT WRIST PAIN: ICD-10-CM

## 2025-06-19 NOTE — PROGRESS NOTES
Progress Note  [address] [place] [address]      Patient: Mariama Turner   : 1965  Referring practitioner: Tal George MD  Date of Initial Visit: Type: THERAPY  Noted: 2024  Today's Date: 2025  Patient seen for 53 sessions         Subjective Evaluation    Pain  Current pain ratin  At worst pain ratin  Location: L wrist       Mariama Turner reports: the hardest thing is tying shoes and using scissors. I have been sore, but I think because I have been trying to get the strength back.    Objective          Observations     Left Wrist/Hand   Positive for edema.       Tenderness     Left Wrist/Hand   Tenderness in the sixth dorsal compartment. No tenderness in the first dorsal compartment, second dorsal compartment, fifth dorsal compartment, carpometacarpal joint and triangular fibrocartilage complex .     Neurological Testing     Sensation     Wrist/Hand   Left   Intact: light touch    Additional Neurological Details  Tingling resolved    Active Range of Motion     Left Wrist   Wrist flexion: 60 degrees   Wrist extension: 50 degrees   Radial deviation: 10 degrees   Ulnar deviation: 25 degrees     Right Wrist   Wrist flexion: 75 degrees   Wrist extension: 55 degrees     Left Thumb   Flexion     MP: 50 degrees    DIP: 50 degrees    Additional Active Range of Motion Details  Full loose composite fist    Strength/Myotome Testing     Left Wrist/Hand   Wrist extension: 4  Wrist flexion: 4+     (2nd hand position)     Trial 1: 28 lbs    Trial 2: 30 lbs    Trial 3: 30 lbs    Average: 29.33 lbs    Right Wrist/Hand      (2nd hand position)     Trial 1: 37 lbs    Trial 2: 37 lbs    Trial 3: 39 lbs    Average: 37.67 lbs    Swelling     Left Wrist/Hand     Additional Swelling Details  Mild edema in L dorsal wrist      See Exercise, Manual, and Modality Logs for complete treatment.       Assessment & Plan       Assessment  Impairments: abnormal coordination, abnormal or  restricted ROM, activity intolerance, impaired physical strength and pain with function   Other impairment: difficulty pushing carts, out of chair, tying shoes  Functional limitations: carrying objects, lifting, pulling and pushing   Assessment details: Writing getting better than it was   Bathing independently now  styling hair easier  Shaving under arm getting easier  Using L elbow to push up out of chair  Prognosis: good    Goals  Plan Goals: 1. The patient complains of pain in the L wrist.                  LTG 1: 12 weeks:  The patient will report a pain rating of 1/10 or better in order to improve sleep quality and tolerance to performance of activities of daily living.                                  STATUS:  Not met                  STG 1a: 6 weeks:  The patient will report a pain rating of 5/10 or better at worst.                                   STATUS:   met  2. The patient has limited ROM of the L wrist.                  LTG 2: 12 weeks:  The patient will demonstrate 50 degrees of wrist flex/ext to allow the patient to wash her hair and back.                                  STATUS:    met                   STG 2a: 6 weeks:  The patient will demonstrate good tolerance to range of motion measurements to L wrist.                                  STATUS:  Met                             3. Carrying, Moving, and Handling Objects Functional Limitation                                    LTG 3: 12 weeks:  The patient will achieve a score of 15/55 on the Quick DASH.                                  STATUS:  Not met                  STG 3a: 6 weeks:  The patient will achieve a score of 30/55 on the Quick DASH.                                    STATUS:  Not met                        Plan  Planned modality interventions: TENS, thermotherapy (hydrocollator packs) and thermotherapy (paraffin bath)  Planned therapy interventions: manual therapy, functional ROM exercises, fine motor coordination training,  flexibility, stretching, strengthening, home exercise program, neuromuscular re-education, soft tissue mobilization and therapeutic activities  Frequency: 2x week  Duration in weeks: 8  Treatment plan discussed with: patient and family      Visit Diagnoses:    ICD-10-CM ICD-9-CM   1. Closed displaced fracture of trapezium of left wrist, initial encounter  S62.172A 814.05   2. Left wrist pain  M25.532 719.43              Timed:  Manual Therapy:    0     mins  94860;  Therapeutic Exercise:    15     mins  22101;     Therapeutic Activity:    10     mins  01177;  Ultrasound:     0     mins  49027;    Electrical Stimulation:    0     mins 02666;  Neuromuscular Srinivas:    15    mins  24506;  Self Care Management:     0     mins  45747      Timed Treatment:   40   mins   Total Treatment:     40   min    Adriana Mejia OTR/L  Occupational Therapist    Electronically signed   License number [id number]

## 2025-06-23 ENCOUNTER — TELEPHONE (OUTPATIENT)
Dept: NEUROLOGY | Facility: CLINIC | Age: 60
End: 2025-06-23
Payer: MEDICARE

## 2025-06-23 ENCOUNTER — TREATMENT (OUTPATIENT)
Dept: PHYSICAL THERAPY | Facility: CLINIC | Age: 60
End: 2025-06-23
Payer: MEDICARE

## 2025-06-23 DIAGNOSIS — S62.172A CLOSED DISPLACED FRACTURE OF TRAPEZIUM OF LEFT WRIST, INITIAL ENCOUNTER: Primary | ICD-10-CM

## 2025-06-23 DIAGNOSIS — M25.532 LEFT WRIST PAIN: ICD-10-CM

## 2025-06-23 PROCEDURE — 97112 NEUROMUSCULAR REEDUCATION: CPT | Performed by: PHYSICAL THERAPIST

## 2025-06-23 PROCEDURE — 97530 THERAPEUTIC ACTIVITIES: CPT | Performed by: PHYSICAL THERAPIST

## 2025-06-23 PROCEDURE — 97110 THERAPEUTIC EXERCISES: CPT | Performed by: PHYSICAL THERAPIST

## 2025-06-23 NOTE — TELEPHONE ENCOUNTER
Spoke to Pt to r/s appt on 7/17 due to Dr Rawls being out of office. Pt agreed to r/s for 7/10 @ 12:20.

## 2025-06-23 NOTE — PROGRESS NOTES
Occupational Therapy Daily Treatment Note  [address] [place] [address]      Patient: Mariama Turner   : 1965  Referring practitioner: Tal George MD  Date of Initial Visit: Type: THERAPY  Noted: 2024  Today's Date: 2025  Patient seen for 54 sessions         Subjective   Mariama Turner reports: it hurts when I am doing my hair or things like that.     Objective   See Exercise, Manual, and Modality Logs for complete treatment.   Pt tolerated treatment well for strengthening, dynamic stabilization, and desensitization.     Assessment/Plan    Visit Diagnoses:    ICD-10-CM ICD-9-CM   1. Closed displaced fracture of trapezium of left wrist, initial encounter  S62.172A 814.05   2. Left wrist pain  M25.532 719.43       Continue per POC to increase her strength and functional use of her L UE.         Timed:  Manual Therapy:    0     mins  99602;  Therapeutic Exercise:    20     mins  50317;     Therapeutic Activity:    10     mins  74265;  Ultrasound:     0     mins  19503;    Electrical Stimulation:    0     mins 35981;  Neuromuscular Srinivas:    24    mins  81145;  Self Care Management:     0     mins  10882      Timed Treatment:   54   mins   Total Treatment:     54   min    Adriana Mejia OTR/L  Occupational Therapist    Electronically signed   License number [id number]

## 2025-06-26 ENCOUNTER — TREATMENT (OUTPATIENT)
Dept: PHYSICAL THERAPY | Facility: CLINIC | Age: 60
End: 2025-06-26
Payer: MEDICARE

## 2025-06-26 ENCOUNTER — OFFICE VISIT (OUTPATIENT)
Dept: SLEEP MEDICINE | Facility: HOSPITAL | Age: 60
End: 2025-06-26
Payer: MEDICARE

## 2025-06-26 VITALS
HEIGHT: 62 IN | HEART RATE: 50 BPM | SYSTOLIC BLOOD PRESSURE: 115 MMHG | WEIGHT: 168.1 LBS | BODY MASS INDEX: 30.93 KG/M2 | OXYGEN SATURATION: 99 % | DIASTOLIC BLOOD PRESSURE: 70 MMHG

## 2025-06-26 DIAGNOSIS — G47.19 EXCESSIVE DAYTIME SLEEPINESS: Primary | ICD-10-CM

## 2025-06-26 DIAGNOSIS — M25.532 LEFT WRIST PAIN: ICD-10-CM

## 2025-06-26 DIAGNOSIS — S62.172A CLOSED DISPLACED FRACTURE OF TRAPEZIUM OF LEFT WRIST, INITIAL ENCOUNTER: Primary | ICD-10-CM

## 2025-06-26 DIAGNOSIS — R06.83 SNORING: ICD-10-CM

## 2025-06-26 PROCEDURE — 99204 OFFICE O/P NEW MOD 45 MIN: CPT | Performed by: STUDENT IN AN ORGANIZED HEALTH CARE EDUCATION/TRAINING PROGRAM

## 2025-06-26 PROCEDURE — 3074F SYST BP LT 130 MM HG: CPT | Performed by: STUDENT IN AN ORGANIZED HEALTH CARE EDUCATION/TRAINING PROGRAM

## 2025-06-26 PROCEDURE — 1159F MED LIST DOCD IN RCRD: CPT | Performed by: STUDENT IN AN ORGANIZED HEALTH CARE EDUCATION/TRAINING PROGRAM

## 2025-06-26 PROCEDURE — 3078F DIAST BP <80 MM HG: CPT | Performed by: STUDENT IN AN ORGANIZED HEALTH CARE EDUCATION/TRAINING PROGRAM

## 2025-06-26 PROCEDURE — 1160F RVW MEDS BY RX/DR IN RCRD: CPT | Performed by: STUDENT IN AN ORGANIZED HEALTH CARE EDUCATION/TRAINING PROGRAM

## 2025-06-26 PROCEDURE — G0463 HOSPITAL OUTPT CLINIC VISIT: HCPCS

## 2025-06-26 NOTE — PROGRESS NOTES
Occupational Therapy Daily Treatment Note  [address] [place] [address]      Patient: Mariama Turner   : 1965  Referring practitioner: Tal George MD  Date of Initial Visit: Type: THERAPY  Noted: 2024  Today's Date: 2025  Patient seen for 55 sessions         Subjective   Mariama Turner reports: some days it seems worse than others. I been trying to go to the gym and do the chest press and if I do, it hurts at night.    Objective   See Exercise, Manual, and Modality Logs for complete treatment.   Pt tolerated treatment well for dynamic stabilization, strengthening and desensitization.    Assessment/Plan    Visit Diagnoses:    ICD-10-CM ICD-9-CM   1. Closed displaced fracture of trapezium of left wrist, initial encounter  S62.172A 814.05   2. Left wrist pain  M25.532 719.43       Continue per POC to increase functional use of her L UE.         Timed:  Manual Therapy:    0     mins  36750;  Therapeutic Exercise:    15     mins  75785;     Therapeutic Activity:    10     mins  08206;  Ultrasound:     0     mins  42852;    Electrical Stimulation:    0     mins 08851;  Neuromuscular Srinivas:    15    mins  14909;  Self Care Management:     0     mins  02258    Timed Treatment:   40   mins   Total Treatment:     40   min    ZENY Mendoza/L  Occupational Therapist    Electronically signed   License number [id number]

## 2025-06-26 NOTE — PROGRESS NOTES
[facility]   [address]  [place] [address]  [phone]     Referring physician/provider: Ame Xie A*   PCP: Ame Xie APRN    Type of service: Initial Sleep Medicine Consult.  Date of service: 6/26/2025        Sleep Clinic Initial Consult Visit      Patient or patient representative verbalized consent for the use of Ambient Listening during the visit with  Cleveland Kearns DO for chart documentation. 6/26/2025  11:56 EDT    HISTORY OF PRESENT ILLNESS  Chief Complaint: snoring, daytime sleepiness  Mariama Turner is a 59 y.o. female that was seen today, on 6/26/2025 at [facility].  History of Present Illness  The patient presents for evaluation of sleepiness. She is accompanied by her .  She has been experiencing daytime sleepiness for several years, a condition that has been exacerbated since her fall in 2022. Her  reports that she occasionally snores loudly, but he is unsure if she experiences apneic episodes during sleep. She does not believe her snoring disrupts her sleep.  She has never undergone a sleep study. Her bedtime varies, typically around 11:00 PM, but can be as early as 9:00 PM. She avoids napping during the day to ensure a full night's sleep. However, she often falls asleep when sitting idle. She wakes up approximately three times per night, but usually returns to sleep without difficulty.  She gets up for the day usually between 8 to 9 AM.  Her sleep schedule remains consistent on weekends.  She does not experience drowsiness while driving. She has a history of sleepwalking as a teenager, but this has not occurred in recent years. She also reports occasional restless legs at night happening a few days out of the month.    She has back pain but rarely uses tramadol for this.   She has seen neurology with last visit in April 2025 and has cognitive impairment and history of  "post concussive syndrome. MRI brain 2022 showed no significant abnormality.       Medical history  Cognitive impairment  Postconcussive syndrome  ADD  Mitral valve regurgitation  Hypothyroidism  Hypertension      SOCIAL HISTORY  She does not smoke or drink alcohol.  She drinks tea sometimes.     FAMILY HISTORY  Her sisters have obstructive sleep apnea.            History of Sleep Study before: None  ESS today:16  Occupation: disabled    Symptoms:   Have you ever awakened gasping for breath, coughing, choking:  []   Yes     [x]   No   Witnessed apneas: []   Yes     [x]   No   Loud Snoring: [x]   Yes     []   No   Do you drive a commercial vehicle:  []   Yes     [x]   No   History of any near accidents while driving due to sleepiness in the past 5 years: []   Yes     [x]   No         ROS:    Negative for:  Symptoms consistent with the clinical diagnosis of Restless legs syndrome  Symptoms consistent with the clinical diagnosis of Cataplexy   Sleep walking   See scanned media document for other sleep related questions      Allergies: Contrast dye (echo or unknown ct/mr), Iodine, Ceftriaxone, Iodinated contrast media, and Nurtec [rimegepant sulfate]       Objective   Vital Signs:   Vitals:    06/26/25 1100   BP: 115/70   Pulse: 50   SpO2: 99%   Weight: 76.2 kg (168 lb 1.6 oz)   Height: 157.5 cm (62\")   PainSc: 4      Body mass index is 30.75 kg/m².      PHYSICAL EXAM  CONSTITUTIONAL:  Non-toxic, In no overt distress   ENT: Mallampati class 2  NECK:Neck Circumference: 12.5 inches  RESPIRATORY SYSTEM: Breathing appears nonlabored, no wheezes or rales  CARDIOVASULAR SYSTEM: Regular rate and rhythm, no murmurs  NEUROLOGICAL SYSTEM: answers questions appropriately      Result Review   The following data was reviewed by: Cleveland Kearns DO on 06/26/2025:  [x]  Medications reviewed        ASSESSMENT/PLAN  Diagnoses and all orders for this visit:    1. Excessive daytime sleepiness (Primary)  -     Polysomnography 4 or More " Parameters; Future    2. Snoring  -     Polysomnography 4 or More Parameters; Future    She has intermittent loud snoring and daytime sleepiness and has hypertension and family history of sleep apnea.  In lab diagnostic psg ordered for daytime sleepiness.    Obesity class I, patient's BMI is Body mass index is 30.75 kg/m².. I have discussed the relationship between weight and sleep apnea.There is direct correlation between weight and severity of sleep apnea.       I have also discussed with the patient the following  Untreated HARIS is associated with increased risks of stroke, heart attack, heart failure, motor vehicle accidents.   Recommended no driving or operating machinery if feeling sleepy. Discussed options of taking naps and getting rides with other people.   Generally most people need about 7 to 9 hours of sleep per night.       FOLLOW UP  No follow-ups on file.  Patient was given instructions and counseling regarding her condition or for health maintenance advice. Please see specific information pulled into the AVS if appropriate.         Patient's questions were answered.  Thank you for allowing me to participate in the care of this patient.  Dictated Utilizing Dragon Dictation. Please note that portions of this note were completed with a voice recognition program. Part of this note may be an electronic transcription/translation of spoken language to printed text using the Dragon Dictation System.      [facility]   Cleveland Kearns DO  06/26/25  11:53 EDT

## 2025-06-30 ENCOUNTER — TREATMENT (OUTPATIENT)
Dept: PHYSICAL THERAPY | Facility: CLINIC | Age: 60
End: 2025-06-30
Payer: MEDICARE

## 2025-06-30 DIAGNOSIS — S62.172A CLOSED DISPLACED FRACTURE OF TRAPEZIUM OF LEFT WRIST, INITIAL ENCOUNTER: Primary | ICD-10-CM

## 2025-06-30 DIAGNOSIS — M25.532 LEFT WRIST PAIN: ICD-10-CM

## 2025-06-30 PROCEDURE — 97110 THERAPEUTIC EXERCISES: CPT | Performed by: PHYSICAL THERAPIST

## 2025-06-30 PROCEDURE — 97112 NEUROMUSCULAR REEDUCATION: CPT | Performed by: PHYSICAL THERAPIST

## 2025-06-30 PROCEDURE — 97530 THERAPEUTIC ACTIVITIES: CPT | Performed by: PHYSICAL THERAPIST

## 2025-06-30 NOTE — PROGRESS NOTES
Occupational Therapy Daily Treatment Note  [address] [place] [address]      Patient: Mariama Turner   : 1965  Referring practitioner: Tal George MD  Date of Initial Visit: Type: THERAPY  Noted: 2024  Today's Date: 2025  Patient seen for 56 sessions         Subjective Evaluation    Pain  Current pain ratin  At best pain ratin  At worst pain ratin  Location: L wrist         Mariama Turner reports: she still has pain with certain motions.    Objective   See Exercise, Manual, and Modality Logs for complete treatment.   Pt tolerated treatment well for desensitization, range of motion and strengthening.    Assessment/Plan    Visit Diagnoses:    ICD-10-CM ICD-9-CM   1. Closed displaced fracture of trapezium of left wrist, initial encounter  S62.172A 814.05   2. Left wrist pain  M25.532 719.43       Continue per POC to increase functional use of her L UE.         Timed:  Manual Therapy:    0     mins  19396;  Therapeutic Exercise:    10     mins  82370;     Therapeutic Activity:    10     mins  38876;  Ultrasound:     0     mins  80948;    Electrical Stimulation:    0     mins 81468;  Neuromuscular Srinivas:    20    mins  10462;  Self Care Management:     0     mins  01661      Timed Treatment:   40   mins   Total Treatment:     40   min    ZENY Mendzoa/L  Occupational Therapist    Electronically signed   License number [id number]

## 2025-07-08 ENCOUNTER — TREATMENT (OUTPATIENT)
Dept: PHYSICAL THERAPY | Facility: CLINIC | Age: 60
End: 2025-07-08
Payer: MEDICARE

## 2025-07-08 DIAGNOSIS — M25.532 LEFT WRIST PAIN: ICD-10-CM

## 2025-07-08 DIAGNOSIS — S62.172A CLOSED DISPLACED FRACTURE OF TRAPEZIUM OF LEFT WRIST, INITIAL ENCOUNTER: Primary | ICD-10-CM

## 2025-07-08 NOTE — PROGRESS NOTES
Occupational Therapy Daily Treatment Note  [address] [place] [address]      Patient: Mariama Turner   : 1965  Referring practitioner: Tal George MD  Date of Initial Visit: Type: THERAPY  Noted: 2024  Today's Date: 2025  Patient seen for 57 sessions         Subjective   Mariama Turner reports: my R thumb has been hurting me the most lately. I can do so much more than I could in the beginning.     Objective   See Exercise, Manual, and Modality Logs for complete treatment.   Pt tolerated treatment well for range of motion, strengthening and desensitization.     Assessment/Plan    Visit Diagnoses:    ICD-10-CM ICD-9-CM   1. Closed displaced fracture of trapezium of left wrist, initial encounter  S62.172A 814.05   2. Left wrist pain  M25.532 719.43       Continue per POC for skilled OT to increase functional use of her L hand.          Timed:  Manual Therapy:    0     mins  17199;  Therapeutic Exercise:    20    mins  70574;     Therapeutic Activity:    10     mins  91821;  Ultrasound:     0     mins  45434;    Electrical Stimulation:    0     mins 28854;  Neuromuscular Srinivas:    24    mins  27478;  Self Care Management:     0     mins  66081      Timed Treatment:   54   mins   Total Treatment:     54  min    ZENY Mendoza/L  Occupational Therapist    Electronically signed   License number [id number]

## 2025-07-10 ENCOUNTER — OFFICE VISIT (OUTPATIENT)
Dept: NEUROLOGY | Facility: CLINIC | Age: 60
End: 2025-07-10
Payer: MEDICARE

## 2025-07-10 ENCOUNTER — SPECIALTY PHARMACY (OUTPATIENT)
Age: 60
End: 2025-07-10
Payer: MEDICARE

## 2025-07-10 ENCOUNTER — SPECIALTY PHARMACY (OUTPATIENT)
Dept: NEUROLOGY | Facility: CLINIC | Age: 60
End: 2025-07-10
Payer: MEDICARE

## 2025-07-10 VITALS
HEIGHT: 62 IN | HEART RATE: 60 BPM | SYSTOLIC BLOOD PRESSURE: 132 MMHG | OXYGEN SATURATION: 97 % | WEIGHT: 168 LBS | DIASTOLIC BLOOD PRESSURE: 60 MMHG | BODY MASS INDEX: 30.91 KG/M2

## 2025-07-10 DIAGNOSIS — H81.90 VESTIBULAR DYSFUNCTION, UNSPECIFIED LATERALITY: Primary | ICD-10-CM

## 2025-07-10 DIAGNOSIS — F07.81 POST CONCUSSIVE SYNDROME: ICD-10-CM

## 2025-07-10 DIAGNOSIS — M54.81 BILATERAL OCCIPITAL NEURALGIA: ICD-10-CM

## 2025-07-10 DIAGNOSIS — R51.9 CHRONIC DAILY HEADACHE: ICD-10-CM

## 2025-07-10 RX ORDER — VITAMIN E (DL,TOCOPHERYL ACET) 180 MG
1 CAPSULE ORAL DAILY
Qty: 30 CAPSULE | Refills: 4 | Status: SHIPPED | OUTPATIENT
Start: 2025-07-10 | End: 2025-08-09

## 2025-07-10 NOTE — PROGRESS NOTES
Chief Complaint   Patient presents with    Post concussive syndrome       Patient ID: Mariama Turner is a 59 y.o. female.    HPI:  I have had the pleasure of seeing your patient today.  As you may know she is a 59-year-old female here for the management of postconcussive syndrome, migraine, vestibular dysfunction and occipital neuralgia.  She still has moments of dizziness although overall her symptoms have improved.  Certain triggers include bending forward or standing up quickly.  She has been having more headaches recently.  They are typically in the occipital head regions.  We did try an occipital nerve block which was not helpful.  She is taking magnesium daily however she is not sure it is the right one.  She does use Ubrelvy for her headaches abortively.  She says that that does work however many times her headache returns.    The following portions of the patient's history were reviewed and updated as appropriate: allergies, current medications, past family history, past medical history, past social history, past surgical history and problem list.    Review of Systems   I have reviewed the review of systems above performed by my medical assistant.      Vitals:    07/10/25 1216   BP: 132/60   Pulse: 60   SpO2: 97%       Neurological Exam  Mental Status  Awake, alert and oriented to person, place and time. Recent and remote memory are intact. Speech is normal. Language is fluent with no aphasia. Attention and concentration are normal. Fund of knowledge is appropriate for level of education.    Cranial Nerves  CN I: Sense of smell is normal.  CN II: Visual acuity is normal.  CN III, IV, VI: Extraocular movements intact bilaterally. Pupils equal round and reactive to light bilaterally.  CN V: Facial sensation is normal.  CN VII: Full and symmetric facial movement.  CN XI: Shoulder shrug strength is normal.  CN XII: Tongue midline without atrophy or fasciculations.    Motor  Normal muscle bulk throughout. No  fasciculations present. Normal muscle tone. No abnormal involuntary movements. No pronator drift.                                             Right                     Left  Rhomboids                            5                          5  Infraspinatus                          5                          5  Supraspinatus                       5                          5  Deltoid                                   5                          5   Biceps                                   5                          5  Brachioradialis                      5                          5   Triceps                                  5                          5   Pronator                                5                          5   Supinator                              5                           5   Wrist flexor                            5                          5   Wrist extensor                       5                          5   Finger flexor                          5                          5   Finger extensor                     5                          5   Interossei                              5                          5   Abductor pollicis brevis         5                          5   Flexor pollicis brevis             5                          5   Opponens pollicis                 5                          5  Extensor digitorum               5                          5  Abductor digiti minimi           5                          5   Abdominal                            5                          5  Glutei                                    5                          5  Hip abductor                         5                          5  Hip adductor                         5                          5   Iliopsoas                               5                          5   Quadriceps                           5                          5   Hamstring                             5                          5    Gastrocnemius                     5                           5   Anterior tibialis                      5                          5   Posterior tibialis                    5                          5   Peroneal                               5                          5  Ankle dorsiflexor                   5                          5  Ankle plantar flexor              5                           5  Extensor hallucis longus      5                           5    Sensory  Sensation is intact to light touch, pinprick, vibration and proprioception in all four extremities.    Reflexes  Deep tendon reflexes are 2+ and symmetric in all four extremities.    Right pathological reflexes: Rose Mary's absent.  Left pathological reflexes: Rose Mary's absent.    Coordination    Finger-to-nose, rapid alternating movements and heel-to-shin normal bilaterally without dysmetria.    Gait  Normal casual, toe, heel and tandem gait.       Physical Exam  Vitals reviewed.   Constitutional:       Appearance: She is well-developed.   HENT:      Head: Normocephalic and atraumatic.   Eyes:      Extraocular Movements: Extraocular movements intact.      Pupils: Pupils are equal, round, and reactive to light.   Cardiovascular:      Rate and Rhythm: Normal rate and regular rhythm.   Pulmonary:      Breath sounds: Normal breath sounds.   Musculoskeletal:         General: Normal range of motion.   Skin:     General: Skin is warm.   Neurological:      Coordination: Coordination is intact.      Deep Tendon Reflexes: Reflexes are normal and symmetric.   Psychiatric:         Speech: Speech normal.         Procedures    Assessment/Plan: I would like to prescribe both magnesium and vitamin B2 for her.  She will continue with the Ubrelvy abortively for now.  Hopefully this will help abortively and preventatively.  Will see her back in 4 months or sooner if needed.  A total of 45 minutes was spent face-to-face with the patient today.  Of that greater than  50% of this time was spent discussing signs and symptoms of those stated below, patient education, plan of care and prognosis.         Diagnoses and all orders for this visit:    1. Vestibular dysfunction, unspecified laterality (Primary)    2. Bilateral occipital neuralgia  -     Magnesium Oxide -Mg Supplement 500 MG capsule; Take 1 capsule by mouth Daily for 30 days.  Dispense: 30 capsule; Refill: 4  -     Riboflavin 400 MG capsule; Take 1 capsule by mouth Daily for 30 days.  Dispense: 30 capsule; Refill: 4    3. Post concussive syndrome    4. Chronic daily headache  -     Magnesium Oxide -Mg Supplement 500 MG capsule; Take 1 capsule by mouth Daily for 30 days.  Dispense: 30 capsule; Refill: 4  -     Riboflavin 400 MG capsule; Take 1 capsule by mouth Daily for 30 days.  Dispense: 30 capsule; Refill: 4    Other orders  -     ubrogepant (Ubrelvy) 100 MG tablet; Take 1 tablet by mouth 1 (One) Time for 1 dose.  Dispense: 1 tablet; Refill: 0           Jordan Rawls II, MD

## 2025-07-10 NOTE — PROGRESS NOTES
Specialty Pharmacy Patient Management Program  Refill Outreach     Ubrelvy PA submitted 7/10/2025 Key: [id number]      Lila Muniz  7/10/2025  14:59 EDT

## 2025-07-10 NOTE — PROGRESS NOTES
Specialty Pharmacy Patient Management Program  Refill Outreach     Ubrelvy PA approved  for life with  plan Key: [id number]      Lila Muniz  7/10/2025  15:06 EDT

## 2025-07-13 ENCOUNTER — HOSPITAL ENCOUNTER (OUTPATIENT)
Dept: SLEEP MEDICINE | Facility: HOSPITAL | Age: 60
Discharge: HOME | End: 2025-07-13
Admitting: STUDENT IN AN ORGANIZED HEALTH CARE EDUCATION/TRAINING PROGRAM
Payer: MEDICARE

## 2025-07-13 DIAGNOSIS — R06.83 SNORING: ICD-10-CM

## 2025-07-13 DIAGNOSIS — G47.19 EXCESSIVE DAYTIME SLEEPINESS: ICD-10-CM

## 2025-07-13 PROCEDURE — 95810 POLYSOM 6/> YRS 4/> PARAM: CPT

## 2025-07-21 ENCOUNTER — LAB (OUTPATIENT)
Dept: LAB | Facility: HOSPITAL | Age: 60
End: 2025-07-21
Payer: MEDICARE
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
## 2025-07-21 DIAGNOSIS — R10.9 RIGHT SIDED ABDOMINAL PAIN: ICD-10-CM

## 2025-07-21 DIAGNOSIS — Z79.899 MEDICATION MANAGEMENT: ICD-10-CM

## 2025-07-21 DIAGNOSIS — R10.11 RUQ PAIN: ICD-10-CM

## 2025-07-21 LAB
ALBUMIN SERPL-MCNC: 4.2 G/DL (ref 3.5–5.2)
ALBUMIN/GLOB SERPL: 1.4 G/DL
ALP SERPL-CCNC: 100 U/L (ref 39–117)
ALT SERPL W P-5'-P-CCNC: 16 U/L (ref 1–33)
ANION GAP SERPL CALCULATED.3IONS-SCNC: 9 MMOL/L (ref 5–15)
AST SERPL-CCNC: 25 U/L (ref 1–32)
BILIRUB SERPL-MCNC: 0.6 MG/DL (ref 0–1.2)
BUN SERPL-MCNC: 23 MG/DL (ref 8–23)
BUN/CREAT SERPL: 26.4 (ref 7–25)
CALCIUM SPEC-SCNC: 9.4 MG/DL (ref 8.6–10.5)
CHLORIDE SERPL-SCNC: 103 MMOL/L (ref 98–107)
CO2 SERPL-SCNC: 24 MMOL/L (ref 22–29)
CREAT SERPL-MCNC: 0.87 MG/DL (ref 0.57–1)
EGFRCR SERPLBLD CKD-EPI 2021: 75.9 ML/MIN/1.73
GLOBULIN UR ELPH-MCNC: 2.9 GM/DL
GLUCOSE SERPL-MCNC: 96 MG/DL (ref 65–99)
POTASSIUM SERPL-SCNC: 4.1 MMOL/L (ref 3.5–5.2)
PROT SERPL-MCNC: 7.1 G/DL (ref 6–8.5)
SODIUM SERPL-SCNC: 136 MMOL/L (ref 136–145)

## 2025-07-21 PROCEDURE — 86757 RICKETTSIA ANTIBODY: CPT

## 2025-07-21 PROCEDURE — 36415 COLL VENOUS BLD VENIPUNCTURE: CPT

## 2025-07-21 PROCEDURE — 82784 ASSAY IGA/IGD/IGG/IGM EACH: CPT

## 2025-07-21 PROCEDURE — 80053 COMPREHEN METABOLIC PANEL: CPT

## 2025-07-21 PROCEDURE — 86617 LYME DISEASE ANTIBODY: CPT

## 2025-07-21 PROCEDURE — 86364 TISS TRNSGLTMNASE EA IG CLAS: CPT

## 2025-07-21 PROCEDURE — 87484 EHRLICHA CHAFFEENSIS AMP PRB: CPT

## 2025-07-21 PROCEDURE — 87468 ANAPLSMA PHGCYTOPHLM AMP PRB: CPT

## 2025-07-21 PROCEDURE — 86231 EMA EACH IG CLASS: CPT

## 2025-07-22 LAB
ENDOMYSIUM IGA SER QL: NEGATIVE
IGA SERPL-MCNC: 190 MG/DL (ref 87–352)
TTG IGA SER-ACNC: <2 U/ML (ref 0–3)

## 2025-07-23 DIAGNOSIS — G47.33 OSA (OBSTRUCTIVE SLEEP APNEA): Primary | ICD-10-CM

## 2025-07-25 LAB
A PHAGOCYTOPH DNA BLD QL NAA+PROBE: NEGATIVE
EHRLICHIA DNA SPEC QL NAA+PROBE: NEGATIVE

## 2025-07-26 ENCOUNTER — TELEPHONE (OUTPATIENT)
Dept: SLEEP MEDICINE | Facility: HOSPITAL | Age: 60
End: 2025-07-26
Payer: MEDICARE

## 2025-07-26 LAB
B BURGDOR IGG PATRN SER IB-IMP: NEGATIVE
B BURGDOR IGM PATRN SER IB-IMP: NEGATIVE
B BURGDOR18KD IGG SER QL IB: NORMAL
B BURGDOR23KD IGG SER QL IB: NORMAL
B BURGDOR23KD IGM SER QL IB: NORMAL
B BURGDOR28KD IGG SER QL IB: NORMAL
B BURGDOR30KD IGG SER QL IB: NORMAL
B BURGDOR39KD IGG SER QL IB: PRESENT
B BURGDOR39KD IGM SER QL IB: NORMAL
B BURGDOR41KD IGG SER QL IB: PRESENT
B BURGDOR41KD IGM SER QL IB: NORMAL
B BURGDOR45KD IGG SER QL IB: NORMAL
B BURGDOR58KD IGG SER QL IB: PRESENT
B BURGDOR66KD IGG SER QL IB: NORMAL
B BURGDOR93KD IGG SER QL IB: NORMAL
CLINICAL INFO: NORMAL
RESULT COMMENT:: NORMAL
RICK SF IGG TITR SER: NORMAL {TITER}
RICK SF IGM TITR SER: NORMAL {TITER}

## 2025-07-30 ENCOUNTER — OFFICE VISIT (OUTPATIENT)
Dept: INTERNAL MEDICINE | Facility: CLINIC | Age: 60
End: 2025-07-30
Payer: MEDICARE

## 2025-07-30 VITALS
HEIGHT: 62 IN | OXYGEN SATURATION: 96 % | BODY MASS INDEX: 30.73 KG/M2 | HEART RATE: 65 BPM | WEIGHT: 167 LBS | DIASTOLIC BLOOD PRESSURE: 75 MMHG | SYSTOLIC BLOOD PRESSURE: 122 MMHG | TEMPERATURE: 98.2 F

## 2025-07-30 DIAGNOSIS — L98.9 SKIN LESION: ICD-10-CM

## 2025-07-30 DIAGNOSIS — M79.89 CALF SWELLING: ICD-10-CM

## 2025-07-30 DIAGNOSIS — M79.662 PAIN OF LEFT CALF: Primary | ICD-10-CM

## 2025-07-30 NOTE — PROGRESS NOTES
Chief Complaint  Leg Pain (Left calf pain for about 10 days and getting worse. /Left 41/Right 40) and Skin Problem (Raised bump on right inner thigh)    Subjective          Mariama Turner presents to [facility]  History of Present Illness    History of Present Illness  The patient presents for leg pain.    She began experiencing leg pain approximately 10 days ago, initially attributing it to sciatica. The pain has been progressively worsening, causing her to spend most of her time in bed with her back elevated. The pain intensifies during walking, although it has slightly improved compared to the previous day. The pain is described as an ache, occasionally throbbing, and is accompanied by mild knee discomfort. Her sleep has been disrupted due to the pain, leading her to take Ultram for the past two nights, but without relief. She recalls a similar issue with one of her legs in her 20s, which was managed with a circulation-enhancing medication prescribed by a . She recently embarked on a 7-hour car journey, but the pain had already started before this trip. She has been unable to attend the gym for the past 14 days due to the leg pain. Prior to the onset of this pain, she experienced severe hip discomfort when lying on her side, which she initially thought might be sciatica. She is currently on a daily regimen of Lasix 40 mg and potassium, taken twice daily.    Social History:  Sleep: Reports disrupted sleep due to leg pain    Current Outpatient Medications   Medication Instructions    albuterol sulfate  (90 Base) MCG/ACT inhaler 2 puffs, Inhalation, Every 4 Hours PRN    atenolol (TENORMIN) 25 mg, Oral, Daily    cetirizine (ZYRTEC) 10 mg, Oral, Daily    estradiol (Kendra) 0.075 MG/24HR patch 1 patch, Transdermal, 2 Times Weekly    ezetimibe (ZETIA) 10 mg, Oral, Daily    fluticasone (Flonase) 50 MCG/ACT nasal spray 2 sprays, Nasal, Daily    furosemide (LASIX)  "40 mg, Oral, Daily    levothyroxine (SYNTHROID) 88 mcg, Oral, Daily    Magnesium Oxide -Mg Supplement 500 mg, Oral, Daily    meclizine (ANTIVERT) 25 mg, Oral, 3 Times Daily PRN    methocarbamol (ROBAXIN) 500 mg, Oral, 3 Times Daily PRN    pantoprazole (PROTONIX) 40 mg, Oral, Daily    pitavastatin calcium (LIVALO) 1 mg, Oral, Daily    potassium chloride 10 MEQ CR tablet 10 mEq, Oral, 2 Times Daily    Riboflavin 400 mg, Oral, Daily    Scopolamine (Transderm-Scop, 1.5 MG,) 1 MG/3DAYS patch 1 patch, Transdermal, Every 72 Hours, PRN    traMADol (ULTRAM) 50 mg, Oral, Every 8 Hours PRN    valACYclovir (VALTREX) 1,000 mg, Oral, Daily       The following portions of the patient's history were reviewed and updated as appropriate: allergies, current medications, past family history, past medical history, past social history, past surgical history, and problem list.    Objective   Vital Signs:   /75   Pulse 65   Temp 98.2 °F (36.8 °C)   Ht 157.5 cm (62\")   Wt 75.8 kg (167 lb)   SpO2 96%   BMI 30.54 kg/m²     BP Readings from Last 3 Encounters:   07/30/25 122/75   07/10/25 132/60   06/26/25 115/70     Wt Readings from Last 3 Encounters:   07/30/25 75.8 kg (167 lb)   07/10/25 76.2 kg (168 lb)   06/26/25 76.2 kg (168 lb 1.6 oz)           Physical Exam     Appearance: No acute distress, well-nourished  Head: normocephalic, atraumatic  Eyes: extraocular movements intact, no scleral icterus, no conjunctival injection  Ears, Nose, and Throat: external ears normal, nares patent, moist mucous membranes  Cardiovascular: regular rate and rhythm. no murmurs, rubs, or gallops. no edema  Respiratory: breathing comfortably, symmetric chest rise, clear to auscultation bilaterally. No wheezes, rales, or rhonchi.  Neuro: alert and oriented to time, place, and person. Normal gait  Psych: normal mood and affect   Skin: right inner thigh   Physical Exam  Cardiovascular: left leg 41 cm   Right leg 40 cm       Result Review :   The " following data was reviewed by: MIGNON Gaitan on 07/30/2025:  Common labs          2/6/2025    15:47 4/24/2025    12:37 7/21/2025    15:27   Common Labs   Glucose  84  96    BUN  14  23.0    Creatinine  0.76  0.87    Sodium  139  136    Potassium  4.0  4.1    Chloride  103  103    Calcium  9.3  9.4    Albumin 4.1  4.3  4.2    Total Bilirubin 0.3  0.5  0.6    Alkaline Phosphatase 125  121  100    AST (SGOT) 21  20  25    ALT (SGPT) 21  18  16    WBC  5.45     Hemoglobin  14.1     Hematocrit  43.2     Platelets  241     Total Cholesterol  186     Triglycerides  168     HDL Cholesterol  62     LDL Cholesterol   95         Results           Lab Results   Component Value Date    SARSANTIGEN Not Detected 11/14/2022    COVID19 Not Detected 11/14/2022    RAPFLUA Negative 11/14/2022    RAPFLUB Negative 11/14/2022    FLUAAG Not Detected 09/23/2021    FLU Negative 04/28/2022    FLU Negative 04/28/2022    FLUBAG Not Detected 09/23/2021    RAPSCRN Negative 11/14/2022    INR 0.98 11/05/2024    BILIRUBINUR Negative 01/28/2023            Assessment and Plan    Diagnoses and all orders for this visit:    1. Pain of left calf (Primary)  -     Duplex Venous Lower Extremity - Left CAR; Future    2. Calf swelling  -     Duplex Venous Lower Extremity - Left CAR; Future    3. Skin lesion  -     Ambulatory Referral to Dermatology      STAT venous doppler ordered to rule out DVT. Will call patient for eliquis dosing if positive as I highly suspect.   Pharmacy updated in chart.     Assessment & Plan  1. Leg pain.  - Persistent for about 10 days with recent improvement noted.  - Pain described as an ache that sometimes becomes a throbbing ache, affecting sleep.  - Doppler ultrasound ordered to rule out deep vein thrombosis (DVT).  - If DVT is confirmed, Eliquis will be prescribed and sent to pharmacy.    There are no discontinued medications.       Follow Up   No follow-ups on file.  Patient was given instructions and counseling  regarding her condition or for health maintenance advice. Please see specific information pulled into the AVS if appropriate.       MIGNON Gaitan  07/30/25  15:52 EDT      Patient or patient representative verbalized consent for the use of Ambient Listening during the visit with  MIGNON Gaitan for chart documentation. 7/30/2025  15:52 EDT

## 2025-08-05 ENCOUNTER — HOSPITAL ENCOUNTER (OUTPATIENT)
Dept: MAMMOGRAPHY | Facility: HOSPITAL | Age: 60
Discharge: HOME | End: 2025-08-05
Admitting: NURSE PRACTITIONER
Payer: MEDICARE

## 2025-08-05 DIAGNOSIS — Z12.31 ENCOUNTER FOR SCREENING MAMMOGRAM FOR MALIGNANT NEOPLASM OF BREAST: ICD-10-CM

## 2025-08-05 PROCEDURE — 77067 SCR MAMMO BI INCL CAD: CPT

## 2025-08-05 PROCEDURE — 77063 BREAST TOMOSYNTHESIS BI: CPT

## 2025-08-07 ENCOUNTER — OFFICE VISIT (OUTPATIENT)
Dept: INTERNAL MEDICINE | Facility: CLINIC | Age: 60
End: 2025-08-07
Payer: MEDICARE

## 2025-08-07 ENCOUNTER — HOSPITAL ENCOUNTER (OUTPATIENT)
Dept: GENERAL RADIOLOGY | Facility: HOSPITAL | Age: 60
Discharge: HOME | End: 2025-08-07
Admitting: INTERNAL MEDICINE
Payer: MEDICARE

## 2025-08-07 VITALS
SYSTOLIC BLOOD PRESSURE: 135 MMHG | WEIGHT: 165 LBS | TEMPERATURE: 98.2 F | BODY MASS INDEX: 29.23 KG/M2 | DIASTOLIC BLOOD PRESSURE: 79 MMHG | HEIGHT: 63 IN | OXYGEN SATURATION: 95 % | HEART RATE: 84 BPM

## 2025-08-07 DIAGNOSIS — R05.1 ACUTE COUGH: ICD-10-CM

## 2025-08-07 DIAGNOSIS — J06.9 UPPER RESPIRATORY TRACT INFECTION, UNSPECIFIED TYPE: Primary | ICD-10-CM

## 2025-08-07 PROCEDURE — 3075F SYST BP GE 130 - 139MM HG: CPT | Performed by: INTERNAL MEDICINE

## 2025-08-07 PROCEDURE — 71046 X-RAY EXAM CHEST 2 VIEWS: CPT

## 2025-08-07 PROCEDURE — 3078F DIAST BP <80 MM HG: CPT | Performed by: INTERNAL MEDICINE

## 2025-08-07 PROCEDURE — 1125F AMNT PAIN NOTED PAIN PRSNT: CPT | Performed by: INTERNAL MEDICINE

## 2025-08-07 PROCEDURE — 99214 OFFICE O/P EST MOD 30 MIN: CPT | Performed by: INTERNAL MEDICINE

## 2025-08-07 RX ORDER — LEVOFLOXACIN 750 MG/1
750 TABLET, FILM COATED ORAL DAILY
Qty: 5 TABLET | Refills: 0 | Status: SHIPPED | OUTPATIENT
Start: 2025-08-07 | End: 2025-08-12

## 2025-08-07 RX ORDER — FLUCONAZOLE 150 MG/1
150 TABLET ORAL
Qty: 3 TABLET | Refills: 0 | Status: SHIPPED | OUTPATIENT
Start: 2025-08-07

## 2025-08-07 RX ORDER — LEVOFLOXACIN 750 MG/1
750 TABLET, FILM COATED ORAL DAILY
Qty: 5 TABLET | Refills: 0 | Status: SHIPPED | OUTPATIENT
Start: 2025-08-07 | End: 2025-08-07

## 2025-08-07 RX ORDER — BROMPHENIRAMINE MALEATE, PSEUDOEPHEDRINE HYDROCHLORIDE, AND DEXTROMETHORPHAN HYDROBROMIDE 2; 30; 10 MG/5ML; MG/5ML; MG/5ML
5 SYRUP ORAL 3 TIMES DAILY PRN
Qty: 118 ML | Refills: 0 | Status: SHIPPED | OUTPATIENT
Start: 2025-08-07

## 2025-08-08 ENCOUNTER — TELEPHONE (OUTPATIENT)
Dept: INTERNAL MEDICINE | Facility: CLINIC | Age: 60
End: 2025-08-08
Payer: MEDICARE

## 2025-08-12 ENCOUNTER — TELEPHONE (OUTPATIENT)
Dept: INTERNAL MEDICINE | Facility: CLINIC | Age: 60
End: 2025-08-12
Payer: MEDICARE

## 2025-08-19 ENCOUNTER — HOSPITAL ENCOUNTER (OUTPATIENT)
Dept: ULTRASOUND IMAGING | Facility: HOSPITAL | Age: 60
Discharge: HOME | End: 2025-08-19
Payer: MEDICARE

## 2025-08-19 ENCOUNTER — HOSPITAL ENCOUNTER (OUTPATIENT)
Dept: MAMMOGRAPHY | Facility: HOSPITAL | Age: 60
Discharge: HOME | End: 2025-08-19
Payer: MEDICARE

## 2025-08-19 ENCOUNTER — DOCUMENTATION (OUTPATIENT)
Dept: PHYSICAL THERAPY | Facility: CLINIC | Age: 60
End: 2025-08-19
Payer: MEDICARE

## 2025-08-19 DIAGNOSIS — R92.8 ABNORMAL MAMMOGRAM: ICD-10-CM

## 2025-08-19 DIAGNOSIS — N63.10 MASS OF RIGHT BREAST, UNSPECIFIED QUADRANT: ICD-10-CM

## 2025-08-19 PROCEDURE — 76642 ULTRASOUND BREAST LIMITED: CPT

## 2025-08-20 DIAGNOSIS — J06.9 UPPER RESPIRATORY TRACT INFECTION, UNSPECIFIED TYPE: ICD-10-CM

## 2025-08-20 DIAGNOSIS — R05.1 ACUTE COUGH: ICD-10-CM

## 2025-08-21 ENCOUNTER — RESULTS FOLLOW-UP (OUTPATIENT)
Dept: INTERNAL MEDICINE | Facility: CLINIC | Age: 60
End: 2025-08-21
Payer: MEDICARE

## 2025-08-21 DIAGNOSIS — R05.1 ACUTE COUGH: ICD-10-CM

## 2025-08-21 DIAGNOSIS — J06.9 UPPER RESPIRATORY TRACT INFECTION, UNSPECIFIED TYPE: ICD-10-CM

## 2025-08-21 DIAGNOSIS — R92.8 ABNORMAL MAMMOGRAM: Primary | ICD-10-CM

## 2025-08-21 RX ORDER — BROMPHENIRAMINE MALEATE, PSEUDOEPHEDRINE HYDROCHLORIDE, AND DEXTROMETHORPHAN HYDROBROMIDE 2; 30; 10 MG/5ML; MG/5ML; MG/5ML
5 SYRUP ORAL 3 TIMES DAILY PRN
Qty: 118 ML | Refills: 0 | OUTPATIENT
Start: 2025-08-21

## 2025-08-21 RX ORDER — BROMPHENIRAMINE MALEATE, PSEUDOEPHEDRINE HYDROCHLORIDE, AND DEXTROMETHORPHAN HYDROBROMIDE 2; 30; 10 MG/5ML; MG/5ML; MG/5ML
5 SYRUP ORAL 3 TIMES DAILY PRN
Qty: 118 ML | Refills: 0 | Status: SHIPPED | OUTPATIENT
Start: 2025-08-21

## 2025-08-23 ENCOUNTER — APPOINTMENT (OUTPATIENT)
Dept: GENERAL RADIOLOGY | Facility: HOSPITAL | Age: 60
End: 2025-08-23
Payer: MEDICARE

## 2025-08-23 ENCOUNTER — HOSPITAL ENCOUNTER (EMERGENCY)
Facility: HOSPITAL | Age: 60
Discharge: HOME | End: 2025-08-24
Attending: EMERGENCY MEDICINE
Payer: MEDICARE

## 2025-08-24 ENCOUNTER — HOSPITAL ENCOUNTER (OUTPATIENT)
Facility: HOSPITAL | Age: 60
Discharge: HOME | End: 2025-08-24
Admitting: EMERGENCY MEDICINE
Payer: MEDICARE

## 2025-08-24 DIAGNOSIS — M79.89 RIGHT LEG SWELLING: ICD-10-CM

## 2025-08-24 LAB
BH CV LOWER VASCULAR LEFT COMMON FEMORAL AUGMENT: NORMAL
BH CV LOWER VASCULAR LEFT COMMON FEMORAL COMPETENT: NORMAL
BH CV LOWER VASCULAR LEFT COMMON FEMORAL COMPRESS: NORMAL
BH CV LOWER VASCULAR LEFT COMMON FEMORAL PHASIC: NORMAL
BH CV LOWER VASCULAR LEFT COMMON FEMORAL SPONT: NORMAL
BH CV LOWER VASCULAR RIGHT COMMON FEMORAL AUGMENT: NORMAL
BH CV LOWER VASCULAR RIGHT COMMON FEMORAL COMPETENT: NORMAL
BH CV LOWER VASCULAR RIGHT COMMON FEMORAL COMPRESS: NORMAL
BH CV LOWER VASCULAR RIGHT COMMON FEMORAL PHASIC: NORMAL
BH CV LOWER VASCULAR RIGHT COMMON FEMORAL SPONT: NORMAL
BH CV LOWER VASCULAR RIGHT DISTAL FEMORAL COMPRESS: NORMAL
BH CV LOWER VASCULAR RIGHT GASTRONEMIUS COMPRESS: NORMAL
BH CV LOWER VASCULAR RIGHT GREATER SAPH AK COMPRESS: NORMAL
BH CV LOWER VASCULAR RIGHT GREATER SAPH BK COMPRESS: NORMAL
BH CV LOWER VASCULAR RIGHT LESSER SAPH COMPRESS: NORMAL
BH CV LOWER VASCULAR RIGHT MID FEMORAL AUGMENT: NORMAL
BH CV LOWER VASCULAR RIGHT MID FEMORAL COMPETENT: NORMAL
BH CV LOWER VASCULAR RIGHT MID FEMORAL COMPRESS: NORMAL
BH CV LOWER VASCULAR RIGHT MID FEMORAL PHASIC: NORMAL
BH CV LOWER VASCULAR RIGHT MID FEMORAL SPONT: NORMAL
BH CV LOWER VASCULAR RIGHT PERONEAL COMPRESS: NORMAL
BH CV LOWER VASCULAR RIGHT POPLITEAL AUGMENT: NORMAL
BH CV LOWER VASCULAR RIGHT POPLITEAL COMPETENT: NORMAL
BH CV LOWER VASCULAR RIGHT POPLITEAL COMPRESS: NORMAL
BH CV LOWER VASCULAR RIGHT POPLITEAL PHASIC: NORMAL
BH CV LOWER VASCULAR RIGHT POPLITEAL SPONT: NORMAL
BH CV LOWER VASCULAR RIGHT POSTERIOR TIBIAL COMPRESS: NORMAL
BH CV LOWER VASCULAR RIGHT PROXIMAL FEMORAL COMPRESS: NORMAL
BH CV LOWER VASCULAR RIGHT SAPHENOFEMORAL JUNCTION COMPRESS: NORMAL

## 2025-08-24 PROCEDURE — 93971 EXTREMITY STUDY: CPT | Performed by: SURGERY

## 2025-08-24 PROCEDURE — 93971 EXTREMITY STUDY: CPT

## 2025-08-25 ENCOUNTER — TELEPHONE (OUTPATIENT)
Dept: INTERNAL MEDICINE | Facility: CLINIC | Age: 60
End: 2025-08-25
Payer: MEDICARE

## 2025-08-28 ENCOUNTER — OFFICE VISIT (OUTPATIENT)
Dept: INTERNAL MEDICINE | Facility: CLINIC | Age: 60
End: 2025-08-28
Payer: MEDICARE

## 2025-08-28 VITALS
HEIGHT: 63 IN | BODY MASS INDEX: 30.09 KG/M2 | TEMPERATURE: 98.4 F | HEART RATE: 65 BPM | WEIGHT: 169.8 LBS | DIASTOLIC BLOOD PRESSURE: 70 MMHG | SYSTOLIC BLOOD PRESSURE: 128 MMHG | OXYGEN SATURATION: 98 %

## 2025-08-28 DIAGNOSIS — R20.2 PARESTHESIA OF BOTH LOWER EXTREMITIES: ICD-10-CM

## 2025-08-28 DIAGNOSIS — R60.0 BILATERAL LOWER EXTREMITY EDEMA: ICD-10-CM

## 2025-08-28 DIAGNOSIS — N17.9 AKI (ACUTE KIDNEY INJURY): Primary | ICD-10-CM

## 2025-08-28 DIAGNOSIS — R09.89 OTHER SPECIFIED SYMPTOMS AND SIGNS INVOLVING THE CIRCULATORY AND RESPIRATORY SYSTEMS: ICD-10-CM

## 2025-08-28 LAB
ANION GAP SERPL CALCULATED.3IONS-SCNC: 12 MMOL/L (ref 5–15)
BUN SERPL-MCNC: 21 MG/DL (ref 8–23)
BUN/CREAT SERPL: 25.6 (ref 7–25)
CALCIUM SPEC-SCNC: 9.8 MG/DL (ref 8.6–10.5)
CHLORIDE SERPL-SCNC: 103 MMOL/L (ref 98–107)
CO2 SERPL-SCNC: 26 MMOL/L (ref 22–29)
CREAT SERPL-MCNC: 0.82 MG/DL (ref 0.57–1)
EGFRCR SERPLBLD CKD-EPI 2021: 81.5 ML/MIN/1.73
GLUCOSE SERPL-MCNC: 73 MG/DL (ref 65–99)
POTASSIUM SERPL-SCNC: 3.9 MMOL/L (ref 3.5–5.2)
SODIUM SERPL-SCNC: 141 MMOL/L (ref 136–145)

## 2025-08-28 PROCEDURE — 80048 BASIC METABOLIC PNL TOTAL CA: CPT | Performed by: NURSE PRACTITIONER

## 2025-08-29 ENCOUNTER — RESULTS FOLLOW-UP (OUTPATIENT)
Dept: INTERNAL MEDICINE | Facility: CLINIC | Age: 60
End: 2025-08-29
Payer: MEDICARE

## (undated) DEVICE — SOLIDIFIER LIQLOC PLS 1500CC BT

## (undated) DEVICE — THE SINGLE USE ETRAP – POLYP TRAP IS USED FOR SUCTION RETRIEVAL OF ENDOSCOPICALLY REMOVED POLYPS.: Brand: ETRAP

## (undated) DEVICE — DEFENDO AIR WATER SUCTION AND BIOPSY VALVE KIT FOR  OLYMPUS: Brand: DEFENDO AIR/WATER/SUCTION AND BIOPSY VALVE

## (undated) DEVICE — Device

## (undated) DEVICE — SOL IRRG H2O PL/BG 1000ML STRL

## (undated) DEVICE — LINER SURG CANSTR SXN S/RIGD 1500CC

## (undated) DEVICE — SINGLE-USE BIOPSY FORCEPS: Brand: RADIAL JAW 4

## (undated) DEVICE — BLCK/BITE BLOX WO/DENTL/RIM W/STRAP 54F

## (undated) DEVICE — CONN JET HYDRA H20 AUXILIARY DISP

## (undated) DEVICE — SNAR E/S POLYP SNAREMASTER OVL/10MM 2.8X2300MM YEL